# Patient Record
Sex: MALE | Race: BLACK OR AFRICAN AMERICAN | NOT HISPANIC OR LATINO
[De-identification: names, ages, dates, MRNs, and addresses within clinical notes are randomized per-mention and may not be internally consistent; named-entity substitution may affect disease eponyms.]

---

## 2017-01-03 ENCOUNTER — MEDICATION RENEWAL (OUTPATIENT)
Age: 67
End: 2017-01-03

## 2017-01-25 ENCOUNTER — APPOINTMENT (OUTPATIENT)
Dept: INTERNAL MEDICINE | Facility: CLINIC | Age: 67
End: 2017-01-25

## 2017-01-25 VITALS
DIASTOLIC BLOOD PRESSURE: 78 MMHG | WEIGHT: 173 LBS | HEIGHT: 69 IN | HEART RATE: 75 BPM | OXYGEN SATURATION: 98 % | TEMPERATURE: 97.5 F | SYSTOLIC BLOOD PRESSURE: 132 MMHG | BODY MASS INDEX: 25.62 KG/M2

## 2017-01-27 LAB
GLUCOSE BLDC GLUCOMTR-MCNC: 174
HBA1C MFR BLD HPLC: 7.6

## 2017-02-01 ENCOUNTER — MEDICATION RENEWAL (OUTPATIENT)
Age: 67
End: 2017-02-01

## 2017-02-15 ENCOUNTER — RX RENEWAL (OUTPATIENT)
Age: 67
End: 2017-02-15

## 2017-02-19 ENCOUNTER — EMERGENCY (EMERGENCY)
Facility: HOSPITAL | Age: 67
LOS: 1 days | Discharge: LEFT BEFORE DISCHARGE | End: 2017-02-19
Attending: EMERGENCY MEDICINE | Admitting: EMERGENCY MEDICINE

## 2017-02-19 VITALS
DIASTOLIC BLOOD PRESSURE: 64 MMHG | OXYGEN SATURATION: 100 % | HEART RATE: 85 BPM | RESPIRATION RATE: 16 BRPM | HEIGHT: 69 IN | TEMPERATURE: 98 F | WEIGHT: 171.08 LBS | SYSTOLIC BLOOD PRESSURE: 107 MMHG

## 2017-02-19 NOTE — ED ADULT NURSE NOTE - CHIEF COMPLAINT QUOTE
pt is unable to release the button on his prosthetic right leg.  No swelling to leg, button not working

## 2017-02-19 NOTE — ED STATDOCS - OBJECTIVE STATEMENT
67 y/o male with hx of gangrene and right leg amputation presents to ED c/o prosthetic leg malfunction. Pt has a prosthetic right leg and the button on the leg is not working to come off his leg. Pt reports he puts his prosthetic leg on daily. Pt has an orthopedist however he is closed until Tuesday so pt reported to the ED. Prosthetic was put in place by  No further complaints at this time.

## 2017-02-19 NOTE — ED STATDOCS - NS ED MD SCRIBE ATTENDING SCRIBE SECTIONS
VITAL SIGNS( Pullset)/HISTORY OF PRESENT ILLNESS/PAST MEDICAL/SURGICAL/SOCIAL HISTORY/DISPOSITION/HIV/REVIEW OF SYSTEMS/PHYSICAL EXAM/INTAKE ASSESSMENT/SCREENINGS

## 2017-03-06 ENCOUNTER — APPOINTMENT (OUTPATIENT)
Dept: INTERNAL MEDICINE | Facility: CLINIC | Age: 67
End: 2017-03-06

## 2017-03-06 VITALS
OXYGEN SATURATION: 98 % | HEIGHT: 69 IN | TEMPERATURE: 97.6 F | WEIGHT: 172 LBS | SYSTOLIC BLOOD PRESSURE: 120 MMHG | HEART RATE: 81 BPM | DIASTOLIC BLOOD PRESSURE: 74 MMHG | BODY MASS INDEX: 25.48 KG/M2

## 2017-04-11 ENCOUNTER — FORM ENCOUNTER (OUTPATIENT)
Age: 67
End: 2017-04-11

## 2017-04-11 ENCOUNTER — APPOINTMENT (OUTPATIENT)
Dept: INTERNAL MEDICINE | Facility: CLINIC | Age: 67
End: 2017-04-11

## 2017-04-11 VITALS
SYSTOLIC BLOOD PRESSURE: 118 MMHG | WEIGHT: 171 LBS | OXYGEN SATURATION: 98 % | BODY MASS INDEX: 25.33 KG/M2 | DIASTOLIC BLOOD PRESSURE: 80 MMHG | HEART RATE: 86 BPM | TEMPERATURE: 98 F | HEIGHT: 69 IN

## 2017-04-12 ENCOUNTER — OUTPATIENT (OUTPATIENT)
Dept: OUTPATIENT SERVICES | Facility: HOSPITAL | Age: 67
LOS: 1 days | End: 2017-04-12
Payer: MEDICARE

## 2017-04-12 ENCOUNTER — APPOINTMENT (OUTPATIENT)
Dept: RADIOLOGY | Facility: CLINIC | Age: 67
End: 2017-04-12

## 2017-04-12 DIAGNOSIS — E11.621 TYPE 2 DIABETES MELLITUS WITH FOOT ULCER: ICD-10-CM

## 2017-04-12 LAB
ALBUMIN SERPL ELPH-MCNC: 4.2 G/DL
ALP BLD-CCNC: 82 U/L
ALT SERPL-CCNC: 11 U/L
ANION GAP SERPL CALC-SCNC: 15 MMOL/L
AST SERPL-CCNC: 11 U/L
BASOPHILS # BLD AUTO: 0.02 K/UL
BASOPHILS NFR BLD AUTO: 0.2 %
BILIRUB SERPL-MCNC: 0.2 MG/DL
BUN SERPL-MCNC: 15 MG/DL
CALCIUM SERPL-MCNC: 9.8 MG/DL
CHLORIDE SERPL-SCNC: 98 MMOL/L
CHOLEST SERPL-MCNC: 162 MG/DL
CHOLEST/HDLC SERPL: 5.2 RATIO
CO2 SERPL-SCNC: 26 MMOL/L
CREAT SERPL-MCNC: 1.09 MG/DL
EOSINOPHIL # BLD AUTO: 0.12 K/UL
EOSINOPHIL NFR BLD AUTO: 1.1 %
GLUCOSE SERPL-MCNC: 164 MG/DL
HBA1C MFR BLD HPLC: 8 %
HCT VFR BLD CALC: 38.5 %
HDLC SERPL-MCNC: 31 MG/DL
HGB BLD-MCNC: 13 G/DL
IMM GRANULOCYTES NFR BLD AUTO: 0.4 %
LDLC SERPL CALC-MCNC: 102 MG/DL
LYMPHOCYTES # BLD AUTO: 2.86 K/UL
LYMPHOCYTES NFR BLD AUTO: 26 %
MAN DIFF?: NORMAL
MCHC RBC-ENTMCNC: 28.8 PG
MCHC RBC-ENTMCNC: 33.8 GM/DL
MCV RBC AUTO: 85.4 FL
MONOCYTES # BLD AUTO: 0.71 K/UL
MONOCYTES NFR BLD AUTO: 6.4 %
NEUTROPHILS # BLD AUTO: 7.27 K/UL
NEUTROPHILS NFR BLD AUTO: 65.9 %
PLATELET # BLD AUTO: 278 K/UL
POTASSIUM SERPL-SCNC: 4.6 MMOL/L
PROT SERPL-MCNC: 7.3 G/DL
RBC # BLD: 4.51 M/UL
RBC # FLD: 12.8 %
SODIUM SERPL-SCNC: 139 MMOL/L
TRIGL SERPL-MCNC: 147 MG/DL
WBC # FLD AUTO: 11.02 K/UL

## 2017-04-12 PROCEDURE — 73630 X-RAY EXAM OF FOOT: CPT

## 2017-04-17 LAB — BACTERIA WND CULT: ABNORMAL

## 2017-04-25 ENCOUNTER — APPOINTMENT (OUTPATIENT)
Dept: INTERNAL MEDICINE | Facility: CLINIC | Age: 67
End: 2017-04-25

## 2017-04-25 VITALS
BODY MASS INDEX: 25.33 KG/M2 | WEIGHT: 171 LBS | SYSTOLIC BLOOD PRESSURE: 120 MMHG | DIASTOLIC BLOOD PRESSURE: 80 MMHG | HEIGHT: 69 IN | OXYGEN SATURATION: 98 % | TEMPERATURE: 98.1 F | HEART RATE: 83 BPM

## 2017-05-04 ENCOUNTER — RX RENEWAL (OUTPATIENT)
Age: 67
End: 2017-05-04

## 2017-05-10 ENCOUNTER — APPOINTMENT (OUTPATIENT)
Dept: VASCULAR SURGERY | Facility: CLINIC | Age: 67
End: 2017-05-10

## 2017-05-10 ENCOUNTER — INPATIENT (INPATIENT)
Facility: HOSPITAL | Age: 67
LOS: 2 days | Discharge: HOME | DRG: 256 | End: 2017-05-13
Attending: HOSPITALIST | Admitting: HOSPITALIST
Payer: MEDICARE

## 2017-05-10 VITALS
DIASTOLIC BLOOD PRESSURE: 77 MMHG | HEART RATE: 81 BPM | OXYGEN SATURATION: 99 % | SYSTOLIC BLOOD PRESSURE: 129 MMHG | TEMPERATURE: 98 F | RESPIRATION RATE: 16 BRPM | HEIGHT: 69 IN | WEIGHT: 171.08 LBS

## 2017-05-10 VITALS
RESPIRATION RATE: 15 BRPM | WEIGHT: 171 LBS | HEART RATE: 80 BPM | SYSTOLIC BLOOD PRESSURE: 125 MMHG | TEMPERATURE: 97.4 F | BODY MASS INDEX: 25.33 KG/M2 | DIASTOLIC BLOOD PRESSURE: 72 MMHG | HEIGHT: 69 IN | OXYGEN SATURATION: 99 %

## 2017-05-10 DIAGNOSIS — I96 GANGRENE, NOT ELSEWHERE CLASSIFIED: ICD-10-CM

## 2017-05-10 LAB
ALBUMIN SERPL ELPH-MCNC: 4.4 G/DL — SIGNIFICANT CHANGE UP (ref 3.3–5.2)
ALP SERPL-CCNC: 102 U/L — SIGNIFICANT CHANGE UP (ref 40–120)
ALT FLD-CCNC: 9 U/L — SIGNIFICANT CHANGE UP
ANION GAP SERPL CALC-SCNC: 13 MMOL/L — SIGNIFICANT CHANGE UP (ref 5–17)
APTT BLD: 37.2 SEC — SIGNIFICANT CHANGE UP (ref 27.5–37.4)
AST SERPL-CCNC: 12 U/L — SIGNIFICANT CHANGE UP
BASOPHILS # BLD AUTO: 0 K/UL — SIGNIFICANT CHANGE UP (ref 0–0.2)
BASOPHILS NFR BLD AUTO: 0.2 % — SIGNIFICANT CHANGE UP (ref 0–2)
BILIRUB SERPL-MCNC: 0.1 MG/DL — LOW (ref 0.4–2)
BLD GP AB SCN SERPL QL: SIGNIFICANT CHANGE UP
BUN SERPL-MCNC: 19 MG/DL — SIGNIFICANT CHANGE UP (ref 8–20)
CALCIUM SERPL-MCNC: 9.4 MG/DL — SIGNIFICANT CHANGE UP (ref 8.6–10.2)
CHLORIDE SERPL-SCNC: 99 MMOL/L — SIGNIFICANT CHANGE UP (ref 98–107)
CO2 SERPL-SCNC: 26 MMOL/L — SIGNIFICANT CHANGE UP (ref 22–29)
CREAT SERPL-MCNC: 0.91 MG/DL — SIGNIFICANT CHANGE UP (ref 0.5–1.3)
EOSINOPHIL # BLD AUTO: 0.1 K/UL — SIGNIFICANT CHANGE UP (ref 0–0.5)
EOSINOPHIL NFR BLD AUTO: 1.3 % — SIGNIFICANT CHANGE UP (ref 0–5)
GLUCOSE SERPL-MCNC: 130 MG/DL — HIGH (ref 70–115)
HCT VFR BLD CALC: 36.8 % — LOW (ref 42–52)
HGB BLD-MCNC: 12.5 G/DL — LOW (ref 14–18)
INR BLD: 1.01 RATIO — SIGNIFICANT CHANGE UP (ref 0.88–1.16)
LYMPHOCYTES # BLD AUTO: 2.7 K/UL — SIGNIFICANT CHANGE UP (ref 1–4.8)
LYMPHOCYTES # BLD AUTO: 30.5 % — SIGNIFICANT CHANGE UP (ref 20–55)
MCHC RBC-ENTMCNC: 29 PG — SIGNIFICANT CHANGE UP (ref 27–31)
MCHC RBC-ENTMCNC: 34 G/DL — SIGNIFICANT CHANGE UP (ref 32–36)
MCV RBC AUTO: 85.4 FL — SIGNIFICANT CHANGE UP (ref 80–94)
MONOCYTES # BLD AUTO: 0.9 K/UL — HIGH (ref 0–0.8)
MONOCYTES NFR BLD AUTO: 10 % — SIGNIFICANT CHANGE UP (ref 3–10)
NEUTROPHILS # BLD AUTO: 5.2 K/UL — SIGNIFICANT CHANGE UP (ref 1.8–8)
NEUTROPHILS NFR BLD AUTO: 57.8 % — SIGNIFICANT CHANGE UP (ref 37–73)
PLATELET # BLD AUTO: 296 K/UL — SIGNIFICANT CHANGE UP (ref 150–400)
POTASSIUM SERPL-MCNC: 4 MMOL/L — SIGNIFICANT CHANGE UP (ref 3.5–5.3)
POTASSIUM SERPL-SCNC: 4 MMOL/L — SIGNIFICANT CHANGE UP (ref 3.5–5.3)
PROT SERPL-MCNC: 7.8 G/DL — SIGNIFICANT CHANGE UP (ref 6.6–8.7)
PROTHROM AB SERPL-ACNC: 11.1 SEC — SIGNIFICANT CHANGE UP (ref 9.8–12.7)
RBC # BLD: 4.31 M/UL — LOW (ref 4.6–6.2)
RBC # FLD: 12.8 % — SIGNIFICANT CHANGE UP (ref 11–15.6)
SODIUM SERPL-SCNC: 138 MMOL/L — SIGNIFICANT CHANGE UP (ref 135–145)
TYPE + AB SCN PNL BLD: SIGNIFICANT CHANGE UP
WBC # BLD: 8.9 K/UL — SIGNIFICANT CHANGE UP (ref 4.8–10.8)
WBC # FLD AUTO: 8.9 K/UL — SIGNIFICANT CHANGE UP (ref 4.8–10.8)

## 2017-05-10 PROCEDURE — 99285 EMERGENCY DEPT VISIT HI MDM: CPT

## 2017-05-10 PROCEDURE — 99223 1ST HOSP IP/OBS HIGH 75: CPT

## 2017-05-10 PROCEDURE — 73660 X-RAY EXAM OF TOE(S): CPT | Mod: 26,LT

## 2017-05-10 PROCEDURE — 99222 1ST HOSP IP/OBS MODERATE 55: CPT

## 2017-05-10 RX ORDER — PIPERACILLIN AND TAZOBACTAM 4; .5 G/20ML; G/20ML
3.38 INJECTION, POWDER, LYOPHILIZED, FOR SOLUTION INTRAVENOUS ONCE
Qty: 0 | Refills: 0 | Status: COMPLETED | OUTPATIENT
Start: 2017-05-10 | End: 2017-05-10

## 2017-05-10 RX ORDER — PANTOPRAZOLE SODIUM 20 MG/1
40 TABLET, DELAYED RELEASE ORAL
Qty: 0 | Refills: 0 | Status: DISCONTINUED | OUTPATIENT
Start: 2017-05-10 | End: 2017-05-11

## 2017-05-10 RX ORDER — SODIUM CHLORIDE 9 MG/ML
3 INJECTION INTRAMUSCULAR; INTRAVENOUS; SUBCUTANEOUS ONCE
Qty: 0 | Refills: 0 | Status: COMPLETED | OUTPATIENT
Start: 2017-05-10 | End: 2017-05-10

## 2017-05-10 RX ORDER — DEXTROSE 50 % IN WATER 50 %
25 SYRINGE (ML) INTRAVENOUS ONCE
Qty: 0 | Refills: 0 | Status: DISCONTINUED | OUTPATIENT
Start: 2017-05-10 | End: 2017-05-11

## 2017-05-10 RX ORDER — INSULIN LISPRO 100/ML
VIAL (ML) SUBCUTANEOUS
Qty: 0 | Refills: 0 | Status: DISCONTINUED | OUTPATIENT
Start: 2017-05-10 | End: 2017-05-11

## 2017-05-10 RX ORDER — ACETAMINOPHEN 500 MG
650 TABLET ORAL EVERY 6 HOURS
Qty: 0 | Refills: 0 | Status: DISCONTINUED | OUTPATIENT
Start: 2017-05-10 | End: 2017-05-11

## 2017-05-10 RX ORDER — VANCOMYCIN HCL 1 G
1000 VIAL (EA) INTRAVENOUS EVERY 12 HOURS
Qty: 0 | Refills: 0 | Status: DISCONTINUED | OUTPATIENT
Start: 2017-05-10 | End: 2017-05-10

## 2017-05-10 RX ORDER — GLUCAGON INJECTION, SOLUTION 0.5 MG/.1ML
1 INJECTION, SOLUTION SUBCUTANEOUS ONCE
Qty: 0 | Refills: 0 | Status: DISCONTINUED | OUTPATIENT
Start: 2017-05-10 | End: 2017-05-11

## 2017-05-10 RX ORDER — SODIUM CHLORIDE 9 MG/ML
1000 INJECTION, SOLUTION INTRAVENOUS
Qty: 0 | Refills: 0 | Status: DISCONTINUED | OUTPATIENT
Start: 2017-05-10 | End: 2017-05-11

## 2017-05-10 RX ORDER — DEXTROSE 50 % IN WATER 50 %
1 SYRINGE (ML) INTRAVENOUS ONCE
Qty: 0 | Refills: 0 | Status: DISCONTINUED | OUTPATIENT
Start: 2017-05-10 | End: 2017-05-11

## 2017-05-10 RX ORDER — VANCOMYCIN HCL 1 G
1000 VIAL (EA) INTRAVENOUS EVERY 8 HOURS
Qty: 0 | Refills: 0 | Status: DISCONTINUED | OUTPATIENT
Start: 2017-05-10 | End: 2017-05-11

## 2017-05-10 RX ORDER — ONDANSETRON 8 MG/1
4 TABLET, FILM COATED ORAL EVERY 6 HOURS
Qty: 0 | Refills: 0 | Status: DISCONTINUED | OUTPATIENT
Start: 2017-05-10 | End: 2017-05-11

## 2017-05-10 RX ORDER — ENOXAPARIN SODIUM 100 MG/ML
40 INJECTION SUBCUTANEOUS DAILY
Qty: 0 | Refills: 0 | Status: DISCONTINUED | OUTPATIENT
Start: 2017-05-10 | End: 2017-05-11

## 2017-05-10 RX ORDER — PIPERACILLIN AND TAZOBACTAM 4; .5 G/20ML; G/20ML
3.38 INJECTION, POWDER, LYOPHILIZED, FOR SOLUTION INTRAVENOUS EVERY 8 HOURS
Qty: 0 | Refills: 0 | Status: DISCONTINUED | OUTPATIENT
Start: 2017-05-10 | End: 2017-05-11

## 2017-05-10 RX ORDER — DEXTROSE 50 % IN WATER 50 %
12.5 SYRINGE (ML) INTRAVENOUS ONCE
Qty: 0 | Refills: 0 | Status: DISCONTINUED | OUTPATIENT
Start: 2017-05-10 | End: 2017-05-11

## 2017-05-10 RX ADMIN — SODIUM CHLORIDE 3 MILLILITER(S): 9 INJECTION INTRAMUSCULAR; INTRAVENOUS; SUBCUTANEOUS at 18:49

## 2017-05-10 RX ADMIN — Medication 250 MILLIGRAM(S): at 22:47

## 2017-05-10 RX ADMIN — PIPERACILLIN AND TAZOBACTAM 200 GRAM(S): 4; .5 INJECTION, POWDER, LYOPHILIZED, FOR SOLUTION INTRAVENOUS at 19:40

## 2017-05-10 RX ADMIN — Medication 2: at 22:21

## 2017-05-10 NOTE — ED ADULT NURSE NOTE - CHIEF COMPLAINT QUOTE
patient comes to ed from dr roa ([facility] surgeon) office. reports gangrene to left 3rd toe and sent here "to have it cleaned out." patient is diabetic.  denies fevers at home. pt states dr sánchez is supposed to see him here.

## 2017-05-10 NOTE — H&P ADULT - ASSESSMENT
1) left foot gangrene --> podiatry consult appreciated   --> start iv vanco and iv zosyn  --> ordered blood cultures  --> will call ID in am   --> rule out osteo with mri     2) Dm2 --> ssi  --> hemgolobin a1c in am  --> diabetic diet    3) pain --> percocet prn     4) dvt prop --> lovenox sub q

## 2017-05-10 NOTE — H&P ADULT - NSHPLABSRESULTS_GEN_ALL_CORE
12.5   8.9   )-----------( 296      ( 10 May 2017 17:16 )             36.8     10 May 2017 17:16    138    |  99     |  19.0   ----------------------------<  130    4.0     |  26.0   |  0.91     Ca    9.4        10 May 2017 17:16    TPro  7.8    /  Alb  4.4    /  TBili  0.1    /  DBili  x      /  AST  12     /  ALT  9      /  AlkPhos  102    10 May 2017 17:16    LIVER FUNCTIONS - ( 10 May 2017 17:16 )  Alb: 4.4 g/dL / Pro: 7.8 g/dL / ALK PHOS: 102 U/L / ALT: 9 U/L / AST: 12 U/L / GGT: x           PT/INR - ( 10 May 2017 17:16 )   PT: 11.1 sec;   INR: 1.01 ratio         PTT - ( 10 May 2017 17:16 )  PTT:37.2 sec  CAPILLARY BLOOD GLUCOSE

## 2017-05-10 NOTE — ED PROVIDER NOTE - CHPI ED SYMPTOMS NEG
no headache/no vomiting/no chills/no diarrhea/no rash/no decreased eating/drinking/no shortness of breath/no abdominal pain/no cough/no fever

## 2017-05-10 NOTE — ED ADULT NURSE NOTE - OBJECTIVE STATEMENT
patient comes to ed from dr roa ([facility] surgeon) office. reports gangrene to left 3rd toe. pt c/o left foot pain denies fever chills or diarrhea. No distress noted patient comes to ed from dr roa ([facility] surgeon) office. reports gangrene to left 3rd toe. pt c/o left foot pain denies fever chills or diarrhea. No distress noted. pt has surgical boot to left foot

## 2017-05-10 NOTE — CONSULT NOTE ADULT - SUBJECTIVE AND OBJECTIVE BOX
Patient is a 66y old  Male who presents with a chief complaint of left 3rd digit ulceration.  Patient was seen previously in Dr. Calvert's office for the ulceration in April when he first noticed the ulcer.  Patient was given a shoe modification and some abx.  Patient states that the ulcer started to get worse about a week ago.  Patient denies n/v/f/c/sob.  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      PMH:Dry eye  DM (diabetes mellitus)    Allergies: No Known Allergies    Medications: sodium chloride 0.9% lock flush 3milliLiter(s) IV Push once    FH:  PSX: Right BKA  SH: sodium chloride 0.9% lock flush 3milliLiter(s) IV Push once      Vital Signs Last 24 Hrs  T(C): 36.5, Max: 36.5 (05-10 @ 14:40)  T(F): 97.7, Max: 97.7 (05-10 @ 14:40)  HR: 81 (81 - 81)  BP: 129/77 (129/77 - 129/77)  BP(mean): --  RR: 16 (16 - 16)  SpO2: 99% (99% - 99%)    LABS                      ROS  REVIEW OF SYSTEMS:    CONSTITUTIONAL: No fever, weight loss, or fatigue  EYES: No eye pain, visual disturbances, or discharge  ENMT:  No difficulty hearing, tinnitus, vertigo; No sinus or throat pain  NECK: No pain or stiffness  BREASTS: No pain, masses, or nipple discharge  RESPIRATORY: No cough, wheezing, chills or hemoptysis; No shortness of breath  CARDIOVASCULAR: No chest pain, palpitations, dizziness, or leg swelling  GASTROINTESTINAL: No abdominal or epigastric pain. No nausea, vomiting, or hematemesis; No diarrhea or constipation. No melena or hematochezia.  GENITOURINARY: No dysuria, frequency, hematuria, or incontinence  NEUROLOGICAL: No headaches, memory loss, loss of strength, numbness, or tremors  SKIN: No itching, burning, rashes, or lesions   LYMPH NODES: No enlarged glands  ENDOCRINE: No heat or cold intolerance; No hair loss  MUSCULOSKELETAL: No joint pain or swelling; No muscle, back, or extremity pain  PSYCHIATRIC: No depression, anxiety, mood swings, or difficulty sleeping  HEME/LYMPH: No easy bruising, or bleeding gums  ALLERY AND IMMUNOLOGIC: No hives or eczema      PHYSICAL EXAM  GEN: ANDRIY LEBLANC is a pleasant well-nourished, well developed 66y Male in no acute distress, alert awake, and oriented to person, place and time.   LE Focused:  Left foot, Right LE s/p BKA  Vasc: Pedal pulses weakly palpable, CFT 2 sec x 4 w/ no ability to assess 3rd digit, TG slightly increased  Derm: Mild forefoot dorsal non pitting edema with erythema extending to to the level of the dorsal metatarsal shaft.  Distal 3rd digit has a necrotic cap with underlying fluctuance and a + mal odor.  Minor purulent drainage is noted.  Digit is cool to the touch compared to the other digits.  Neuro: protective sensation diminished   MSK: Muscle power 5/5 all groups  Imaging: Significant vascular calcifications.  No soft tissue emphysema, no osteolytic reactions

## 2017-05-10 NOTE — ED STATDOCS - PROGRESS NOTE DETAILS
65 y/o male with h/o DM, with gangrene to left third toe x 1 month, sent to ED by Dr. Mendoza (vascular surgeon), "to have his toe cleaned out". Pt with wet gangrene of left middle toe. Spoke to [facility] surgical PA Juanpablo. Pt to be admitted to medical service for IV abx with further management by podiatry. Outpatient vascular studies done, resulted negative. Pt to be transferred to main ED.

## 2017-05-10 NOTE — ED ADULT TRIAGE NOTE - CHIEF COMPLAINT QUOTE
patient comes to ed from dr roa ([facility] surgeon) office. reports gangrene to left 3rd toe and sent here "to have it cleaned out." patient is diabetic.  denies fevers at home. pt states dr sánchez is supposed to see him here.

## 2017-05-10 NOTE — H&P ADULT - NSHPPHYSICALEXAM_GEN_ALL_CORE
PHYSICAL EXAM:    GENERAL: NAD,   HEAD:  Atraumatic, Normocephalic  EYES: EOMI, PERRLA, conjunctiva and sclera clear  ENMT: No tonsillar erythema, exudates, or enlargement;   NECK: Supple, No JVD, Normal thyroid  NERVOUS SYSTEM:  Alert & Oriented X3, Good concentration;   CHEST/LUNG: Clear to percussion bilaterally; No rales  HEART: Regular rate and rhythm; No murmurs  ABDOMEN: Soft, Nontender, Nondistended;   EXTREMITIES:  right BKA with prosthesis, left foot wrapped  LYMPH: No lymphadenopathy noted

## 2017-05-10 NOTE — ED ADULT NURSE REASSESSMENT NOTE - NS ED NURSE REASSESS COMMENT FT1
Pt received from ED RN. Alert and oriented x 4. Ambulated to bathroom independently. Ortho boots in place. Dressing noted to left foot, dry clean intact. Will continue to monitor.

## 2017-05-10 NOTE — ED PROVIDER NOTE - MEDICAL DECISION MAKING DETAILS
PT WITH GANGRENOUS TOE LEFT FOOT #3. HX BKA RLE YEARS BEFORE.  MD IS MARKELL. DR CHILDRESS  IS PODIATRY

## 2017-05-10 NOTE — ED PROVIDER NOTE - OBJECTIVE STATEMENT
PATIENT STATES HE HAD GANGRENE TO TOE. HE SAW THE PODIATRIST AND THEN  TRIED TO FIND A VASCULAR DOCTOR BUT HE COULD NOT FIND ONE THAT TAKES HIS INSURANCE. HE FINALLY FOUND DR BLAKE WHO DID EVALUATE PT BUT FELT HE NEEDED TO BE ADMITTED FOR FURTHER CARE.  DR CHILDRESS MET PT IN ER AND EVALUATED.. SURGICAL TEAM REQUESTS MEDICAL ADMISSION AS PT IS DIABETIC AND NEEDS SUGARS CONTROLLED AS WELL AS INFECTION.

## 2017-05-10 NOTE — ED STATDOCS - DETAILS:
I, Jennyfer Lopez, personally performed the services described in the documentation, reviewed the documentation recorded by the scribe in my presence and it accurately and completely records my words and action.

## 2017-05-10 NOTE — H&P ADULT - HISTORY OF PRESENT ILLNESS
66 yom with pmh of right BKA with prosthesis and dm2 presents from Vascular surgeons office for 3rd toe ulceration. Patient states he was having normal follow up but due to the appearance and smell of his foot was told to come to the er. Patient states he may have developed a cut on his toe and thought nothing of it and was given po abx for the wound but is unsure of the name. Patient denies fever, chills, nausea, vomiting, diarrhea and was seen in ER by podiatry. Patient seen at bedside and complains of toe pain.

## 2017-05-11 ENCOUNTER — RESULT REVIEW (OUTPATIENT)
Age: 67
End: 2017-05-11

## 2017-05-11 LAB
ANION GAP SERPL CALC-SCNC: 15 MMOL/L — SIGNIFICANT CHANGE UP (ref 5–17)
BUN SERPL-MCNC: 18 MG/DL — SIGNIFICANT CHANGE UP (ref 8–20)
CALCIUM SERPL-MCNC: 9.1 MG/DL — SIGNIFICANT CHANGE UP (ref 8.6–10.2)
CHLORIDE SERPL-SCNC: 98 MMOL/L — SIGNIFICANT CHANGE UP (ref 98–107)
CO2 SERPL-SCNC: 24 MMOL/L — SIGNIFICANT CHANGE UP (ref 22–29)
CREAT SERPL-MCNC: 1.05 MG/DL — SIGNIFICANT CHANGE UP (ref 0.5–1.3)
GLUCOSE SERPL-MCNC: 173 MG/DL — HIGH (ref 70–115)
GRAM STN FLD: SIGNIFICANT CHANGE UP
HBA1C BLD-MCNC: 7.2 % — HIGH (ref 4–5.6)
HCT VFR BLD CALC: 35.9 % — LOW (ref 42–52)
HGB BLD-MCNC: 12 G/DL — LOW (ref 14–18)
MAGNESIUM SERPL-MCNC: 1.8 MG/DL — SIGNIFICANT CHANGE UP (ref 1.8–2.5)
MCHC RBC-ENTMCNC: 28.3 PG — SIGNIFICANT CHANGE UP (ref 27–31)
MCHC RBC-ENTMCNC: 33.4 G/DL — SIGNIFICANT CHANGE UP (ref 32–36)
MCV RBC AUTO: 84.7 FL — SIGNIFICANT CHANGE UP (ref 80–94)
PHOSPHATE SERPL-MCNC: 3.8 MG/DL — SIGNIFICANT CHANGE UP (ref 2.4–4.7)
PLATELET # BLD AUTO: 296 K/UL — SIGNIFICANT CHANGE UP (ref 150–400)
POTASSIUM SERPL-MCNC: 4.1 MMOL/L — SIGNIFICANT CHANGE UP (ref 3.5–5.3)
POTASSIUM SERPL-SCNC: 4.1 MMOL/L — SIGNIFICANT CHANGE UP (ref 3.5–5.3)
RBC # BLD: 4.24 M/UL — LOW (ref 4.6–6.2)
RBC # FLD: 12.4 % — SIGNIFICANT CHANGE UP (ref 11–15.6)
SODIUM SERPL-SCNC: 137 MMOL/L — SIGNIFICANT CHANGE UP (ref 135–145)
SPECIMEN SOURCE: SIGNIFICANT CHANGE UP
WBC # BLD: 6.7 K/UL — SIGNIFICANT CHANGE UP (ref 4.8–10.8)
WBC # FLD AUTO: 6.7 K/UL — SIGNIFICANT CHANGE UP (ref 4.8–10.8)

## 2017-05-11 PROCEDURE — 99233 SBSQ HOSP IP/OBS HIGH 50: CPT

## 2017-05-11 PROCEDURE — 88311 DECALCIFY TISSUE: CPT | Mod: 26

## 2017-05-11 PROCEDURE — 88305 TISSUE EXAM BY PATHOLOGIST: CPT | Mod: 26

## 2017-05-11 PROCEDURE — 73630 X-RAY EXAM OF FOOT: CPT | Mod: 26,LT

## 2017-05-11 RX ORDER — SODIUM CHLORIDE 9 MG/ML
1000 INJECTION, SOLUTION INTRAVENOUS
Qty: 0 | Refills: 0 | Status: DISCONTINUED | OUTPATIENT
Start: 2017-05-11 | End: 2017-05-13

## 2017-05-11 RX ORDER — VANCOMYCIN HCL 1 G
1000 VIAL (EA) INTRAVENOUS EVERY 8 HOURS
Qty: 0 | Refills: 0 | Status: DISCONTINUED | OUTPATIENT
Start: 2017-05-11 | End: 2017-05-12

## 2017-05-11 RX ORDER — FENTANYL CITRATE 50 UG/ML
50 INJECTION INTRAVENOUS
Qty: 0 | Refills: 0 | Status: DISCONTINUED | OUTPATIENT
Start: 2017-05-11 | End: 2017-05-11

## 2017-05-11 RX ORDER — INSULIN LISPRO 100/ML
VIAL (ML) SUBCUTANEOUS
Qty: 0 | Refills: 0 | Status: DISCONTINUED | OUTPATIENT
Start: 2017-05-11 | End: 2017-05-11

## 2017-05-11 RX ORDER — PANTOPRAZOLE SODIUM 20 MG/1
40 TABLET, DELAYED RELEASE ORAL
Qty: 0 | Refills: 0 | Status: DISCONTINUED | OUTPATIENT
Start: 2017-05-11 | End: 2017-05-13

## 2017-05-11 RX ORDER — DEXTROSE 50 % IN WATER 50 %
25 SYRINGE (ML) INTRAVENOUS ONCE
Qty: 0 | Refills: 0 | Status: DISCONTINUED | OUTPATIENT
Start: 2017-05-11 | End: 2017-05-13

## 2017-05-11 RX ORDER — DEXTROSE 50 % IN WATER 50 %
12.5 SYRINGE (ML) INTRAVENOUS ONCE
Qty: 0 | Refills: 0 | Status: DISCONTINUED | OUTPATIENT
Start: 2017-05-11 | End: 2017-05-13

## 2017-05-11 RX ORDER — ONDANSETRON 8 MG/1
4 TABLET, FILM COATED ORAL ONCE
Qty: 0 | Refills: 0 | Status: DISCONTINUED | OUTPATIENT
Start: 2017-05-11 | End: 2017-05-11

## 2017-05-11 RX ORDER — SODIUM CHLORIDE 9 MG/ML
1000 INJECTION INTRAMUSCULAR; INTRAVENOUS; SUBCUTANEOUS
Qty: 0 | Refills: 0 | Status: DISCONTINUED | OUTPATIENT
Start: 2017-05-11 | End: 2017-05-11

## 2017-05-11 RX ORDER — PIPERACILLIN AND TAZOBACTAM 4; .5 G/20ML; G/20ML
3.38 INJECTION, POWDER, LYOPHILIZED, FOR SOLUTION INTRAVENOUS EVERY 8 HOURS
Qty: 0 | Refills: 0 | Status: DISCONTINUED | OUTPATIENT
Start: 2017-05-11 | End: 2017-05-13

## 2017-05-11 RX ORDER — ENOXAPARIN SODIUM 100 MG/ML
40 INJECTION SUBCUTANEOUS DAILY
Qty: 0 | Refills: 0 | Status: DISCONTINUED | OUTPATIENT
Start: 2017-05-11 | End: 2017-05-13

## 2017-05-11 RX ORDER — ONDANSETRON 8 MG/1
4 TABLET, FILM COATED ORAL EVERY 6 HOURS
Qty: 0 | Refills: 0 | Status: DISCONTINUED | OUTPATIENT
Start: 2017-05-11 | End: 2017-05-13

## 2017-05-11 RX ORDER — INSULIN LISPRO 100/ML
VIAL (ML) SUBCUTANEOUS
Qty: 0 | Refills: 0 | Status: DISCONTINUED | OUTPATIENT
Start: 2017-05-11 | End: 2017-05-13

## 2017-05-11 RX ORDER — DEXTROSE 50 % IN WATER 50 %
1 SYRINGE (ML) INTRAVENOUS ONCE
Qty: 0 | Refills: 0 | Status: DISCONTINUED | OUTPATIENT
Start: 2017-05-11 | End: 2017-05-13

## 2017-05-11 RX ORDER — GLUCAGON INJECTION, SOLUTION 0.5 MG/.1ML
1 INJECTION, SOLUTION SUBCUTANEOUS ONCE
Qty: 0 | Refills: 0 | Status: DISCONTINUED | OUTPATIENT
Start: 2017-05-11 | End: 2017-05-13

## 2017-05-11 RX ORDER — ACETAMINOPHEN 500 MG
650 TABLET ORAL EVERY 6 HOURS
Qty: 0 | Refills: 0 | Status: DISCONTINUED | OUTPATIENT
Start: 2017-05-11 | End: 2017-05-13

## 2017-05-11 RX ORDER — INSULIN LISPRO 100/ML
4 VIAL (ML) SUBCUTANEOUS ONCE
Qty: 0 | Refills: 0 | Status: COMPLETED | OUTPATIENT
Start: 2017-05-11 | End: 2017-05-12

## 2017-05-11 RX ADMIN — Medication 250 MILLIGRAM(S): at 22:01

## 2017-05-11 RX ADMIN — Medication 250 MILLIGRAM(S): at 14:18

## 2017-05-11 RX ADMIN — Medication 250 MILLIGRAM(S): at 08:23

## 2017-05-11 RX ADMIN — PANTOPRAZOLE SODIUM 40 MILLIGRAM(S): 20 TABLET, DELAYED RELEASE ORAL at 08:24

## 2017-05-11 RX ADMIN — PIPERACILLIN AND TAZOBACTAM 25 GRAM(S): 4; .5 INJECTION, POWDER, LYOPHILIZED, FOR SOLUTION INTRAVENOUS at 14:18

## 2017-05-11 RX ADMIN — PIPERACILLIN AND TAZOBACTAM 25 GRAM(S): 4; .5 INJECTION, POWDER, LYOPHILIZED, FOR SOLUTION INTRAVENOUS at 23:14

## 2017-05-11 RX ADMIN — PIPERACILLIN AND TAZOBACTAM 25 GRAM(S): 4; .5 INJECTION, POWDER, LYOPHILIZED, FOR SOLUTION INTRAVENOUS at 03:22

## 2017-05-11 NOTE — PROGRESS NOTE ADULT - SUBJECTIVE AND OBJECTIVE BOX
Podiatry Post-Op Note    Surgeon: Dr. Calvert  Assistant : Dr. Pranav Swanson  Pre-op Dx: Left 3rd digit gangrene  Post-op Dx: Same  Procedure : left 3rd digit partial amputation  Pathology : Bone and soft tissue  Anesthesia : MAC  Hemostasis: None  EBL : minimal  Materials : 4-0 vicryl, 4-0 nylon  Injectables : 10 cc 1:1 1% lidocaine plain and 0.25% marcaine plain  Complications : None    Please re-admit patient to medicine    Reinforce dressing if strikethrough noted  Patient to be weightbearing with surgical shoe  Continue medications and abx  as before surgery  Surgical pathology and cultures taken and pending  Podiatry will follow while in house.

## 2017-05-11 NOTE — PROGRESS NOTE ADULT - SUBJECTIVE AND OBJECTIVE BOX
ANDRIY LEBLANC    [id number]    66y      Male    INTERVAL HPI/OVERNIGHT EVENTS:    patient being seen for toe gangrene, dm2 and medical management. Patient seen at bedside and denies any complaints.     last 24 hours patient is afebrile     REVIEW OF SYSTEMS:    CONSTITUTIONAL: No fever, weight loss, or fatigue  RESPIRATORY: No cough, wheezing, hemoptysis; No shortness of breath  CARDIOVASCULAR: No chest pain, palpitations  GASTROINTESTINAL: No abdominal or epigastric pain. No nausea, vomiting  NEUROLOGICAL: No headaches, memory loss, loss of strength.  MISCELLANEOUS:      Vital Signs Last 24 Hrs  T(C): 36.5, Max: 36.8 (05-11 @ 03:04)  T(F): 97.7, Max: 98.2 (05-11 @ 03:04)  HR: 74 (73 - 81)  BP: 134/76 (129/77 - 157/83)  BP(mean): --  RR: 18 (16 - 18)  SpO2: 98% (98% - 99%)    PHYSICAL EXAM:    GENERAL: NAD,  HEENT: PERRL, +EOMI  NECK: soft, Supple, No JVD,   CHEST/LUNG: Clear to percussion bilaterally; No wheezing  HEART: S1S2+, Regular rate and rhythm; No murmurs  ABDOMEN: Soft, Nontender, Nondistended;  EXTREMITIES: right bka, leg toe wrapped  SKIN: No rashes or lesions  NEURO: AAOX3, no focal deficits,   PSYCH: normal mood      LABS:                        12.0   6.7   )-----------( 296      ( 11 May 2017 07:51 )             35.9     05-11    137  |  98  |  18.0  ----------------------------<  173<H>  4.1   |  24.0  |  1.05    Ca    9.1      11 May 2017 07:51  Phos  3.8     05-11  Mg     1.8     05-11    TPro  7.8  /  Alb  4.4  /  TBili  0.1<L>  /  DBili  x   /  AST  12  /  ALT  9   /  AlkPhos  102  05-10    PT/INR - ( 10 May 2017 17:16 )   PT: 11.1 sec;   INR: 1.01 ratio         PTT - ( 10 May 2017 17:16 )  PTT:37.2 sec        MEDICATIONS  (STANDING):  pantoprazole    Tablet 40milliGRAM(s) Oral two times a day before meals  insulin lispro (HumaLOG) corrective regimen sliding scale  SubCutaneous three times a day before meals  dextrose 5%. 1000milliLiter(s) IV Continuous <Continuous>  dextrose 50% Injectable 12.5Gram(s) IV Push once  dextrose 50% Injectable 25Gram(s) IV Push once  dextrose 50% Injectable 25Gram(s) IV Push once  piperacillin/tazobactam IVPB. 3.375Gram(s) IV Intermittent every 8 hours  enoxaparin Injectable 40milliGRAM(s) SubCutaneous daily  vancomycin  IVPB 1000milliGRAM(s) IV Intermittent every 8 hours    MEDICATIONS  (PRN):  dextrose Gel 1Dose(s) Oral once PRN Blood Glucose LESS THAN 70 milliGRAM(s)/deciliter  glucagon  Injectable 1milliGRAM(s) IntraMuscular once PRN Glucose LESS THAN 70 milligrams/deciliter  acetaminophen   Tablet 650milliGRAM(s) Oral every 6 hours PRN For Temp greater than 38 C (100.4 F)  ondansetron Injectable 4milliGRAM(s) IV Push every 6 hours PRN Nausea and/or Vomiting  oxyCODONE  5 mG/acetaminophen 325 mG 2Tablet(s) Oral every 6 hours PRN Moderate Pain (4 - 6)      RADIOLOGY & ADDITIONAL TESTS:

## 2017-05-11 NOTE — PROGRESS NOTE ADULT - SUBJECTIVE AND OBJECTIVE BOX
This patient was seen by me in the [facility] office on 10 May as a referral from podiatry and medicine.    His last MD/DPM visit was more than 2 weeks ago and at that time was referred for vascular evaluation in the setting of a left 3rd tip of toe ulcer. the management plan as I understood it from the patient was to cover the ulcer with a Band-aide, utilize a crest pad to off-load the toe, he was started on oral antibotics (which lapsed and he did not refill or follow-up with prescriber), and given an OR shoe to wear. He was instructed to seek a vascular consult.    His right BKA was don't nearly 7 years ago for gangrene.    In the office yesterday, he presented with wet gangrene of the distal left third toe having failed an oral antibiotic trial. He required hospitalization for IV antibiotics.    ASIA/SDP's and PVR's were done. His left lower extremity vessels are non-compressible. PVR's were consistent with moderate to severe PAD, largely distal depression of waveforms but with biphasic metatarsal waveforms that would typically be consistent with perfusion adequate for healing of a single toe amp if the infection is controlled, the suture line is not made ischemic (too many sutures too tight) and the toe is adequately off-loaded as was the plan per podiatry.    The referring podiatrist was notified of the referral to the ER and the Admin ER MD phone called twice (no answer) to inform the ER of the referal and DPM's involved in the the pateint's care.    Vascular surgery will follow along to discharge and establish continuity of care for PAD surveillance going forward.

## 2017-05-11 NOTE — PROGRESS NOTE ADULT - ASSESSMENT
1) Right toe gangrene --> for OR today   --> medically cleared  --> ID consulted as well  MRI of foot    2) DM2 --> ssi  --> hemoglobin a1c sent    3) dvt prop -->lovenox sub q    4) pain --> percocet prn 1) Left 3rd toe gangrene --> for OR today   --> medically cleared  --> ID consulted as well  MRI of foot    2) DM2 --> ssi  --> hemoglobin a1c sent    3) dvt prop -->lovenox sub q    4) pain --> percocet prn

## 2017-05-11 NOTE — PROGRESS NOTE ADULT - ASSESSMENT
DM, PAD with gangrene of left third toe. No immediate indication for vascular intervention/additional imaging. Plan (per podiatry attending as relayed to me) IV antibiotics and to OR later today for left third toe amputation.

## 2017-05-11 NOTE — PROGRESS NOTE ADULT - SUBJECTIVE AND OBJECTIVE BOX
Chief Complaint : Patient is a 66y old  Male who presents with a chief complaint of left toe gangrene (10 May 2017 19:04)    HPI : HPI:  66 yom with pmh of right BKA with prosthesis and dm2 presents from Vascular surgeons office for 3rd toe ulceration. Patient states he was having normal follow up but due to the appearance and smell of his foot was told to come to the er. Patient states he may have developed a cut on his toe and thought nothing of it and was given po abx for the wound but is unsure of the name. Patient denies fever, chills, nausea, vomiting, diarrhea and was seen in ER by podiatry. Patient seen at bedside and complains of toe pain. (10 May 2017 19:04)      Patient admits to  (-) Fevers, (-) Chills, (-) Nausea, (-) Vomiting, (-) Shortness of Breath      PMH: Dry eye  DM (diabetes mellitus)    PSH:No significant past surgical history      Allergies:No Known Allergies      Labs:                          12.0   6.7   )-----------( 296      ( 11 May 2017 07:51 )             35.9     WBC Trend  6.7 Date (05-11 @ 07:51)  8.9 Date (05-10 @ 17:16)      Chem  05-11    137  |  98  |  18.0  ----------------------------<  173<H>  4.1   |  24.0  |  1.05    Ca    9.1      11 May 2017 07:51  Phos  3.8     05-11  Mg     1.8     05-11    TPro  7.8  /  Alb  4.4  /  TBili  0.1<L>  /  DBili  x   /  AST  12  /  ALT  9   /  AlkPhos  102  05-10          T(F): 97.7, Max: 98.2 (05-11 @ 03:04)  HR: 74 (73 - 81)  BP: 134/76 (129/77 - 157/83)  RR: 18 (16 - 18)  SpO2: 98% (98% - 99%)  Wt(kg): --    O:   PHYSICAL EXAM  GEN: ANDRIY LEBLANC is a pleasant well-nourished, well developed 66y Male in no acute distress, alert awake, and oriented to person, place and time.   LE Focused:  Left foot, Right LE s/p BKA  Vasc: Pedal pulses weakly palpable, CFT 2 sec x 4 w/ no ability to assess 3rd digit, TG slightly increased  Derm: Mild forefoot dorsal non pitting edema with erythema extending to to the level of the dorsal metatarsal shaft.  Distal 3rd digit has a necrotic cap with underlying fluctuance and a + mal odor.  Minor purulent drainage is noted.  Digit is cool to the touch compared to the other digits.  Neuro: protective sensation diminished   MSK: Muscle power 5/5 all groups  Imaging:  Ulceration soft tissue swelling of the distal third phalanx  without underlying fracture or osteolytic lesion..        A: Right 3rd digit gangrene.      P:   Chart reviewed and Patient evaluated  X-ray reviewed  Discussed diagnosis and treatment with patient, right 3rd digit amputation.  Patient understands risk and benefits of surgery, no guarantees given, and answered all questions to patient's satisfaction   Dicusssed with Dr. Buchanan for medical clearance Pending Medical clearence at this time.  Patient has been NPO since 8:30 AM  Patient is scheduled for surgery at 4 PM today.  Patient will be followed by podiatry while in house.

## 2017-05-12 ENCOUNTER — TRANSCRIPTION ENCOUNTER (OUTPATIENT)
Age: 67
End: 2017-05-12

## 2017-05-12 DIAGNOSIS — E11.9 TYPE 2 DIABETES MELLITUS WITHOUT COMPLICATIONS: ICD-10-CM

## 2017-05-12 DIAGNOSIS — I96 GANGRENE, NOT ELSEWHERE CLASSIFIED: ICD-10-CM

## 2017-05-12 LAB
ANION GAP SERPL CALC-SCNC: 13 MMOL/L — SIGNIFICANT CHANGE UP (ref 5–17)
BUN SERPL-MCNC: 15 MG/DL — SIGNIFICANT CHANGE UP (ref 8–20)
CALCIUM SERPL-MCNC: 9.1 MG/DL — SIGNIFICANT CHANGE UP (ref 8.6–10.2)
CHLORIDE SERPL-SCNC: 97 MMOL/L — LOW (ref 98–107)
CO2 SERPL-SCNC: 26 MMOL/L — SIGNIFICANT CHANGE UP (ref 22–29)
CREAT SERPL-MCNC: 0.93 MG/DL — SIGNIFICANT CHANGE UP (ref 0.5–1.3)
CRP SERPL-MCNC: 1.1 MG/DL — HIGH (ref 0–0.4)
ERYTHROCYTE [SEDIMENTATION RATE] IN BLOOD: 41 MM/HR — HIGH (ref 0–20)
GLUCOSE SERPL-MCNC: 123 MG/DL — HIGH (ref 70–115)
HCT VFR BLD CALC: 35.3 % — LOW (ref 42–52)
HGB BLD-MCNC: 12.2 G/DL — LOW (ref 14–18)
MAGNESIUM SERPL-MCNC: 1.8 MG/DL — SIGNIFICANT CHANGE UP (ref 1.6–2.6)
MCHC RBC-ENTMCNC: 29 PG — SIGNIFICANT CHANGE UP (ref 27–31)
MCHC RBC-ENTMCNC: 34.6 G/DL — SIGNIFICANT CHANGE UP (ref 32–36)
MCV RBC AUTO: 83.8 FL — SIGNIFICANT CHANGE UP (ref 80–94)
PLATELET # BLD AUTO: 294 K/UL — SIGNIFICANT CHANGE UP (ref 150–400)
POTASSIUM SERPL-MCNC: 3.9 MMOL/L — SIGNIFICANT CHANGE UP (ref 3.5–5.3)
POTASSIUM SERPL-SCNC: 3.9 MMOL/L — SIGNIFICANT CHANGE UP (ref 3.5–5.3)
RBC # BLD: 4.21 M/UL — LOW (ref 4.6–6.2)
RBC # FLD: 12.3 % — SIGNIFICANT CHANGE UP (ref 11–15.6)
SODIUM SERPL-SCNC: 136 MMOL/L — SIGNIFICANT CHANGE UP (ref 135–145)
VANCOMYCIN TROUGH SERPL-MCNC: 28.5 UG/ML — CRITICAL HIGH (ref 10–20)
WBC # BLD: 10.1 K/UL — SIGNIFICANT CHANGE UP (ref 4.8–10.8)
WBC # FLD AUTO: 10.1 K/UL — SIGNIFICANT CHANGE UP (ref 4.8–10.8)

## 2017-05-12 PROCEDURE — 99233 SBSQ HOSP IP/OBS HIGH 50: CPT

## 2017-05-12 RX ORDER — MORPHINE SULFATE 50 MG/1
4 CAPSULE, EXTENDED RELEASE ORAL ONCE
Qty: 0 | Refills: 0 | Status: DISCONTINUED | OUTPATIENT
Start: 2017-05-12 | End: 2017-05-12

## 2017-05-12 RX ORDER — MORPHINE SULFATE 50 MG/1
4 CAPSULE, EXTENDED RELEASE ORAL EVERY 6 HOURS
Qty: 0 | Refills: 0 | Status: DISCONTINUED | OUTPATIENT
Start: 2017-05-12 | End: 2017-05-13

## 2017-05-12 RX ADMIN — Medication 10 MILLIGRAM(S): at 18:11

## 2017-05-12 RX ADMIN — MORPHINE SULFATE 4 MILLIGRAM(S): 50 CAPSULE, EXTENDED RELEASE ORAL at 18:23

## 2017-05-12 RX ADMIN — PIPERACILLIN AND TAZOBACTAM 25 GRAM(S): 4; .5 INJECTION, POWDER, LYOPHILIZED, FOR SOLUTION INTRAVENOUS at 05:41

## 2017-05-12 RX ADMIN — PIPERACILLIN AND TAZOBACTAM 25 GRAM(S): 4; .5 INJECTION, POWDER, LYOPHILIZED, FOR SOLUTION INTRAVENOUS at 21:58

## 2017-05-12 RX ADMIN — PANTOPRAZOLE SODIUM 40 MILLIGRAM(S): 20 TABLET, DELAYED RELEASE ORAL at 06:01

## 2017-05-12 RX ADMIN — MORPHINE SULFATE 4 MILLIGRAM(S): 50 CAPSULE, EXTENDED RELEASE ORAL at 18:06

## 2017-05-12 RX ADMIN — MORPHINE SULFATE 4 MILLIGRAM(S): 50 CAPSULE, EXTENDED RELEASE ORAL at 11:26

## 2017-05-12 RX ADMIN — MORPHINE SULFATE 4 MILLIGRAM(S): 50 CAPSULE, EXTENDED RELEASE ORAL at 03:23

## 2017-05-12 RX ADMIN — Medication 4: at 13:15

## 2017-05-12 RX ADMIN — Medication 4: at 21:58

## 2017-05-12 RX ADMIN — ENOXAPARIN SODIUM 40 MILLIGRAM(S): 100 INJECTION SUBCUTANEOUS at 13:16

## 2017-05-12 RX ADMIN — PIPERACILLIN AND TAZOBACTAM 25 GRAM(S): 4; .5 INJECTION, POWDER, LYOPHILIZED, FOR SOLUTION INTRAVENOUS at 13:16

## 2017-05-12 RX ADMIN — MORPHINE SULFATE 4 MILLIGRAM(S): 50 CAPSULE, EXTENDED RELEASE ORAL at 12:09

## 2017-05-12 RX ADMIN — PANTOPRAZOLE SODIUM 40 MILLIGRAM(S): 20 TABLET, DELAYED RELEASE ORAL at 18:08

## 2017-05-12 RX ADMIN — Medication 2: at 17:17

## 2017-05-12 RX ADMIN — MORPHINE SULFATE 4 MILLIGRAM(S): 50 CAPSULE, EXTENDED RELEASE ORAL at 02:09

## 2017-05-12 RX ADMIN — Medication 4 UNIT(S): at 01:28

## 2017-05-12 NOTE — PROGRESS NOTE ADULT - SUBJECTIVE AND OBJECTIVE BOX
ANDRIY LEBLANC    [id number]    66y      Male      INTERVAL HPI/OVERNIGHT EVENTS:    patient being seen for toe gangrene, dm2 and medical management. patient seen at bedside and complains of foot pain       REVIEW OF SYSTEMS:    CONSTITUTIONAL: pain   RESPIRATORY: No cough, wheezing, hemoptysis; No shortness of breath  CARDIOVASCULAR: No chest pain, palpitations  GASTROINTESTINAL: No abdominal or epigastric pain. No nausea, vomiting  NEUROLOGICAL: No headaches, memory loss, loss of strength.  MISCELLANEOUS:      Vital Signs Last 24 Hrs  T(C): 36.6, Max: 36.6 (05-12 @ 08:09)  T(F): 97.8, Max: 97.8 (05-12 @ 08:09)  HR: 86 (74 - 86)  BP: 144/81 (130/70 - 159/79)  BP(mean): --  RR: 18 (13 - 18)  SpO2: 95% (95% - 100%)    PHYSICAL EXAM:    GENERAL: NAD,  HEENT: PERRL, +EOMI  NECK: soft, Supple, No JVD,   CHEST/LUNG: Clear to percussion bilaterally; No wheezing  HEART: S1S2+, Regular rate and rhythm; No murmurs  ABDOMEN: Soft, Nontender, Nondistended;  EXTREMITIES: right bka, leg toe wrapped  SKIN: No rashes or lesions  NEURO: AAOX3, no focal deficits,   PSYCH: normal mood      LABS:                        12.2   10.1  )-----------( 294      ( 12 May 2017 06:30 )             35.3     05-12    136  |  97<L>  |  15.0  ----------------------------<  123<H>  3.9   |  26.0  |  0.93    Ca    9.1      12 May 2017 06:30  Phos  3.8     05-11  Mg     1.8     05-12    TPro  7.8  /  Alb  4.4  /  TBili  0.1<L>  /  DBili  x   /  AST  12  /  ALT  9   /  AlkPhos  102  05-10    PT/INR - ( 10 May 2017 17:16 )   PT: 11.1 sec;   INR: 1.01 ratio         PTT - ( 10 May 2017 17:16 )  PTT:37.2 sec        MEDICATIONS  (STANDING):  pantoprazole    Tablet 40milliGRAM(s) Oral two times a day before meals  dextrose 5%. 1000milliLiter(s) IV Continuous <Continuous>  dextrose 50% Injectable 12.5Gram(s) IV Push once  dextrose 50% Injectable 25Gram(s) IV Push once  dextrose 50% Injectable 25Gram(s) IV Push once  piperacillin/tazobactam IVPB. 3.375Gram(s) IV Intermittent every 8 hours  enoxaparin Injectable 40milliGRAM(s) SubCutaneous daily  insulin lispro (HumaLOG) corrective regimen sliding scale  SubCutaneous Before meals and at bedtime  enalapril 10milliGRAM(s) Oral daily    MEDICATIONS  (PRN):  dextrose Gel 1Dose(s) Oral once PRN Blood Glucose LESS THAN 70 milliGRAM(s)/deciliter  glucagon  Injectable 1milliGRAM(s) IntraMuscular once PRN Glucose LESS THAN 70 milligrams/deciliter  acetaminophen   Tablet 650milliGRAM(s) Oral every 6 hours PRN For Temp greater than 38 C (100.4 F)  ondansetron Injectable 4milliGRAM(s) IV Push every 6 hours PRN Nausea and/or Vomiting  oxyCODONE  5 mG/acetaminophen 325 mG 2Tablet(s) Oral every 6 hours PRN Moderate Pain (4 - 6)  morphine  - Injectable 4milliGRAM(s) IV Push every 6 hours PRN Severe Pain (7 - 10)      RADIOLOGY & ADDITIONAL TESTS:    mri foot

## 2017-05-12 NOTE — CONSULT NOTE ADULT - ASSESSMENT
A  DM II  Left 3rd digit ulceration    P  Patient evaluated and chart reviewed  Rec. IV abx- Vanc zosyn  Rec. ID consult  Rec. Vascular consult  Cultures taken  Wound cleansed with normal saline  X-rays reviewed  application of betadine DSD  Rec. MRI   Patient likely to go for 3rd digit amputation on friday/monday  Podiatry will f/u while in house
This 66 y.o. man with DM2, here for left foot partial 3rd toe amputation for ulceration, GNR in topical swab.

## 2017-05-12 NOTE — PROGRESS NOTE ADULT - SUBJECTIVE AND OBJECTIVE BOX
Chief Complaint : Patient is a 66y old  Male who presents with a chief complaint of left toe gangrene (10 May 2017 19:04)    HPI : HPI:  66 yom with pmh of right BKA with prosthesis and dm2 was seen at bedside resting comfortably s/p partial 3rd digit amputaion left foot.        Patient admits to  (-) Fevers, (-) Chills, (-) Nausea, (-) Vomiting, (-) Shortness of Breath      PMH: Dry eye  DM (diabetes mellitus)    PSH:No significant past surgical history      Allergies:No Known Allergies      Labs:                        12.2   10.1  )-----------( 294      ( 12 May 2017 06:30 )             35.3                   O:   PHYSICAL EXAM  GEN: ANDRIY LEBLANC is a pleasant well-nourished, well developed 66y Male in no acute distress, alert awake, and oriented to person, place and time.   LE Focused:  Left foot, Right LE s/p BKA  Sx site: sutures intact, well coapted w/ no dehiscence.  No erythema, no edema, no drainage, no purulence, no mal odor    A: s/p right 3rd digit amputation    P:   Chart reviewed and Patient evaluated  X-ray reviewed- normal expected post op changes  Site cleansed with NS  DSD applied  Patient is podiatrically stable for d/c pending path report  Abx per ID  Patient to f/u with Dr. Calvert upon d/c  podiatry will f/u while in house

## 2017-05-12 NOTE — CONSULT NOTE ADULT - PROBLEM SELECTOR RECOMMENDATION 9
- will continue zosyn at this time ONLY.   discontinue vancomycin  - anticipate a short course of IV antibiotics if all infected tissues were removed  - check ESR and CRP - ordered for today

## 2017-05-12 NOTE — CONSULT NOTE ADULT - SUBJECTIVE AND OBJECTIVE BOX
NPP INFECTIOUS DISEASES AND INTERNAL MEDICINE OF [place]  =======================================================  Terrence Ram MD [facility] Center[facility]LEM Villar MD  Diplomates American Board of Internal Medicine and Infectious Diseases  =======================================================    [facility]-[id number]  ANDRIY LEBLANC is a 66y  Male     =======================================================  Past Medical & Surgical Hx:  =====================  PAST MEDICAL & SURGICAL HISTORY:  Dry eye  DM (diabetes mellitus)  No significant past surgical history      Problem List:  ==========  HEALTH ISSUES - PROBLEM Dx:          Social Hx:  =======    FAMILY HISTORY:  No pertinent family history in first degree relatives      Allergies    No Known Allergies    Intolerances        MEDICATIONS  (STANDING):  pantoprazole    Tablet 40milliGRAM(s) Oral two times a day before meals  dextrose 5%. 1000milliLiter(s) IV Continuous <Continuous>  dextrose 50% Injectable 12.5Gram(s) IV Push once  dextrose 50% Injectable 25Gram(s) IV Push once  dextrose 50% Injectable 25Gram(s) IV Push once  piperacillin/tazobactam IVPB. 3.375Gram(s) IV Intermittent every 8 hours  enoxaparin Injectable 40milliGRAM(s) SubCutaneous daily  vancomycin  IVPB 1000milliGRAM(s) IV Intermittent every 8 hours  insulin lispro (HumaLOG) corrective regimen sliding scale  SubCutaneous Before meals and at bedtime    MEDICATIONS  (PRN):  dextrose Gel 1Dose(s) Oral once PRN Blood Glucose LESS THAN 70 milliGRAM(s)/deciliter  glucagon  Injectable 1milliGRAM(s) IntraMuscular once PRN Glucose LESS THAN 70 milligrams/deciliter  acetaminophen   Tablet 650milliGRAM(s) Oral every 6 hours PRN For Temp greater than 38 C (100.4 F)  ondansetron Injectable 4milliGRAM(s) IV Push every 6 hours PRN Nausea and/or Vomiting  oxyCODONE  5 mG/acetaminophen 325 mG 2Tablet(s) Oral every 6 hours PRN Moderate Pain (4 - 6)        ANTIBIOTICS:      =======================================================  REVIEW OF SYSTEMS:  CONSTITUTIONAL:  as per HPI  HEENT:  Eyes:  No diplopia or blurred vision. ENT:  No earache, sore throat or runny nose.  CARDIOVASCULAR:  No pressure, squeezing, strangling, tightness, heaviness or aching about the chest, neck, axilla or epigastrium.  RESPIRATORY:  No cough, shortness of breath, PND or orthopnea.  GASTROINTESTINAL:  No nausea, vomiting or diarrhea.  GENITOURINARY:  No dysuria, frequency or urgency. No Blood in urine  MUSCULOSKELETAL:  no joint aches, no muscle pain  SKIN:  No change in skin, hair or nails.  NEUROLOGIC:  No paresthesias, fasciculations, seizures or weakness.  PSYCHIATRIC:  No disorder of thought or mood.  ENDOCRINE:  No heat or cold intolerance, polyuria or polydipsia.  HEMATOLOGICAL:  No easy bruising or bleeding.     =======================================================  I&O's Detail    I & Os for current day (as of 12 May 2017 08:07)  =============================================  IN:    Total IN: 0 ml  ---------------------------------------------  OUT:    Voided: 750 ml    Total OUT: 750 ml  ---------------------------------------------  Total NET: -750 ml      Physical Exam:  ============  Vital Signs Last 24 Hrs  T(C): 36.5, Max: 36.5 (05-11 @ 12:01)  T(F): 97.7, Max: 97.7 (05-11 @ 12:01)  HR: 77 (74 - 79)  BP: 159/79 (130/70 - 159/79)  BP(mean): --  RR: 18 (13 - 18)  SpO2: 98% (98% - 100%)    GEN: NAD, pleasant  HEENT: normocephalic and atraumatic. EOMI. ARNOL.    NECK: Supple. No carotid bruits.  No lymphadenopathy or thyromegaly.  LUNGS: Clear to auscultation.  HEART: Regular rate and rhythm without murmur.  ABDOMEN: Soft, nontender, and nondistended.  Positive bowel sounds.    EXTREMITIES: Without any cyanosis, clubbing, rash, lesions or edema.  NEUROLOGIC: Cranial nerves II through XII are grossly intact.  PSYCHIATRIC: Appropriate affect .  SKIN: No ulceration or induration present.    =======================================================  Labs:  ====   05-12    136  |  97<L>  |  15.0  ----------------------------<  123<H>  3.9   |  26.0  |  0.93    Ca    9.1      12 May 2017 06:30  Phos  3.8     05-11  Mg     1.8     05-12    TPro  7.8  /  Alb  4.4  /  TBili  0.1<L>  /  DBili  x   /  AST  12  /  ALT  9   /  AlkPhos  102  05-10                          12.2   10.1  )-----------( 294      ( 12 May 2017 06:30 )             35.3       PT/INR - ( 10 May 2017 17:16 )   PT: 11.1 sec;   INR: 1.01 ratio         PTT - ( 10 May 2017 17:16 )  PTT:37.2 sec    LIVER FUNCTIONS - ( 10 May 2017 17:16 )  Alb: 4.4 g/dL / Pro: 7.8 g/dL / ALK PHOS: 102 U/L / ALT: 9 U/L / AST: 12 U/L / GGT: x               CAPILLARY BLOOD GLUCOSE  222 (11 May 2017 22:06)  147 (11 May 2017 18:10)        RECENT CULTURES:  05-11 .Surgical Swab left 3rd digit toe (swabs) XXXX   No White blood cells  No organisms seen XXXX    05-10 Skin foot XXXX XXXX   Moderate Gram Negative Rods Identification and susceptibility to follow. NPP INFECTIOUS DISEASES AND INTERNAL MEDICINE OF [place]  =======================================================  Terrence Ram MD [facility]   Adama Villar MD  Diplomates American Board of Internal Medicine and Infectious Diseases  =======================================================    [facility]-[id number]  ANDRIY LEBLANC is a 66y  Male with long standing diabetes fro 24 years, prior RIGHT sided BKA with prosthesis who developed an ulcer of the LEFT third toe at distal tip for about 6 weeks.  patient had a delay getting to see podiatry and then to see vascular due to insurance non-acceptance. He was eventually seen by Dr. Александр Calvert and Dr. Neelima Mendoza, repsectively.  he was sent to the ER  for further management of this toe ulcer.   Xrays of the foot showed ulceration at the distal tip on the LEFT third toe. patient had been on vanco and zosyn per admitting team.  Superficial swabs showed gram negative rods, pending identification.   ESR and CRP were not sent.  patient underwent LEFT partial 3rd toe amputation on 5/11/17.  he is seen post operatively.      patent has complaints of pain only.     =======================================================  Past Medical & Surgical Hx:  =====================  PAST MEDICAL & SURGICAL HISTORY:  Dry eye  DM (diabetes mellitus)  No significant past surgical history      Problem List:  ==========  HEALTH ISSUES - PROBLEM Dx:    Social Hx:  =======  no current toxic habits.    FAMILY HISTORY:  No pertinent family history in first degree relatives      Allergies    No Known Allergies    Intolerances        MEDICATIONS  (STANDING):  pantoprazole    Tablet 40milliGRAM(s) Oral two times a day before meals  dextrose 5%. 1000milliLiter(s) IV Continuous <Continuous>  dextrose 50% Injectable 12.5Gram(s) IV Push once  dextrose 50% Injectable 25Gram(s) IV Push once  dextrose 50% Injectable 25Gram(s) IV Push once  piperacillin/tazobactam IVPB. 3.375Gram(s) IV Intermittent every 8 hours  enoxaparin Injectable 40milliGRAM(s) SubCutaneous daily  vancomycin  IVPB 1000milliGRAM(s) IV Intermittent every 8 hours  insulin lispro (HumaLOG) corrective regimen sliding scale  SubCutaneous Before meals and at bedtime    MEDICATIONS  (PRN):  dextrose Gel 1Dose(s) Oral once PRN Blood Glucose LESS THAN 70 milliGRAM(s)/deciliter  glucagon  Injectable 1milliGRAM(s) IntraMuscular once PRN Glucose LESS THAN 70 milligrams/deciliter  acetaminophen   Tablet 650milliGRAM(s) Oral every 6 hours PRN For Temp greater than 38 C (100.4 F)  ondansetron Injectable 4milliGRAM(s) IV Push every 6 hours PRN Nausea and/or Vomiting  oxyCODONE  5 mG/acetaminophen 325 mG 2Tablet(s) Oral every 6 hours PRN Moderate Pain (4 - 6)        ANTIBIOTICS:      =======================================================  REVIEW OF SYSTEMS:  CONSTITUTIONAL:  as per HPI  HEENT:  Eyes:  No diplopia or blurred vision. ENT:  No earache, sore throat or runny nose.  CARDIOVASCULAR:  No pressure, squeezing, strangling, tightness, heaviness or aching about the chest, neck, axilla or epigastrium.  RESPIRATORY:  No cough, shortness of breath, PND or orthopnea.  GASTROINTESTINAL:  No nausea, vomiting or diarrhea.  GENITOURINARY:  No dysuria, frequency or urgency. No Blood in urine  MUSCULOSKELETAL:  pain in left foot  SKIN:  No change in skin, hair or nails.  NEUROLOGIC:  No paresthesias, fasciculations, seizures or weakness.  PSYCHIATRIC:  No disorder of thought or mood.  ENDOCRINE:  No heat or cold intolerance, polyuria or polydipsia.  HEMATOLOGICAL:  No easy bruising or bleeding.     =======================================================  I&O's Detail    I & Os for current day (as of 12 May 2017 08:07)  =============================================  IN:    Total IN: 0 ml  ---------------------------------------------  OUT:    Voided: 750 ml    Total OUT: 750 ml  ---------------------------------------------  Total NET: -750 ml      Physical Exam:  ============  Vital Signs Last 24 Hrs  T(C): 36.5, Max: 36.5 (05-11 @ 12:01)  T(F): 97.7, Max: 97.7 (05-11 @ 12:01)  HR: 77 (74 - 79)  BP: 159/79 (130/70 - 159/79)  BP(mean): --  RR: 18 (13 - 18)  SpO2: 98% (98% - 100%)    GEN: NAD, pleasant  HEENT: normocephalic and atraumatic. EOMI. ARNOL.    NECK: Supple. No carotid bruits.  No lymphadenopathy or thyromegaly.  LUNGS: Clear to auscultation.  HEART: Regular rate and rhythm without murmur.  ABDOMEN: Soft, nontender, and nondistended.  Positive bowel sounds.    EXTREMITIES: Without any cyanosis, clubbing, rash, lesions or edema.  RIGHT BKA with prosthesis in place  LEFT foot with dressing in place; good pulse on DP.   NEUROLOGIC: Cranial nerves II through XII are grossly intact.  PSYCHIATRIC: Appropriate affect .  SKIN: No ulceration or induration present.    =======================================================  Labs:  ====   05-12    136  |  97<L>  |  15.0  ----------------------------<  123<H>  3.9   |  26.0  |  0.93    Ca    9.1      12 May 2017 06:30  Phos  3.8     05-11  Mg     1.8     05-12    TPro  7.8  /  Alb  4.4  /  TBili  0.1<L>  /  DBili  x   /  AST  12  /  ALT  9   /  AlkPhos  102  05-10                          12.2   10.1  )-----------( 294      ( 12 May 2017 06:30 )             35.3       PT/INR - ( 10 May 2017 17:16 )   PT: 11.1 sec;   INR: 1.01 ratio         PTT - ( 10 May 2017 17:16 )  PTT:37.2 sec    LIVER FUNCTIONS - ( 10 May 2017 17:16 )  Alb: 4.4 g/dL / Pro: 7.8 g/dL / ALK PHOS: 102 U/L / ALT: 9 U/L / AST: 12 U/L / GGT: x               CAPILLARY BLOOD GLUCOSE  222 (11 May 2017 22:06)  147 (11 May 2017 18:10)        RECENT CULTURES:  05-11 .Surgical Swab left 3rd digit toe (swabs) XXXX   No White blood cells  No organisms seen XXXX    05-10 Skin foot XXXX XXXX   Moderate Gram Negative Rods Identification and susceptibility to follow.

## 2017-05-12 NOTE — PROGRESS NOTE ADULT - ASSESSMENT
1) Left 3rd toe gangrene --> s.p amputation post op day #1  --> ID following   --> MRI of foot    2) DM2 --> ssi  --> hemoglobin a1c elevated  --> SSI     3) dvt prop -->lovenox sub q    4) pain --> percocet prn     5) htn --> enalapril home dose  --> add iv morphine prn

## 2017-05-13 ENCOUNTER — TRANSCRIPTION ENCOUNTER (OUTPATIENT)
Age: 67
End: 2017-05-13

## 2017-05-13 VITALS — OXYGEN SATURATION: 93 % | TEMPERATURE: 96 F | HEART RATE: 74 BPM

## 2017-05-13 DIAGNOSIS — A49.9 BACTERIAL INFECTION, UNSPECIFIED: ICD-10-CM

## 2017-05-13 LAB
ANION GAP SERPL CALC-SCNC: 12 MMOL/L — SIGNIFICANT CHANGE UP (ref 5–17)
BUN SERPL-MCNC: 18 MG/DL — SIGNIFICANT CHANGE UP (ref 8–20)
CALCIUM SERPL-MCNC: 9.1 MG/DL — SIGNIFICANT CHANGE UP (ref 8.6–10.2)
CHLORIDE SERPL-SCNC: 96 MMOL/L — LOW (ref 98–107)
CO2 SERPL-SCNC: 27 MMOL/L — SIGNIFICANT CHANGE UP (ref 22–29)
CREAT SERPL-MCNC: 1.01 MG/DL — SIGNIFICANT CHANGE UP (ref 0.5–1.3)
GLUCOSE SERPL-MCNC: 188 MG/DL — HIGH (ref 70–115)
HCT VFR BLD CALC: 34.3 % — LOW (ref 42–52)
HGB BLD-MCNC: 11.6 G/DL — LOW (ref 14–18)
MAGNESIUM SERPL-MCNC: 2 MG/DL — SIGNIFICANT CHANGE UP (ref 1.6–2.6)
MCHC RBC-ENTMCNC: 28.6 PG — SIGNIFICANT CHANGE UP (ref 27–31)
MCHC RBC-ENTMCNC: 33.8 G/DL — SIGNIFICANT CHANGE UP (ref 32–36)
MCV RBC AUTO: 84.5 FL — SIGNIFICANT CHANGE UP (ref 80–94)
PHOSPHATE SERPL-MCNC: 4 MG/DL — SIGNIFICANT CHANGE UP (ref 2.4–4.7)
PLATELET # BLD AUTO: 293 K/UL — SIGNIFICANT CHANGE UP (ref 150–400)
POTASSIUM SERPL-MCNC: 4 MMOL/L — SIGNIFICANT CHANGE UP (ref 3.5–5.3)
POTASSIUM SERPL-SCNC: 4 MMOL/L — SIGNIFICANT CHANGE UP (ref 3.5–5.3)
RBC # BLD: 4.06 M/UL — LOW (ref 4.6–6.2)
RBC # FLD: 12.3 % — SIGNIFICANT CHANGE UP (ref 11–15.6)
SODIUM SERPL-SCNC: 135 MMOL/L — SIGNIFICANT CHANGE UP (ref 135–145)
WBC # BLD: 9 K/UL — SIGNIFICANT CHANGE UP (ref 4.8–10.8)
WBC # FLD AUTO: 9 K/UL — SIGNIFICANT CHANGE UP (ref 4.8–10.8)

## 2017-05-13 PROCEDURE — 73720 MRI LWR EXTREMITY W/O&W/DYE: CPT

## 2017-05-13 PROCEDURE — 85610 PROTHROMBIN TIME: CPT

## 2017-05-13 PROCEDURE — 87040 BLOOD CULTURE FOR BACTERIA: CPT

## 2017-05-13 PROCEDURE — 88305 TISSUE EXAM BY PATHOLOGIST: CPT

## 2017-05-13 PROCEDURE — 83735 ASSAY OF MAGNESIUM: CPT

## 2017-05-13 PROCEDURE — 87075 CULTR BACTERIA EXCEPT BLOOD: CPT

## 2017-05-13 PROCEDURE — 85730 THROMBOPLASTIN TIME PARTIAL: CPT

## 2017-05-13 PROCEDURE — 73720 MRI LWR EXTREMITY W/O&W/DYE: CPT | Mod: 26,LT

## 2017-05-13 PROCEDURE — 86901 BLOOD TYPING SEROLOGIC RH(D): CPT

## 2017-05-13 PROCEDURE — 87070 CULTURE OTHR SPECIMN AEROBIC: CPT

## 2017-05-13 PROCEDURE — A9579: CPT

## 2017-05-13 PROCEDURE — 84100 ASSAY OF PHOSPHORUS: CPT

## 2017-05-13 PROCEDURE — 96374 THER/PROPH/DIAG INJ IV PUSH: CPT

## 2017-05-13 PROCEDURE — 99285 EMERGENCY DEPT VISIT HI MDM: CPT | Mod: 25

## 2017-05-13 PROCEDURE — 80048 BASIC METABOLIC PNL TOTAL CA: CPT

## 2017-05-13 PROCEDURE — 85652 RBC SED RATE AUTOMATED: CPT

## 2017-05-13 PROCEDURE — 83036 HEMOGLOBIN GLYCOSYLATED A1C: CPT

## 2017-05-13 PROCEDURE — 86900 BLOOD TYPING SEROLOGIC ABO: CPT

## 2017-05-13 PROCEDURE — 88311 DECALCIFY TISSUE: CPT

## 2017-05-13 PROCEDURE — 80053 COMPREHEN METABOLIC PANEL: CPT

## 2017-05-13 PROCEDURE — 86850 RBC ANTIBODY SCREEN: CPT

## 2017-05-13 PROCEDURE — 99239 HOSP IP/OBS DSCHRG MGMT >30: CPT

## 2017-05-13 PROCEDURE — 73630 X-RAY EXAM OF FOOT: CPT

## 2017-05-13 PROCEDURE — 36415 COLL VENOUS BLD VENIPUNCTURE: CPT

## 2017-05-13 PROCEDURE — 85027 COMPLETE CBC AUTOMATED: CPT

## 2017-05-13 PROCEDURE — 87186 SC STD MICRODIL/AGAR DIL: CPT

## 2017-05-13 PROCEDURE — 86140 C-REACTIVE PROTEIN: CPT

## 2017-05-13 PROCEDURE — 80202 ASSAY OF VANCOMYCIN: CPT

## 2017-05-13 PROCEDURE — 73660 X-RAY EXAM OF TOE(S): CPT

## 2017-05-13 RX ORDER — MORPHINE SULFATE 50 MG/1
4 CAPSULE, EXTENDED RELEASE ORAL EVERY 4 HOURS
Qty: 0 | Refills: 0 | Status: DISCONTINUED | OUTPATIENT
Start: 2017-05-13 | End: 2017-05-13

## 2017-05-13 RX ORDER — DULAGLUTIDE 4.5 MG/.5ML
0 INJECTION, SOLUTION SUBCUTANEOUS
Qty: 0 | Refills: 0 | COMMUNITY

## 2017-05-13 RX ORDER — DULAGLUTIDE 4.5 MG/.5ML
1 INJECTION, SOLUTION SUBCUTANEOUS
Qty: 30 | Refills: 0 | OUTPATIENT
Start: 2017-05-13

## 2017-05-13 RX ORDER — CIPROFLOXACIN LACTATE 400MG/40ML
1 VIAL (ML) INTRAVENOUS
Qty: 7 | Refills: 0 | OUTPATIENT
Start: 2017-05-13 | End: 2017-05-20

## 2017-05-13 RX ADMIN — MORPHINE SULFATE 4 MILLIGRAM(S): 50 CAPSULE, EXTENDED RELEASE ORAL at 00:09

## 2017-05-13 RX ADMIN — MORPHINE SULFATE 4 MILLIGRAM(S): 50 CAPSULE, EXTENDED RELEASE ORAL at 12:58

## 2017-05-13 RX ADMIN — MORPHINE SULFATE 4 MILLIGRAM(S): 50 CAPSULE, EXTENDED RELEASE ORAL at 00:24

## 2017-05-13 RX ADMIN — PANTOPRAZOLE SODIUM 40 MILLIGRAM(S): 20 TABLET, DELAYED RELEASE ORAL at 06:06

## 2017-05-13 RX ADMIN — Medication 4: at 12:49

## 2017-05-13 RX ADMIN — PIPERACILLIN AND TAZOBACTAM 25 GRAM(S): 4; .5 INJECTION, POWDER, LYOPHILIZED, FOR SOLUTION INTRAVENOUS at 05:58

## 2017-05-13 RX ADMIN — MORPHINE SULFATE 4 MILLIGRAM(S): 50 CAPSULE, EXTENDED RELEASE ORAL at 08:51

## 2017-05-13 RX ADMIN — Medication 4: at 16:46

## 2017-05-13 RX ADMIN — PANTOPRAZOLE SODIUM 40 MILLIGRAM(S): 20 TABLET, DELAYED RELEASE ORAL at 16:46

## 2017-05-13 RX ADMIN — PIPERACILLIN AND TAZOBACTAM 25 GRAM(S): 4; .5 INJECTION, POWDER, LYOPHILIZED, FOR SOLUTION INTRAVENOUS at 14:52

## 2017-05-13 RX ADMIN — Medication 10 MILLIGRAM(S): at 06:06

## 2017-05-13 RX ADMIN — Medication 2: at 08:06

## 2017-05-13 RX ADMIN — ENOXAPARIN SODIUM 40 MILLIGRAM(S): 100 INJECTION SUBCUTANEOUS at 12:58

## 2017-05-13 NOTE — DISCHARGE NOTE ADULT - MEDICATION SUMMARY - MEDICATIONS TO TAKE
I will START or STAY ON the medications listed below when I get home from the hospital:    Percocet 5/325 325 mg-5 mg oral tablet  -- 2 tab(s) by mouth every 6 hours MDD:max 8 tabs  -- Caution federal law prohibits the transfer of this drug to any person other  than the person for whom it was prescribed.  May cause drowsiness.  Alcohol may intensify this effect.  Use care when operating dangerous machinery.  This prescription cannot be refilled.  This product contains acetaminophen.  Do not use  with any other product containing acetaminophen to prevent possible liver damage.  Using more of this medication than prescribed may cause serious breathing problems.    -- Indication: For pain    enalapril 10 mg oral tablet  -- 1 tab(s) by mouth once a day  -- Indication: For htn    metFORMIN 500 mg oral tablet  -- 1 tab(s) by mouth 2 times a day  -- Indication: For DM (diabetes mellitus)    glipiZIDE 10 mg oral tablet  -- 1 tab(s) by mouth 2 times a day  -- Indication: For DM (diabetes mellitus)    Lantus 100 units/mL subcutaneous solution  -- 15 unit(s) subcutaneous once a day (at bedtime)  -- Indication: For DM (diabetes mellitus)    Trulicity Pen 1.5 mg/0.5 mL subcutaneous solution  -- 1 dose(s) subcutaneous every 7 days  -- Indication: For DM (diabetes mellitus)    levoFLOXacin 750 mg oral tablet  -- 1 tab(s) by mouth every 24 hours  -- Indication: For foot infection

## 2017-05-13 NOTE — DISCHARGE NOTE ADULT - HOSPITAL COURSE
66 yom with pmh of right BKA with prosthesis and dm2 presents from Vascular surgeons (leandra) office for 3rd toe ulceration. Patient states he was having normal follow up but due to the appearance and smell of his foot was told to come to the er. Patient states he may have developed a cut on his toe and thought nothing of it and was given po abx for the wound but is unsure of the name.    Patient admitted to medicine and started on IV abx and seen by podiatry, vascular and ID. Patient had an amputation of 3rd left toe on 5/11 and tolerated it well. Patient is still ordered for MRI foot to rule out osteo and ID narrowed his abx to iv zosyn. Patient continues to have pain and is on IV morphine prn. 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    Findings consistent with osteochondral injury along the heads of the   second and third metatarsals with associated periarticular edema about   the second and third metatarsal phalangeal joints.    time spent on dc 35 minutes

## 2017-05-13 NOTE — PROGRESS NOTE ADULT - ASSESSMENT
1) Left 3rd toe gangrene --> s.p amputation post op day #2  --> ID, podiatry and vascular following  --> c.w iv zosyn   --> MRI of foot still pending to rule out osteo     2) DM2 -->  --> hemoglobin a1c elevated  --> SSI   --> dc on home oral meds when ready for dc    3) dvt prop -->lovenox sub q    4) pain --> iv morphine prn     5) htn --> stable    dispo --> await MRI of foot  pt consult

## 2017-05-13 NOTE — PROGRESS NOTE ADULT - SUBJECTIVE AND OBJECTIVE BOX
Chief Complaint : Patient is a 66y old  Male who presents with a chief complaint of left toe gangrene (10 May 2017 19:04)    HPI : HPI:  66 yom with pmh of right BKA with prosthesis and dm2 was seen at bedside resting comfortably s/p partial 3rd digit amputaion left foot.        Patient admits to  (-) Fevers, (-) Chills, (-) Nausea, (-) Vomiting, (-) Shortness of Breath      PMH: Dry eye  DM (diabetes mellitus)    PSH:No significant past surgical history      Allergies:No Known Allergies      O:   PHYSICAL EXAM  GEN: ANDRIY LEBLANC is a pleasant well-nourished, well developed 66y Male in no acute distress, alert awake, and oriented to person, place and time.   LE Focused:  Left foot, Right LE s/p BKA  Sx site: sutures intact, well coapted w/ no dehiscence.  No erythema, no edema, no drainage, no purulence, no mal odor, dusky appearance to the surgical site around the sutures and proximal , no tenderness on palpation     A: s/p right 3rd digit amputation    P:   Chart reviewed and Patient evaluated  X-ray reviewed- normal expected post op changes  Site cleansed with NS  DSD applied  Discussed with patient about the importance of elevating his foot at all times, minimal walking to the bathroom, otherwise no walking a t all.   Patient to follow up with Dr. Calvert upon discharge  Change his dressing daily with dry sterile dressing, do not get the foot wet at all.

## 2017-05-13 NOTE — PROGRESS NOTE ADULT - SUBJECTIVE AND OBJECTIVE BOX
ANDRIY LEBLANC    [id number]    66y      Male    INTERVAL HPI/OVERNIGHT EVENTS:    REVIEW OF SYSTEMS:    CONSTITUTIONAL: No fever, weight loss, or fatigue  RESPIRATORY: No cough, wheezing, hemoptysis; No shortness of breath  CARDIOVASCULAR: No chest pain, palpitations  GASTROINTESTINAL: No abdominal or epigastric pain. No nausea, vomiting  NEUROLOGICAL: No headaches, memory loss, loss of strength.  MISCELLANEOUS:      Vital Signs Last 24 Hrs  T(C): 36.2, Max: 36.2 (05-12 @ 17:22)  T(F): 97.2, Max: 97.2 (05-12 @ 17:22)  HR: 82 (82 - 84)  BP: 135/72 (134/82 - 135/72)  BP(mean): --  RR: 18 (18 - 18)  SpO2: 97% (96% - 97%)    PHYSICAL EXAM:    GENERAL: NAD, well-groomed  HEENT: PERRL, +EOMI  NECK: soft, Supple, No JVD,   CHEST/LUNG: Clear to percussion bilaterally; No wheezing  HEART: S1S2+, Regular rate and rhythm; No murmurs, rubs, or gallops  ABDOMEN: Soft, Nontender, Nondistended; Bowel sounds present  EXTREMITIES:  2+ Peripheral Pulses, No clubbing, cyanosis, or edema  SKIN: No rashes or lesions  NEURO: AAOX3, no focal deficits, no motor r sensory loss  PSYCH: normal mood      LABS:                        11.6   9.0   )-----------( 293      ( 13 May 2017 08:14 )             34.3     05-13    135  |  96<L>  |  18.0  ----------------------------<  188<H>  4.0   |  27.0  |  1.01    Ca    9.1      13 May 2017 08:13  Phos  4.0     05-13  Mg     2.0     05-13        MEDICATIONS  (STANDING):  pantoprazole    Tablet 40milliGRAM(s) Oral two times a day before meals  dextrose 5%. 1000milliLiter(s) IV Continuous <Continuous>  dextrose 50% Injectable 12.5Gram(s) IV Push once  dextrose 50% Injectable 25Gram(s) IV Push once  dextrose 50% Injectable 25Gram(s) IV Push once  piperacillin/tazobactam IVPB. 3.375Gram(s) IV Intermittent every 8 hours  enoxaparin Injectable 40milliGRAM(s) SubCutaneous daily  insulin lispro (HumaLOG) corrective regimen sliding scale  SubCutaneous Before meals and at bedtime  enalapril 10milliGRAM(s) Oral daily    MEDICATIONS  (PRN):  dextrose Gel 1Dose(s) Oral once PRN Blood Glucose LESS THAN 70 milliGRAM(s)/deciliter  glucagon  Injectable 1milliGRAM(s) IntraMuscular once PRN Glucose LESS THAN 70 milligrams/deciliter  acetaminophen   Tablet 650milliGRAM(s) Oral every 6 hours PRN For Temp greater than 38 C (100.4 F)  ondansetron Injectable 4milliGRAM(s) IV Push every 6 hours PRN Nausea and/or Vomiting  morphine  - Injectable 4milliGRAM(s) IV Push every 4 hours PRN Moderate Pain (4 - 6)      RADIOLOGY & ADDITIONAL TESTS: ANDRIY LEBLANC    [id number]    66y      Male    INTERVAL HPI/OVERNIGHT EVENTS:    off service note:      REVIEW OF SYSTEMS:    CONSTITUTIONAL: No fever, weight loss, or fatigue  RESPIRATORY: No cough, wheezing, hemoptysis; No shortness of breath  CARDIOVASCULAR: No chest pain, palpitations  GASTROINTESTINAL: No abdominal or epigastric pain. No nausea, vomiting  NEUROLOGICAL: No headaches, memory loss, loss of strength.  MISCELLANEOUS:      Vital Signs Last 24 Hrs  T(C): 36.2, Max: 36.2 (05-12 @ 17:22)  T(F): 97.2, Max: 97.2 (05-12 @ 17:22)  HR: 82 (82 - 84)  BP: 135/72 (134/82 - 135/72)  BP(mean): --  RR: 18 (18 - 18)  SpO2: 97% (96% - 97%)    PHYSICAL EXAM:    GENERAL: NAD, well-groomed  HEENT: PERRL, +EOMI  NECK: soft, Supple, No JVD,   CHEST/LUNG: Clear to percussion bilaterally; No wheezing  HEART: S1S2+, Regular rate and rhythm; No murmurs, rubs, or gallops  ABDOMEN: Soft, Nontender, Nondistended; Bowel sounds present  EXTREMITIES:  2+ Peripheral Pulses, No clubbing, cyanosis, or edema  SKIN: No rashes or lesions  NEURO: AAOX3, no focal deficits, no motor r sensory loss  PSYCH: normal mood      LABS:                        11.6   9.0   )-----------( 293      ( 13 May 2017 08:14 )             34.3     05-13    135  |  96<L>  |  18.0  ----------------------------<  188<H>  4.0   |  27.0  |  1.01    Ca    9.1      13 May 2017 08:13  Phos  4.0     05-13  Mg     2.0     05-13        MEDICATIONS  (STANDING):  pantoprazole    Tablet 40milliGRAM(s) Oral two times a day before meals  dextrose 5%. 1000milliLiter(s) IV Continuous <Continuous>  dextrose 50% Injectable 12.5Gram(s) IV Push once  dextrose 50% Injectable 25Gram(s) IV Push once  dextrose 50% Injectable 25Gram(s) IV Push once  piperacillin/tazobactam IVPB. 3.375Gram(s) IV Intermittent every 8 hours  enoxaparin Injectable 40milliGRAM(s) SubCutaneous daily  insulin lispro (HumaLOG) corrective regimen sliding scale  SubCutaneous Before meals and at bedtime  enalapril 10milliGRAM(s) Oral daily    MEDICATIONS  (PRN):  dextrose Gel 1Dose(s) Oral once PRN Blood Glucose LESS THAN 70 milliGRAM(s)/deciliter  glucagon  Injectable 1milliGRAM(s) IntraMuscular once PRN Glucose LESS THAN 70 milligrams/deciliter  acetaminophen   Tablet 650milliGRAM(s) Oral every 6 hours PRN For Temp greater than 38 C (100.4 F)  ondansetron Injectable 4milliGRAM(s) IV Push every 6 hours PRN Nausea and/or Vomiting  morphine  - Injectable 4milliGRAM(s) IV Push every 4 hours PRN Moderate Pain (4 - 6)      RADIOLOGY & ADDITIONAL TESTS: ANDRIY LEBLANC    [id number]    66y      Male    INTERVAL HPI/OVERNIGHT EVENTS:    off service note:  66 yom with pmh of right BKA with prosthesis and dm2 presents from Vascular surgeons (leandra) office for 3rd toe ulceration. Patient states he was having normal follow up but due to the appearance and smell of his foot was told to come to the er. Patient states he may have developed a cut on his toe and thought nothing of it and was given po abx for the wound but is unsure of the name.    Patient admitted to medicine and started on IV abx and seen by podiatry, vascular and ID. Patient had an amputation of 3rd left toe on 5/11 and tolerated it well. Patient is still ordered for MRI foot to rule out osteo and ID narrowed his abx to iv zosyn. Patient continues to have pain and is on IV morphine prn. Patients hemoglobin a1c is 7.2 Patient has remained afebrile and stable    last 24 hours patient is afebrile.      REVIEW OF SYSTEMS:    CONSTITUTIONAL: pain   RESPIRATORY: No cough, wheezing, hemoptysis; No shortness of breath  CARDIOVASCULAR: No chest pain, palpitations  GASTROINTESTINAL: No abdominal or epigastric pain. No nausea, vomiting  NEUROLOGICAL: No headaches, memory loss, loss of strength.  MISCELLANEOUS:      Vital Signs Last 24 Hrs  T(C): 36.2, Max: 36.2 (05-12 @ 17:22)  T(F): 97.2, Max: 97.2 (05-12 @ 17:22)  HR: 82 (82 - 84)  BP: 135/72 (134/82 - 135/72)  BP(mean): --  RR: 18 (18 - 18)  SpO2: 97% (96% - 97%)    PHYSICAL EXAM:    GENERAL: NAD,  HEENT: PERRL, +EOMI  NECK: soft, Supple, No JVD,   CHEST/LUNG: Clear to percussion bilaterally; No wheezing  HEART: S1S2+, Regular rate and rhythm; No murmurs  ABDOMEN: Soft, Nontender, Nondistended;  EXTREMITIES: right bka, leg toe wrapped  SKIN: No rashes or lesions  NEURO: AAOX3, no focal deficits,   PSYCH: normal mood      LABS:                        11.6   9.0   )-----------( 293      ( 13 May 2017 08:14 )             34.3     05-13    135  |  96<L>  |  18.0  ----------------------------<  188<H>  4.0   |  27.0  |  1.01    Ca    9.1      13 May 2017 08:13  Phos  4.0     05-13  Mg     2.0     05-13        MEDICATIONS  (STANDING):  pantoprazole    Tablet 40milliGRAM(s) Oral two times a day before meals  dextrose 5%. 1000milliLiter(s) IV Continuous <Continuous>  dextrose 50% Injectable 12.5Gram(s) IV Push once  dextrose 50% Injectable 25Gram(s) IV Push once  dextrose 50% Injectable 25Gram(s) IV Push once  piperacillin/tazobactam IVPB. 3.375Gram(s) IV Intermittent every 8 hours  enoxaparin Injectable 40milliGRAM(s) SubCutaneous daily  insulin lispro (HumaLOG) corrective regimen sliding scale  SubCutaneous Before meals and at bedtime  enalapril 10milliGRAM(s) Oral daily    MEDICATIONS  (PRN):  dextrose Gel 1Dose(s) Oral once PRN Blood Glucose LESS THAN 70 milliGRAM(s)/deciliter  glucagon  Injectable 1milliGRAM(s) IntraMuscular once PRN Glucose LESS THAN 70 milligrams/deciliter  acetaminophen   Tablet 650milliGRAM(s) Oral every 6 hours PRN For Temp greater than 38 C (100.4 F)  ondansetron Injectable 4milliGRAM(s) IV Push every 6 hours PRN Nausea and/or Vomiting  morphine  - Injectable 4milliGRAM(s) IV Push every 4 hours PRN Moderate Pain (4 - 6)      RADIOLOGY & ADDITIONAL TESTS:    mri foot --> ordered

## 2017-05-13 NOTE — PROGRESS NOTE ADULT - ASSESSMENT
This 66 y.o. man with DM2, here for left foot partial 3rd toe amputation for ulceration, GNR in superficial swab.

## 2017-05-13 NOTE — DISCHARGE NOTE ADULT - PATIENT PORTAL LINK FT
“You can access the Follow[facility] Patient Portal, offered by [facility], by registering with the following website: [email or web address]”

## 2017-05-13 NOTE — PROGRESS NOTE ADULT - PROVIDER SPECIALTY LIST ADULT
Anesthesia
Infectious Disease
Internal Medicine
Podiatry
Vascular Surgery
Podiatry

## 2017-05-13 NOTE — DISCHARGE NOTE ADULT - CARE PROVIDERS DIRECT ADDRESSES
,[email or web address]

## 2017-05-13 NOTE — PROGRESS NOTE ADULT - SUBJECTIVE AND OBJECTIVE BOX
[facility] [place]  =======================================================  Terrence Ram MD [facility]   Adama Villar MD  Diplomates American Board of Internal Medicine and Infectious Diseases  =======================================================    [id number]  ANDRIY LEBLANC    patient seen in follow up for Left partial 3rd toe amputation.   per podiatry  infected tissues removed.     =======================================================     Allergies  No Known Allergies    MEDICATIONS  (STANDING):  pantoprazole    Tablet 40milliGRAM(s) Oral two times a day before meals  dextrose 5%. 1000milliLiter(s) IV Continuous <Continuous>  dextrose 50% Injectable 12.5Gram(s) IV Push once  dextrose 50% Injectable 25Gram(s) IV Push once  dextrose 50% Injectable 25Gram(s) IV Push once  piperacillin/tazobactam IVPB. 3.375Gram(s) IV Intermittent every 8 hours  enoxaparin Injectable 40milliGRAM(s) SubCutaneous daily  insulin lispro (HumaLOG) corrective regimen sliding scale  SubCutaneous Before meals and at bedtime  enalapril 10milliGRAM(s) Oral daily       =======================================================  REVIEW OF SYSTEMS:  CONSTITUTIONAL:  as per HPI  HEENT:  Eyes:  No diplopia or blurred vision. ENT:  No earache, sore throat or runny nose.  CARDIOVASCULAR:  No pressure, squeezing, strangling, tightness, heaviness or aching about the chest, neck, axilla or epigastrium.  RESPIRATORY:  No cough, shortness of breath, PND or orthopnea.  GASTROINTESTINAL:  No nausea, vomiting or diarrhea.  GENITOURINARY:  No dysuria, frequency or urgency. No Blood in urine  MUSCULOSKELETAL:  pain in left foot  SKIN:  No change in skin, hair or nails.  NEUROLOGIC:  No paresthesias, fasciculations, seizures or weakness.  PSYCHIATRIC:  No disorder of thought or mood.  ENDOCRINE:  No heat or cold intolerance, polyuria or polydipsia.  HEMATOLOGICAL:  No easy bruising or bleeding.     =======================================================  Physical Exam:  ============  Vital Signs Last 24 Hrs  ICU Vital Signs Last 24 Hrs  T(C): 36.2, Max: 36.2 (05-12 @ 17:22)  T(F): 97.2, Max: 97.2 (05-12 @ 17:22)  HR: 76 (76 - 84)  BP: 108/62 (108/62 - 135/72)  BP(mean): --  ABP: --  ABP(mean): --  RR: 18 (18 - 18)  SpO2: 97% (96% - 97%)    GEN: NAD, pleasant  HEENT: normocephalic and atraumatic. EOMI. ARNOL.    NECK: Supple. No carotid bruits.  No lymphadenopathy or thyromegaly.  LUNGS: Clear to auscultation.  HEART: Regular rate and rhythm without murmur.  ABDOMEN: Soft, nontender, and nondistended.  Positive bowel sounds.    EXTREMITIES: Without any cyanosis, clubbing, rash, lesions or edema.  RIGHT BKA with prosthesis in place  LEFT foot with dressing in place; good pulse on DP.   NEUROLOGIC: Cranial nerves II through XII are grossly intact.  PSYCHIATRIC: Appropriate affect .  SKIN: No ulceration or induration present.    =======================================================  Labs:  ====  05-13    135  |  96<L>  |  18.0  ----------------------------<  188<H>  4.0   |  27.0  |  1.01    Ca    9.1      13 May 2017 08:13  Phos  4.0     05-13  Mg     2.0     05-13                            11.6   9.0   )-----------( 293      ( 13 May 2017 08:14 )             34.3                CAPILLARY BLOOD GLUCOSE  192 (13 May 2017 07:59)  209 (12 May 2017 22:00)  189 (12 May 2017 17:31)        RECENT CULTURES:  05-11 .Surgical Swab left 3rd digit toe (swabs) XXXX   No White blood cells  No organisms seen   Culture in progress    05-11 .Blood Blood XXXX XXXX   No growth at 48 hours    05-10 Skin foot Providencia rettgeri XXXX   Moderate Providencia rettgeri  Culture in progress        Culture - Other (05.10.17 @ 19:07)    -  Ampicillin/Sulbactam: I 16/8    -  Cefepime: S <=4    -  Ceftazidime: S <=1    -  Ertapenem: S <=1    -  Gentamicin: S <=4    -  Tobramycin: S <=4    -  Cefazolin: R >16    -  Ciprofloxacin: S <=1    -  Meropenem: S <=1    -  Ampicillin: R >16    -  Aztreonam: S <=4    -  Cefoxitin: S <=8    -  Amikacin: S <=16    -  Ceftriaxone: S <=1    -  Imipenem: S <=1    -  Levofloxacin: S <=2    -  Piperacillin/Tazobactam: S <=16    -  Trimethoprim/Sulfamethoxazole: S <=2/38    Specimen Source: Skin foot    Culture Results:   Moderate Providencia rettgeri  Culture in progress    Organism Identification: Providencia rettgeri    Organism: Providencia retvanessaeri    Method Type: SHAZIA

## 2017-05-13 NOTE — DISCHARGE NOTE ADULT - CARE PROVIDER_API CALL
Jabari Renee), [facility]  [address]  [place] [address]  Phone: [phone]  Fax: [phone]    Jay Mccurdy ([email or web address]), Podiatric Medicine and Surgery  [address]  [place] [address]  Phone: [phone]  Fax: [phone]    Neelima Mendoza (MD; PhD), Vascular Surgery  [address] [place] [address]  Phone: [phone]  Fax: [phone]

## 2017-05-13 NOTE — PROGRESS NOTE ADULT - SUBJECTIVE AND OBJECTIVE BOX
Anesthesia post op  65 yo S/P toe amputation  No anesthetic issues evident, VSS, pain well controlled. No N/V reported.

## 2017-05-13 NOTE — PROGRESS NOTE ADULT - PROBLEM SELECTOR PLAN 2
no pseudomonas on swab cx  moderate providenciae rettgeri  SS Cipro and Levaquin  - d/c zosyn  - Po Levaquin for 7 days

## 2017-05-13 NOTE — DISCHARGE NOTE ADULT - CARE PLAN
Principal Discharge DX:	Gangrene of toe  Goal:	sp amputation  Instructions for follow-up, activity and diet:	follow up with podiatry  vascular  Secondary Diagnosis:	DM (diabetes mellitus)  Goal:	home meds  Secondary Diagnosis:	HTN (hypertension)  Goal:	home meds  Secondary Diagnosis:	Pain  Goal:	peroccet prn

## 2017-05-15 LAB
-  AMIKACIN: SIGNIFICANT CHANGE UP
-  AMIKACIN: SIGNIFICANT CHANGE UP
-  AMPICILLIN/SULBACTAM: SIGNIFICANT CHANGE UP
-  AMPICILLIN/SULBACTAM: SIGNIFICANT CHANGE UP
-  AMPICILLIN: SIGNIFICANT CHANGE UP
-  AMPICILLIN: SIGNIFICANT CHANGE UP
-  AZTREONAM: SIGNIFICANT CHANGE UP
-  AZTREONAM: SIGNIFICANT CHANGE UP
-  CEFAZOLIN: SIGNIFICANT CHANGE UP
-  CEFAZOLIN: SIGNIFICANT CHANGE UP
-  CEFEPIME: SIGNIFICANT CHANGE UP
-  CEFEPIME: SIGNIFICANT CHANGE UP
-  CEFOXITIN: SIGNIFICANT CHANGE UP
-  CEFOXITIN: SIGNIFICANT CHANGE UP
-  CEFTAZIDIME: SIGNIFICANT CHANGE UP
-  CEFTAZIDIME: SIGNIFICANT CHANGE UP
-  CEFTRIAXONE: SIGNIFICANT CHANGE UP
-  CEFTRIAXONE: SIGNIFICANT CHANGE UP
-  CIPROFLOXACIN: SIGNIFICANT CHANGE UP
-  CIPROFLOXACIN: SIGNIFICANT CHANGE UP
-  ERTAPENEM: SIGNIFICANT CHANGE UP
-  ERTAPENEM: SIGNIFICANT CHANGE UP
-  GENTAMICIN: SIGNIFICANT CHANGE UP
-  GENTAMICIN: SIGNIFICANT CHANGE UP
-  IMIPENEM: SIGNIFICANT CHANGE UP
-  IMIPENEM: SIGNIFICANT CHANGE UP
-  LEVOFLOXACIN: SIGNIFICANT CHANGE UP
-  LEVOFLOXACIN: SIGNIFICANT CHANGE UP
-  MEROPENEM: SIGNIFICANT CHANGE UP
-  MEROPENEM: SIGNIFICANT CHANGE UP
-  PIPERACILLIN/TAZOBACTAM: SIGNIFICANT CHANGE UP
-  PIPERACILLIN/TAZOBACTAM: SIGNIFICANT CHANGE UP
-  TOBRAMYCIN: SIGNIFICANT CHANGE UP
-  TOBRAMYCIN: SIGNIFICANT CHANGE UP
-  TRIMETHOPRIM/SULFAMETHOXAZOLE: SIGNIFICANT CHANGE UP
-  TRIMETHOPRIM/SULFAMETHOXAZOLE: SIGNIFICANT CHANGE UP
METHOD TYPE: SIGNIFICANT CHANGE UP
METHOD TYPE: SIGNIFICANT CHANGE UP

## 2017-05-16 LAB
CULTURE RESULTS: SIGNIFICANT CHANGE UP
METHOD TYPE: SIGNIFICANT CHANGE UP
ORGANISM # SPEC MICROSCOPIC CNT: SIGNIFICANT CHANGE UP
SPECIMEN SOURCE: SIGNIFICANT CHANGE UP

## 2017-05-17 LAB
CULTURE RESULTS: SIGNIFICANT CHANGE UP
CULTURE RESULTS: SIGNIFICANT CHANGE UP
SPECIMEN SOURCE: SIGNIFICANT CHANGE UP
SPECIMEN SOURCE: SIGNIFICANT CHANGE UP

## 2017-05-18 LAB — SURGICAL PATHOLOGY FINAL REPORT - CH: SIGNIFICANT CHANGE UP

## 2017-05-19 ENCOUNTER — MEDICATION RENEWAL (OUTPATIENT)
Age: 67
End: 2017-05-19

## 2017-05-26 ENCOUNTER — APPOINTMENT (OUTPATIENT)
Dept: INTERNAL MEDICINE | Facility: CLINIC | Age: 67
End: 2017-05-26

## 2017-07-06 ENCOUNTER — RX RENEWAL (OUTPATIENT)
Age: 67
End: 2017-07-06

## 2017-07-21 ENCOUNTER — RX RENEWAL (OUTPATIENT)
Age: 67
End: 2017-07-21

## 2017-08-02 ENCOUNTER — MEDICATION RENEWAL (OUTPATIENT)
Age: 67
End: 2017-08-02

## 2017-08-02 PROBLEM — E11.9 TYPE 2 DIABETES MELLITUS WITHOUT COMPLICATIONS: Chronic | Status: ACTIVE | Noted: 2017-05-10

## 2017-08-02 PROBLEM — H04.129 DRY EYE SYNDROME OF UNSPECIFIED LACRIMAL GLAND: Chronic | Status: ACTIVE | Noted: 2017-05-10

## 2017-08-02 RX ORDER — SULFAMETHOXAZOLE AND TRIMETHOPRIM 800; 160 MG/1; MG/1
800-160 TABLET ORAL
Qty: 10 | Refills: 0 | Status: DISCONTINUED | COMMUNITY
Start: 2017-04-11 | End: 2017-08-02

## 2017-08-03 ENCOUNTER — CHART COPY (OUTPATIENT)
Age: 67
End: 2017-08-03

## 2017-08-15 ENCOUNTER — APPOINTMENT (OUTPATIENT)
Dept: INTERNAL MEDICINE | Facility: CLINIC | Age: 67
End: 2017-08-15
Payer: MEDICARE

## 2017-08-15 ENCOUNTER — CHART COPY (OUTPATIENT)
Age: 67
End: 2017-08-15

## 2017-08-15 VITALS
BODY MASS INDEX: 25.77 KG/M2 | HEART RATE: 75 BPM | DIASTOLIC BLOOD PRESSURE: 70 MMHG | HEIGHT: 69 IN | TEMPERATURE: 97.3 F | OXYGEN SATURATION: 98 % | SYSTOLIC BLOOD PRESSURE: 110 MMHG | WEIGHT: 174 LBS

## 2017-08-15 PROCEDURE — 83036 HEMOGLOBIN GLYCOSYLATED A1C: CPT | Mod: QW

## 2017-08-15 PROCEDURE — 82962 GLUCOSE BLOOD TEST: CPT

## 2017-08-15 PROCEDURE — 99213 OFFICE O/P EST LOW 20 MIN: CPT | Mod: 25

## 2017-08-15 RX ORDER — ENALAPRIL MALEATE 5 MG/1
TABLET ORAL
Refills: 0 | Status: COMPLETED | COMMUNITY

## 2017-08-15 RX ORDER — LEVOFLOXACIN 750 MG/1
750 TABLET, FILM COATED ORAL
Qty: 7 | Refills: 0 | Status: COMPLETED | COMMUNITY
Start: 2017-05-13

## 2017-08-16 LAB
GLUCOSE BLDC GLUCOMTR-MCNC: 96
HBA1C MFR BLD HPLC: 7.5

## 2017-09-19 ENCOUNTER — RX RENEWAL (OUTPATIENT)
Age: 67
End: 2017-09-19

## 2017-09-26 ENCOUNTER — OTHER (OUTPATIENT)
Age: 67
End: 2017-09-26

## 2017-09-26 ENCOUNTER — APPOINTMENT (OUTPATIENT)
Dept: INTERNAL MEDICINE | Facility: CLINIC | Age: 67
End: 2017-09-26
Payer: MEDICARE

## 2017-09-26 VITALS
DIASTOLIC BLOOD PRESSURE: 60 MMHG | OXYGEN SATURATION: 97 % | HEIGHT: 69 IN | HEART RATE: 82 BPM | WEIGHT: 174 LBS | BODY MASS INDEX: 25.77 KG/M2 | TEMPERATURE: 97.5 F | SYSTOLIC BLOOD PRESSURE: 120 MMHG

## 2017-09-26 DIAGNOSIS — Q80.0 ICHTHYOSIS VULGARIS: ICD-10-CM

## 2017-09-26 PROCEDURE — 99213 OFFICE O/P EST LOW 20 MIN: CPT | Mod: 25

## 2017-09-26 PROCEDURE — G0008: CPT

## 2017-09-26 PROCEDURE — 90688 IIV4 VACCINE SPLT 0.5 ML IM: CPT

## 2017-09-27 ENCOUNTER — RX RENEWAL (OUTPATIENT)
Age: 67
End: 2017-09-27

## 2017-10-31 ENCOUNTER — APPOINTMENT (OUTPATIENT)
Dept: INTERNAL MEDICINE | Facility: CLINIC | Age: 67
End: 2017-10-31
Payer: MEDICARE

## 2017-10-31 VITALS
TEMPERATURE: 97.4 F | HEART RATE: 76 BPM | BODY MASS INDEX: 26.22 KG/M2 | DIASTOLIC BLOOD PRESSURE: 70 MMHG | OXYGEN SATURATION: 97 % | SYSTOLIC BLOOD PRESSURE: 124 MMHG | WEIGHT: 177 LBS | HEIGHT: 69 IN

## 2017-10-31 PROCEDURE — 99213 OFFICE O/P EST LOW 20 MIN: CPT

## 2017-10-31 RX ORDER — AMOXICILLIN AND CLAVULANATE POTASSIUM 875; 125 MG/1; MG/1
875-125 TABLET, COATED ORAL
Qty: 20 | Refills: 0 | Status: DISCONTINUED | COMMUNITY
Start: 2017-04-20 | End: 2017-10-31

## 2017-12-14 NOTE — ED PROVIDER NOTE - RESPIRATORY, MLM
[facility] [place]   [facility]       Patient: Rock ALEXANDRA Rausch Date: 2017     : 1956 Attending: Russell Kam MD   61 year old male      Chief Complaint: Epistaxis    History of Present Illness: Rock ALEXANDRA Rausch is a 61 year old male with a past medical history significant for Ischemic Cardiomyopathy s/p Heartmate II LVAD as 1-B, Chronic Systolic HF, s/p x3 PCI to LAD & x1 PCI to RCA (), DMII, HLD, HTN, AFib, bipolar disorder, and epistaxis with cauterization to left nare (10/2017) who was admitted from Heart Failure Clinic on 17 after reporting bloody nose approximately 1 week ago that lasted ~1 day.  Patient notes that the bleeding was from from the right nare, opposite of the site that was cauterized 8 weeks ago.  He states that this bleeding was much less severe and lasted much shorter in duration as compared to is previous bleed which is why he did not seek medical attention.  He has not had any further bleeding since then.  He denies hematochezia and melena. He does note dizziness and lightheadedness when standing and with ambulation initially - he reports that these symptoms improve when he walks around a bit.  He also notes decreased PIs on LVAD as well as intermittent decreased speed to 8800 which is the lowest he has ever seen.  He also reports some BLE swelling that he started recently after Norvasc was started.  He denies CP, SOB at rest, PND, orthopnea, palpitations as well as fever/chills, cough and sinus congestion. Patient was seen in Heart Failure Clinic on day of admission and his hemoglobin was noted to be 8.6 (has been 12) therefore, he was admitted for further evaluation.  Admission INR therapeutic at 2.6 (goal 2-3).  LDH on admission 170. He reports compliance with all medications and states that his driveline dressing is changed daily, he denies tenderness or bleeding/drainage at driveline site.    Interval  Events:  12/13: Resting in bed. Feels tired. Hgb decreased 7.8. No BM since admit. No s/s of bleeding.  12/14: Sitting up at side of bed. No bleeding (nose, no BM since admit) but Hgb continues to drop. RHC postponed until today due to scheduling conflict.     Review of Systems: Pertinent items are listed in HPI (history of present illness):  A comprehensive 10+ point review of systems is otherwise negative.    Nutrition: NPO for RHC (PO 2 gram Na, 2000 cc Fluid Restriction)    Medications/Infusions:  Scheduled:   • levothyroxine  50 mcg Oral QAM AC   • iron sucrose (VENOFER) injection/IVPB  200 mg Intravenous Daily   • sodium chloride (PF)  2 mL Injection 2 times per day   • carvedilol  6.25 mg Oral BID WC   • potassium chloride  20 mEq Oral Daily   • famotidine  20 mg Oral BID   • lamoTRIgine  75 mg Oral Daily   • ferrous sulfate  325 mg Oral Daily with breakfast   • cholecalciferol  2,000 Units Oral Daily   • torsemide  10 mg Oral Daily   • amiodarone  200 mg Oral Daily   • allopurinol  100 mg Oral BID   • atorvastatin  40 mg Oral Nightly       Continuous Infusions:        Rhythm: Normal sinus    Arrythmias: None    UNOS Status:  IB      Vital Last Value 24 Hour Range   Temperature 98.4 °F (36.9 °C) Temp  Min: 98.3 °F (36.8 °C)  Max: 98.4 °F (36.9 °C)   Pulse 64 Pulse  Min: 64  Max: 76   Respiratory (!) 0 Resp  Min: 0  Max: 20   Blood Pressure (!) 87/68 BP  Min: 87/68  Max: 109/64   Pulse Oximetry 98 % SpO2  Min: 94 %  Max: 100 %     Vital Today Admitted   Weight 105.6 kg Weight: 105.7 kg   Height N/A Height: 5' 8\" (172.7 cm)   BMI (body mass index) N/A BMI (Calculated): 35.51       Hemodynamics:         Weight over the past 48 Hours:  Patient Vitals for the past 48 hrs:   Weight   12/13/17 0253 105.1 kg   12/14/17 0655 105.6 kg          Intake/Output:  I/O this shift:  In: 270 [P.O.:270]  Out: 1225 [Urine:1225]  I/O last 3 completed shifts:  In: 817 [P.O.:817]  Out: 1875 [Urine:1875]    Intake/Output Summary  (Last 24 hours) at 12/14/17 1401  Last data filed at 12/14/17 1100   Gross per 24 hour   Intake              727 ml   Output             1325 ml   Net             -598 ml         Physical Assessment:    General:    No Acute distress and Obese   Incision:    n/a   EENT:    Normal PERRL   Oral Mucosa:    Moist   Neck:   Trachea midline   Lungs:     diminished breath sounds  anterior - bilateral   Cardiovascular:   VAD sounds, no JVD/HJR   Abdomen:     Soft, nontender   Pulses:   Nonpulsitile   Skin:   Driveline dressing: CDI, no tenderness or oozing   Neurologic:   Alert and oriented to person, place and time       Laboratory Results:  Lab Results   Component Value Date    WBC 5.1 12/14/2017    HGB 7.3 (L) 12/14/2017    HCT 23.3 (L) 12/14/2017     12/14/2017    BUN 28 (H) 12/14/2017    CREATININE 1.28 (H) 12/14/2017    SODIUM 143 12/14/2017    POTASSIUM 3.8 12/14/2017    CO2 28 12/14/2017    MG 2.1 04/11/2017    BILIRUBIN 0.8 04/10/2017    INR 2.7 12/14/2017    PTT 42 (H) 04/10/2017    LACTA 1.1 11/14/2016    ALKPT 81 04/10/2017    AST 22 04/10/2017    GPT 42 04/10/2017    ALBUMIN 3.6 04/10/2017    GLUCOSE 109 (H) 12/14/2017    TSH 17.760 (H) 12/12/2017     ECHO 12/13/17  S/p Heartmate II LVAD (11/16/2016) @ 9200 RPMS  Inflow velocity of 0.8 m/s; Outflow velocity of 1.0 m/s  Septum in midline.  Aortic valve does not opens with each cardiac cycle. Mild aortic valve regurgitation.  Mildly increased right ventricular size. Decreased longitudianl and normal radial systolic function.  Compared to prior study dated 09/12/2017, aortic valve does not open.    ECHO 9/12/17  Heartmate II LVAD (11/16/2016).  Severely decreased left ventricular systolic function.  Inflow and outflow cannulae visualized; 0.8 m/s and 1.1 m/s respectively.  Septum is somewhat neutral-to-rightward positioned.  Aortic valve opens with each cardiac cycle. Mild aortic valve regurgitation.  Normal right ventricular systolic function.  No  significant change since the prior study dated 2017.    RHC 17  RA 11/11/5 mmhg.  RV 36/0/8 mmhg.  PA 34/15/24 mmhg.   PCWP 15/18/15 mmhg.  Systemic BP  mmhg  HR 66 bpm  Cardiac output/cardiac index (thermodilution) 5.30 L/min/2.48 L/min/m2.  Cardiac output/cardiac index ( Assumed Eleanor) = 5.31  L/min//2.48 L/min/m2 .  Calculated PVR 1.69 lenz  Calculated SVR 19.77 lenz.  RVSWI 10 gm-m / m2  Sats.:   Ao 96%   PA 61.6%      Diagnosis/Plan:   S/P Heartmate II LVAD as BTT (RHTET Status 1-B) due to Ischemic Cardiomyopathy. Chronic Systolic HF. ACC/AHA Stage D, NYHA Function Class III.   Volume Status: Suspect Elevated Perfusion Status: Adequate with LVAD   -Continue current VAD speed 9200 RPM   -Recent Alarms: None per patient   -ECHO as aforementioned    -Continue Coreg 6.25 mg BID   -Continue Torsemide 10 mg daily   -Hold ASA Dosin mg daily    -HOLD coumadin.  Warfarin Dosing per AHF team; goal INR 2.0-3.0     --[facility] ([phone])    -RHC today at 1000.  Pt to be NPO 4 hours prior (starting at 0600).  No additional orders needed. Written consent previously obtained and placed in blue folder   -Driveline dressing changes per protocol, daily   -Daily standing weights and strict I&O   -Low sodium & fluid restricted diet    Mild Pulmonary Hypertension   -RHC 17: mPA 24 with wedge 15   -Stop Sildenafil 20 mg TID - consider keeping off pending RHC hemodynamics      Anemia / Epistaxis   -Epistaxis seems to be resolved   -Check CBC daily   -Venofer 200 mg x5 days   -Check occult stool   -Continue ASA / AC as aforementioned   -If further bleeding, will consider ENT consult   -Consider transfusion only if HGB <7 and symptomatic   -Consult ENT to determine if nasal bleed    Hypertension, controlled   -Goal MAP 60-85   -Stop Norvasc as BLE edema noted shortly after started   -Continue BB as aforementioned   -PRN Hydralazine for MAP >90 mmHg    Atrial  Fibrillation   -Currently in SR   -Amio 200 mg daily    --Check thyroid panel   -AC as aforementioned    CKD II   GFR Estimate, Non  (no units)   Date Value   12/14/2017 61    -Monitor   -Avoid nephrotoxic agents    Hypothyroidism   -Start synthroid 50 daily    Bipolar   -Continue Lamictal    H/O Nicotine Abuse   -Nicotine level pending, last negative on 6/27/17    DVT Prophylaxis: Holding for anemia / SCDs      BRYNN Castrejon  Advanced Heart Failure Therapies & Pulmonary Hypertension  [phone]  Please call on-call MD between the hours of [phone]    I have interviewed and examined the patient, reviewed the pertinent diagnostic studies and medical data, and have participated in the development of the treatment plan with BRYNN Castrejon  My recommendation for 12/14/2017    CV exam: RAP= 8, + Periferal edema, pallor    LVAD: HM2 as BTT listed staus 1B   MAP 60-90   INR 2-3   ASA 81   Drive line appears clean and dry    ECHO noted  RHC noted (off revatio)    Thyroid function: start synthroid 50ug  TSH (mcUnits/mL)   Date Value   12/12/2017 17.760 (H)    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    CKD: Stage 2 (baseline)  GFR Estimate, Non  (no units)   Date Value   12/14/2017 61      Edema likely related to norvasc and thyroid state      Russell Kam MD                 Breath sounds clear and equal bilaterally.

## 2017-12-18 ENCOUNTER — LABORATORY RESULT (OUTPATIENT)
Age: 67
End: 2017-12-18

## 2017-12-19 ENCOUNTER — NON-APPOINTMENT (OUTPATIENT)
Age: 67
End: 2017-12-19

## 2017-12-19 ENCOUNTER — APPOINTMENT (OUTPATIENT)
Dept: INTERNAL MEDICINE | Facility: CLINIC | Age: 67
End: 2017-12-19
Payer: MEDICARE

## 2017-12-19 VITALS
DIASTOLIC BLOOD PRESSURE: 76 MMHG | HEART RATE: 94 BPM | SYSTOLIC BLOOD PRESSURE: 128 MMHG | OXYGEN SATURATION: 96 % | WEIGHT: 178 LBS | HEIGHT: 69 IN | BODY MASS INDEX: 26.36 KG/M2 | TEMPERATURE: 97.5 F

## 2017-12-19 PROCEDURE — G0439: CPT | Mod: 25

## 2017-12-19 PROCEDURE — 92552 PURE TONE AUDIOMETRY AIR: CPT

## 2017-12-19 PROCEDURE — 36415 COLL VENOUS BLD VENIPUNCTURE: CPT

## 2017-12-19 PROCEDURE — G0444 DEPRESSION SCREEN ANNUAL: CPT

## 2017-12-19 PROCEDURE — 94010 BREATHING CAPACITY TEST: CPT

## 2017-12-19 PROCEDURE — 82043 UR ALBUMIN QUANTITATIVE: CPT | Mod: QW

## 2017-12-19 PROCEDURE — 83036 HEMOGLOBIN GLYCOSYLATED A1C: CPT | Mod: QW

## 2017-12-19 PROCEDURE — 93000 ELECTROCARDIOGRAM COMPLETE: CPT

## 2017-12-19 RX ORDER — INSULIN ASPART 100 [IU]/ML
100 INJECTION, SOLUTION INTRAVENOUS; SUBCUTANEOUS
Refills: 0 | Status: DISCONTINUED | COMMUNITY
End: 2017-12-19

## 2017-12-21 ENCOUNTER — CLINICAL ADVICE (OUTPATIENT)
Age: 67
End: 2017-12-21

## 2017-12-21 DIAGNOSIS — R31.29 OTHER MICROSCOPIC HEMATURIA: ICD-10-CM

## 2017-12-21 LAB
25(OH)D3 SERPL-MCNC: 23.9 NG/ML
ALBUMIN SERPL ELPH-MCNC: 4.2 G/DL
ALP BLD-CCNC: 100 U/L
ALT SERPL-CCNC: 11 U/L
ANION GAP SERPL CALC-SCNC: 14 MMOL/L
APPEARANCE: ABNORMAL
AST SERPL-CCNC: 13 U/L
BASOPHILS # BLD AUTO: 0.02 K/UL
BASOPHILS NFR BLD AUTO: 0.2 %
BILIRUB SERPL-MCNC: <0.2 MG/DL
BILIRUBIN URINE: NEGATIVE
BLOOD URINE: ABNORMAL
BUN SERPL-MCNC: 13 MG/DL
CALCIUM SERPL-MCNC: 10 MG/DL
CHLORIDE SERPL-SCNC: 97 MMOL/L
CHOLEST SERPL-MCNC: 178 MG/DL
CHOLEST/HDLC SERPL: 6.6 RATIO
CK SERPL-CCNC: 172 U/L
CO2 SERPL-SCNC: 26 MMOL/L
COLOR: YELLOW
CREAT SERPL-MCNC: 1.05 MG/DL
EOSINOPHIL # BLD AUTO: 0.1 K/UL
EOSINOPHIL NFR BLD AUTO: 1.1 %
GLUCOSE QUALITATIVE U: 1000 MG/DL
GLUCOSE SERPL-MCNC: 212 MG/DL
HCT VFR BLD CALC: 39.7 %
HDLC SERPL-MCNC: 27 MG/DL
HGB BLD-MCNC: 13.1 G/DL
IMM GRANULOCYTES NFR BLD AUTO: 0.2 %
KETONES URINE: ABNORMAL
LDLC SERPL CALC-MCNC: 89 MG/DL
LEUKOCYTE ESTERASE URINE: ABNORMAL
LYMPHOCYTES # BLD AUTO: 2.51 K/UL
LYMPHOCYTES NFR BLD AUTO: 28 %
MAN DIFF?: NORMAL
MCHC RBC-ENTMCNC: 28.5 PG
MCHC RBC-ENTMCNC: 33 GM/DL
MCV RBC AUTO: 86.3 FL
MONOCYTES # BLD AUTO: 0.77 K/UL
MONOCYTES NFR BLD AUTO: 8.6 %
NEUTROPHILS # BLD AUTO: 5.53 K/UL
NEUTROPHILS NFR BLD AUTO: 61.9 %
NITRITE URINE: NEGATIVE
PH URINE: 5
PLATELET # BLD AUTO: 333 K/UL
POTASSIUM SERPL-SCNC: 4.7 MMOL/L
PROT SERPL-MCNC: 7.8 G/DL
PROTEIN URINE: NEGATIVE MG/DL
RBC # BLD: 4.6 M/UL
RBC # FLD: 13.2 %
SODIUM SERPL-SCNC: 137 MMOL/L
SPECIFIC GRAVITY URINE: 1.03
TRIGL SERPL-MCNC: 308 MG/DL
TSH SERPL-ACNC: 1.87 UIU/ML
URATE SERPL-MCNC: 4.2 MG/DL
UROBILINOGEN URINE: NEGATIVE MG/DL
WBC # FLD AUTO: 8.95 K/UL

## 2017-12-22 ENCOUNTER — CHART COPY (OUTPATIENT)
Age: 67
End: 2017-12-22

## 2017-12-28 ENCOUNTER — FORM ENCOUNTER (OUTPATIENT)
Age: 67
End: 2017-12-28

## 2017-12-29 ENCOUNTER — APPOINTMENT (OUTPATIENT)
Dept: ULTRASOUND IMAGING | Facility: CLINIC | Age: 67
End: 2017-12-29
Payer: MEDICARE

## 2017-12-29 ENCOUNTER — OUTPATIENT (OUTPATIENT)
Dept: OUTPATIENT SERVICES | Facility: HOSPITAL | Age: 67
LOS: 1 days | End: 2017-12-29
Payer: MEDICARE

## 2017-12-29 DIAGNOSIS — R31.29 OTHER MICROSCOPIC HEMATURIA: ICD-10-CM

## 2017-12-29 PROCEDURE — 76770 US EXAM ABDO BACK WALL COMP: CPT | Mod: 26

## 2017-12-29 PROCEDURE — 76770 US EXAM ABDO BACK WALL COMP: CPT

## 2018-01-29 ENCOUNTER — APPOINTMENT (OUTPATIENT)
Dept: INTERNAL MEDICINE | Facility: CLINIC | Age: 68
End: 2018-01-29
Payer: MEDICARE

## 2018-01-29 VITALS
HEART RATE: 78 BPM | HEIGHT: 69 IN | OXYGEN SATURATION: 97 % | BODY MASS INDEX: 26.66 KG/M2 | DIASTOLIC BLOOD PRESSURE: 70 MMHG | TEMPERATURE: 97.5 F | WEIGHT: 180 LBS | SYSTOLIC BLOOD PRESSURE: 130 MMHG

## 2018-01-29 PROCEDURE — 99214 OFFICE O/P EST MOD 30 MIN: CPT

## 2018-02-05 ENCOUNTER — LABORATORY RESULT (OUTPATIENT)
Age: 68
End: 2018-02-05

## 2018-02-06 ENCOUNTER — APPOINTMENT (OUTPATIENT)
Dept: UROLOGY | Facility: CLINIC | Age: 68
End: 2018-02-06
Payer: MEDICARE

## 2018-02-06 VITALS
OXYGEN SATURATION: 97 % | HEART RATE: 79 BPM | DIASTOLIC BLOOD PRESSURE: 70 MMHG | TEMPERATURE: 98.1 F | SYSTOLIC BLOOD PRESSURE: 157 MMHG

## 2018-02-06 PROCEDURE — 81003 URINALYSIS AUTO W/O SCOPE: CPT | Mod: QW

## 2018-02-06 PROCEDURE — 99203 OFFICE O/P NEW LOW 30 MIN: CPT

## 2018-02-07 LAB
BILIRUB UR QL STRIP: NEGATIVE
CLARITY UR: NORMAL
COLLECTION METHOD: NORMAL
GLUCOSE UR-MCNC: 500
HCG UR QL: 0.2 EU/DL
HGB UR QL STRIP.AUTO: NORMAL
KETONES UR-MCNC: 15
LEUKOCYTE ESTERASE UR QL STRIP: NORMAL
NITRITE UR QL STRIP: NEGATIVE
PH UR STRIP: 5
PROT UR STRIP-MCNC: NORMAL
SP GR UR STRIP: 1.03

## 2018-02-08 LAB — BACTERIA UR CULT: NORMAL

## 2018-03-12 ENCOUNTER — APPOINTMENT (OUTPATIENT)
Dept: INTERNAL MEDICINE | Facility: CLINIC | Age: 68
End: 2018-03-12
Payer: MEDICARE

## 2018-03-12 PROCEDURE — 83036 HEMOGLOBIN GLYCOSYLATED A1C: CPT | Mod: QW

## 2018-03-12 PROCEDURE — 99214 OFFICE O/P EST MOD 30 MIN: CPT | Mod: 25

## 2018-03-12 PROCEDURE — 82962 GLUCOSE BLOOD TEST: CPT

## 2018-03-13 ENCOUNTER — APPOINTMENT (OUTPATIENT)
Dept: UROLOGY | Facility: CLINIC | Age: 68
End: 2018-03-13
Payer: MEDICARE

## 2018-03-13 VITALS
BODY MASS INDEX: 26.66 KG/M2 | TEMPERATURE: 98 F | HEIGHT: 69 IN | HEART RATE: 81 BPM | WEIGHT: 180 LBS | SYSTOLIC BLOOD PRESSURE: 143 MMHG | DIASTOLIC BLOOD PRESSURE: 71 MMHG

## 2018-03-13 DIAGNOSIS — N47.1 PHIMOSIS: ICD-10-CM

## 2018-03-13 LAB
BILIRUB UR QL STRIP: NORMAL
GLUCOSE UR-MCNC: 500
HCG UR QL: 0.2 EU/DL
HGB UR QL STRIP.AUTO: NORMAL
KETONES UR-MCNC: NORMAL
LEUKOCYTE ESTERASE UR QL STRIP: NORMAL
NITRITE UR QL STRIP: NORMAL
PH UR STRIP: 5
PROT UR STRIP-MCNC: NORMAL
SP GR UR STRIP: >=1.03

## 2018-03-13 PROCEDURE — 81003 URINALYSIS AUTO W/O SCOPE: CPT | Mod: QW

## 2018-03-13 PROCEDURE — 99213 OFFICE O/P EST LOW 20 MIN: CPT

## 2018-03-14 LAB — GLUCOSE BLDC GLUCOMTR-MCNC: 371

## 2018-03-15 NOTE — ED STATDOCS - HISTORY ATTESTATION, MLM
Subjective:      Patient ID: Rudy Saldaña is a 81 y.o. male.    Patient Active Problem List   Diagnosis    Combined hyperlipidemia associated with type 2 diabetes mellitus    Hypertension associated with diabetes    DM (diabetes mellitus) type II controlled with renal manifestation    History of stroke    BPH (benign prostatic hypertrophy)    History of colon polyps    Iron deficiency anemia    Polyneuropathy due to secondary diabetes    DEVIKA on CPAP    Family history of colonic polyps    Vitamin D deficiency    Hypothyroidism    Anemia    Gross hematuria    Obstruction of urinary tract    CKD stage 3 secondary to diabetes    Facet arthropathy, lumbar    Right hip pain    Primary osteoarthritis of right hip    Osteoarthritis of spine with radiculopathy, lumbar region     Problem list has been reviewed.    Chief Complaint: DEVIKA ON CPAP    HPI: He is  here for follow up for DEVIKA and CPAP complaince assessment.  Patient denies snoring, headaches, excessive daytime sleepiness. He definitely thinks PAP is beneficial to his health and he wants to continue with PAP therapy.       Compliance Summary  2/13/2018 - 3/14/2018 (30 days)  Days with Device Usage 28 days  Days without Device Usage 2 days  Percent Days with Device Usage 93.3%  Cumulative Usage 5 days 17 hrs. 50 mins. 28 secs.  Maximum Usage (1 Day) 7 hrs. 4 mins. 22 secs.  Average Usage (All Days) 4 hrs. 35 mins. 40 secs.  Average Usage (Days Used) 4 hrs. 55 mins. 22 secs.  Minimum Usage (1 Day) 2 hrs. 22 mins. 47 secs.  Percent of Days with Usage >= 4 Hours 76.7%  Percent of Days with Usage < 4 Hours 23.3%  Date Range  Total Blower Time 5 days 19 hrs. 57 mins. 7 secs.  CPAP Summary ([facility])  Average Time in Large Leak Per Day 1 mins. 13 secs.  Average AHI 2.7  CPAP 8.0 cmH2O    [place] Sleepiness Scale   EPWORTH SLEEPINESS SCALE 3/15/2018 12/7/2015 10/5/2015 8/10/2015   Sitting and reading 1 2 2 3   Watching TV 1 2 2 3   Sitting,  inactive in a public place (e.g. a theatre or a meeting) 0 2 1 3   As a passenger in a car for an hour without a break 1 2 1 3   Lying down to rest in the afternoon when circumstances permit 1 1 2 3   Sitting and talking to someone 0 0 0 1   Sitting quietly after a lunch without alcohol 0 2 1 3   In a car, while stopped for a few minutes in traffic 0 0 1 0   Total score 4 11 10 19     Previous Report Reviewed: office notes     The following portions of the patient's history were reviewed and updated as appropriate: He  has a past medical history of Anemia; BPH (benign prostatic hypertrophy); Carotid bruit; CVA (cerebral vascular accident); Hyperlipidemia; Hypertension; Hypothyroidism (2/29/2016); Type II or unspecified type diabetes mellitus without mention of complication, not stated as uncontrolled; and Vitamin D deficiency (2/29/2016).  He  has a past surgical history that includes Colonoscopy (2008); Transurethral resection of prostate; Pilonidal cyst drainage; and Colonoscopy (5/31/2005).  His family history includes Brain cancer in his sister; Heart attack in his maternal uncle; Hypertension in his father and mother.  He  reports that he has never smoked. He has never used smokeless tobacco. He reports that he does not drink alcohol or use drugs.  He has a current medication list which includes the following prescription(s): acarbose, aspirin, atorvastatin, benazepril-hydrochlorthiazide, blood sugar diagnostic, calcium carbonate, calcium-vitamin d3, clopidogrel, diclofenac sodium, ergocalciferol, fenofibrate, ferrous sulfate, fluticasone, galantamine, glipizide, lancets, levothyroxine, loratadine, niacin, nisoldipine, pioglitazone, sitagliptin, and onetouch verio iq meter.  He is allergic to no known drug allergies..    Review of Systems   Constitutional: Negative for fever, weight gain and weakness.   HENT: Negative for nosebleeds, postnasal drip and hearing loss.    Eyes: Negative for itching.   Respiratory:  Negative for hemoptysis, asthma nighttime symptoms, dyspnea on extertion and somnolence.    Cardiovascular: Negative for chest pain and leg swelling.   Endocrine: Negative for polydipsia and heat intolerance.    Musculoskeletal: Negative for back pain and gait problem.   Skin: Negative for rash.   Gastrointestinal: Negative for nausea, vomiting and acid reflux.   Neurological: Negative for dizziness, light-headedness and headaches.   Hematological: Does not bruise/bleed easily.   Psychiatric/Behavioral: The patient is not nervous/anxious.    All other systems reviewed and are negative.     Objective:     Vitals:    03/15/18 1305   BP: 120/83   Pulse: 62   Resp: 18   SpO2: 96%   Weight: 104.6 kg (230 lb 9.6 oz)   Height: 6' (1.829 m)     Body mass index is 31.28 kg/m².    Physical Exam   Constitutional: He is oriented to person, place, and time. He appears well-developed and well-nourished.   HENT:   Head: Normocephalic and atraumatic.   Right Ear: External ear normal.   Left Ear: External ear normal.   Nose: Nose normal.   Mouth/Throat: Oropharynx is clear and moist. No oropharyngeal exudate.   Eyes: Conjunctivae and EOM are normal. Pupils are equal, round, and reactive to light. Right eye exhibits no discharge. Left eye exhibits no discharge. No scleral icterus.   Neck: Normal range of motion. Neck supple. No JVD present. No thyromegaly present.   Cardiovascular: Normal rate, regular rhythm, normal heart sounds and intact distal pulses.  Exam reveals no gallop and no friction rub.    No murmur heard.  Pulmonary/Chest: Effort normal and breath sounds normal. No respiratory distress. He has no wheezes. He has no rales. He exhibits no tenderness.   Abdominal: Soft. Bowel sounds are normal. He exhibits no distension and no mass. There is no tenderness. There is no rebound and no guarding.   Musculoskeletal: Normal range of motion. He exhibits no edema or tenderness.   Lymphadenopathy:     He has no cervical adenopathy.    Neurological: He is alert and oriented to person, place, and time. No cranial nerve deficit. Coordination normal.   Skin: Skin is warm and dry. He is not diaphoretic.   Psychiatric: He has a normal mood and affect.       Personal Diagnostic Review  CPAP complaince download.     Assessment / plan :       Discussed diagnosis, its evaluation, treatment and usual course. All questions answered.      Problem List Items Addressed This Visit        Other    DEVIKA on CPAP - Primary (Chronic)    Current Assessment & Plan     Continue CPAP of 8 CMWP. ([facility] [place] [facility]). Discussed therapeutic goals for positive airway pressure therapy(CPAP or BiPAP): Ideal is usage 100% of nights for 6 - 8 hours per night. Minimum usage is 70% of night for at least 4 hours per night used. Pateint expressed understanding. All Questions answered. CPAP supplies renewed.         CPAP supplies renewed.          Relevant Orders    CPAP/BIPAP SUPPLIES          TIME SPENT WITH PATIENT: Time spent: 25 minutes in face to face  discussion concerning diagnosis, prognosis, review of lab and test results, benefits of treatment as well as management of disease, counseling of patient and coordination of care between various health  care providers . Greater than half the time spent was used for coordination of care and counseling of patient.       Follow-up in about 1 year (around 3/15/2019) for DEVIKA.   I have reviewed and confirmed nurses' notes...

## 2018-03-16 ENCOUNTER — CHART COPY (OUTPATIENT)
Age: 68
End: 2018-03-16

## 2018-03-29 ENCOUNTER — RX RENEWAL (OUTPATIENT)
Age: 68
End: 2018-03-29

## 2018-04-13 ENCOUNTER — APPOINTMENT (OUTPATIENT)
Dept: UROLOGY | Facility: CLINIC | Age: 68
End: 2018-04-13

## 2018-04-18 ENCOUNTER — MEDICATION RENEWAL (OUTPATIENT)
Age: 68
End: 2018-04-18

## 2018-04-25 ENCOUNTER — INPATIENT (INPATIENT)
Facility: HOSPITAL | Age: 68
LOS: 6 days | Discharge: HOME | DRG: 256 | End: 2018-05-02
Attending: HOSPITALIST | Admitting: GENERAL ACUTE CARE HOSPITAL
Payer: MEDICARE

## 2018-04-25 VITALS
WEIGHT: 173.06 LBS | TEMPERATURE: 99 F | DIASTOLIC BLOOD PRESSURE: 78 MMHG | OXYGEN SATURATION: 98 % | RESPIRATION RATE: 17 BRPM | HEART RATE: 91 BPM | HEIGHT: 69 IN | SYSTOLIC BLOOD PRESSURE: 145 MMHG

## 2018-04-25 DIAGNOSIS — Z89.511 ACQUIRED ABSENCE OF RIGHT LEG BELOW KNEE: Chronic | ICD-10-CM

## 2018-04-25 DIAGNOSIS — I96 GANGRENE, NOT ELSEWHERE CLASSIFIED: ICD-10-CM

## 2018-04-25 LAB
ALBUMIN SERPL ELPH-MCNC: 4.2 G/DL — SIGNIFICANT CHANGE UP (ref 3.3–5.2)
ALP SERPL-CCNC: 95 U/L — SIGNIFICANT CHANGE UP (ref 40–120)
ALT FLD-CCNC: 10 U/L — SIGNIFICANT CHANGE UP
ANION GAP SERPL CALC-SCNC: 13 MMOL/L — SIGNIFICANT CHANGE UP (ref 5–17)
AST SERPL-CCNC: 14 U/L — SIGNIFICANT CHANGE UP
BASOPHILS # BLD AUTO: 0 K/UL — SIGNIFICANT CHANGE UP (ref 0–0.2)
BASOPHILS NFR BLD AUTO: 0.1 % — SIGNIFICANT CHANGE UP (ref 0–2)
BILIRUB SERPL-MCNC: <0.2 MG/DL — LOW (ref 0.4–2)
BUN SERPL-MCNC: 19 MG/DL — SIGNIFICANT CHANGE UP (ref 8–20)
CALCIUM SERPL-MCNC: 9.4 MG/DL — SIGNIFICANT CHANGE UP (ref 8.6–10.2)
CHLORIDE SERPL-SCNC: 97 MMOL/L — LOW (ref 98–107)
CO2 SERPL-SCNC: 25 MMOL/L — SIGNIFICANT CHANGE UP (ref 22–29)
CREAT SERPL-MCNC: 1.04 MG/DL — SIGNIFICANT CHANGE UP (ref 0.5–1.3)
EOSINOPHIL # BLD AUTO: 0.1 K/UL — SIGNIFICANT CHANGE UP (ref 0–0.5)
EOSINOPHIL NFR BLD AUTO: 0.7 % — SIGNIFICANT CHANGE UP (ref 0–5)
GLUCOSE BLDC GLUCOMTR-MCNC: 179 MG/DL — HIGH (ref 70–99)
GLUCOSE SERPL-MCNC: 193 MG/DL — HIGH (ref 70–115)
HCT VFR BLD CALC: 36.3 % — LOW (ref 42–52)
HGB BLD-MCNC: 12.2 G/DL — LOW (ref 14–18)
LACTATE BLDV-MCNC: 0.9 MMOL/L — SIGNIFICANT CHANGE UP (ref 0.5–2)
LYMPHOCYTES # BLD AUTO: 2.2 K/UL — SIGNIFICANT CHANGE UP (ref 1–4.8)
LYMPHOCYTES # BLD AUTO: 20.5 % — SIGNIFICANT CHANGE UP (ref 20–55)
MCHC RBC-ENTMCNC: 28.3 PG — SIGNIFICANT CHANGE UP (ref 27–31)
MCHC RBC-ENTMCNC: 33.6 G/DL — SIGNIFICANT CHANGE UP (ref 32–36)
MCV RBC AUTO: 84.2 FL — SIGNIFICANT CHANGE UP (ref 80–94)
MONOCYTES # BLD AUTO: 0.8 K/UL — SIGNIFICANT CHANGE UP (ref 0–0.8)
MONOCYTES NFR BLD AUTO: 7.8 % — SIGNIFICANT CHANGE UP (ref 3–10)
NEUTROPHILS # BLD AUTO: 7.7 K/UL — SIGNIFICANT CHANGE UP (ref 1.8–8)
NEUTROPHILS NFR BLD AUTO: 70.5 % — SIGNIFICANT CHANGE UP (ref 37–73)
PLATELET # BLD AUTO: 334 K/UL — SIGNIFICANT CHANGE UP (ref 150–400)
POTASSIUM SERPL-MCNC: 4.4 MMOL/L — SIGNIFICANT CHANGE UP (ref 3.5–5.3)
POTASSIUM SERPL-SCNC: 4.4 MMOL/L — SIGNIFICANT CHANGE UP (ref 3.5–5.3)
PROT SERPL-MCNC: 7.9 G/DL — SIGNIFICANT CHANGE UP (ref 6.6–8.7)
RBC # BLD: 4.31 M/UL — LOW (ref 4.6–6.2)
RBC # FLD: 13.1 % — SIGNIFICANT CHANGE UP (ref 11–15.6)
SODIUM SERPL-SCNC: 135 MMOL/L — SIGNIFICANT CHANGE UP (ref 135–145)
WBC # BLD: 11 K/UL — HIGH (ref 4.8–10.8)
WBC # FLD AUTO: 11 K/UL — HIGH (ref 4.8–10.8)

## 2018-04-25 PROCEDURE — 99223 1ST HOSP IP/OBS HIGH 75: CPT

## 2018-04-25 PROCEDURE — 73630 X-RAY EXAM OF FOOT: CPT | Mod: 26,LT

## 2018-04-25 PROCEDURE — 99285 EMERGENCY DEPT VISIT HI MDM: CPT

## 2018-04-25 RX ORDER — GABAPENTIN 400 MG/1
100 CAPSULE ORAL THREE TIMES A DAY
Qty: 0 | Refills: 0 | Status: DISCONTINUED | OUTPATIENT
Start: 2018-04-25 | End: 2018-05-02

## 2018-04-25 RX ORDER — SODIUM CHLORIDE 9 MG/ML
1000 INJECTION INTRAMUSCULAR; INTRAVENOUS; SUBCUTANEOUS
Qty: 0 | Refills: 0 | Status: COMPLETED | OUTPATIENT
Start: 2018-04-25 | End: 2018-04-26

## 2018-04-25 RX ORDER — INSULIN DETEMIR 100/ML (3)
15 INSULIN PEN (ML) SUBCUTANEOUS AT BEDTIME
Qty: 0 | Refills: 0 | Status: DISCONTINUED | OUTPATIENT
Start: 2018-04-25 | End: 2018-04-27

## 2018-04-25 RX ORDER — ACETAMINOPHEN 500 MG
650 TABLET ORAL EVERY 6 HOURS
Qty: 0 | Refills: 0 | Status: DISCONTINUED | OUTPATIENT
Start: 2018-04-25 | End: 2018-05-02

## 2018-04-25 RX ORDER — VANCOMYCIN HCL 1 G
1000 VIAL (EA) INTRAVENOUS EVERY 8 HOURS
Qty: 0 | Refills: 0 | Status: DISCONTINUED | OUTPATIENT
Start: 2018-04-25 | End: 2018-04-30

## 2018-04-25 RX ORDER — SODIUM CHLORIDE 9 MG/ML
1000 INJECTION, SOLUTION INTRAVENOUS
Qty: 0 | Refills: 0 | Status: DISCONTINUED | OUTPATIENT
Start: 2018-04-25 | End: 2018-05-02

## 2018-04-25 RX ORDER — ENOXAPARIN SODIUM 100 MG/ML
40 INJECTION SUBCUTANEOUS DAILY
Qty: 0 | Refills: 0 | Status: DISCONTINUED | OUTPATIENT
Start: 2018-04-25 | End: 2018-04-27

## 2018-04-25 RX ORDER — OXYCODONE AND ACETAMINOPHEN 5; 325 MG/1; MG/1
2 TABLET ORAL EVERY 4 HOURS
Qty: 0 | Refills: 0 | Status: DISCONTINUED | OUTPATIENT
Start: 2018-04-25 | End: 2018-05-01

## 2018-04-25 RX ORDER — PIPERACILLIN AND TAZOBACTAM 4; .5 G/20ML; G/20ML
3.38 INJECTION, POWDER, LYOPHILIZED, FOR SOLUTION INTRAVENOUS ONCE
Qty: 0 | Refills: 0 | Status: COMPLETED | OUTPATIENT
Start: 2018-04-25 | End: 2018-04-25

## 2018-04-25 RX ORDER — PIPERACILLIN AND TAZOBACTAM 4; .5 G/20ML; G/20ML
3.38 INJECTION, POWDER, LYOPHILIZED, FOR SOLUTION INTRAVENOUS EVERY 8 HOURS
Qty: 0 | Refills: 0 | Status: DISCONTINUED | OUTPATIENT
Start: 2018-04-25 | End: 2018-04-26

## 2018-04-25 RX ORDER — GLUCAGON INJECTION, SOLUTION 0.5 MG/.1ML
1 INJECTION, SOLUTION SUBCUTANEOUS ONCE
Qty: 0 | Refills: 0 | Status: DISCONTINUED | OUTPATIENT
Start: 2018-04-25 | End: 2018-05-02

## 2018-04-25 RX ORDER — DEXTROSE 50 % IN WATER 50 %
25 SYRINGE (ML) INTRAVENOUS ONCE
Qty: 0 | Refills: 0 | Status: DISCONTINUED | OUTPATIENT
Start: 2018-04-25 | End: 2018-05-02

## 2018-04-25 RX ORDER — DEXTROSE 50 % IN WATER 50 %
1 SYRINGE (ML) INTRAVENOUS ONCE
Qty: 0 | Refills: 0 | Status: DISCONTINUED | OUTPATIENT
Start: 2018-04-25 | End: 2018-05-02

## 2018-04-25 RX ORDER — INSULIN LISPRO 100/ML
VIAL (ML) SUBCUTANEOUS
Qty: 0 | Refills: 0 | Status: DISCONTINUED | OUTPATIENT
Start: 2018-04-25 | End: 2018-05-02

## 2018-04-25 RX ORDER — ATENOLOL 25 MG/1
50 TABLET ORAL DAILY
Qty: 0 | Refills: 0 | Status: DISCONTINUED | OUTPATIENT
Start: 2018-04-25 | End: 2018-05-02

## 2018-04-25 RX ORDER — VANCOMYCIN HCL 1 G
1000 VIAL (EA) INTRAVENOUS ONCE
Qty: 0 | Refills: 0 | Status: DISCONTINUED | OUTPATIENT
Start: 2018-04-25 | End: 2018-04-25

## 2018-04-25 RX ORDER — PANTOPRAZOLE SODIUM 20 MG/1
40 TABLET, DELAYED RELEASE ORAL
Qty: 0 | Refills: 0 | Status: DISCONTINUED | OUTPATIENT
Start: 2018-04-25 | End: 2018-05-02

## 2018-04-25 RX ORDER — DEXTROSE 50 % IN WATER 50 %
12.5 SYRINGE (ML) INTRAVENOUS ONCE
Qty: 0 | Refills: 0 | Status: DISCONTINUED | OUTPATIENT
Start: 2018-04-25 | End: 2018-05-02

## 2018-04-25 RX ORDER — VANCOMYCIN HCL 1 G
1000 VIAL (EA) INTRAVENOUS ONCE
Qty: 0 | Refills: 0 | Status: COMPLETED | OUTPATIENT
Start: 2018-04-25 | End: 2018-04-25

## 2018-04-25 RX ORDER — OXYCODONE AND ACETAMINOPHEN 5; 325 MG/1; MG/1
1 TABLET ORAL EVERY 6 HOURS
Qty: 0 | Refills: 0 | Status: DISCONTINUED | OUTPATIENT
Start: 2018-04-25 | End: 2018-04-30

## 2018-04-25 RX ADMIN — OXYCODONE AND ACETAMINOPHEN 2 TABLET(S): 5; 325 TABLET ORAL at 23:27

## 2018-04-25 RX ADMIN — OXYCODONE AND ACETAMINOPHEN 2 TABLET(S): 5; 325 TABLET ORAL at 23:59

## 2018-04-25 RX ADMIN — Medication 250 MILLIGRAM(S): at 19:14

## 2018-04-25 RX ADMIN — Medication 15 UNIT(S): at 23:28

## 2018-04-25 RX ADMIN — SODIUM CHLORIDE 75 MILLILITER(S): 9 INJECTION INTRAMUSCULAR; INTRAVENOUS; SUBCUTANEOUS at 20:28

## 2018-04-25 RX ADMIN — PIPERACILLIN AND TAZOBACTAM 200 GRAM(S): 4; .5 INJECTION, POWDER, LYOPHILIZED, FOR SOLUTION INTRAVENOUS at 17:53

## 2018-04-25 RX ADMIN — GABAPENTIN 100 MILLIGRAM(S): 400 CAPSULE ORAL at 23:27

## 2018-04-25 NOTE — ED PROVIDER NOTE - MEDICAL DECISION MAKING DETAILS
The patient presents with toe infection with gangrene.  Will admit for IV antibiotics and Vascular consult. Podiatry already seen patient.

## 2018-04-25 NOTE — CONSULT NOTE ADULT - SUBJECTIVE AND OBJECTIVE BOX
Vascular Attending:  kim sexton       HPI:      67y year old Male with PMHX of DM, HTN, Past tobacco use,  history of Right BKA ~ 9 years ago, and left 3rd toe amputation last year, presents with complaint of  Left 2nd digit ulceration.  Patient reports to having the ulceration for 1.5 week. pt states that he went [facility] with shoe on 4/15 and had mild pain in the L foot. Patient saw his  Podiatrist today who recommended to be admitted to [facility] for vascular work up. for L 2nd digit with infection.  Patient was seen last may  in office by Dr. Mendoza for vascular workup prior to his 3rd toe amputation.  Based on non invasive studies at that time, no vascular surgical intervention was performed prior to amputation.  Patient healed the amputation site.   Currently reports no pain, no chills, no fevers, no sob or chest pain.             PAST MEDICAL & SURGICAL HISTORY:  HTN (hypertension)  Dry eye  DM (diabetes mellitus)  No significant past surgical history      REVIEW OF SYSTEMS  see hpi             MEDICATIONS  (STANDING):  vancomycin  IVPB 1000 milliGRAM(s) IV Intermittent once    MEDICATIONS  (PRN):      Allergies    No Known Allergies    Intolerances        SOCIAL HISTORY:  Past tobacco use       Vital Signs Last 24 Hrs  T(C): 37.2 (25 Apr 2018 15:45), Max: 37.2 (25 Apr 2018 15:45)  T(F): 99 (25 Apr 2018 15:45), Max: 99 (25 Apr 2018 15:45)  HR: 91 (25 Apr 2018 15:45) (91 - 91)  BP: 145/78 (25 Apr 2018 15:45) (145/78 - 145/78)  BP(mean): --  RR: 17 (25 Apr 2018 15:45) (17 - 17)  SpO2: 98% (25 Apr 2018 15:45) (98% - 98%)    PHYSICAL EXAM:      Constitutional:  no distress     Eyes:  left ptosis, no jaundice , left forehead scar       Neck:  supple , no audible bruit       Respiratory:  clear     Cardiovascular:  s1/s2     Gastrointestinal:  soft no pulsatile mass       Extremities:  warm, left foot with arch deformity.  Right BKA with prosthetic device in place.  Left second toe with gangrenous changes, odorous no pain elicited to palpation     Vascular: Femorals palpable , equal.  left popliteal 1+, pedals non palpable     Neurological:  gross distal sensory deficits, able to wiggle toes             LABS:                        12.2   11.0  )-----------( 334      ( 25 Apr 2018 16:50 )             36.3     04-25    135  |  97<L>  |  19.0  ----------------------------<  193<H>  4.4   |  25.0  |  1.04    Ca    9.4      25 Apr 2018 16:50    TPro  7.9  /  Alb  4.2  /  TBili  <0.2<L>  /  DBili  x   /  AST  14  /  ALT  10  /  AlkPhos  95  04-25          RADIOLOGY & ADDITIONAL STUDIES    past non invasive studies no within the pacs system, but within [employer]     Impression and Plan:    Diabetic 2 left toe infection, amputation likely eminent  Charcot foot deformity   patient with history of moderate to severe left tibial disease, but was able to heal prior surgical wound post amputation    - will follow up ordered non invasive studies and compare them with the past office findings  - patient seen with Dr. alvarez, will continue to follow while in house
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
## 2018-04-25 NOTE — ED PROVIDER NOTE - MUSCULOSKELETAL MINIMAL EXAM
2nd toe: Ulcer seen with erythema and discharge seen L 2nd toe: Ulcer seen with erythema and discharge seen

## 2018-04-25 NOTE — ED ADULT NURSE REASSESSMENT NOTE - NS ED NURSE REASSESS COMMENT FT1
Third call placed to [facility] to give report, RN on phone, unable to take report at this time.

## 2018-04-25 NOTE — ED PROVIDER NOTE - CARE PLAN
Principal Discharge DX:	Toe gangrene  Secondary Diagnosis:	DM (diabetes mellitus)  Secondary Diagnosis:	HTN (hypertension)

## 2018-04-25 NOTE — ED PROVIDER NOTE - CHPI ED SYMPTOMS NEG
no rash/no shortness of breath/no abdominal pain/no diarrhea/no chills/no headache/no vomiting/no decreased eating/drinking/no cough/no fever
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
## 2018-04-25 NOTE — ED PROVIDER NOTE - OBJECTIVE STATEMENT
The patient is a 67 year old male presents with foot pain and foul smell discharge from 2nd toe The patient is a 67 year old male presents with foot pain and foul smell discharge from 2nd toe.  No fever, No chill, No CP, No SOB.  The patient went to see his PMD Podiatrist Dr Calvert and sent in for admission.

## 2018-04-25 NOTE — ED ADULT NURSE NOTE - OBJECTIVE STATEMENT
pt AOX4 c.o going to [employer] on  last Sunday, was out all the day with the same shoe and when he went home he noticed something wrong with his big toe and the second toe, pt went to the MD, where it was wrapped and pt was educated on proper foot wear. pt has hx of DM and poor circulation, pt was told by MD to come to ED bc he was not able to get in touch with a vascular MD.

## 2018-04-25 NOTE — CONSULT NOTE ADULT - SUBJECTIVE AND OBJECTIVE BOX
S : 67y year old Male seen at bedside for Right BKA Left 2nd digit ulceration.  Patient relates to having the ulceration for 1.5 week. pt states that he went [employer] with shoe on 4/15 and had mild pain in the L foot.  Patient has had previous treatment with dsd  seen by Dr. Calvert.  - Patient states the last saw Dr. Calvert, a Podiatrist today who recommenced to be admitted to [facility] for vascular work up. for L 2nd digit with infection.      Patient admits to  (-) Fevers, (-) Chills, (-) Nausea, (-) Vomiting, (-) Shortness of Breath, admits to pain in the L foot       PMH: Dry eye  DM (diabetes mellitus)    PSH:No significant past surgical history      Allergies:No Known Allergies      Labs: pending       T(F): 99 (04-25-18 @ 15:45), Max: 99 (04-25-18 @ 15:45)  HR: 91 (04-25-18 @ 15:45) (91 - 91)  BP: 145/78 (04-25-18 @ 15:45) (145/78 - 145/78)  RR: 17 (04-25-18 @ 15:45) (17 - 17)  SpO2: 98% (04-25-18 @ 15:45) (98% - 98%)  Wt(kg): --    O:   General: Pleasant  male NAD & AOX3.       Ulceration Right BKA, Left 2nd digit ulcer, - hyperkeratotic border ,  + edema on the dorsal aspect of the foot, +michael-wound erythema, - purulence, - fluctuance, - tracking/tunneling, + malodor  - probe to bone. L 3rd digit amp site healed   Vascular: Dorsalis Pedis and Posterior Tibial pulses 0/4.     Neuro: Protective sensation diminished to the level of the digits bilateral.  Deformity:  A: Left 2nd digit gangrene  ulceration      P:   Chart reviewed and Patient evaluated  Discussed diagnosis and treatment with patient  Obtained wound culture to be sent to Pathology  Applied with dry sterile dressing  X-rays pending   Continue with IV antibiotics As Per ID  ID consult Dr. Pedro  Ordered ASIA   Vascular consult recommenced   Podiatry will follow while in house.  Discussed with Attending Dr. Calvert

## 2018-04-26 DIAGNOSIS — E11.40 TYPE 2 DIABETES MELLITUS WITH DIABETIC NEUROPATHY, UNSPECIFIED: ICD-10-CM

## 2018-04-26 DIAGNOSIS — E11.628 TYPE 2 DIABETES MELLITUS WITH OTHER SKIN COMPLICATIONS: ICD-10-CM

## 2018-04-26 DIAGNOSIS — I10 ESSENTIAL (PRIMARY) HYPERTENSION: ICD-10-CM

## 2018-04-26 DIAGNOSIS — I96 GANGRENE, NOT ELSEWHERE CLASSIFIED: ICD-10-CM

## 2018-04-26 LAB
ANION GAP SERPL CALC-SCNC: 10 MMOL/L — SIGNIFICANT CHANGE UP (ref 5–17)
APPEARANCE UR: CLEAR — SIGNIFICANT CHANGE UP
BACTERIA # UR AUTO: ABNORMAL
BASOPHILS # BLD AUTO: 0 K/UL — SIGNIFICANT CHANGE UP (ref 0–0.2)
BASOPHILS NFR BLD AUTO: 0.1 % — SIGNIFICANT CHANGE UP (ref 0–2)
BILIRUB UR-MCNC: NEGATIVE — SIGNIFICANT CHANGE UP
BUN SERPL-MCNC: 18 MG/DL — SIGNIFICANT CHANGE UP (ref 8–20)
CALCIUM SERPL-MCNC: 8.7 MG/DL — SIGNIFICANT CHANGE UP (ref 8.6–10.2)
CHLORIDE SERPL-SCNC: 102 MMOL/L — SIGNIFICANT CHANGE UP (ref 98–107)
CO2 SERPL-SCNC: 28 MMOL/L — SIGNIFICANT CHANGE UP (ref 22–29)
COLOR SPEC: YELLOW — SIGNIFICANT CHANGE UP
CREAT SERPL-MCNC: 0.98 MG/DL — SIGNIFICANT CHANGE UP (ref 0.5–1.3)
CRP SERPL-MCNC: 3.4 MG/DL — HIGH (ref 0–0.4)
DIFF PNL FLD: NEGATIVE — SIGNIFICANT CHANGE UP
EOSINOPHIL # BLD AUTO: 0.1 K/UL — SIGNIFICANT CHANGE UP (ref 0–0.5)
EOSINOPHIL NFR BLD AUTO: 0.6 % — SIGNIFICANT CHANGE UP (ref 0–5)
EPI CELLS # UR: SIGNIFICANT CHANGE UP
ERYTHROCYTE [SEDIMENTATION RATE] IN BLOOD: 45 MM/HR — HIGH (ref 0–20)
GLUCOSE BLDC GLUCOMTR-MCNC: 112 MG/DL — HIGH (ref 70–99)
GLUCOSE BLDC GLUCOMTR-MCNC: 166 MG/DL — HIGH (ref 70–99)
GLUCOSE BLDC GLUCOMTR-MCNC: 182 MG/DL — HIGH (ref 70–99)
GLUCOSE BLDC GLUCOMTR-MCNC: 90 MG/DL — SIGNIFICANT CHANGE UP (ref 70–99)
GLUCOSE SERPL-MCNC: 111 MG/DL — SIGNIFICANT CHANGE UP (ref 70–115)
GLUCOSE UR QL: 50 MG/DL
HBA1C BLD-MCNC: 8.9 % — HIGH (ref 4–5.6)
HCT VFR BLD CALC: 33.8 % — LOW (ref 42–52)
HGB BLD-MCNC: 11.1 G/DL — LOW (ref 14–18)
KETONES UR-MCNC: NEGATIVE — SIGNIFICANT CHANGE UP
LEUKOCYTE ESTERASE UR-ACNC: ABNORMAL
LYMPHOCYTES # BLD AUTO: 2 K/UL — SIGNIFICANT CHANGE UP (ref 1–4.8)
LYMPHOCYTES # BLD AUTO: 21.2 % — SIGNIFICANT CHANGE UP (ref 20–55)
MAGNESIUM SERPL-MCNC: 1.7 MG/DL — SIGNIFICANT CHANGE UP (ref 1.6–2.6)
MCHC RBC-ENTMCNC: 28.1 PG — SIGNIFICANT CHANGE UP (ref 27–31)
MCHC RBC-ENTMCNC: 32.8 G/DL — SIGNIFICANT CHANGE UP (ref 32–36)
MCV RBC AUTO: 85.6 FL — SIGNIFICANT CHANGE UP (ref 80–94)
MONOCYTES # BLD AUTO: 0.7 K/UL — SIGNIFICANT CHANGE UP (ref 0–0.8)
MONOCYTES NFR BLD AUTO: 7.7 % — SIGNIFICANT CHANGE UP (ref 3–10)
NEUTROPHILS # BLD AUTO: 6.5 K/UL — SIGNIFICANT CHANGE UP (ref 1.8–8)
NEUTROPHILS NFR BLD AUTO: 70.2 % — SIGNIFICANT CHANGE UP (ref 37–73)
NITRITE UR-MCNC: NEGATIVE — SIGNIFICANT CHANGE UP
PH UR: 5 — SIGNIFICANT CHANGE UP (ref 5–8)
PHOSPHATE SERPL-MCNC: 3.3 MG/DL — SIGNIFICANT CHANGE UP (ref 2.4–4.7)
PLATELET # BLD AUTO: 327 K/UL — SIGNIFICANT CHANGE UP (ref 150–400)
POTASSIUM SERPL-MCNC: 4.5 MMOL/L — SIGNIFICANT CHANGE UP (ref 3.5–5.3)
POTASSIUM SERPL-SCNC: 4.5 MMOL/L — SIGNIFICANT CHANGE UP (ref 3.5–5.3)
PROT UR-MCNC: 15 MG/DL
RBC # BLD: 3.95 M/UL — LOW (ref 4.6–6.2)
RBC # FLD: 13.2 % — SIGNIFICANT CHANGE UP (ref 11–15.6)
RBC CASTS # UR COMP ASSIST: SIGNIFICANT CHANGE UP /HPF (ref 0–4)
SODIUM SERPL-SCNC: 140 MMOL/L — SIGNIFICANT CHANGE UP (ref 135–145)
SP GR SPEC: 1.02 — SIGNIFICANT CHANGE UP (ref 1.01–1.02)
UROBILINOGEN FLD QL: NEGATIVE MG/DL — SIGNIFICANT CHANGE UP
WBC # BLD: 9.3 K/UL — SIGNIFICANT CHANGE UP (ref 4.8–10.8)
WBC # FLD AUTO: 9.3 K/UL — SIGNIFICANT CHANGE UP (ref 4.8–10.8)
WBC UR QL: ABNORMAL

## 2018-04-26 PROCEDURE — 99233 SBSQ HOSP IP/OBS HIGH 50: CPT

## 2018-04-26 PROCEDURE — 99222 1ST HOSP IP/OBS MODERATE 55: CPT

## 2018-04-26 PROCEDURE — 93923 UPR/LXTR ART STDY 3+ LVLS: CPT | Mod: 26

## 2018-04-26 RX ORDER — PIPERACILLIN AND TAZOBACTAM 4; .5 G/20ML; G/20ML
3.38 INJECTION, POWDER, LYOPHILIZED, FOR SOLUTION INTRAVENOUS EVERY 8 HOURS
Qty: 0 | Refills: 0 | Status: DISCONTINUED | OUTPATIENT
Start: 2018-04-26 | End: 2018-05-02

## 2018-04-26 RX ADMIN — Medication 250 MILLIGRAM(S): at 02:44

## 2018-04-26 RX ADMIN — GABAPENTIN 100 MILLIGRAM(S): 400 CAPSULE ORAL at 05:40

## 2018-04-26 RX ADMIN — OXYCODONE AND ACETAMINOPHEN 2 TABLET(S): 5; 325 TABLET ORAL at 22:09

## 2018-04-26 RX ADMIN — PIPERACILLIN AND TAZOBACTAM 25 GRAM(S): 4; .5 INJECTION, POWDER, LYOPHILIZED, FOR SOLUTION INTRAVENOUS at 22:09

## 2018-04-26 RX ADMIN — OXYCODONE AND ACETAMINOPHEN 2 TABLET(S): 5; 325 TABLET ORAL at 23:00

## 2018-04-26 RX ADMIN — PANTOPRAZOLE SODIUM 40 MILLIGRAM(S): 20 TABLET, DELAYED RELEASE ORAL at 08:45

## 2018-04-26 RX ADMIN — Medication 1: at 12:38

## 2018-04-26 RX ADMIN — Medication 15 UNIT(S): at 22:09

## 2018-04-26 RX ADMIN — GABAPENTIN 100 MILLIGRAM(S): 400 CAPSULE ORAL at 13:40

## 2018-04-26 RX ADMIN — PIPERACILLIN AND TAZOBACTAM 25 GRAM(S): 4; .5 INJECTION, POWDER, LYOPHILIZED, FOR SOLUTION INTRAVENOUS at 05:41

## 2018-04-26 RX ADMIN — ATENOLOL 50 MILLIGRAM(S): 25 TABLET ORAL at 05:40

## 2018-04-26 RX ADMIN — ENOXAPARIN SODIUM 40 MILLIGRAM(S): 100 INJECTION SUBCUTANEOUS at 13:40

## 2018-04-26 RX ADMIN — PIPERACILLIN AND TAZOBACTAM 25 GRAM(S): 4; .5 INJECTION, POWDER, LYOPHILIZED, FOR SOLUTION INTRAVENOUS at 15:00

## 2018-04-26 RX ADMIN — Medication 1: at 17:21

## 2018-04-26 RX ADMIN — Medication 250 MILLIGRAM(S): at 13:40

## 2018-04-26 RX ADMIN — OXYCODONE AND ACETAMINOPHEN 2 TABLET(S): 5; 325 TABLET ORAL at 10:27

## 2018-04-26 RX ADMIN — OXYCODONE AND ACETAMINOPHEN 2 TABLET(S): 5; 325 TABLET ORAL at 11:00

## 2018-04-26 RX ADMIN — GABAPENTIN 100 MILLIGRAM(S): 400 CAPSULE ORAL at 22:09

## 2018-04-26 NOTE — CHART NOTE - NSCHARTNOTEFT_GEN_A_CORE
ASIA/PVR's reviewed with Dr Sanford  No sig change from prior studies performed in our office in 5/2017  Toe pressures >30  Previous amputation site healed  No vascular surgical intervention at this time  May proceed with podiatric intervention and will follow post op wound healing
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
## 2018-04-26 NOTE — CONSULT NOTE ADULT - PROBLEM SELECTOR RECOMMENDATION 9
- continue current antibiotics - Zosyn and Vancomycin  - follow upon cultures  - needs vascular consult if not already done  - podiatry to follow  - trend WBC infection

## 2018-04-26 NOTE — CONSULT NOTE ADULT - SUBJECTIVE AND OBJECTIVE BOX
[facility]  INFECTIOUS DISEASES AND INTERNAL MEDICINE at [place]  =======================================================  Terrence Hardy MD  Diplomates American Board of Internal Medicine and Infectious Diseases  =======================================================    [facility]-[id number]  ANDRIY LEBLANC   This is a 67 y.o. man with  HTN, DM with Neuropathy, PVD s/p Right BKA, Left tibial disease s/p left third toe amputation, who had been following with podiatry Dr. Александр Calvert, was sent to the ED for LEFT #2 TOE ulceration and cellulitis.   ABOUT 10 DAYS AGO, A wound and discoloration of affected toe. He was given a course of PO antibiotics. ______________  There was no improvement.    Pt c/o left foot pain  Cultures from blood were obtained.  Wound culture was collected by the ER physician.   Workup showed leukocytosis of 11k.     Prior cultures from 2017 reviewed and showed Serratia, Providencia, and Bacteroides from the LEFT #3 toe prior to its amputation.   patient was started on Vancomycin and Zosyn.     =======================================================  Past Medical & Surgical Hx:  =====================  PAST MEDICAL & SURGICAL HISTORY:  Neuropathy  Cataract  HTN (hypertension)  Dry eye  DM (diabetes mellitus)  S/P BKA (below knee amputation) unilateral, right      Problem List:  ==========  HEALTH ISSUES - PROBLEM Dx:    Social Hx:  =======  no toxic habits currently    Family History:  no significant family history of immunosuppressive disorders.  Family history of diabetes mellitus (Father, Mother)        Allergies  No Known Allergies  Intolerances        MEDICATIONS  (STANDING):  ATENolol  Tablet 50 milliGRAM(s) Oral daily  dextrose 5%. 1000 milliLiter(s) (50 mL/Hr) IV Continuous <Continuous>  dextrose 50% Injectable 12.5 Gram(s) IV Push once  dextrose 50% Injectable 25 Gram(s) IV Push once  dextrose 50% Injectable 25 Gram(s) IV Push once  enoxaparin Injectable 40 milliGRAM(s) SubCutaneous daily  gabapentin 100 milliGRAM(s) Oral three times a day  insulin detemir injectable (LEVEMIR) 15 Unit(s) SubCutaneous at bedtime  insulin lispro (HumaLOG) corrective regimen sliding scale   SubCutaneous three times a day before meals  pantoprazole    Tablet 40 milliGRAM(s) Oral before breakfast  piperacillin/tazobactam IVPB. 3.375 Gram(s) IV Intermittent every 8 hours  vancomycin  IVPB 1000 milliGRAM(s) IV Intermittent every 8 hours    =======================================================    REVIEW OF SYSTEMS:  as above  all other ROS negative    ======================================================  Physical Exam:  ============  Vital Signs Last 24 Hrs  T(C): 36.6 (2018 05:18), Max: 37.2 (2018 15:45)  T(F): 97.9 (2018 05:18), Max: 99 (2018 15:45)  HR: 82 (2018 05:18) (82 - 91)  BP: 132/65 (2018 05:18) (132/65 - 145/78)  RR: 18 (2018 05:18) (16 - 18)  SpO2: 97% (2018 05:18) (97% - 99%)  Height (cm): 175.26 ( @ 15:45)  Weight (kg): 78.5 ( @ 15:45)  BMI (kg/m2): 25.6 ( @ 15:45)  BSA (m2): 1.94 ( @ 15:45)    General: Alert and oriented, No acute distress.  Eye: Pupils are equal, round and reactive to light, Extraocular movements are intact, Normal conjunctiva.  HENT: Normocephalic, Oral mucosa is moist, No pharyngeal erythema, No sinus tenderness.  Neck: Supple, No lymphadenopathy.  Respiratory: Lungs are clear to auscultation, Respirations are non-labored.  Cardiovascular: Normal rate, Regular rhythm, No murmur, Good pulses equal in all extremities, No edema.  Gastrointestinal: Soft, Non-tender, Non-distended, Normal bowel sounds.  Genitourinary: No costovertebral angle tenderness.  Lymphatics: No lymphadenopathy neck, axilla, groin.  Musculoskeletal:   Integumentary:    Neurologic: Alert, Oriented, No focal deficits, Cranial Nerves II-XII are grossly intact.  Psychiatric: Appropriate mood & affect.      =======================================================  Labs:  ====      140  |  102  |  18.0  ----------------------------<  111  4.5   |  28.0  |  0.98    Ca    8.7      2018 06:23  Phos  3.3       Mg     1.7         TPro  7.9  /  Alb  4.2  /  TBili  <0.2<L>  /  DBili  x   /  AST  14  /  ALT  10  /  AlkPhos  95                            12.2   11.0  )-----------( 334      ( 2018 16:50 )             36.3         Urinalysis Basic - ( 2018 05:07 )    Color: Yellow / Appearance: Clear / S.020 / pH: x  Gluc: x / Ketone: Negative  / Bili: Negative / Urobili: Negative mg/dL   Blood: x / Protein: 15 mg/dL / Nitrite: Negative   Leuk Esterase: Moderate / RBC: 0-2 /HPF / WBC 26-50   Sq Epi: x / Non Sq Epi: Occasional / Bacteria: Occasional      LIVER FUNCTIONS - ( 2018 16:50 )  Alb: 4.2 g/dL / Pro: 7.9 g/dL / ALK PHOS: 95 U/L / ALT: 10 U/L / AST: 14 U/L / GGT: x [facility]  INFECTIOUS DISEASES AND INTERNAL MEDICINE at [place]  =======================================================  Terrence Hardy MD  Diplomates American Board of Internal Medicine and Infectious Diseases  =======================================================    [facility]-[id number]  ANDRIY LEBLANC   This is a 67 y.o. man with  HTN, DM with Neuropathy, PVD s/p Right BKA, Left tibial disease s/p left third toe amputation, who had been following with podiatry Dr. Александр Calvert, was sent to the ED for LEFT #2 TOE ulceration and cellulitis.   ON 18 evening, while going to bed, he noted that his toe was discolored/ white in appearance.  He was concerned, told his wife, who then suggested he go to the ER.  He opted to see podiatry on 4/15/18.  He wad debrided at the office and started on a course of PO antibiotics.  (Flagyl 500 mg TID x 14 days; and also given Bactrim DS BID for about 1 week).  In follow up, there was no improvement.   He had pain, and had been taking Tylenol around the clock; no fevers reported at home.     Pt c/o left foot pain.  Cultures from blood were obtained.  Wound culture was collected by the ER physician.  Workup showed leukocytosis of 11k.     Prior cultures from 2017 reviewed and showed Serratia, Providencia, and Bacteroides from the LEFT #3 toe prior to its amputation.   patient was started on Vancomycin and Zosyn.     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  Family history of diabetes mellitus (Father, Mother)    Allergies  No Known Allergies  Intolerances    MEDICATIONS  (STANDING):  ATENolol  Tablet 50 milliGRAM(s) Oral daily  dextrose 5%. 1000 milliLiter(s) (50 mL/Hr) IV Continuous <Continuous>  dextrose 50% Injectable 12.5 Gram(s) IV Push once  dextrose 50% Injectable 25 Gram(s) IV Push once  dextrose 50% Injectable 25 Gram(s) IV Push once  enoxaparin Injectable 40 milliGRAM(s) SubCutaneous daily  gabapentin 100 milliGRAM(s) Oral three times a day  insulin detemir injectable (LEVEMIR) 15 Unit(s) SubCutaneous at bedtime  insulin lispro (HumaLOG) corrective regimen sliding scale   SubCutaneous three times a day before meals  pantoprazole    Tablet 40 milliGRAM(s) Oral before breakfast  piperacillin/tazobactam IVPB. 3.375 Gram(s) IV Intermittent every 8 hours  vancomycin  IVPB 1000 milliGRAM(s) IV Intermittent every 8 hours    =======================================================    REVIEW OF SYSTEMS:  as above  all other ROS negative    ======================================================  Physical Exam:  ============  Vital Signs Last 24 Hrs  T(C): 36.6 (2018 05:18), Max: 37.2 (2018 15:45)  T(F): 97.9 (2018 05:18), Max: 99 (2018 15:45)  HR: 82 (2018 05:18) (82 - 91)  BP: 132/65 (2018 05:18) (132/65 - 145/78)  RR: 18 (2018 05:18) (16 - 18)  SpO2: 97% (2018 05:18) (97% - 99%)  Height (cm): 175.26 ( @ 15:45)  Weight (kg): 78.5 ( @ 15:45)  BMI (kg/m2): 25.6 ( @ 15:45)  BSA (m2): 1.94 ( @ 15:45)    General: Alert and oriented, No acute distress.  Eye: Pupils are equal, round and reactive to light, Extraocular movements are intact, Normal conjunctiva.  HENT: Normocephalic, Oral mucosa is moist, No pharyngeal erythema, No sinus tenderness.  Neck: Supple, No lymphadenopathy.  Respiratory: Lungs are clear to auscultation, PROLONG EXP PAHS  Cardiovascular: Normal rate, Regular rhythm, No murmur. DIMINISHED PULSES IN LLE, NO EDEMA  Gastrointestinal: Soft, Non-tender, Non-distended, Normal bowel sounds.  Genitourinary: No costovertebral angle tenderness.  Lymphatics: No lymphadenopathy neck, axilla, groin.  Musculoskeletal: RIGHT LOWER EXT BKA WITH PROSTHESIS; LEFT #3 TOE STUMP C/D/I  Integumentary:  NECROTIC SKIN ON THE LEFT MEDIAL #2 TOE; PIP JOINT IS VISIBLE.  ESCHAR ON THE MEDIAL PLANTAR TIP OF TIE.   Neurologic: Alert, Oriented, No focal deficits, Cranial Nerves II-XII are grossly intact.  Psychiatric: Appropriate mood & affect.      =======================================================  Labs:  ====      140  |  102  |  18.0  ----------------------------<  111  4.5   |  28.0  |  0.98    Ca    8.7      2018 06:23  Phos  3.3     -  Mg     1.7     -    TPro  7.9  /  Alb  4.2  /  TBili  <0.2<L>  /  DBili  x   /  AST  14  /  ALT  10  /  AlkPhos  95  -                          12.2   11.0  )-----------( 334      ( 2018 16:50 )             36.3          Color: Yellow / Appearance: Clear / S.020 / pH: x  Gluc: x / Ketone: Negative  / Bili: Negative / Urobili: Negative mg/dL   Blood: x / Protein: 15 mg/dL / Nitrite: Negative   Leuk Esterase: Moderate / RBC: 0-2 /HPF / WBC 26-50   Sq Epi: x / Non Sq Epi: Occasional / Bacteria: Occasional      LIVER FUNCTIONS - ( 2018 16:50 )  Alb: 4.2 g/dL / Pro: 7.9 g/dL / ALK PHOS: 95 U/L / ALT: 10 U/L / AST: 14 U/L / GGT: x

## 2018-04-26 NOTE — CONSULT NOTE ADULT - ASSESSMENT
this 67 y.o. man with long standing diabetes, neuropathy, vascular disease, prior right BKA and left #3 toe OM and amputation, here for left #2 toe infection.

## 2018-04-27 LAB
-  AMIKACIN: SIGNIFICANT CHANGE UP
-  AZTREONAM: SIGNIFICANT CHANGE UP
-  CEFEPIME: SIGNIFICANT CHANGE UP
-  CIPROFLOXACIN: SIGNIFICANT CHANGE UP
-  GENTAMICIN: SIGNIFICANT CHANGE UP
-  LEVOFLOXACIN: SIGNIFICANT CHANGE UP
-  MEROPENEM: SIGNIFICANT CHANGE UP
-  PIPERACILLIN/TAZOBACTAM: SIGNIFICANT CHANGE UP
-  TOBRAMYCIN: SIGNIFICANT CHANGE UP
ANION GAP SERPL CALC-SCNC: 13 MMOL/L — SIGNIFICANT CHANGE UP (ref 5–17)
BASOPHILS # BLD AUTO: 0 K/UL — SIGNIFICANT CHANGE UP (ref 0–0.2)
BASOPHILS NFR BLD AUTO: 0.1 % — SIGNIFICANT CHANGE UP (ref 0–2)
BUN SERPL-MCNC: 13 MG/DL — SIGNIFICANT CHANGE UP (ref 8–20)
CALCIUM SERPL-MCNC: 8.7 MG/DL — SIGNIFICANT CHANGE UP (ref 8.6–10.2)
CHLORIDE SERPL-SCNC: 100 MMOL/L — SIGNIFICANT CHANGE UP (ref 98–107)
CO2 SERPL-SCNC: 25 MMOL/L — SIGNIFICANT CHANGE UP (ref 22–29)
CREAT SERPL-MCNC: 1.08 MG/DL — SIGNIFICANT CHANGE UP (ref 0.5–1.3)
CULTURE RESULTS: SIGNIFICANT CHANGE UP
EOSINOPHIL # BLD AUTO: 0.1 K/UL — SIGNIFICANT CHANGE UP (ref 0–0.5)
EOSINOPHIL NFR BLD AUTO: 1.2 % — SIGNIFICANT CHANGE UP (ref 0–5)
GLUCOSE BLDC GLUCOMTR-MCNC: 126 MG/DL — HIGH (ref 70–99)
GLUCOSE BLDC GLUCOMTR-MCNC: 169 MG/DL — HIGH (ref 70–99)
GLUCOSE BLDC GLUCOMTR-MCNC: 221 MG/DL — HIGH (ref 70–99)
GLUCOSE BLDC GLUCOMTR-MCNC: 236 MG/DL — HIGH (ref 70–99)
GLUCOSE SERPL-MCNC: 120 MG/DL — HIGH (ref 70–115)
HCT VFR BLD CALC: 33.4 % — LOW (ref 42–52)
HGB BLD-MCNC: 10.9 G/DL — LOW (ref 14–18)
LYMPHOCYTES # BLD AUTO: 1.5 K/UL — SIGNIFICANT CHANGE UP (ref 1–4.8)
LYMPHOCYTES # BLD AUTO: 20.5 % — SIGNIFICANT CHANGE UP (ref 20–55)
MAGNESIUM SERPL-MCNC: 1.9 MG/DL — SIGNIFICANT CHANGE UP (ref 1.6–2.6)
MCHC RBC-ENTMCNC: 27.6 PG — SIGNIFICANT CHANGE UP (ref 27–31)
MCHC RBC-ENTMCNC: 32.6 G/DL — SIGNIFICANT CHANGE UP (ref 32–36)
MCV RBC AUTO: 84.6 FL — SIGNIFICANT CHANGE UP (ref 80–94)
METHOD TYPE: SIGNIFICANT CHANGE UP
MONOCYTES # BLD AUTO: 0.8 K/UL — SIGNIFICANT CHANGE UP (ref 0–0.8)
MONOCYTES NFR BLD AUTO: 10.5 % — HIGH (ref 3–10)
NEUTROPHILS # BLD AUTO: 4.9 K/UL — SIGNIFICANT CHANGE UP (ref 1.8–8)
NEUTROPHILS NFR BLD AUTO: 67.7 % — SIGNIFICANT CHANGE UP (ref 37–73)
ORGANISM # SPEC MICROSCOPIC CNT: SIGNIFICANT CHANGE UP
ORGANISM # SPEC MICROSCOPIC CNT: SIGNIFICANT CHANGE UP
PHOSPHATE SERPL-MCNC: 3.6 MG/DL — SIGNIFICANT CHANGE UP (ref 2.4–4.7)
PLATELET # BLD AUTO: 307 K/UL — SIGNIFICANT CHANGE UP (ref 150–400)
POTASSIUM SERPL-MCNC: 4 MMOL/L — SIGNIFICANT CHANGE UP (ref 3.5–5.3)
POTASSIUM SERPL-SCNC: 4 MMOL/L — SIGNIFICANT CHANGE UP (ref 3.5–5.3)
RBC # BLD: 3.95 M/UL — LOW (ref 4.6–6.2)
RBC # FLD: 13 % — SIGNIFICANT CHANGE UP (ref 11–15.6)
SODIUM SERPL-SCNC: 138 MMOL/L — SIGNIFICANT CHANGE UP (ref 135–145)
SPECIMEN SOURCE: SIGNIFICANT CHANGE UP
VANCOMYCIN TROUGH SERPL-MCNC: 15.7 UG/ML — SIGNIFICANT CHANGE UP (ref 10–20)
WBC # BLD: 7.3 K/UL — SIGNIFICANT CHANGE UP (ref 4.8–10.8)
WBC # FLD AUTO: 7.3 K/UL — SIGNIFICANT CHANGE UP (ref 4.8–10.8)

## 2018-04-27 PROCEDURE — 99233 SBSQ HOSP IP/OBS HIGH 50: CPT

## 2018-04-27 RX ORDER — INSULIN DETEMIR 100/ML (3)
8 INSULIN PEN (ML) SUBCUTANEOUS AT BEDTIME
Qty: 0 | Refills: 0 | Status: COMPLETED | OUTPATIENT
Start: 2018-04-27 | End: 2018-04-27

## 2018-04-27 RX ADMIN — ENOXAPARIN SODIUM 40 MILLIGRAM(S): 100 INJECTION SUBCUTANEOUS at 11:47

## 2018-04-27 RX ADMIN — PIPERACILLIN AND TAZOBACTAM 25 GRAM(S): 4; .5 INJECTION, POWDER, LYOPHILIZED, FOR SOLUTION INTRAVENOUS at 22:13

## 2018-04-27 RX ADMIN — PANTOPRAZOLE SODIUM 40 MILLIGRAM(S): 20 TABLET, DELAYED RELEASE ORAL at 05:42

## 2018-04-27 RX ADMIN — OXYCODONE AND ACETAMINOPHEN 2 TABLET(S): 5; 325 TABLET ORAL at 22:44

## 2018-04-27 RX ADMIN — ATENOLOL 50 MILLIGRAM(S): 25 TABLET ORAL at 05:41

## 2018-04-27 RX ADMIN — PIPERACILLIN AND TAZOBACTAM 25 GRAM(S): 4; .5 INJECTION, POWDER, LYOPHILIZED, FOR SOLUTION INTRAVENOUS at 15:13

## 2018-04-27 RX ADMIN — OXYCODONE AND ACETAMINOPHEN 2 TABLET(S): 5; 325 TABLET ORAL at 22:14

## 2018-04-27 RX ADMIN — Medication 250 MILLIGRAM(S): at 02:48

## 2018-04-27 RX ADMIN — Medication 1: at 17:21

## 2018-04-27 RX ADMIN — Medication 2: at 11:48

## 2018-04-27 RX ADMIN — PIPERACILLIN AND TAZOBACTAM 25 GRAM(S): 4; .5 INJECTION, POWDER, LYOPHILIZED, FOR SOLUTION INTRAVENOUS at 05:42

## 2018-04-27 RX ADMIN — GABAPENTIN 100 MILLIGRAM(S): 400 CAPSULE ORAL at 15:13

## 2018-04-27 RX ADMIN — Medication 250 MILLIGRAM(S): at 11:48

## 2018-04-27 RX ADMIN — Medication 8 UNIT(S): at 22:43

## 2018-04-27 RX ADMIN — OXYCODONE AND ACETAMINOPHEN 2 TABLET(S): 5; 325 TABLET ORAL at 06:18

## 2018-04-27 RX ADMIN — GABAPENTIN 100 MILLIGRAM(S): 400 CAPSULE ORAL at 05:41

## 2018-04-27 RX ADMIN — Medication 250 MILLIGRAM(S): at 20:36

## 2018-04-27 RX ADMIN — OXYCODONE AND ACETAMINOPHEN 2 TABLET(S): 5; 325 TABLET ORAL at 05:42

## 2018-04-27 RX ADMIN — GABAPENTIN 100 MILLIGRAM(S): 400 CAPSULE ORAL at 22:13
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
## 2018-04-28 ENCOUNTER — RESULT REVIEW (OUTPATIENT)
Age: 68
End: 2018-04-28

## 2018-04-28 LAB
FERRITIN SERPL-MCNC: 95 NG/ML — SIGNIFICANT CHANGE UP (ref 30–400)
FOLATE SERPL-MCNC: 13.1 NG/ML — SIGNIFICANT CHANGE UP
GLUCOSE BLDC GLUCOMTR-MCNC: 164 MG/DL — HIGH (ref 70–99)
GLUCOSE BLDC GLUCOMTR-MCNC: 219 MG/DL — HIGH (ref 70–99)
GLUCOSE BLDC GLUCOMTR-MCNC: 307 MG/DL — HIGH (ref 70–99)
GRAM STN FLD: SIGNIFICANT CHANGE UP
HCT VFR BLD CALC: 33.1 % — LOW (ref 42–52)
HGB BLD-MCNC: 11.2 G/DL — LOW (ref 14–18)
IRON SATN MFR SERPL: 13 % — LOW (ref 16–55)
IRON SATN MFR SERPL: 40 UG/DL — LOW (ref 59–158)
MCHC RBC-ENTMCNC: 28.6 PG — SIGNIFICANT CHANGE UP (ref 27–31)
MCHC RBC-ENTMCNC: 33.8 G/DL — SIGNIFICANT CHANGE UP (ref 32–36)
MCV RBC AUTO: 84.4 FL — SIGNIFICANT CHANGE UP (ref 80–94)
PLATELET # BLD AUTO: 344 K/UL — SIGNIFICANT CHANGE UP (ref 150–400)
RBC # BLD: 3.92 M/UL — LOW (ref 4.6–6.2)
RBC # FLD: 12.6 % — SIGNIFICANT CHANGE UP (ref 11–15.6)
SPECIMEN SOURCE: SIGNIFICANT CHANGE UP
TIBC SERPL-MCNC: 319 UG/DL — SIGNIFICANT CHANGE UP (ref 220–430)
TRANSFERRIN SERPL-MCNC: 223 MG/DL — SIGNIFICANT CHANGE UP (ref 180–329)
VANCOMYCIN TROUGH SERPL-MCNC: 15.3 UG/ML — SIGNIFICANT CHANGE UP (ref 10–20)
VIT B12 SERPL-MCNC: 207 PG/ML — LOW (ref 232–1245)
WBC # BLD: 8.1 K/UL — SIGNIFICANT CHANGE UP (ref 4.8–10.8)
WBC # FLD AUTO: 8.1 K/UL — SIGNIFICANT CHANGE UP (ref 4.8–10.8)

## 2018-04-28 PROCEDURE — 88305 TISSUE EXAM BY PATHOLOGIST: CPT | Mod: 26

## 2018-04-28 PROCEDURE — 88311 DECALCIFY TISSUE: CPT | Mod: 26

## 2018-04-28 PROCEDURE — 99233 SBSQ HOSP IP/OBS HIGH 50: CPT

## 2018-04-28 PROCEDURE — 73630 X-RAY EXAM OF FOOT: CPT | Mod: 26,LT

## 2018-04-28 RX ORDER — LACTOBACILLUS ACIDOPHILUS 100MM CELL
1 CAPSULE ORAL
Qty: 0 | Refills: 0 | Status: DISCONTINUED | OUTPATIENT
Start: 2018-04-28 | End: 2018-05-02

## 2018-04-28 RX ORDER — INSULIN DETEMIR 100/ML (3)
15 INSULIN PEN (ML) SUBCUTANEOUS AT BEDTIME
Qty: 0 | Refills: 0 | Status: DISCONTINUED | OUTPATIENT
Start: 2018-04-28 | End: 2018-04-30

## 2018-04-28 RX ORDER — ENOXAPARIN SODIUM 100 MG/ML
40 INJECTION SUBCUTANEOUS DAILY
Qty: 0 | Refills: 0 | Status: DISCONTINUED | OUTPATIENT
Start: 2018-04-29 | End: 2018-05-02

## 2018-04-28 RX ORDER — ATENOLOL 25 MG/1
50 TABLET ORAL ONCE
Qty: 0 | Refills: 0 | Status: COMPLETED | OUTPATIENT
Start: 2018-04-28 | End: 2018-04-28

## 2018-04-28 RX ORDER — SODIUM CHLORIDE 9 MG/ML
1000 INJECTION, SOLUTION INTRAVENOUS
Qty: 0 | Refills: 0 | Status: DISCONTINUED | OUTPATIENT
Start: 2018-04-28 | End: 2018-04-28

## 2018-04-28 RX ORDER — FENTANYL CITRATE 50 UG/ML
25 INJECTION INTRAVENOUS
Qty: 0 | Refills: 0 | Status: DISCONTINUED | OUTPATIENT
Start: 2018-04-28 | End: 2018-04-28

## 2018-04-28 RX ORDER — PREGABALIN 225 MG/1
1000 CAPSULE ORAL DAILY
Qty: 0 | Refills: 0 | Status: DISCONTINUED | OUTPATIENT
Start: 2018-04-28 | End: 2018-05-02

## 2018-04-28 RX ORDER — INSULIN LISPRO 100/ML
3 VIAL (ML) SUBCUTANEOUS ONCE
Qty: 0 | Refills: 0 | Status: COMPLETED | OUTPATIENT
Start: 2018-04-28 | End: 2018-04-28

## 2018-04-28 RX ORDER — ONDANSETRON 8 MG/1
4 TABLET, FILM COATED ORAL ONCE
Qty: 0 | Refills: 0 | Status: DISCONTINUED | OUTPATIENT
Start: 2018-04-28 | End: 2018-04-28

## 2018-04-28 RX ORDER — FERROUS SULFATE 325(65) MG
325 TABLET ORAL
Qty: 0 | Refills: 0 | Status: DISCONTINUED | OUTPATIENT
Start: 2018-04-28 | End: 2018-05-02

## 2018-04-28 RX ADMIN — OXYCODONE AND ACETAMINOPHEN 2 TABLET(S): 5; 325 TABLET ORAL at 22:16

## 2018-04-28 RX ADMIN — Medication 325 MILLIGRAM(S): at 16:57

## 2018-04-28 RX ADMIN — OXYCODONE AND ACETAMINOPHEN 1 TABLET(S): 5; 325 TABLET ORAL at 14:08

## 2018-04-28 RX ADMIN — OXYCODONE AND ACETAMINOPHEN 2 TABLET(S): 5; 325 TABLET ORAL at 23:16

## 2018-04-28 RX ADMIN — Medication 1 TABLET(S): at 16:53

## 2018-04-28 RX ADMIN — PIPERACILLIN AND TAZOBACTAM 25 GRAM(S): 4; .5 INJECTION, POWDER, LYOPHILIZED, FOR SOLUTION INTRAVENOUS at 05:03

## 2018-04-28 RX ADMIN — GABAPENTIN 100 MILLIGRAM(S): 400 CAPSULE ORAL at 21:52

## 2018-04-28 RX ADMIN — Medication 250 MILLIGRAM(S): at 16:57

## 2018-04-28 RX ADMIN — GABAPENTIN 100 MILLIGRAM(S): 400 CAPSULE ORAL at 05:03

## 2018-04-28 RX ADMIN — PIPERACILLIN AND TAZOBACTAM 25 GRAM(S): 4; .5 INJECTION, POWDER, LYOPHILIZED, FOR SOLUTION INTRAVENOUS at 14:06

## 2018-04-28 RX ADMIN — Medication 250 MILLIGRAM(S): at 05:02

## 2018-04-28 RX ADMIN — ATENOLOL 50 MILLIGRAM(S): 25 TABLET ORAL at 11:43

## 2018-04-28 RX ADMIN — Medication 1: at 11:30

## 2018-04-28 RX ADMIN — GABAPENTIN 100 MILLIGRAM(S): 400 CAPSULE ORAL at 14:06

## 2018-04-28 RX ADMIN — PREGABALIN 1000 MICROGRAM(S): 225 CAPSULE ORAL at 16:57

## 2018-04-28 RX ADMIN — PIPERACILLIN AND TAZOBACTAM 25 GRAM(S): 4; .5 INJECTION, POWDER, LYOPHILIZED, FOR SOLUTION INTRAVENOUS at 21:46

## 2018-04-28 RX ADMIN — OXYCODONE AND ACETAMINOPHEN 1 TABLET(S): 5; 325 TABLET ORAL at 15:36

## 2018-04-28 RX ADMIN — Medication 2: at 16:54

## 2018-04-28 RX ADMIN — Medication 15 UNIT(S): at 22:13

## 2018-04-28 RX ADMIN — Medication 3 UNIT(S): at 22:12
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
## 2018-04-28 NOTE — BRIEF OPERATIVE NOTE - PROCEDURE
<<-----Click on this checkbox to enter Procedure Amputation of left second toe  04/28/2018    Active  LTOMLINSON

## 2018-04-29 LAB
GLUCOSE BLDC GLUCOMTR-MCNC: 170 MG/DL — HIGH (ref 70–99)
GLUCOSE BLDC GLUCOMTR-MCNC: 195 MG/DL — HIGH (ref 70–99)
GLUCOSE BLDC GLUCOMTR-MCNC: 241 MG/DL — HIGH (ref 70–99)
GLUCOSE BLDC GLUCOMTR-MCNC: 261 MG/DL — HIGH (ref 70–99)

## 2018-04-29 PROCEDURE — 99233 SBSQ HOSP IP/OBS HIGH 50: CPT

## 2018-04-29 RX ADMIN — Medication 325 MILLIGRAM(S): at 05:30

## 2018-04-29 RX ADMIN — GABAPENTIN 100 MILLIGRAM(S): 400 CAPSULE ORAL at 05:30

## 2018-04-29 RX ADMIN — GABAPENTIN 100 MILLIGRAM(S): 400 CAPSULE ORAL at 22:35

## 2018-04-29 RX ADMIN — Medication 15 UNIT(S): at 22:37

## 2018-04-29 RX ADMIN — PIPERACILLIN AND TAZOBACTAM 25 GRAM(S): 4; .5 INJECTION, POWDER, LYOPHILIZED, FOR SOLUTION INTRAVENOUS at 22:35

## 2018-04-29 RX ADMIN — Medication 2: at 17:21

## 2018-04-29 RX ADMIN — Medication 1 TABLET(S): at 17:21

## 2018-04-29 RX ADMIN — OXYCODONE AND ACETAMINOPHEN 2 TABLET(S): 5; 325 TABLET ORAL at 10:11

## 2018-04-29 RX ADMIN — PIPERACILLIN AND TAZOBACTAM 25 GRAM(S): 4; .5 INJECTION, POWDER, LYOPHILIZED, FOR SOLUTION INTRAVENOUS at 15:55

## 2018-04-29 RX ADMIN — OXYCODONE AND ACETAMINOPHEN 2 TABLET(S): 5; 325 TABLET ORAL at 22:44

## 2018-04-29 RX ADMIN — Medication 250 MILLIGRAM(S): at 22:35

## 2018-04-29 RX ADMIN — ATENOLOL 50 MILLIGRAM(S): 25 TABLET ORAL at 05:30

## 2018-04-29 RX ADMIN — Medication 1 TABLET(S): at 08:11

## 2018-04-29 RX ADMIN — Medication 250 MILLIGRAM(S): at 00:10

## 2018-04-29 RX ADMIN — PIPERACILLIN AND TAZOBACTAM 25 GRAM(S): 4; .5 INJECTION, POWDER, LYOPHILIZED, FOR SOLUTION INTRAVENOUS at 05:31

## 2018-04-29 RX ADMIN — Medication 250 MILLIGRAM(S): at 17:21

## 2018-04-29 RX ADMIN — OXYCODONE AND ACETAMINOPHEN 2 TABLET(S): 5; 325 TABLET ORAL at 11:30

## 2018-04-29 RX ADMIN — Medication 325 MILLIGRAM(S): at 17:21

## 2018-04-29 RX ADMIN — PANTOPRAZOLE SODIUM 40 MILLIGRAM(S): 20 TABLET, DELAYED RELEASE ORAL at 05:30

## 2018-04-29 RX ADMIN — ENOXAPARIN SODIUM 40 MILLIGRAM(S): 100 INJECTION SUBCUTANEOUS at 12:26

## 2018-04-29 RX ADMIN — Medication 1: at 08:10

## 2018-04-29 RX ADMIN — OXYCODONE AND ACETAMINOPHEN 2 TABLET(S): 5; 325 TABLET ORAL at 23:45

## 2018-04-29 RX ADMIN — PREGABALIN 1000 MICROGRAM(S): 225 CAPSULE ORAL at 12:26

## 2018-04-29 RX ADMIN — Medication 1: at 12:26

## 2018-04-29 RX ADMIN — Medication 250 MILLIGRAM(S): at 09:39

## 2018-04-29 RX ADMIN — GABAPENTIN 100 MILLIGRAM(S): 400 CAPSULE ORAL at 15:55

## 2018-04-29 NOTE — PROVIDER CONTACT NOTE (OTHER) - BACKGROUND
d/c. If so, please amend order to state pt is WBAT for transfers, ambulation and stairs. P.T. to hold completing order pending clarification of above.

## 2018-04-29 NOTE — PROVIDER CONTACT NOTE (OTHER) - SITUATION
Pt chart reviewed, contents noted, P.T order received and states WBAT for transfers using surgical shoe, please confirm if pt is permitted to ambulate and if needed negotiate stairs in preparation for
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
## 2018-04-30 DIAGNOSIS — E11.628 TYPE 2 DIABETES MELLITUS WITH OTHER SKIN COMPLICATIONS: ICD-10-CM

## 2018-04-30 LAB
ANION GAP SERPL CALC-SCNC: 10 MMOL/L — SIGNIFICANT CHANGE UP (ref 5–17)
BUN SERPL-MCNC: 13 MG/DL — SIGNIFICANT CHANGE UP (ref 8–20)
CALCIUM SERPL-MCNC: 8.7 MG/DL — SIGNIFICANT CHANGE UP (ref 8.6–10.2)
CHLORIDE SERPL-SCNC: 98 MMOL/L — SIGNIFICANT CHANGE UP (ref 98–107)
CO2 SERPL-SCNC: 27 MMOL/L — SIGNIFICANT CHANGE UP (ref 22–29)
CREAT SERPL-MCNC: 1.01 MG/DL — SIGNIFICANT CHANGE UP (ref 0.5–1.3)
CULTURE RESULTS: SIGNIFICANT CHANGE UP
CULTURE RESULTS: SIGNIFICANT CHANGE UP
GLUCOSE BLDC GLUCOMTR-MCNC: 202 MG/DL — HIGH (ref 70–99)
GLUCOSE BLDC GLUCOMTR-MCNC: 202 MG/DL — HIGH (ref 70–99)
GLUCOSE BLDC GLUCOMTR-MCNC: 218 MG/DL — HIGH (ref 70–99)
GLUCOSE BLDC GLUCOMTR-MCNC: 252 MG/DL — HIGH (ref 70–99)
GLUCOSE BLDC GLUCOMTR-MCNC: 282 MG/DL — HIGH (ref 70–99)
GLUCOSE SERPL-MCNC: 186 MG/DL — HIGH (ref 70–115)
HCT VFR BLD CALC: 33.1 % — LOW (ref 42–52)
HGB BLD-MCNC: 11 G/DL — LOW (ref 14–18)
MCHC RBC-ENTMCNC: 28 PG — SIGNIFICANT CHANGE UP (ref 27–31)
MCHC RBC-ENTMCNC: 33.2 G/DL — SIGNIFICANT CHANGE UP (ref 32–36)
MCV RBC AUTO: 84.2 FL — SIGNIFICANT CHANGE UP (ref 80–94)
PLATELET # BLD AUTO: 324 K/UL — SIGNIFICANT CHANGE UP (ref 150–400)
POTASSIUM SERPL-MCNC: 3.8 MMOL/L — SIGNIFICANT CHANGE UP (ref 3.5–5.3)
POTASSIUM SERPL-SCNC: 3.8 MMOL/L — SIGNIFICANT CHANGE UP (ref 3.5–5.3)
RBC # BLD: 3.93 M/UL — LOW (ref 4.6–6.2)
RBC # FLD: 12.7 % — SIGNIFICANT CHANGE UP (ref 11–15.6)
SODIUM SERPL-SCNC: 135 MMOL/L — SIGNIFICANT CHANGE UP (ref 135–145)
SPECIMEN SOURCE: SIGNIFICANT CHANGE UP
SPECIMEN SOURCE: SIGNIFICANT CHANGE UP
WBC # BLD: 7.8 K/UL — SIGNIFICANT CHANGE UP (ref 4.8–10.8)
WBC # FLD AUTO: 7.8 K/UL — SIGNIFICANT CHANGE UP (ref 4.8–10.8)

## 2018-04-30 PROCEDURE — 99233 SBSQ HOSP IP/OBS HIGH 50: CPT

## 2018-04-30 PROCEDURE — 99232 SBSQ HOSP IP/OBS MODERATE 35: CPT

## 2018-04-30 RX ORDER — INSULIN DETEMIR 100/ML (3)
20 INSULIN PEN (ML) SUBCUTANEOUS AT BEDTIME
Qty: 0 | Refills: 0 | Status: DISCONTINUED | OUTPATIENT
Start: 2018-04-30 | End: 2018-05-02

## 2018-04-30 RX ADMIN — Medication 1 TABLET(S): at 17:02

## 2018-04-30 RX ADMIN — OXYCODONE AND ACETAMINOPHEN 1 TABLET(S): 5; 325 TABLET ORAL at 06:17

## 2018-04-30 RX ADMIN — Medication 2: at 17:02

## 2018-04-30 RX ADMIN — GABAPENTIN 100 MILLIGRAM(S): 400 CAPSULE ORAL at 13:35

## 2018-04-30 RX ADMIN — OXYCODONE AND ACETAMINOPHEN 1 TABLET(S): 5; 325 TABLET ORAL at 21:45

## 2018-04-30 RX ADMIN — PANTOPRAZOLE SODIUM 40 MILLIGRAM(S): 20 TABLET, DELAYED RELEASE ORAL at 06:05

## 2018-04-30 RX ADMIN — OXYCODONE AND ACETAMINOPHEN 1 TABLET(S): 5; 325 TABLET ORAL at 21:23

## 2018-04-30 RX ADMIN — PIPERACILLIN AND TAZOBACTAM 25 GRAM(S): 4; .5 INJECTION, POWDER, LYOPHILIZED, FOR SOLUTION INTRAVENOUS at 21:30

## 2018-04-30 RX ADMIN — GABAPENTIN 100 MILLIGRAM(S): 400 CAPSULE ORAL at 06:05

## 2018-04-30 RX ADMIN — Medication 325 MILLIGRAM(S): at 06:05

## 2018-04-30 RX ADMIN — GABAPENTIN 100 MILLIGRAM(S): 400 CAPSULE ORAL at 21:23

## 2018-04-30 RX ADMIN — Medication 20 UNIT(S): at 21:27

## 2018-04-30 RX ADMIN — Medication 325 MILLIGRAM(S): at 17:02

## 2018-04-30 RX ADMIN — ENOXAPARIN SODIUM 40 MILLIGRAM(S): 100 INJECTION SUBCUTANEOUS at 12:37

## 2018-04-30 RX ADMIN — PIPERACILLIN AND TAZOBACTAM 25 GRAM(S): 4; .5 INJECTION, POWDER, LYOPHILIZED, FOR SOLUTION INTRAVENOUS at 13:35

## 2018-04-30 RX ADMIN — Medication 3: at 12:38

## 2018-04-30 RX ADMIN — PIPERACILLIN AND TAZOBACTAM 25 GRAM(S): 4; .5 INJECTION, POWDER, LYOPHILIZED, FOR SOLUTION INTRAVENOUS at 06:05

## 2018-04-30 RX ADMIN — Medication 250 MILLIGRAM(S): at 08:28

## 2018-04-30 RX ADMIN — Medication 1 TABLET(S): at 08:28

## 2018-04-30 RX ADMIN — Medication 2: at 08:28

## 2018-04-30 RX ADMIN — PREGABALIN 1000 MICROGRAM(S): 225 CAPSULE ORAL at 12:41

## 2018-04-30 RX ADMIN — OXYCODONE AND ACETAMINOPHEN 1 TABLET(S): 5; 325 TABLET ORAL at 07:01

## 2018-04-30 RX ADMIN — ATENOLOL 50 MILLIGRAM(S): 25 TABLET ORAL at 06:05

## 2018-04-30 NOTE — PROGRESS NOTE ADULT - PROBLEM SELECTOR PLAN 1
s/p Left second toe amputation 4/28/18  Culture from OR pending  Continue Zosyn  Will D/C Vancomycin since no sign of MRSA on new or prior cultures  Prior cultures with Pseudomonas, so will cover pseudomonas   Follow up Podiatry
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
## 2018-04-30 NOTE — PROGRESS NOTE ADULT - ASSESSMENT
67 y.o. man with long standing diabetes, neuropathy, vascular disease, prior right BKA and left #3 toe OM and amputation, here for left #2 toe infection.   s/p Left second toe amputation 4/28/18

## 2018-05-01 ENCOUNTER — APPOINTMENT (OUTPATIENT)
Dept: INTERNAL MEDICINE | Facility: CLINIC | Age: 68
End: 2018-05-01

## 2018-05-01 LAB
GLUCOSE BLDC GLUCOMTR-MCNC: 199 MG/DL — HIGH (ref 70–99)
GLUCOSE BLDC GLUCOMTR-MCNC: 199 MG/DL — HIGH (ref 70–99)
GLUCOSE BLDC GLUCOMTR-MCNC: 211 MG/DL — HIGH (ref 70–99)
GLUCOSE BLDC GLUCOMTR-MCNC: 272 MG/DL — HIGH (ref 70–99)

## 2018-05-01 PROCEDURE — 99232 SBSQ HOSP IP/OBS MODERATE 35: CPT

## 2018-05-01 RX ADMIN — OXYCODONE AND ACETAMINOPHEN 2 TABLET(S): 5; 325 TABLET ORAL at 23:45

## 2018-05-01 RX ADMIN — PIPERACILLIN AND TAZOBACTAM 25 GRAM(S): 4; .5 INJECTION, POWDER, LYOPHILIZED, FOR SOLUTION INTRAVENOUS at 21:28

## 2018-05-01 RX ADMIN — ENOXAPARIN SODIUM 40 MILLIGRAM(S): 100 INJECTION SUBCUTANEOUS at 11:05

## 2018-05-01 RX ADMIN — Medication 3: at 11:34

## 2018-05-01 RX ADMIN — PREGABALIN 1000 MICROGRAM(S): 225 CAPSULE ORAL at 11:34

## 2018-05-01 RX ADMIN — GABAPENTIN 100 MILLIGRAM(S): 400 CAPSULE ORAL at 13:34

## 2018-05-01 RX ADMIN — Medication 20 UNIT(S): at 21:28

## 2018-05-01 RX ADMIN — Medication 1: at 07:54

## 2018-05-01 RX ADMIN — PIPERACILLIN AND TAZOBACTAM 25 GRAM(S): 4; .5 INJECTION, POWDER, LYOPHILIZED, FOR SOLUTION INTRAVENOUS at 06:24

## 2018-05-01 RX ADMIN — OXYCODONE AND ACETAMINOPHEN 2 TABLET(S): 5; 325 TABLET ORAL at 11:13

## 2018-05-01 RX ADMIN — GABAPENTIN 100 MILLIGRAM(S): 400 CAPSULE ORAL at 21:27

## 2018-05-01 RX ADMIN — PIPERACILLIN AND TAZOBACTAM 25 GRAM(S): 4; .5 INJECTION, POWDER, LYOPHILIZED, FOR SOLUTION INTRAVENOUS at 13:34

## 2018-05-01 RX ADMIN — Medication 325 MILLIGRAM(S): at 17:08

## 2018-05-01 RX ADMIN — Medication 1 TABLET(S): at 17:08

## 2018-05-01 RX ADMIN — ATENOLOL 50 MILLIGRAM(S): 25 TABLET ORAL at 06:23

## 2018-05-01 RX ADMIN — Medication 325 MILLIGRAM(S): at 06:23

## 2018-05-01 RX ADMIN — Medication 1: at 17:08

## 2018-05-01 RX ADMIN — OXYCODONE AND ACETAMINOPHEN 2 TABLET(S): 5; 325 TABLET ORAL at 10:13

## 2018-05-01 RX ADMIN — PANTOPRAZOLE SODIUM 40 MILLIGRAM(S): 20 TABLET, DELAYED RELEASE ORAL at 06:23

## 2018-05-01 RX ADMIN — GABAPENTIN 100 MILLIGRAM(S): 400 CAPSULE ORAL at 06:23

## 2018-05-01 RX ADMIN — Medication 1 TABLET(S): at 07:54
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
## 2018-05-01 NOTE — DIETITIAN INITIAL EVALUATION ADULT. - PERTINENT LABORATORY DATA
04-30 Na135 mmol/L Glu 186 mg/dL<H> K+ 3.8 mmol/L Cr  1.01 mg/dL BUN 13.0 mg/dL Phos n/a   Alb n/a   PAB n/a

## 2018-05-01 NOTE — DIETITIAN INITIAL EVALUATION ADULT. - NS AS NUTRI INTERV ED CONTENT
Nutrition relationship to health/disease/Purpose of the nutrition education/Recommended modifications/meal planning with plate model

## 2018-05-02 ENCOUNTER — TRANSCRIPTION ENCOUNTER (OUTPATIENT)
Age: 68
End: 2018-05-02

## 2018-05-02 VITALS
RESPIRATION RATE: 20 BRPM | DIASTOLIC BLOOD PRESSURE: 75 MMHG | TEMPERATURE: 98 F | HEART RATE: 69 BPM | SYSTOLIC BLOOD PRESSURE: 131 MMHG

## 2018-05-02 PROBLEM — I10 ESSENTIAL (PRIMARY) HYPERTENSION: Chronic | Status: ACTIVE | Noted: 2018-04-25

## 2018-05-02 LAB
GLUCOSE BLDC GLUCOMTR-MCNC: 219 MG/DL — HIGH (ref 70–99)
GLUCOSE BLDC GLUCOMTR-MCNC: 222 MG/DL — HIGH (ref 70–99)

## 2018-05-02 PROCEDURE — 83036 HEMOGLOBIN GLYCOSYLATED A1C: CPT

## 2018-05-02 PROCEDURE — 93923 UPR/LXTR ART STDY 3+ LVLS: CPT

## 2018-05-02 PROCEDURE — 86140 C-REACTIVE PROTEIN: CPT

## 2018-05-02 PROCEDURE — 99285 EMERGENCY DEPT VISIT HI MDM: CPT | Mod: 25

## 2018-05-02 PROCEDURE — 80048 BASIC METABOLIC PNL TOTAL CA: CPT

## 2018-05-02 PROCEDURE — 87070 CULTURE OTHR SPECIMN AEROBIC: CPT

## 2018-05-02 PROCEDURE — 88305 TISSUE EXAM BY PATHOLOGIST: CPT

## 2018-05-02 PROCEDURE — 82607 VITAMIN B-12: CPT

## 2018-05-02 PROCEDURE — 83605 ASSAY OF LACTIC ACID: CPT

## 2018-05-02 PROCEDURE — 73630 X-RAY EXAM OF FOOT: CPT

## 2018-05-02 PROCEDURE — 99239 HOSP IP/OBS DSCHRG MGMT >30: CPT

## 2018-05-02 PROCEDURE — 87075 CULTR BACTERIA EXCEPT BLOOD: CPT

## 2018-05-02 PROCEDURE — 99232 SBSQ HOSP IP/OBS MODERATE 35: CPT

## 2018-05-02 PROCEDURE — 82728 ASSAY OF FERRITIN: CPT

## 2018-05-02 PROCEDURE — 81001 URINALYSIS AUTO W/SCOPE: CPT

## 2018-05-02 PROCEDURE — 96374 THER/PROPH/DIAG INJ IV PUSH: CPT

## 2018-05-02 PROCEDURE — 82962 GLUCOSE BLOOD TEST: CPT

## 2018-05-02 PROCEDURE — 82746 ASSAY OF FOLIC ACID SERUM: CPT

## 2018-05-02 PROCEDURE — 83735 ASSAY OF MAGNESIUM: CPT

## 2018-05-02 PROCEDURE — 97163 PT EVAL HIGH COMPLEX 45 MIN: CPT

## 2018-05-02 PROCEDURE — 87186 SC STD MICRODIL/AGAR DIL: CPT

## 2018-05-02 PROCEDURE — 88311 DECALCIFY TISSUE: CPT

## 2018-05-02 PROCEDURE — 85652 RBC SED RATE AUTOMATED: CPT

## 2018-05-02 PROCEDURE — 87040 BLOOD CULTURE FOR BACTERIA: CPT

## 2018-05-02 PROCEDURE — 84100 ASSAY OF PHOSPHORUS: CPT

## 2018-05-02 PROCEDURE — 84466 ASSAY OF TRANSFERRIN: CPT

## 2018-05-02 PROCEDURE — 85027 COMPLETE CBC AUTOMATED: CPT

## 2018-05-02 PROCEDURE — 80053 COMPREHEN METABOLIC PANEL: CPT

## 2018-05-02 PROCEDURE — 36415 COLL VENOUS BLD VENIPUNCTURE: CPT

## 2018-05-02 PROCEDURE — 83550 IRON BINDING TEST: CPT

## 2018-05-02 PROCEDURE — 80202 ASSAY OF VANCOMYCIN: CPT

## 2018-05-02 RX ORDER — PREGABALIN 225 MG/1
1 CAPSULE ORAL
Qty: 30 | Refills: 0 | OUTPATIENT
Start: 2018-05-02 | End: 2018-05-31

## 2018-05-02 RX ORDER — FERROUS SULFATE 325(65) MG
1 TABLET ORAL
Qty: 90 | Refills: 0 | OUTPATIENT
Start: 2018-05-02 | End: 2018-05-31

## 2018-05-02 RX ORDER — GABAPENTIN 400 MG/1
1 CAPSULE ORAL
Qty: 0 | Refills: 0 | COMMUNITY
Start: 2018-05-02 | End: 2018-05-31

## 2018-05-02 RX ORDER — GABAPENTIN 400 MG/1
1 CAPSULE ORAL
Qty: 90 | Refills: 0 | OUTPATIENT
Start: 2018-05-02 | End: 2018-05-31

## 2018-05-02 RX ORDER — ACETAMINOPHEN 500 MG
2 TABLET ORAL
Qty: 0 | Refills: 0 | COMMUNITY
Start: 2018-05-02

## 2018-05-02 RX ADMIN — GABAPENTIN 100 MILLIGRAM(S): 400 CAPSULE ORAL at 12:29

## 2018-05-02 RX ADMIN — Medication 1 TABLET(S): at 07:58

## 2018-05-02 RX ADMIN — PANTOPRAZOLE SODIUM 40 MILLIGRAM(S): 20 TABLET, DELAYED RELEASE ORAL at 06:09

## 2018-05-02 RX ADMIN — PREGABALIN 1000 MICROGRAM(S): 225 CAPSULE ORAL at 12:29

## 2018-05-02 RX ADMIN — Medication 2: at 12:29

## 2018-05-02 RX ADMIN — GABAPENTIN 100 MILLIGRAM(S): 400 CAPSULE ORAL at 06:08

## 2018-05-02 RX ADMIN — ATENOLOL 50 MILLIGRAM(S): 25 TABLET ORAL at 06:08

## 2018-05-02 RX ADMIN — OXYCODONE AND ACETAMINOPHEN 2 TABLET(S): 5; 325 TABLET ORAL at 00:05

## 2018-05-02 RX ADMIN — PIPERACILLIN AND TAZOBACTAM 25 GRAM(S): 4; .5 INJECTION, POWDER, LYOPHILIZED, FOR SOLUTION INTRAVENOUS at 06:08

## 2018-05-02 RX ADMIN — Medication 325 MILLIGRAM(S): at 06:08

## 2018-05-02 RX ADMIN — Medication 2: at 07:58
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
## 2018-05-02 NOTE — DISCHARGE NOTE ADULT - PLAN OF CARE
C/w your home medicine as reconciled and follow up with your PCP in 1 week. C/w your home medicine as reconciled and follow up with your PCP / endocrine in 1 week. Trial fo gabapentin as reconciled. C/w iron /b12 supplements. Follow up with your PMD. To prevent progression of infection. Status post amputation. Follow up with podiatry in 1 week. Status post amputation. Follow up with podiatry in 1 week. Take antibiotics as reconciled. Follow with ID in 1 week.

## 2018-05-02 NOTE — DISCHARGE NOTE ADULT - MEDICATION SUMMARY - MEDICATIONS TO TAKE
I will START or STAY ON the medications listed below when I get home from the hospital:    gabapentin 100 mg oral capsule  -- 1 cap(s) by mouth 3 times a day  -- Indication: For Neuropathy    metFORMIN 500 mg oral tablet  -- 1 tab(s) by mouth 2 times a day  -- Indication: For DM (diabetes mellitus)    Lantus 100 units/mL subcutaneous solution  -- 15 unit(s) subcutaneous once a day (at bedtime)  -- Indication: For DM (diabetes mellitus)    Trulicity Pen 1.5 mg/0.5 mL subcutaneous solution  -- 1 dose(s) subcutaneous every 7 days  -- Indication: For DM (diabetes mellitus)    glipiZIDE 10 mg oral tablet  -- 2 tab(s) by mouth 2 times a day  -- Indication: For DM (diabetes mellitus)    atenolol 50 mg oral tablet  -- 1 tab(s) by mouth once a day  -- Indication: For HTN (hypertension)    FeroSul 325 mg (65 mg elemental iron) oral tablet  -- 1 tab(s) by mouth 3 times a day  -- Indication: For anemia    omeprazole 40 mg oral delayed release capsule  -- 1 cap(s) by mouth once a day  -- Indication: For GERD    cyanocobalamin 1000 mcg oral tablet  -- 1 tab(s) by mouth once a day  -- Indication: For Supplements. I will START or STAY ON the medications listed below when I get home from the hospital:    acetaminophen 325 mg oral tablet  -- 2 tab(s) by mouth every 6 hours, As Needed for pain.   -- Indication: For Pain    gabapentin 100 mg oral capsule  -- 1 cap(s) by mouth 3 times a day  -- Indication: For Neuropathy    metFORMIN 500 mg oral tablet  -- 1 tab(s) by mouth 2 times a day  -- Indication: For DM (diabetes mellitus)    Lantus 100 units/mL subcutaneous solution  -- 15 unit(s) subcutaneous once a day (at bedtime)  -- Indication: For DM (diabetes mellitus)    Trulicity Pen 1.5 mg/0.5 mL subcutaneous solution  -- 1 dose(s) subcutaneous every 7 days  -- Indication: For DM (diabetes mellitus)    glipiZIDE 10 mg oral tablet  -- 2 tab(s) by mouth 2 times a day  -- Indication: For DM (diabetes mellitus)    atenolol 50 mg oral tablet  -- 1 tab(s) by mouth once a day  -- Indication: For HTN (hypertension)    FeroSul 325 mg (65 mg elemental iron) oral tablet  -- 1 tab(s) by mouth 3 times a day  -- Indication: For anemia    omeprazole 40 mg oral delayed release capsule  -- 1 cap(s) by mouth once a day  -- Indication: For GERD    Levaquin 500 mg oral tablet  -- 1 tab(s) by mouth once a day   -- Avoid prolonged or excessive exposure to direct and/or artificial sunlight while taking this medication.  Do not take dairy products, antacids, or iron preparations within one hour of this medication.  Finish all this medication unless otherwise directed by prescriber.  May cause drowsiness or dizziness.  Medication should be taken with plenty of water.    -- Indication: For Infection    cyanocobalamin 1000 mcg oral tablet  -- 1 tab(s) by mouth once a day  -- Indication: For Supplements.

## 2018-05-02 NOTE — DISCHARGE NOTE ADULT - PROVIDER TOKENS
TOKEN:'[id number]',FREE:[LAST:[PCP],PHONE:[(   )    -],FAX:[(   )    -]] TOKEN:'[id number]',FREE:[LAST:[PCP],PHONE:[(   )    -],FAX:[(   )    -]],TOKEN:'[id number]',FREE:[LAST:[Endocrinology.],PHONE:[(   )    -],FAX:[(   )    -]]

## 2018-05-02 NOTE — PROGRESS NOTE ADULT - SUBJECTIVE AND OBJECTIVE BOX
67y year old Male seen at bedside 1 day s/p left 2nd digit partial amputation. Patient states he feels well this morning. Denies paint to the area. Denies any overnight events. Denies n/v/f/c/sob at this time.     Allergies:No Known Allergies      Labs:                                   11.0   7.8   )-----------( 324      ( 30 Apr 2018 06:18 )             33.1     O:   General: Pleasant  male NAD & AOX3.       Derm: left foot surgical site with sutures intact; no signs of dehiscence, skin edges well coapted; mild edema to left LE; no erythema, no purulence   Vascular: Dorsalis Pedis and Posterior Tibial pulses 0/4.     Neuro: Protective sensation diminished to the level of the digits bilateral.  Deformity:    A: Left 2nd digit partial amputation on 4/28/18      P:   Chart reviewed and Patient evaluated  Dressing of xeroform, DSD applied  No growth on Cx day 3  Patient may be discharged and follow up with Dr. Calvert as outpatient  Patient may ambulate with surgical shoe  Podiatry to follow
67y year old Male seen at bedside 1 day s/p left 2nd digit partial amputation. Patient states he feels well this morning. Denies paint to the area. Denies any overnight events. Denies n/v/f/c/sob at this time.     Allergies:No Known Allergies      Labs:                        11.2   8.1   )-----------( 344      ( 28 Apr 2018 07:32 )             33.1     Vital Signs Last 24 Hrs  T(C): 36.6 (29 Apr 2018 10:17), Max: 36.9 (29 Apr 2018 04:42)  T(F): 97.8 (29 Apr 2018 10:17), Max: 98.4 (29 Apr 2018 04:42)  HR: 77 (29 Apr 2018 10:17) (77 - 91)  BP: 120/72 (29 Apr 2018 10:17) (120/72 - 152/92)  BP(mean): --  RR: 14 (29 Apr 2018 10:17) (14 - 18)  SpO2: 98% (29 Apr 2018 10:17) (93% - 98%)    O:   General: Pleasant  male NAD & AOX3.       Derm: left foot surgical site with sutures intact; no signs of dehiscence, skin edges well coapted; mild edema to left LE; no erythema, no purulence   Vascular: Dorsalis Pedis and Posterior Tibial pulses 0/4.     Neuro: Protective sensation diminished to the level of the digits bilateral.  Deformity:    A: Left 2nd digit partial amputation on 4/28/18      P:   Chart reviewed and Patient evaluated  Dressing of xeroform, DSD applied  F/U OR culture and pathology  Continue IV abx  Podiatry to follow
67y year old Male seen at bedside 1 day s/p left 2nd digit partial amputation. Patient states he feels well this morning. Denies paint to the area. Denies any overnight events. Denies n/v/f/c/sob at this time.     Allergies:No Known Allergies    O:   General: Pleasant  male NAD & AOX3.       Derm: left foot surgical site with sutures intact; no signs of dehiscence, skin edges well coapted; mild edema to left LE; no erythema, no purulence   Vascular: Dorsalis Pedis and Posterior Tibial pulses 0/4.     Neuro: Protective sensation diminished to the level of the digits bilateral.  Deformity:    A: Left 2nd digit partial amputation on 4/28/18      P:   Chart reviewed and Patient evaluated  Dressing of xeroform, DSD applied  No growth on Cx day 4  Patient may be discharged and follow up with Dr. Calvert as outpatient  Patient may ambulate with surgical shoe  New PT consult placed  Bandage change every other day  Podiatry to follow    Wound care instructions:  1) remove bandage and cleanse with saline  2) place xeroform on incision site  3) wrap with gauze and kerlix  4) wrap light ACE
ANDRIY LEBLANC Male 67y [id number]    Patient is a 67y old  Male who presents with a chief complaint of Toe gangrene (25 Apr 2018 20:04)      Subjective/objective:  Pt seen and examined at bedside, no over night event reported by night staff. Pt doing well, has no other complaints, states left foot swelling reduced, has ace wrapped left foot. Pending OR culture result and on IV abx.     Review of system:  No fever, chills, nausea, vomiting, headache, dizziness, chest pain, SOB or palpitation.      PHYSICAL EXAM:    Vital Signs Last 24 Hrs  T(C): 36.8 (30 Apr 2018 06:03), Max: 36.8 (29 Apr 2018 22:28)  T(F): 98.3 (30 Apr 2018 06:03), Max: 98.3 (29 Apr 2018 22:28)  HR: 85 (30 Apr 2018 06:03) (60 - 85)  BP: 134/68 (30 Apr 2018 06:03) (118/62 - 150/85)  BP(mean): --  RR: 17 (30 Apr 2018 06:03) (14 - 17)  SpO2: 96% (30 Apr 2018 06:03) (95% - 98%)    GENERAL: Pt lying comfortably, NAD.  CHEST/LUNG: Clear to auscultation bilaterally; No wheezing.  HEART: S1S2+, Regular rate and rhythm; No murmurs.  ABDOMEN: Soft, Nontender, Nondistended; Bowel sounds present.  Extremities:  Left foot with ace wrap dressing by podiatry.   NEURO: AAOX3, no focal deficits, no motor r sensory loss.  PSYCH: normal mood.            MEDICATIONS  (STANDING):  ATENolol  Tablet 50 milliGRAM(s) Oral daily  cyanocobalamin 1000 MICROGram(s) Oral daily  dextrose 5%. 1000 milliLiter(s) (50 mL/Hr) IV Continuous <Continuous>  dextrose 50% Injectable 12.5 Gram(s) IV Push once  dextrose 50% Injectable 25 Gram(s) IV Push once  dextrose 50% Injectable 25 Gram(s) IV Push once  enoxaparin Injectable 40 milliGRAM(s) SubCutaneous daily  ferrous    sulfate 325 milliGRAM(s) Oral two times a day  gabapentin 100 milliGRAM(s) Oral three times a day  insulin detemir injectable (LEVEMIR) 15 Unit(s) SubCutaneous at bedtime  insulin lispro (HumaLOG) corrective regimen sliding scale   SubCutaneous three times a day before meals  lactobacillus acidophilus 1 Tablet(s) Oral two times a day with meals  pantoprazole    Tablet 40 milliGRAM(s) Oral before breakfast  piperacillin/tazobactam IVPB. 3.375 Gram(s) IV Intermittent every 8 hours  vancomycin  IVPB 1000 milliGRAM(s) IV Intermittent every 8 hours    MEDICATIONS  (PRN):  acetaminophen   Tablet. 650 milliGRAM(s) Oral every 6 hours PRN Mild Pain (1 - 3)  dextrose Gel 1 Dose(s) Oral once PRN Blood Glucose LESS THAN 70 milliGRAM(s)/deciliter  glucagon  Injectable 1 milliGRAM(s) IntraMuscular once PRN Glucose LESS THAN 70 milligrams/deciliter  oxyCODONE    5 mG/acetaminophen 325 mG 1 Tablet(s) Oral every 6 hours PRN Moderate Pain (4 - 6)  oxyCODONE    5 mG/acetaminophen 325 mG 2 Tablet(s) Oral every 4 hours PRN Severe Pain (7 - 10)        Labs:  LABS:                        11.0   7.8   )-----------( 324      ( 30 Apr 2018 06:18 )             33.1     04-30    135  |  98  |  13.0  ----------------------------<  186<H>  3.8   |  27.0  |  1.01    Ca    8.7      30 Apr 2018 06:18
ANDRIY LEBLANC Male 67y [id number]    Patient is a 67y old  Male who presents with a chief complaint of Toe gangrene (25 Apr 2018 20:04)      Subjective/objective:  Pt seen and examined at bedside, no over night event reported by night staff. Pt reports pain well controlled, has no other active complaints. Surgical Swab cx in process.      Review of system:  No fever, chills, nausea, vomiting, headache, dizziness, chest pain, SOB or palpitation.        PHYSICAL EXAM:    Vital Signs Last 24 Hrs  T(C): 36.6 (29 Apr 2018 10:17), Max: 36.9 (29 Apr 2018 04:42)  T(F): 97.8 (29 Apr 2018 10:17), Max: 98.4 (29 Apr 2018 04:42)  HR: 77 (29 Apr 2018 10:17) (77 - 91)  BP: 120/72 (29 Apr 2018 10:17) (120/72 - 152/92)  BP(mean): --  RR: 14 (29 Apr 2018 10:17) (14 - 18)  SpO2: 98% (29 Apr 2018 10:17) (93% - 98%)    GENERAL: Pt lying comfortably, NAD.  CHEST/LUNG: Clear to auscultation bilaterally; No wheezing.  HEART: S1S2+, Regular rate and rhythm; No murmurs.  ABDOMEN: Soft, Nontender, Nondistended; Bowel sounds present.  Extremities:  Left foot with ace wrap dressing by podiatry.   NEURO: AAOX3, no focal deficits, no motor r sensory loss.  PSYCH: normal mood.        MEDICATIONS  (STANDING):  ATENolol  Tablet 50 milliGRAM(s) Oral daily  cyanocobalamin 1000 MICROGram(s) Oral daily  dextrose 5%. 1000 milliLiter(s) (50 mL/Hr) IV Continuous <Continuous>  dextrose 50% Injectable 12.5 Gram(s) IV Push once  dextrose 50% Injectable 25 Gram(s) IV Push once  dextrose 50% Injectable 25 Gram(s) IV Push once  enoxaparin Injectable 40 milliGRAM(s) SubCutaneous daily  ferrous    sulfate 325 milliGRAM(s) Oral two times a day  gabapentin 100 milliGRAM(s) Oral three times a day  insulin detemir injectable (LEVEMIR) 15 Unit(s) SubCutaneous at bedtime  insulin lispro (HumaLOG) corrective regimen sliding scale   SubCutaneous three times a day before meals  lactobacillus acidophilus 1 Tablet(s) Oral two times a day with meals  pantoprazole    Tablet 40 milliGRAM(s) Oral before breakfast  piperacillin/tazobactam IVPB. 3.375 Gram(s) IV Intermittent every 8 hours  vancomycin  IVPB 1000 milliGRAM(s) IV Intermittent every 8 hours    MEDICATIONS  (PRN):  acetaminophen   Tablet. 650 milliGRAM(s) Oral every 6 hours PRN Mild Pain (1 - 3)  dextrose Gel 1 Dose(s) Oral once PRN Blood Glucose LESS THAN 70 milliGRAM(s)/deciliter  glucagon  Injectable 1 milliGRAM(s) IntraMuscular once PRN Glucose LESS THAN 70 milligrams/deciliter  oxyCODONE    5 mG/acetaminophen 325 mG 1 Tablet(s) Oral every 6 hours PRN Moderate Pain (4 - 6)  oxyCODONE    5 mG/acetaminophen 325 mG 2 Tablet(s) Oral every 4 hours PRN Severe Pain (7 - 10)        Labs:  LABS:                        11.2   8.1   )-----------( 344      ( 28 Apr 2018 07:32 )             33.1
ANDRIY LEBLANC Male 67y [id number]    Patient is a 67y old  Male who presents with a chief complaint of Toe gangrene (25 Apr 2018 20:04)      Subjective/objective:  Pt seen and examined at bedside, no over night event reported by night staff. Pt has no active complaints. On IV abx pending OR culture result to decide on length of abx per ID.     Review of system:  No fever, chills, nausea, vomiting, headache, dizziness, chest pain, SOB or palpitation.      PHYSICAL EXAM:    Vital Signs Last 24 Hrs  T(C): 36.9 (01 May 2018 08:19), Max: 36.9 (01 May 2018 04:43)  T(F): 98.5 (01 May 2018 08:19), Max: 98.5 (01 May 2018 04:43)  HR: 78 (01 May 2018 08:19) (71 - 78)  BP: 119/69 (01 May 2018 08:19) (114/70 - 169/79)  BP(mean): --  RR: 19 (01 May 2018 08:19) (18 - 20)  SpO2: --    GENERAL: Pt lying comfortably, NAD.  CHEST/LUNG: Clear to auscultation bilaterally; No wheezing.  HEART: S1S2+, Regular rate and rhythm; No murmurs.  ABDOMEN: Soft, Nontender, Nondistended; Bowel sounds present.  Extremities:  Left foot with ace wrap dressing by podiatry.   NEURO: AAOX3, no focal deficits, no motor r sensory loss.  PSYCH: normal mood.        MEDICATIONS  (STANDING):  ATENolol  Tablet 50 milliGRAM(s) Oral daily  cyanocobalamin 1000 MICROGram(s) Oral daily  dextrose 5%. 1000 milliLiter(s) (50 mL/Hr) IV Continuous <Continuous>  dextrose 50% Injectable 12.5 Gram(s) IV Push once  dextrose 50% Injectable 25 Gram(s) IV Push once  dextrose 50% Injectable 25 Gram(s) IV Push once  enoxaparin Injectable 40 milliGRAM(s) SubCutaneous daily  ferrous    sulfate 325 milliGRAM(s) Oral two times a day  gabapentin 100 milliGRAM(s) Oral three times a day  insulin detemir injectable (LEVEMIR) 20 Unit(s) SubCutaneous at bedtime  insulin lispro (HumaLOG) corrective regimen sliding scale   SubCutaneous three times a day before meals  lactobacillus acidophilus 1 Tablet(s) Oral two times a day with meals  pantoprazole    Tablet 40 milliGRAM(s) Oral before breakfast  piperacillin/tazobactam IVPB. 3.375 Gram(s) IV Intermittent every 8 hours    MEDICATIONS  (PRN):  acetaminophen   Tablet. 650 milliGRAM(s) Oral every 6 hours PRN Mild Pain (1 - 3)  dextrose Gel 1 Dose(s) Oral once PRN Blood Glucose LESS THAN 70 milliGRAM(s)/deciliter  glucagon  Injectable 1 milliGRAM(s) IntraMuscular once PRN Glucose LESS THAN 70 milligrams/deciliter  oxyCODONE    5 mG/acetaminophen 325 mG 1 Tablet(s) Oral every 6 hours PRN Moderate Pain (4 - 6)  oxyCODONE    5 mG/acetaminophen 325 mG 2 Tablet(s) Oral every 4 hours PRN Severe Pain (7 - 10)        Labs:  LABS:                        11.0   7.8   )-----------( 324      ( 30 Apr 2018 06:18 )             33.1     04-30    135  |  98  |  13.0  ----------------------------<  186<H>  3.8   |  27.0  |  1.01    Ca    8.7      30 Apr 2018 06:18
CHIEF COMPLAINT/INTERVAL HISTORY:    Patient is a 67y old  Male who presents with a chief complaint of Toe gangrene (2018 20:04)    SUBJECTIVE & OBJECTIVE: Pt seen and examined at bedside. No overnight events. Denies any new complaints. Pain controlled on current regimen. ASIA/PVR today. Continue IV antibiotics.    ROS: No chest pain, palpitations, SOB, light headedness, dizziness, headache, nausea/vomiting, fevers/chills, abdominal pain, dysuria or increased urinary frequency.    ICU Vital Signs Last 24 Hrs  T(C): 37.2 (2018 11:20), Max: 37.2 (2018 15:45)  T(F): 98.9 (2018 11:20), Max: 99 (2018 15:45)  HR: 74 (2018 11:20) (74 - 91)  BP: 122/77 (2018 11:20) (122/77 - 145/78)  RR: 17 (2018 11:20) (16 - 18)  SpO2: 98% (2018 11:20) (97% - 99%)    MEDICATIONS  (STANDING):  ATENolol  Tablet 50 milliGRAM(s) Oral daily  dextrose 5%. 1000 milliLiter(s) (50 mL/Hr) IV Continuous <Continuous>  dextrose 50% Injectable 12.5 Gram(s) IV Push once  dextrose 50% Injectable 25 Gram(s) IV Push once  dextrose 50% Injectable 25 Gram(s) IV Push once  enoxaparin Injectable 40 milliGRAM(s) SubCutaneous daily  gabapentin 100 milliGRAM(s) Oral three times a day  insulin detemir injectable (LEVEMIR) 15 Unit(s) SubCutaneous at bedtime  insulin lispro (HumaLOG) corrective regimen sliding scale   SubCutaneous three times a day before meals  pantoprazole    Tablet 40 milliGRAM(s) Oral before breakfast  piperacillin/tazobactam IVPB. 3.375 Gram(s) IV Intermittent every 8 hours  vancomycin  IVPB 1000 milliGRAM(s) IV Intermittent every 8 hours    MEDICATIONS  (PRN):  acetaminophen   Tablet. 650 milliGRAM(s) Oral every 6 hours PRN Mild Pain (1 - 3)  dextrose Gel 1 Dose(s) Oral once PRN Blood Glucose LESS THAN 70 milliGRAM(s)/deciliter  glucagon  Injectable 1 milliGRAM(s) IntraMuscular once PRN Glucose LESS THAN 70 milligrams/deciliter  oxyCODONE    5 mG/acetaminophen 325 mG 1 Tablet(s) Oral every 6 hours PRN Moderate Pain (4 - 6)  oxyCODONE    5 mG/acetaminophen 325 mG 2 Tablet(s) Oral every 4 hours PRN Severe Pain (7 - 10)      LABS:                        11.1   9.3   )-----------( 327      ( 2018 06:23 )             33.8         140  |  102  |  18.0  ----------------------------<  111  4.5   |  28.0  |  0.98    Ca    8.7      2018 06:23  Phos  3.3       Mg     1.7         TPro  7.9  /  Alb  4.2  /  TBili  <0.2<L>  /  DBili  x   /  AST  14  /  ALT  10  /  AlkPhos  95        Urinalysis Basic - ( 2018 05:07 )    Color: Yellow / Appearance: Clear / S.020 / pH: x  Gluc: x / Ketone: Negative  / Bili: Negative / Urobili: Negative mg/dL   Blood: x / Protein: 15 mg/dL / Nitrite: Negative   Leuk Esterase: Moderate / RBC: 0-2 /HPF / WBC 26-50   Sq Epi: x / Non Sq Epi: Occasional / Bacteria: Occasional        CAPILLARY BLOOD GLUCOSE      POCT Blood Glucose.: 90 mg/dL (2018 08:06)  POCT Blood Glucose.: 179 mg/dL (2018 22:17)    Physical Exam:  Gen: laying in bed, not in acute distress  HEENT: NC/AT, MMM  CVS: RRR, + S1/S2  Resp: Bilateral air entry  GI: soft, nontender, nondistended  Ext: right BKA, left toe ulceration/gangrene
[facility]  INFECTIOUS DISEASES AND INTERNAL MEDICINE at [place]  =======================================================  Terrence Hardy MD  Diplomates American Board of Internal Medicine and Infectious Diseases  =======================================================    ANDRIY LEBLANC [id number]    Follow up: Left second toe infection   s/p Left second toe amputation 4/28/18    No fevers or chills  No complaints  cultures from bone negative.        Allergies:  No Known Allergies    MEDICATIONS  (STANDING):  ATENolol  Tablet 50 milliGRAM(s) Oral daily  cyanocobalamin 1000 MICROGram(s) Oral daily  dextrose 5%. 1000 milliLiter(s) (50 mL/Hr) IV Continuous <Continuous>  dextrose 50% Injectable 12.5 Gram(s) IV Push once  dextrose 50% Injectable 25 Gram(s) IV Push once  dextrose 50% Injectable 25 Gram(s) IV Push once  enoxaparin Injectable 40 milliGRAM(s) SubCutaneous daily  ferrous    sulfate 325 milliGRAM(s) Oral two times a day  gabapentin 100 milliGRAM(s) Oral three times a day  insulin detemir injectable (LEVEMIR) 20 Unit(s) SubCutaneous at bedtime  insulin lispro (HumaLOG) corrective regimen sliding scale   SubCutaneous three times a day before meals  lactobacillus acidophilus 1 Tablet(s) Oral two times a day with meals  levoFLOXacin  Tablet 750 milliGRAM(s) Oral every 24 hours  pantoprazole    Tablet 40 milliGRAM(s) Oral before breakfast     REVIEW OF SYSTEMS:  CONSTITUTIONAL:  No Fever or chills  HEENT:   No diplopia or blurred vision.  No earache, sore throat or runny nose.  CARDIOVASCULAR:  No pressure, squeezing, strangling, tightness, heaviness or aching about the chest, neck, axilla or epigastrium.  RESPIRATORY:  No cough, shortness of breath  GASTROINTESTINAL:  No nausea, vomiting or diarrhea.  GENITOURINARY:  No dysuria, frequency or urgency  MUSCULOSKELETAL:  no joint aches, no muscle pain  SKIN:  No rash  NEUROLOGIC:  No paresthesias, fasciculations  PSYCHIATRIC:  No disorder of thought or mood.  ENDOCRINE:  No heat or cold intolerance  HEMATOLOGICAL:  No easy bruising or bleeding.       Physical Exam:  Vital Signs Last 24 Hrs  T(C): 36.4 (02 May 2018 11:37), Max: 36.9 (01 May 2018 21:00)  T(F): 97.6 (02 May 2018 11:37), Max: 98.4 (01 May 2018 21:00)  HR: 69 (02 May 2018 11:37) (56 - 77)  BP: 131/75 (02 May 2018 11:37) (123/72 - 150/85)  RR: 20 (02 May 2018 11:37) (18 - 20)  SpO2: 97% (02 May 2018 04:18) (97% - 97%)    GEN: NAD, pleasant  HEENT: normocephalic and atraumatic. EOMI. PERRL.    NECK: Supple.  LUNGS: Clear to auscultation.  HEART: Regular rate and rhythm  ABDOMEN: Soft, nontender, and nondistended.  Positive bowel sounds.    : No CVA tenderness  EXTREMITIES: Left foot in dressing   MSK: no joint swelling  NEUROLOGIC: No focal deficits   PSYCHIATRIC: Appropriate affect .  SKIN: No rash          Labs:                      CAPILLARY BLOOD GLUCOSE      POCT Blood Glucose.: 219 mg/dL (02 May 2018 07:57)  POCT Blood Glucose.: 211 mg/dL (01 May 2018 21:26)  POCT Blood Glucose.: 199 mg/dL (01 May 2018 16:29)        RECENT CULTURES:  04-28 @ 14:25 .Surgical Swab     No growth at 4 days.  Culture in progress    No White blood cells  No organisms seen      Culture - Other (04.25.18 @ 17:35)    -  Amikacin: S <=16    -  Aztreonam: S <=4    -  Cefepime: S <=4    -  Tobramycin: S <=4    -  Meropenem: S <=1    -  Piperacillin/Tazobactam: S <=16    -  Ciprofloxacin: S <=1    -  Gentamicin: S <=4    -  Levofloxacin: S <=2    Specimen Source: .Other Left foot    Culture Results:   Numerous Pseudomonas aeruginosa    Organism Identification: Pseudomonas aeruginosa    Organism: Pseudomonas aeruginosa    Method Type: SHAZIA          04-25 @ 16:51 .Blood Blood     No growth at 5 days.        04-25 @ 16:50 .Blood Blood     No growth at 5 days.
[facility]  INFECTIOUS DISEASES AND INTERNAL MEDICINE at [place]  =======================================================  Terrence Hardy MD  Diplomates American Board of Internal Medicine and Infectious Diseases  =======================================================    ANDRIY LEBLANC [id number]    Follow up: Left second toe infection   s/p Left second toe amputation 4/28/18    No fevers or chills  No complaints      Allergies:  No Known Allergies      Antibiotics:  piperacillin/tazobactam IVPB. 3.375 Gram(s) IV Intermittent every 8 hours  vancomycin  IVPB 1000 milliGRAM(s) IV Intermittent every 8 hours       REVIEW OF SYSTEMS:  CONSTITUTIONAL:  No Fever or chills  HEENT:   No diplopia or blurred vision.  No earache, sore throat or runny nose.  CARDIOVASCULAR:  No pressure, squeezing, strangling, tightness, heaviness or aching about the chest, neck, axilla or epigastrium.  RESPIRATORY:  No cough, shortness of breath  GASTROINTESTINAL:  No nausea, vomiting or diarrhea.  GENITOURINARY:  No dysuria, frequency or urgency  MUSCULOSKELETAL:  no joint aches, no muscle pain  SKIN:  No rash  NEUROLOGIC:  No paresthesias, fasciculations  PSYCHIATRIC:  No disorder of thought or mood.  ENDOCRINE:  No heat or cold intolerance  HEMATOLOGICAL:  No easy bruising or bleeding.       Physical Exam:  Vital Signs Last 24 Hrs  T(C): 36.5 (30 Apr 2018 12:44), Max: 36.8 (29 Apr 2018 22:28)  T(F): 97.7 (30 Apr 2018 12:44), Max: 98.3 (29 Apr 2018 22:28)  HR: 71 (30 Apr 2018 12:44) (60 - 85)  BP: 124/68 (30 Apr 2018 12:44) (118/62 - 150/85)  RR: 20 (30 Apr 2018 12:44) (16 - 20)  SpO2: 96% (30 Apr 2018 06:03) (95% - 98%)      GEN: NAD, pleasant  HEENT: normocephalic and atraumatic. EOMI. PERRL.    NECK: Supple.  LUNGS: Clear to auscultation.  HEART: Regular rate and rhythm  ABDOMEN: Soft, nontender, and nondistended.  Positive bowel sounds.    : No CVA tenderness  EXTREMITIES: Left foot in dressing  MSK: no joint swelling  NEUROLOGIC: No focal deficits   PSYCHIATRIC: Appropriate affect .  SKIN: No rash      Labs:  04-30    135  |  98  |  13.0  ----------------------------<  186<H>  3.8   |  27.0  |  1.01    Ca    8.7      30 Apr 2018 06:18               11.0   7.8   )-----------( 324      ( 30 Apr 2018 06:18 )             33.1       RECENT CULTURES:  04-28 @ 14:25 .Surgical Swab     No growth at 2 days.  Culture in progress  No White blood cells  No organisms seen      04-25 @ 17:35 .Other Left foot Pseudomonas aeruginosa    Numerous Pseudomonas aeruginosa      04-25 @ 16:51 .Blood Blood     No growth at 48 hours      04-25 @ 16:50 .Blood Blood     No growth at 48 hours
Pt seen, chart reviewed.  S/p partial amputation left 2nd toe, POD#3.  VSS.  Adequate pain control.  Resting comfortably.   Tolerating PO intake.  No N/V.    No anesthesia problems noted.
ANDRIY LEBLANC Male 67y [id number]    Patient is a 67y old  Male who presents with a chief complaint of Toe gangrene (2018 20:04)      On service note:  Pt seen /examined at bedside and charts reviewed, no over night event reported by night staff. Pt reports pain well controlled, he has no other complaints. Followed by podiatry today- plan for OR tomorrow.     Review of system:  No fever, chills, nausea, vomiting, headache, dizziness, chest pain, SOB or palpitation.      PHYSICAL EXAM:    Vital Signs Last 24 Hrs  T(C): 36.6 (2018 05:40), Max: 37.2 (2018 16:47)  T(F): 97.8 (2018 05:40), Max: 98.9 (2018 16:47)  HR: 79 (2018 05:40) (76 - 79)  BP: 124/62 (2018 05:40) (121/71 - 130/76)  BP(mean): --  RR: 16 (2018 05:40) (16 - 18)  SpO2: 94% (2018 05:40) (94% - 98%)    GENERAL: Pt lying comfortably, NAD.  CHEST/LUNG: Clear to auscultation bilaterally; No wheezing.  HEART: S1S2+, Regular rate and rhythm; No murmurs.  ABDOMEN: Soft, Nontender, Nondistended; Bowel sounds present.  Extremities:  Left 2nd digit gangrene  ulceration, Right BKA  NEURO: AAOX3, no focal deficits, no motor r sensory loss.  PSYCH: normal mood.          MEDICATIONS  (STANDING):  ATENolol  Tablet 50 milliGRAM(s) Oral daily  dextrose 5%. 1000 milliLiter(s) (50 mL/Hr) IV Continuous <Continuous>  dextrose 50% Injectable 12.5 Gram(s) IV Push once  dextrose 50% Injectable 25 Gram(s) IV Push once  dextrose 50% Injectable 25 Gram(s) IV Push once  enoxaparin Injectable 40 milliGRAM(s) SubCutaneous daily  gabapentin 100 milliGRAM(s) Oral three times a day  insulin detemir injectable (LEVEMIR) 15 Unit(s) SubCutaneous at bedtime  insulin lispro (HumaLOG) corrective regimen sliding scale   SubCutaneous three times a day before meals  pantoprazole    Tablet 40 milliGRAM(s) Oral before breakfast  piperacillin/tazobactam IVPB. 3.375 Gram(s) IV Intermittent every 8 hours  vancomycin  IVPB 1000 milliGRAM(s) IV Intermittent every 8 hours    MEDICATIONS  (PRN):  acetaminophen   Tablet. 650 milliGRAM(s) Oral every 6 hours PRN Mild Pain (1 - 3)  dextrose Gel 1 Dose(s) Oral once PRN Blood Glucose LESS THAN 70 milliGRAM(s)/deciliter  glucagon  Injectable 1 milliGRAM(s) IntraMuscular once PRN Glucose LESS THAN 70 milligrams/deciliter  oxyCODONE    5 mG/acetaminophen 325 mG 1 Tablet(s) Oral every 6 hours PRN Moderate Pain (4 - 6)  oxyCODONE    5 mG/acetaminophen 325 mG 2 Tablet(s) Oral every 4 hours PRN Severe Pain (7 - 10)        Labs:  LABS:                        10.9   7.3   )-----------( 307      ( 2018 06:12 )             33.4     -    138  |  100  |  13.0  ----------------------------<  120<H>  4.0   |  25.0  |  1.08    Ca    8.7      2018 06:12  Phos  3.6       Mg     1.9         TPro  7.9  /  Alb  4.2  /  TBili  <0.2<L>  /  DBili  x   /  AST  14  /  ALT  10  /  AlkPhos  95  04-25        LIVER FUNCTIONS - ( 2018 16:50 )  Alb: 4.2 g/dL / Pro: 7.9 g/dL / ALK PHOS: 95 U/L / ALT: 10 U/L / AST: 14 U/L / GGT: x           Urinalysis Basic - ( 2018 05:07 )    Color: Yellow / Appearance: Clear / S.020 / pH: x  Gluc: x / Ketone: Negative  / Bili: Negative / Urobili: Negative mg/dL   Blood: x / Protein: 15 mg/dL / Nitrite: Negative   Leuk Esterase: Moderate / RBC: 0-2 /HPF / WBC 26-50   Sq Epi: x / Non Sq Epi: Occasional / Bacteria: Occasional
ANDRIY LEBLANC Male 67y [id number]    Patient is a 67y old  Male who presents with a chief complaint of Toe gangrene (25 Apr 2018 20:04)      Subjective/objective:  Pt seen and examined at bedside, no over night event reported by night staff. Pt undergoing toe amputation today.     Review of system:  No fever, chills, nausea, vomiting, headache, dizziness, chest pain, SOB or palpitation.      PHYSICAL EXAM:    Vital Signs Last 24 Hrs  T(C): 36.2 (28 Apr 2018 09:33), Max: 36.8 (27 Apr 2018 17:12)  T(F): 97.2 (28 Apr 2018 09:33), Max: 98.2 (27 Apr 2018 17:12)  HR: 75 (28 Apr 2018 09:33) (73 - 78)  BP: 129/61 (28 Apr 2018 09:33) (102/56 - 158/79)  BP(mean): --  RR: 13 (28 Apr 2018 09:33) (13 - 18)  SpO2: 98% (28 Apr 2018 09:33) (95% - 99%)    GENERAL: Pt lying comfortably, NAD.  CHEST/LUNG: Clear to auscultation bilaterally; No wheezing.  HEART: S1S2+, Regular rate and rhythm; No murmurs.  ABDOMEN: Soft, Nontender, Nondistended; Bowel sounds present.  Extremities:  Left 2nd digit gangrene  ulceration, Right BKA  NEURO: AAOX3, no focal deficits, no motor r sensory loss.  PSYCH: normal mood.      MEDICATIONS  (STANDING):  ATENolol  Tablet 50 milliGRAM(s) Oral daily  dextrose 5%. 1000 milliLiter(s) (50 mL/Hr) IV Continuous <Continuous>  dextrose 50% Injectable 12.5 Gram(s) IV Push once  dextrose 50% Injectable 25 Gram(s) IV Push once  dextrose 50% Injectable 25 Gram(s) IV Push once  gabapentin 100 milliGRAM(s) Oral three times a day  insulin lispro (HumaLOG) corrective regimen sliding scale   SubCutaneous three times a day before meals  lactated ringers. 1000 milliLiter(s) (100 mL/Hr) IV Continuous <Continuous>  pantoprazole    Tablet 40 milliGRAM(s) Oral before breakfast  piperacillin/tazobactam IVPB. 3.375 Gram(s) IV Intermittent every 8 hours  vancomycin  IVPB 1000 milliGRAM(s) IV Intermittent every 8 hours    MEDICATIONS  (PRN):  acetaminophen   Tablet. 650 milliGRAM(s) Oral every 6 hours PRN Mild Pain (1 - 3)  dextrose Gel 1 Dose(s) Oral once PRN Blood Glucose LESS THAN 70 milliGRAM(s)/deciliter  fentaNYL    Injectable 25 MICROGram(s) IV Push every 5 minutes PRN Moderate Pain  glucagon  Injectable 1 milliGRAM(s) IntraMuscular once PRN Glucose LESS THAN 70 milligrams/deciliter  ondansetron Injectable 4 milliGRAM(s) IV Push once PRN Nausea and/or Vomiting  oxyCODONE    5 mG/acetaminophen 325 mG 1 Tablet(s) Oral every 6 hours PRN Moderate Pain (4 - 6)  oxyCODONE    5 mG/acetaminophen 325 mG 2 Tablet(s) Oral every 4 hours PRN Severe Pain (7 - 10)        Labs:  LABS:                        11.2   8.1   )-----------( 344      ( 28 Apr 2018 07:32 )             33.1     04-27    138  |  100  |  13.0  ----------------------------<  120<H>  4.0   |  25.0  |  1.08    Ca    8.7      27 Apr 2018 06:12  Phos  3.6     04-27  Mg     1.9     04-27

## 2018-05-02 NOTE — PROGRESS NOTE ADULT - PROBLEM SELECTOR PLAN 1
s/p Left second toe amputation 4/28/18  Culture from OR negative  Prior cultures with Pseudomonas  - completed course of zosyn.  Levaquin x 5 more days

## 2018-05-02 NOTE — DISCHARGE NOTE ADULT - CARE PLAN
Principal Discharge DX:	Toe gangrene  Goal:	To prevent progression of infection.  Assessment and plan of treatment:	Status post amputation. Follow up with podiatry in 1 week.  Secondary Diagnosis:	Essential hypertension  Assessment and plan of treatment:	C/w your home medicine as reconciled and follow up with your PCP in 1 week.  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  Secondary Diagnosis:	Neuropathy  Assessment and plan of treatment:	Trial fo gabapentin as reconciled.  Secondary Diagnosis:	Iron deficiency anemia, unspecified iron deficiency anemia type  Assessment and plan of treatment:	C/w iron /b12 supplements. Follow up with your PMD. Principal Discharge DX:	Toe gangrene  Goal:	To prevent progression of infection.  Assessment and plan of treatment:	Status post amputation. Follow up with podiatry in 1 week. Take antibiotics as reconciled. Follow with ID in 1 week.  Secondary Diagnosis:	Essential hypertension  Assessment and plan of treatment:	C/w your home medicine as reconciled and follow up with your PCP in 1 week.  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  Secondary Diagnosis:	Neuropathy  Assessment and plan of treatment:	Trial fo gabapentin as reconciled.  Secondary Diagnosis:	Iron deficiency anemia, unspecified iron deficiency anemia type  Assessment and plan of treatment:	C/w iron /b12 supplements. Follow up with your PMD.

## 2018-05-02 NOTE — DISCHARGE NOTE ADULT - SECONDARY DIAGNOSIS.
Type 2 diabetes mellitus with other skin ulcer, with long-term current use of insulin Neuropathy Iron deficiency anemia, unspecified iron deficiency anemia type Essential hypertension

## 2018-05-02 NOTE — PROGRESS NOTE ADULT - PROVIDER SPECIALTY LIST ADULT
Anesthesia
Hospitalist
Infectious Disease
Infectious Disease
Podiatry
Hospitalist
Hospitalist

## 2018-05-02 NOTE — DISCHARGE NOTE ADULT - CARE PROVIDER_API CALL
Александр Calvert (DPM), Podiatric Medicine and Surgery  [address] [place] [address]  Phone: [phone]  Fax: [phone]    PCP,   Phone: (   )    -  Fax: (   )    - Александр Calvert (DPM), Podiatric Medicine and Surgery  [address] [place] [address]  Phone: [phone]  Fax: [phone]    PCP,   Phone: (   )    -  Fax: (   )    -    Saúl Hull), Infectious Disease; Internal Medicine  [address] [place] [address]  Phone: [phone]  Fax: [phone]    Endocrinology.,   Phone: (   )    -  Fax: (   )    -

## 2018-05-02 NOTE — PROGRESS NOTE ADULT - ASSESSMENT
67 y.o. man with long standing diabetes, neuropathy, vascular disease, prior right BKA and left #3 toe OM and amputation, here for left #2 toe infection with pseudomonas. s/p Left second toe amputation 4/28/18. bone cultures negative

## 2018-05-02 NOTE — DISCHARGE NOTE ADULT - MEDICATION SUMMARY - MEDICATIONS TO STOP TAKING
I will STOP taking the medications listed below when I get home from the hospital:    levoFLOXacin 750 mg oral tablet  -- 1 tab(s) by mouth every 24 hours    enalapril  -- 50 milligram(s) orally I will STOP taking the medications listed below when I get home from the hospital:    enalapril  -- 50 milligram(s) orally

## 2018-05-02 NOTE — DISCHARGE NOTE ADULT - PATIENT PORTAL LINK FT
You can access the [facility], offered by [facility], by registering with the following website: [email or web address]

## 2018-05-03 ENCOUNTER — CHART COPY (OUTPATIENT)
Age: 68
End: 2018-05-03

## 2018-05-03 LAB
CULTURE RESULTS: SIGNIFICANT CHANGE UP
SPECIMEN SOURCE: SIGNIFICANT CHANGE UP

## 2018-05-03 RX ORDER — CIPROFLOXACIN LACTATE 400MG/40ML
1 VIAL (ML) INTRAVENOUS
Qty: 5 | Refills: 0 | OUTPATIENT
Start: 2018-05-03 | End: 2018-05-07

## 2018-05-04 ENCOUNTER — CHART COPY (OUTPATIENT)
Age: 68
End: 2018-05-04

## 2018-05-10 ENCOUNTER — RX RENEWAL (OUTPATIENT)
Age: 68
End: 2018-05-10

## 2018-05-10 ENCOUNTER — MEDICATION RENEWAL (OUTPATIENT)
Age: 68
End: 2018-05-10

## 2018-05-10 LAB — SURGICAL PATHOLOGY FINAL REPORT - CH: SIGNIFICANT CHANGE UP

## 2018-05-15 ENCOUNTER — OTHER (OUTPATIENT)
Age: 68
End: 2018-05-15

## 2018-05-15 ENCOUNTER — APPOINTMENT (OUTPATIENT)
Dept: INTERNAL MEDICINE | Facility: CLINIC | Age: 68
End: 2018-05-15
Payer: MEDICARE

## 2018-05-15 VITALS
WEIGHT: 170 LBS | TEMPERATURE: 98.2 F | HEART RATE: 85 BPM | HEIGHT: 69 IN | SYSTOLIC BLOOD PRESSURE: 132 MMHG | OXYGEN SATURATION: 97 % | BODY MASS INDEX: 25.18 KG/M2 | DIASTOLIC BLOOD PRESSURE: 80 MMHG

## 2018-05-15 PROCEDURE — 99495 TRANSJ CARE MGMT MOD F2F 14D: CPT | Mod: 25

## 2018-05-15 PROCEDURE — 83036 HEMOGLOBIN GLYCOSYLATED A1C: CPT | Mod: QW

## 2018-05-15 PROCEDURE — 82962 GLUCOSE BLOOD TEST: CPT

## 2018-05-15 NOTE — HISTORY OF PRESENT ILLNESS
[Post-hospitalization from ___ Hospital] : Post-hospitalization from [unfilled] Hospital [Admitted on: ___] : The patient was admitted on [unfilled] [Discharged on ___] : discharged on [unfilled] [Discharge Summary] : discharge summary [Discharge Med List] : discharge medication list [Med Reconciliation] : medication reconciliation has been completed [Patient Contacted By: ____] : and contacted by [unfilled] [FreeTextEntry2] : Pt had gangrenous toe and required amputation. He has been on antibiotics.

## 2018-05-15 NOTE — PHYSICAL EXAM
[No Acute Distress] : no acute distress [Normal Sclera/Conjunctiva] : normal sclera/conjunctiva [Normal Outer Ear/Nose] : the outer ears and nose were normal in appearance [No JVD] : no jugular venous distention [No Respiratory Distress] : no respiratory distress  [Alert and Oriented x3] : oriented to person, place, and time

## 2018-05-16 ENCOUNTER — APPOINTMENT (OUTPATIENT)
Dept: INTERNAL MEDICINE | Facility: CLINIC | Age: 68
End: 2018-05-16

## 2018-05-16 LAB
GLUCOSE BLDC GLUCOMTR-MCNC: 216
HBA1C MFR BLD HPLC: 8.3

## 2018-05-18 ENCOUNTER — CHART COPY (OUTPATIENT)
Age: 68
End: 2018-05-18

## 2018-05-21 ENCOUNTER — CHART COPY (OUTPATIENT)
Age: 68
End: 2018-05-21

## 2018-06-01 ENCOUNTER — RX RENEWAL (OUTPATIENT)
Age: 68
End: 2018-06-01

## 2018-06-13 ENCOUNTER — MEDICATION RENEWAL (OUTPATIENT)
Age: 68
End: 2018-06-13

## 2018-06-15 ENCOUNTER — APPOINTMENT (OUTPATIENT)
Dept: INTERNAL MEDICINE | Facility: CLINIC | Age: 68
End: 2018-06-15
Payer: MEDICARE

## 2018-06-15 VITALS
TEMPERATURE: 98.8 F | HEART RATE: 82 BPM | WEIGHT: 177 LBS | DIASTOLIC BLOOD PRESSURE: 78 MMHG | RESPIRATION RATE: 16 BRPM | BODY MASS INDEX: 26.14 KG/M2 | OXYGEN SATURATION: 98 % | SYSTOLIC BLOOD PRESSURE: 136 MMHG

## 2018-06-15 PROCEDURE — 99214 OFFICE O/P EST MOD 30 MIN: CPT

## 2018-06-15 RX ORDER — COLLAGENASE SANTYL 250 [ARB'U]/G
250 OINTMENT TOPICAL
Qty: 30 | Refills: 0 | Status: COMPLETED | COMMUNITY
Start: 2018-05-31

## 2018-06-15 NOTE — PHYSICAL EXAM
[No Acute Distress] : no acute distress [Normal Sclera/Conjunctiva] : normal sclera/conjunctiva [Normal Outer Ear/Nose] : the outer ears and nose were normal in appearance [No JVD] : no jugular venous distention [No Respiratory Distress] : no respiratory distress  [Clear to Auscultation] : lungs were clear to auscultation bilaterally [Normal Rate] : normal rate

## 2018-06-15 NOTE — HISTORY OF PRESENT ILLNESS
[FreeTextEntry1] : f/u to DM, GERD and HTN [de-identified] : pt states that he is not able to get Trulicity any more as they will not pay for it . he states that the GERD is controlled and his HTN is controlled.

## 2018-07-23 ENCOUNTER — APPOINTMENT (OUTPATIENT)
Dept: INTERNAL MEDICINE | Facility: CLINIC | Age: 68
End: 2018-07-23
Payer: MEDICARE

## 2018-07-23 VITALS
TEMPERATURE: 98.2 F | DIASTOLIC BLOOD PRESSURE: 80 MMHG | BODY MASS INDEX: 25.33 KG/M2 | HEIGHT: 69 IN | WEIGHT: 171 LBS | HEART RATE: 63 BPM | OXYGEN SATURATION: 98 % | SYSTOLIC BLOOD PRESSURE: 134 MMHG

## 2018-07-23 DIAGNOSIS — Z86.79 PERSONAL HISTORY OF OTHER DISEASES OF THE CIRCULATORY SYSTEM: ICD-10-CM

## 2018-07-23 PROBLEM — H26.9 UNSPECIFIED CATARACT: Chronic | Status: ACTIVE | Noted: 2018-04-25

## 2018-07-23 PROBLEM — G62.9 POLYNEUROPATHY, UNSPECIFIED: Chronic | Status: ACTIVE | Noted: 2018-04-25

## 2018-07-23 PROCEDURE — 99214 OFFICE O/P EST MOD 30 MIN: CPT

## 2018-07-23 NOTE — HISTORY OF PRESENT ILLNESS
[FreeTextEntry1] : f/u to DM, GERD, and HTN [de-identified] : pt states that the medication is helping the reflux. His HTN is controlled and the DM to be checked today. He could not afford the Bydureon and will be tried on Ozempic.

## 2018-08-28 ENCOUNTER — APPOINTMENT (OUTPATIENT)
Dept: INTERNAL MEDICINE | Facility: CLINIC | Age: 68
End: 2018-08-28
Payer: MEDICARE

## 2018-08-28 VITALS
OXYGEN SATURATION: 98 % | SYSTOLIC BLOOD PRESSURE: 132 MMHG | BODY MASS INDEX: 25.18 KG/M2 | WEIGHT: 170 LBS | HEART RATE: 71 BPM | DIASTOLIC BLOOD PRESSURE: 80 MMHG | HEIGHT: 69 IN

## 2018-08-28 DIAGNOSIS — Z23 ENCOUNTER FOR IMMUNIZATION: ICD-10-CM

## 2018-08-28 PROCEDURE — G0009: CPT

## 2018-08-28 PROCEDURE — 99213 OFFICE O/P EST LOW 20 MIN: CPT | Mod: 25

## 2018-08-28 PROCEDURE — 90670 PCV13 VACCINE IM: CPT

## 2018-08-28 PROCEDURE — 82962 GLUCOSE BLOOD TEST: CPT

## 2018-08-28 PROCEDURE — 83036 HEMOGLOBIN GLYCOSYLATED A1C: CPT | Mod: QW

## 2018-08-29 LAB
GLUCOSE BLDC GLUCOMTR-MCNC: 95
HBA1C MFR BLD HPLC: 7.5

## 2018-08-31 ENCOUNTER — CHART COPY (OUTPATIENT)
Age: 68
End: 2018-08-31

## 2018-09-05 ENCOUNTER — CHART COPY (OUTPATIENT)
Age: 68
End: 2018-09-05

## 2018-09-06 ENCOUNTER — RX RENEWAL (OUTPATIENT)
Age: 68
End: 2018-09-06

## 2018-09-07 ENCOUNTER — CHART COPY (OUTPATIENT)
Age: 68
End: 2018-09-07

## 2018-09-20 ENCOUNTER — MEDICATION RENEWAL (OUTPATIENT)
Age: 68
End: 2018-09-20

## 2018-09-21 ENCOUNTER — MEDICATION RENEWAL (OUTPATIENT)
Age: 68
End: 2018-09-21

## 2018-10-09 ENCOUNTER — OTHER (OUTPATIENT)
Age: 68
End: 2018-10-09

## 2018-10-09 ENCOUNTER — APPOINTMENT (OUTPATIENT)
Dept: INTERNAL MEDICINE | Facility: CLINIC | Age: 68
End: 2018-10-09
Payer: MEDICARE

## 2018-10-09 VITALS
RESPIRATION RATE: 16 BRPM | HEART RATE: 72 BPM | TEMPERATURE: 98.6 F | BODY MASS INDEX: 25.55 KG/M2 | SYSTOLIC BLOOD PRESSURE: 130 MMHG | OXYGEN SATURATION: 98 % | WEIGHT: 173 LBS | DIASTOLIC BLOOD PRESSURE: 70 MMHG

## 2018-10-09 PROCEDURE — 99214 OFFICE O/P EST MOD 30 MIN: CPT | Mod: 25

## 2018-10-09 PROCEDURE — 82962 GLUCOSE BLOOD TEST: CPT

## 2018-10-25 ENCOUNTER — MEDICATION RENEWAL (OUTPATIENT)
Age: 68
End: 2018-10-25

## 2018-11-16 ENCOUNTER — RX RENEWAL (OUTPATIENT)
Age: 68
End: 2018-11-16

## 2018-12-03 ENCOUNTER — OTHER (OUTPATIENT)
Age: 68
End: 2018-12-03

## 2018-12-03 ENCOUNTER — APPOINTMENT (OUTPATIENT)
Dept: INTERNAL MEDICINE | Facility: CLINIC | Age: 68
End: 2018-12-03
Payer: MEDICARE

## 2018-12-03 VITALS
HEART RATE: 72 BPM | OXYGEN SATURATION: 97 % | WEIGHT: 171 LBS | BODY MASS INDEX: 25.33 KG/M2 | HEIGHT: 69 IN | SYSTOLIC BLOOD PRESSURE: 120 MMHG | DIASTOLIC BLOOD PRESSURE: 60 MMHG

## 2018-12-03 PROCEDURE — 99214 OFFICE O/P EST MOD 30 MIN: CPT | Mod: 25

## 2018-12-03 PROCEDURE — 82962 GLUCOSE BLOOD TEST: CPT

## 2018-12-03 PROCEDURE — 83036 HEMOGLOBIN GLYCOSYLATED A1C: CPT | Mod: QW

## 2018-12-03 NOTE — HISTORY OF PRESENT ILLNESS
[FreeTextEntry1] : f/u to DM, GERD and the HTN [de-identified] : Pt DM to be checked today, GERD is under controlled, and the HTN is controlled.

## 2018-12-04 LAB
GLUCOSE BLDC GLUCOMTR-MCNC: 179
HBA1C MFR BLD HPLC: 8.2

## 2018-12-05 NOTE — PATIENT PROFILE ADULT. - TOBACCO USE
Pt C/O vaginal bleeding. LMP ended on 12/01. Positive urine pregnancy test at home on 12/02.
Former smoker

## 2019-01-02 ENCOUNTER — APPOINTMENT (OUTPATIENT)
Dept: INTERNAL MEDICINE | Facility: CLINIC | Age: 69
End: 2019-01-02
Payer: MEDICARE

## 2019-01-02 VITALS
HEART RATE: 78 BPM | OXYGEN SATURATION: 97 % | BODY MASS INDEX: 25.48 KG/M2 | WEIGHT: 172 LBS | HEIGHT: 69 IN | SYSTOLIC BLOOD PRESSURE: 120 MMHG | DIASTOLIC BLOOD PRESSURE: 60 MMHG

## 2019-01-02 PROCEDURE — 99213 OFFICE O/P EST LOW 20 MIN: CPT

## 2019-01-14 ENCOUNTER — APPOINTMENT (OUTPATIENT)
Dept: INTERNAL MEDICINE | Facility: CLINIC | Age: 69
End: 2019-01-14
Payer: MEDICARE

## 2019-01-14 VITALS
OXYGEN SATURATION: 98 % | HEART RATE: 71 BPM | HEIGHT: 69 IN | DIASTOLIC BLOOD PRESSURE: 70 MMHG | WEIGHT: 172 LBS | SYSTOLIC BLOOD PRESSURE: 110 MMHG | BODY MASS INDEX: 25.48 KG/M2

## 2019-01-14 PROCEDURE — 82962 GLUCOSE BLOOD TEST: CPT

## 2019-01-14 PROCEDURE — 99214 OFFICE O/P EST MOD 30 MIN: CPT | Mod: 25

## 2019-01-14 NOTE — HISTORY OF PRESENT ILLNESS
[FreeTextEntry1] : f/u to DM, HTN and GERD [de-identified] : DM to be tested today. The HTN is controlled and the GERD is controlled with the medication. He reports that the sore on the stump is improved and he got a new sleeve for the prosthesis.

## 2019-01-15 LAB — GLUCOSE BLDC GLUCOMTR-MCNC: 254

## 2019-01-22 NOTE — ED PROVIDER NOTE - CARDIOVASCULAR NEGATIVE STATEMENT, MLM
[General Appearance - Alert] : alert [General Appearance - In No Acute Distress] : in no acute distress [Oriented To Time, Place, And Person] : oriented to person, place, and time [Impaired Insight] : insight and judgment were intact [Cranial Nerves Optic (II)] : visual acuity intact bilaterally,  pupils equal round and reactive to light [Cranial Nerves Oculomotor (III)] : extraocular motion intact [Cranial Nerves Trigeminal (V)] : facial sensation intact symmetrically [Cranial Nerves Facial (VII)] : face symmetrical [Cranial Nerves Glossopharyngeal (IX)] : tongue and palate midline [Cranial Nerves Accessory (XI - Cranial And Spinal)] : head turning and shoulder shrug symmetric [Cranial Nerves Hypoglossal (XII)] : there was no tongue deviation with protrusion [Motor Tone] : muscle tone was normal in all four extremities [Motor Strength] : muscle strength was normal in all four extremities [Sensation Tactile Decrease] : light touch was intact [2+] : Patella left 2+ [No Visual Abnormalities] : no visible abnormailities no chest pain and no edema. [Straight-Leg Raise Test - Left] : straight leg raise of the left leg was positive [Straight-Leg Raise Test - Right] : straight leg raise of the right leg was positive [Antalgic] : antalgic [Able to toe walk] : the patient was able to toe walk [Able to heel walk] : the patient was able to heel walk [Intact] : all sensory within normal limits bilaterally [Sclera] : the sclera and conjunctiva were normal [] : no respiratory distress [Heart Rate And Rhythm] : heart rate was normal and rhythm regular [Abdomen Soft] : soft [Skin Color & Pigmentation] : normal skin color and pigmentation [FreeTextEntry1] : Ambulates with Limp and with a cane

## 2019-01-30 ENCOUNTER — APPOINTMENT (OUTPATIENT)
Dept: VASCULAR SURGERY | Facility: CLINIC | Age: 69
End: 2019-01-30
Payer: MEDICARE

## 2019-01-30 ENCOUNTER — INPATIENT (INPATIENT)
Facility: HOSPITAL | Age: 69
LOS: 1 days | Discharge: HOME | DRG: 253 | End: 2019-02-01
Attending: SURGERY | Admitting: SURGERY
Payer: MEDICARE

## 2019-01-30 VITALS
DIASTOLIC BLOOD PRESSURE: 66 MMHG | HEIGHT: 69 IN | RESPIRATION RATE: 14 BRPM | BODY MASS INDEX: 25.48 KG/M2 | SYSTOLIC BLOOD PRESSURE: 114 MMHG | WEIGHT: 172 LBS | HEART RATE: 65 BPM

## 2019-01-30 VITALS
SYSTOLIC BLOOD PRESSURE: 151 MMHG | RESPIRATION RATE: 18 BRPM | OXYGEN SATURATION: 99 % | HEART RATE: 65 BPM | DIASTOLIC BLOOD PRESSURE: 76 MMHG | TEMPERATURE: 98 F

## 2019-01-30 DIAGNOSIS — Z89.422 ACQUIRED ABSENCE OF OTHER LEFT TOE(S): Chronic | ICD-10-CM

## 2019-01-30 DIAGNOSIS — Z89.511 ACQUIRED ABSENCE OF RIGHT LEG BELOW KNEE: Chronic | ICD-10-CM

## 2019-01-30 DIAGNOSIS — I99.8 OTHER DISORDER OF CIRCULATORY SYSTEM: ICD-10-CM

## 2019-01-30 LAB
ALBUMIN SERPL ELPH-MCNC: 3.8 G/DL — SIGNIFICANT CHANGE UP (ref 3.3–5.2)
ALP SERPL-CCNC: 98 U/L — SIGNIFICANT CHANGE UP (ref 40–120)
ALT FLD-CCNC: 11 U/L — SIGNIFICANT CHANGE UP
ANION GAP SERPL CALC-SCNC: 12 MMOL/L — SIGNIFICANT CHANGE UP (ref 5–17)
APTT BLD: 36 SEC — SIGNIFICANT CHANGE UP (ref 27.5–36.3)
AST SERPL-CCNC: 12 U/L — SIGNIFICANT CHANGE UP
BASOPHILS # BLD AUTO: 0 K/UL — SIGNIFICANT CHANGE UP (ref 0–0.2)
BASOPHILS NFR BLD AUTO: 0.1 % — SIGNIFICANT CHANGE UP (ref 0–2)
BILIRUB SERPL-MCNC: 0.3 MG/DL — LOW (ref 0.4–2)
BUN SERPL-MCNC: 19 MG/DL — SIGNIFICANT CHANGE UP (ref 8–20)
CALCIUM SERPL-MCNC: 9.1 MG/DL — SIGNIFICANT CHANGE UP (ref 8.6–10.2)
CHLORIDE SERPL-SCNC: 96 MMOL/L — LOW (ref 98–107)
CO2 SERPL-SCNC: 26 MMOL/L — SIGNIFICANT CHANGE UP (ref 22–29)
CREAT SERPL-MCNC: 1.32 MG/DL — HIGH (ref 0.5–1.3)
EOSINOPHIL # BLD AUTO: 0.1 K/UL — SIGNIFICANT CHANGE UP (ref 0–0.5)
EOSINOPHIL NFR BLD AUTO: 0.8 % — SIGNIFICANT CHANGE UP (ref 0–5)
GLUCOSE SERPL-MCNC: 133 MG/DL — HIGH (ref 70–115)
HCT VFR BLD CALC: 33 % — LOW (ref 42–52)
HGB BLD-MCNC: 11 G/DL — LOW (ref 14–18)
INR BLD: 1.17 RATIO — HIGH (ref 0.88–1.16)
LACTATE BLDV-MCNC: 0.8 MMOL/L — SIGNIFICANT CHANGE UP (ref 0.5–2)
LYMPHOCYTES # BLD AUTO: 2.6 K/UL — SIGNIFICANT CHANGE UP (ref 1–4.8)
LYMPHOCYTES # BLD AUTO: 21.8 % — SIGNIFICANT CHANGE UP (ref 20–55)
MCHC RBC-ENTMCNC: 27.8 PG — SIGNIFICANT CHANGE UP (ref 27–31)
MCHC RBC-ENTMCNC: 33.3 G/DL — SIGNIFICANT CHANGE UP (ref 32–36)
MCV RBC AUTO: 83.5 FL — SIGNIFICANT CHANGE UP (ref 80–94)
MONOCYTES # BLD AUTO: 1 K/UL — HIGH (ref 0–0.8)
MONOCYTES NFR BLD AUTO: 8.7 % — SIGNIFICANT CHANGE UP (ref 3–10)
NEUTROPHILS # BLD AUTO: 8.2 K/UL — HIGH (ref 1.8–8)
NEUTROPHILS NFR BLD AUTO: 68.3 % — SIGNIFICANT CHANGE UP (ref 37–73)
PLATELET # BLD AUTO: 397 K/UL — SIGNIFICANT CHANGE UP (ref 150–400)
POTASSIUM SERPL-MCNC: 4.5 MMOL/L — SIGNIFICANT CHANGE UP (ref 3.5–5.3)
POTASSIUM SERPL-SCNC: 4.5 MMOL/L — SIGNIFICANT CHANGE UP (ref 3.5–5.3)
PROT SERPL-MCNC: 8.1 G/DL — SIGNIFICANT CHANGE UP (ref 6.6–8.7)
PROTHROM AB SERPL-ACNC: 13.5 SEC — HIGH (ref 10–12.9)
RBC # BLD: 3.95 M/UL — LOW (ref 4.6–6.2)
RBC # FLD: 12.8 % — SIGNIFICANT CHANGE UP (ref 11–15.6)
SODIUM SERPL-SCNC: 134 MMOL/L — LOW (ref 135–145)
WBC # BLD: 12 K/UL — HIGH (ref 4.8–10.8)
WBC # FLD AUTO: 12 K/UL — HIGH (ref 4.8–10.8)

## 2019-01-30 PROCEDURE — 93010 ELECTROCARDIOGRAM REPORT: CPT

## 2019-01-30 PROCEDURE — 99285 EMERGENCY DEPT VISIT HI MDM: CPT

## 2019-01-30 PROCEDURE — 99215 OFFICE O/P EST HI 40 MIN: CPT

## 2019-01-30 PROCEDURE — 93923 UPR/LXTR ART STDY 3+ LVLS: CPT

## 2019-01-30 PROCEDURE — 93926 LOWER EXTREMITY STUDY: CPT

## 2019-01-30 PROCEDURE — 71045 X-RAY EXAM CHEST 1 VIEW: CPT | Mod: 26

## 2019-01-30 RX ORDER — GLUCAGON INJECTION, SOLUTION 0.5 MG/.1ML
1 INJECTION, SOLUTION SUBCUTANEOUS ONCE
Qty: 0 | Refills: 0 | Status: DISCONTINUED | OUTPATIENT
Start: 2019-01-30 | End: 2019-02-01

## 2019-01-30 RX ORDER — VANCOMYCIN HCL 1 G
VIAL (EA) INTRAVENOUS
Qty: 0 | Refills: 0 | Status: DISCONTINUED | OUTPATIENT
Start: 2019-01-31 | End: 2019-02-01

## 2019-01-30 RX ORDER — DOCUSATE SODIUM 100 MG
100 CAPSULE ORAL THREE TIMES A DAY
Qty: 0 | Refills: 0 | Status: DISCONTINUED | OUTPATIENT
Start: 2019-01-30 | End: 2019-02-01

## 2019-01-30 RX ORDER — PIPERACILLIN AND TAZOBACTAM 4; .5 G/20ML; G/20ML
3.38 INJECTION, POWDER, LYOPHILIZED, FOR SOLUTION INTRAVENOUS ONCE
Qty: 0 | Refills: 0 | Status: COMPLETED | OUTPATIENT
Start: 2019-01-30 | End: 2019-01-31

## 2019-01-30 RX ORDER — INSULIN LISPRO 100/ML
VIAL (ML) SUBCUTANEOUS
Qty: 0 | Refills: 0 | Status: DISCONTINUED | OUTPATIENT
Start: 2019-01-30 | End: 2019-02-01

## 2019-01-30 RX ORDER — ATENOLOL 25 MG/1
50 TABLET ORAL DAILY
Qty: 0 | Refills: 0 | Status: DISCONTINUED | OUTPATIENT
Start: 2019-01-30 | End: 2019-02-01

## 2019-01-30 RX ORDER — DEXTROSE 50 % IN WATER 50 %
25 SYRINGE (ML) INTRAVENOUS ONCE
Qty: 0 | Refills: 0 | Status: DISCONTINUED | OUTPATIENT
Start: 2019-01-30 | End: 2019-02-01

## 2019-01-30 RX ORDER — DEXTROSE 50 % IN WATER 50 %
15 SYRINGE (ML) INTRAVENOUS ONCE
Qty: 0 | Refills: 0 | Status: DISCONTINUED | OUTPATIENT
Start: 2019-01-30 | End: 2019-02-01

## 2019-01-30 RX ORDER — DEXTROSE 50 % IN WATER 50 %
12.5 SYRINGE (ML) INTRAVENOUS ONCE
Qty: 0 | Refills: 0 | Status: DISCONTINUED | OUTPATIENT
Start: 2019-01-30 | End: 2019-02-01

## 2019-01-30 RX ORDER — SODIUM CHLORIDE 9 MG/ML
1000 INJECTION, SOLUTION INTRAVENOUS
Qty: 0 | Refills: 0 | Status: DISCONTINUED | OUTPATIENT
Start: 2019-01-30 | End: 2019-02-01

## 2019-01-30 RX ORDER — HYDROMORPHONE HYDROCHLORIDE 2 MG/ML
0.5 INJECTION INTRAMUSCULAR; INTRAVENOUS; SUBCUTANEOUS EVERY 4 HOURS
Qty: 0 | Refills: 0 | Status: DISCONTINUED | OUTPATIENT
Start: 2019-01-30 | End: 2019-02-01

## 2019-01-30 RX ORDER — INSULIN GLARGINE 100 [IU]/ML
15 INJECTION, SOLUTION SUBCUTANEOUS AT BEDTIME
Qty: 0 | Refills: 0 | Status: DISCONTINUED | OUTPATIENT
Start: 2019-01-30 | End: 2019-01-31

## 2019-01-30 RX ORDER — SENNA PLUS 8.6 MG/1
2 TABLET ORAL AT BEDTIME
Qty: 0 | Refills: 0 | Status: DISCONTINUED | OUTPATIENT
Start: 2019-01-30 | End: 2019-02-01

## 2019-01-30 RX ORDER — HEPARIN SODIUM 5000 [USP'U]/ML
800 INJECTION INTRAVENOUS; SUBCUTANEOUS
Qty: 25000 | Refills: 0 | Status: DISCONTINUED | OUTPATIENT
Start: 2019-01-30 | End: 2019-02-01

## 2019-01-30 RX ORDER — PIPERACILLIN AND TAZOBACTAM 4; .5 G/20ML; G/20ML
3.38 INJECTION, POWDER, LYOPHILIZED, FOR SOLUTION INTRAVENOUS EVERY 8 HOURS
Qty: 0 | Refills: 0 | Status: DISCONTINUED | OUTPATIENT
Start: 2019-01-30 | End: 2019-02-01

## 2019-01-30 RX ORDER — GABAPENTIN 400 MG/1
100 CAPSULE ORAL THREE TIMES A DAY
Qty: 0 | Refills: 0 | Status: DISCONTINUED | OUTPATIENT
Start: 2019-01-30 | End: 2019-02-01

## 2019-01-30 RX ORDER — VANCOMYCIN HCL 1 G
1000 VIAL (EA) INTRAVENOUS EVERY 12 HOURS
Qty: 0 | Refills: 0 | Status: DISCONTINUED | OUTPATIENT
Start: 2019-01-31 | End: 2019-02-01

## 2019-01-30 RX ORDER — VANCOMYCIN HCL 1 G
1000 VIAL (EA) INTRAVENOUS ONCE
Qty: 0 | Refills: 0 | Status: COMPLETED | OUTPATIENT
Start: 2019-01-30 | End: 2019-01-31

## 2019-01-30 RX ORDER — ACETAMINOPHEN 500 MG
650 TABLET ORAL EVERY 6 HOURS
Qty: 0 | Refills: 0 | Status: DISCONTINUED | OUTPATIENT
Start: 2019-01-30 | End: 2019-02-01

## 2019-01-30 RX ADMIN — HYDROMORPHONE HYDROCHLORIDE 0.5 MILLIGRAM(S): 2 INJECTION INTRAMUSCULAR; INTRAVENOUS; SUBCUTANEOUS at 23:22

## 2019-01-30 NOTE — ED ADULT TRIAGE NOTE - CHIEF COMPLAINT QUOTE
Patient states that he was told to come to the ED tonight for surgery tomorrow. Patient is going to have stents placed in his leg for gangrene.

## 2019-01-30 NOTE — ASSESSMENT
[FreeTextEntry1] : 68 year old diabetic with known PAD s/p right BKA now presenting with left great toe gangrene and ischemic rest pain. Concern for acute left great hallux osteomyelitis\par Non invasive testing shows multisegmental occlusive disease with high grade right SFA stenosis\par -Recommend Admission to [facility] for IV antibiotics and OM work up\par -Will plan for left leg angiogram this week\par -Plan discussed with patient and daughter. All questions answered

## 2019-01-30 NOTE — PHYSICAL EXAM
[Normal Breath Sounds] : Normal breath sounds [Normal Heart Sounds] : normal heart sounds [2+] : left 2+ [0] : left 0 [Alert] : alert [Oriented to Person] : oriented to person [Oriented to Place] : oriented to place [Oriented to Time] : oriented to time [Carotid Bruits] : no carotid bruits [Ankle Swelling (On Exam)] : not present [Varicose Veins Of Lower Extremities] : not present [] : not present [Abdomen Masses] : No abdominal masses [de-identified] : Well appearing, NAD [de-identified] : NCAT [de-identified] : Supple [FreeTextEntry1] : Right BKA \par Left great toe dry gangrene. S/P 2nd and 3rd toe amps; well healed. \par Absent pedal pulses

## 2019-01-30 NOTE — H&P ADULT - NSHPSOCIALHISTORY_GEN_ALL_CORE
Denies current tobacco use - 30 pack year hx, Denies EtOH, Remote history of illicit substance use - including heroin use

## 2019-01-30 NOTE — H&P ADULT - NSHPPHYSICALEXAM_GEN_ALL_CORE
GENERAL: Alert, well developed, in no acute distress.  MENTAL STATUS: AAOx3. Appropriate affect.  HEENT: PERRLA. EOMI. MMM.  Trachea midline. No lymph node swelling or tenderness.  RESPIRATORY: CTAB. No wheezing, rales or rhonchi.  CARDIOVASCULAR: RRR. No audible murmurs, rubs or gallops.   GASTROINTESTINAL: Abdomen soft, NT, ND, -R/-G.  No pulsatile mass, no flank tenderness or suprapubic tenderness. No hepatosplenomegaly.  NEUROLOGIC: Cranial nerves II-XII grossly intact. No focal neurological deficits. Moves all extremities spontaneously. Sensation intact bilaterally.  INTEGUMENTARY: No overt rashes or lesions, petechia or purpura. Good turgor. No edema.  MUSCULOSKELETAL: RLE BKE well healed with prosthesis in place. LLE with well healed 2nd and 3rd toe amputations. 1st digit with necrosis. No areas of expressible purulence, but TTP. No surrounding erythema.  No cyanosis or clubbing. No gross deformities.   LYMPHATIC: Palpation of neck reveals no swelling or tenderness of neck nodes. Palpation of groin reveals no swelling or tenderness of groin nodes.

## 2019-01-30 NOTE — ED PROVIDER NOTE - CARE PLAN
Principal Discharge DX:	Vascular insufficiency of extremity  Secondary Diagnosis:	Foot ulcer due to secondary DM

## 2019-01-30 NOTE — ED ADULT NURSE NOTE - OBJECTIVE STATEMENT
pt c/o left foot pain d/t dec circulation per pt he was sent in by his cardiologist Anthony for vascular surgery Tomorrow per pt he is supposed to have a stent placed tomorrow.  per pt he has history of BKA d/t gangrene and vascular issues in his right leg.  pt currently has +3 pitting edema of left foot with multiple toes amputated

## 2019-01-30 NOTE — H&P ADULT - ASSESSMENT
69 y/o M w/ PMH of HTN, DM and PSH of Right BKA, and toe amputation x2 presenting with left great toe wound with evidence of dry gangrene with possible superimposed infection. Cannot rule out we gangrene  -Admit to vascular surgery, Dr. Munoz.  -Plan for LLE angiogram 2/1/18  -Vancomycin/Zosyn  -Heparin gtt @ 800u/hr  -Ok for diet, NPO at midnight 1/31  -Home meds  -SSI  Dispo: admission, LLE angio 2/1

## 2019-01-30 NOTE — H&P ADULT - HISTORY OF PRESENT ILLNESS
67 y/o M w/ PMH of HTN, DM and PSH of Right BKA, and toe amputation x2 presenting with left great toe wound. Patient states that the great toe wound first started months ago, and has progressively worsened and become more painful. He noted associated foot swelling. Denies purulent discharge from the toe, but has not intermittent sanguinous discharge. Denies recent F/C/N/V/CP/SOB.    He has been compliant with his medications and outpatient follow ups. Ambulated with RLE prosthesis and cane.

## 2019-01-30 NOTE — ED PROVIDER NOTE - MUSCULOSKELETAL, MLM
Spine appears normal, right below the knee amputation.  LLE non tender.  Left lower leg wrapped, unable to visualize the ulcer

## 2019-01-30 NOTE — ED PROVIDER NOTE - OBJECTIVE STATEMENT
69 y/o M, with hx of DM, HTN, right BKA, and neuropathy presents to the ED c/o pain, to his left foot.  Pt states that he has had an ulcer to his foot since 1/23/19.  Pt was told to come to the ED for surgery to remove the ulcer and have vascular stents placed in his LLE.  Pt states that he follows with a cardiovascular surgeon and was last seen today.  Pt was referred to the ED for admission and surgery tomorrow.  Denies fever, chills, chest pain, SOB, N/V/D, abd pain, back pain, or HA.  Cardiovascular surgeon: Dr. Munoz

## 2019-01-30 NOTE — HISTORY OF PRESENT ILLNESS
[FreeTextEntry1] : 68 year old diabetic, previous smoker with PAD s/p right BKA 10 years ago presents with left great toe gangrene. He reports that he developed severe constant pain in his great toe 2 weeks ago and subsequently noted that his toe began to turn black.\par Patient has a right leg prosthesis and ambulates without assistance. He denies claudication in either leg. No previous MI or stroke. \par He lives at home and is retired.

## 2019-01-31 LAB
ANION GAP SERPL CALC-SCNC: 12 MMOL/L — SIGNIFICANT CHANGE UP (ref 5–17)
APPEARANCE UR: ABNORMAL
APTT BLD: 36.5 SEC — HIGH (ref 27.5–36.3)
BACTERIA # UR AUTO: ABNORMAL
BASOPHILS # BLD AUTO: 0 K/UL — SIGNIFICANT CHANGE UP (ref 0–0.2)
BASOPHILS NFR BLD AUTO: 0.1 % — SIGNIFICANT CHANGE UP (ref 0–2)
BILIRUB UR-MCNC: ABNORMAL
BUN SERPL-MCNC: 20 MG/DL — SIGNIFICANT CHANGE UP (ref 8–20)
CALCIUM SERPL-MCNC: 8.5 MG/DL — LOW (ref 8.6–10.2)
CHLORIDE SERPL-SCNC: 95 MMOL/L — LOW (ref 98–107)
CO2 SERPL-SCNC: 25 MMOL/L — SIGNIFICANT CHANGE UP (ref 22–29)
COLOR SPEC: YELLOW — SIGNIFICANT CHANGE UP
COMMENT - URINE: SIGNIFICANT CHANGE UP
CREAT SERPL-MCNC: 1.14 MG/DL — SIGNIFICANT CHANGE UP (ref 0.5–1.3)
DIFF PNL FLD: ABNORMAL
EOSINOPHIL # BLD AUTO: 0.1 K/UL — SIGNIFICANT CHANGE UP (ref 0–0.5)
EOSINOPHIL NFR BLD AUTO: 0.6 % — SIGNIFICANT CHANGE UP (ref 0–5)
EPI CELLS # UR: ABNORMAL
GLUCOSE BLDC GLUCOMTR-MCNC: 164 MG/DL — HIGH (ref 70–99)
GLUCOSE BLDC GLUCOMTR-MCNC: 196 MG/DL — HIGH (ref 70–99)
GLUCOSE BLDC GLUCOMTR-MCNC: 197 MG/DL — HIGH (ref 70–99)
GLUCOSE BLDC GLUCOMTR-MCNC: 199 MG/DL — HIGH (ref 70–99)
GLUCOSE SERPL-MCNC: 210 MG/DL — HIGH (ref 70–115)
GLUCOSE UR QL: NEGATIVE MG/DL — SIGNIFICANT CHANGE UP
HBA1C BLD-MCNC: 8.9 % — HIGH (ref 4–5.6)
HCT VFR BLD CALC: 30.5 % — LOW (ref 42–52)
HGB BLD-MCNC: 10 G/DL — LOW (ref 14–18)
KETONES UR-MCNC: ABNORMAL
LEUKOCYTE ESTERASE UR-ACNC: ABNORMAL
LYMPHOCYTES # BLD AUTO: 1.1 K/UL — SIGNIFICANT CHANGE UP (ref 1–4.8)
LYMPHOCYTES # BLD AUTO: 12 % — LOW (ref 20–55)
MAGNESIUM SERPL-MCNC: 1.8 MG/DL — SIGNIFICANT CHANGE UP (ref 1.6–2.6)
MCHC RBC-ENTMCNC: 27.4 PG — SIGNIFICANT CHANGE UP (ref 27–31)
MCHC RBC-ENTMCNC: 32.8 G/DL — SIGNIFICANT CHANGE UP (ref 32–36)
MCV RBC AUTO: 83.6 FL — SIGNIFICANT CHANGE UP (ref 80–94)
MONOCYTES # BLD AUTO: 0.6 K/UL — SIGNIFICANT CHANGE UP (ref 0–0.8)
MONOCYTES NFR BLD AUTO: 6.8 % — SIGNIFICANT CHANGE UP (ref 3–10)
NEUTROPHILS # BLD AUTO: 7.5 K/UL — SIGNIFICANT CHANGE UP (ref 1.8–8)
NEUTROPHILS NFR BLD AUTO: 80.1 % — HIGH (ref 37–73)
NITRITE UR-MCNC: NEGATIVE — SIGNIFICANT CHANGE UP
PH UR: 5 — SIGNIFICANT CHANGE UP (ref 5–8)
PHOSPHATE SERPL-MCNC: 3.7 MG/DL — SIGNIFICANT CHANGE UP (ref 2.4–4.7)
PLATELET # BLD AUTO: 363 K/UL — SIGNIFICANT CHANGE UP (ref 150–400)
POTASSIUM SERPL-MCNC: 4.7 MMOL/L — SIGNIFICANT CHANGE UP (ref 3.5–5.3)
POTASSIUM SERPL-SCNC: 4.7 MMOL/L — SIGNIFICANT CHANGE UP (ref 3.5–5.3)
PROT UR-MCNC: 15 MG/DL
RBC # BLD: 3.65 M/UL — LOW (ref 4.6–6.2)
RBC # FLD: 12.7 % — SIGNIFICANT CHANGE UP (ref 11–15.6)
RBC CASTS # UR COMP ASSIST: SIGNIFICANT CHANGE UP /HPF (ref 0–4)
SODIUM SERPL-SCNC: 132 MMOL/L — LOW (ref 135–145)
SP GR SPEC: 1.02 — SIGNIFICANT CHANGE UP (ref 1.01–1.02)
TYPE + AB SCN PNL BLD: SIGNIFICANT CHANGE UP
URATE CRY FLD QL MICRO: ABNORMAL
UROBILINOGEN FLD QL: 1 MG/DL
WBC # BLD: 9.3 K/UL — SIGNIFICANT CHANGE UP (ref 4.8–10.8)
WBC # FLD AUTO: 9.3 K/UL — SIGNIFICANT CHANGE UP (ref 4.8–10.8)
WBC UR QL: ABNORMAL

## 2019-01-31 RX ORDER — INSULIN GLARGINE 100 [IU]/ML
7 INJECTION, SOLUTION SUBCUTANEOUS AT BEDTIME
Qty: 0 | Refills: 0 | Status: DISCONTINUED | OUTPATIENT
Start: 2019-01-31 | End: 2019-02-01

## 2019-01-31 RX ORDER — MAGNESIUM OXIDE 400 MG ORAL TABLET 241.3 MG
400 TABLET ORAL ONCE
Qty: 0 | Refills: 0 | Status: COMPLETED | OUTPATIENT
Start: 2019-01-31 | End: 2019-01-31

## 2019-01-31 RX ADMIN — Medication 10 MILLIGRAM(S): at 17:53

## 2019-01-31 RX ADMIN — GABAPENTIN 100 MILLIGRAM(S): 400 CAPSULE ORAL at 22:07

## 2019-01-31 RX ADMIN — HYDROMORPHONE HYDROCHLORIDE 0.5 MILLIGRAM(S): 2 INJECTION INTRAMUSCULAR; INTRAVENOUS; SUBCUTANEOUS at 22:40

## 2019-01-31 RX ADMIN — Medication 2: at 12:19

## 2019-01-31 RX ADMIN — Medication 650 MILLIGRAM(S): at 12:18

## 2019-01-31 RX ADMIN — INSULIN GLARGINE 15 UNIT(S): 100 INJECTION, SOLUTION SUBCUTANEOUS at 01:14

## 2019-01-31 RX ADMIN — INSULIN GLARGINE 7 UNIT(S): 100 INJECTION, SOLUTION SUBCUTANEOUS at 22:07

## 2019-01-31 RX ADMIN — GABAPENTIN 100 MILLIGRAM(S): 400 CAPSULE ORAL at 01:17

## 2019-01-31 RX ADMIN — GABAPENTIN 100 MILLIGRAM(S): 400 CAPSULE ORAL at 15:18

## 2019-01-31 RX ADMIN — Medication 10 MILLIGRAM(S): at 05:28

## 2019-01-31 RX ADMIN — Medication 650 MILLIGRAM(S): at 05:17

## 2019-01-31 RX ADMIN — ATENOLOL 50 MILLIGRAM(S): 25 TABLET ORAL at 05:17

## 2019-01-31 RX ADMIN — PIPERACILLIN AND TAZOBACTAM 25 GRAM(S): 4; .5 INJECTION, POWDER, LYOPHILIZED, FOR SOLUTION INTRAVENOUS at 16:52

## 2019-01-31 RX ADMIN — HYDROMORPHONE HYDROCHLORIDE 0.5 MILLIGRAM(S): 2 INJECTION INTRAMUSCULAR; INTRAVENOUS; SUBCUTANEOUS at 04:15

## 2019-01-31 RX ADMIN — Medication 650 MILLIGRAM(S): at 05:57

## 2019-01-31 RX ADMIN — Medication 250 MILLIGRAM(S): at 01:52

## 2019-01-31 RX ADMIN — HYDROMORPHONE HYDROCHLORIDE 0.5 MILLIGRAM(S): 2 INJECTION INTRAMUSCULAR; INTRAVENOUS; SUBCUTANEOUS at 22:07

## 2019-01-31 RX ADMIN — HEPARIN SODIUM 8 UNIT(S)/HR: 5000 INJECTION INTRAVENOUS; SUBCUTANEOUS at 11:31

## 2019-01-31 RX ADMIN — MAGNESIUM OXIDE 400 MG ORAL TABLET 400 MILLIGRAM(S): 241.3 TABLET ORAL at 11:01

## 2019-01-31 RX ADMIN — Medication 650 MILLIGRAM(S): at 17:52

## 2019-01-31 RX ADMIN — Medication 250 MILLIGRAM(S): at 22:09

## 2019-01-31 RX ADMIN — Medication 100 MILLIGRAM(S): at 05:17

## 2019-01-31 RX ADMIN — Medication 2: at 16:52

## 2019-01-31 RX ADMIN — HYDROMORPHONE HYDROCHLORIDE 0.5 MILLIGRAM(S): 2 INJECTION INTRAMUSCULAR; INTRAVENOUS; SUBCUTANEOUS at 01:07

## 2019-01-31 RX ADMIN — PIPERACILLIN AND TAZOBACTAM 200 GRAM(S): 4; .5 INJECTION, POWDER, LYOPHILIZED, FOR SOLUTION INTRAVENOUS at 01:19

## 2019-01-31 RX ADMIN — PIPERACILLIN AND TAZOBACTAM 25 GRAM(S): 4; .5 INJECTION, POWDER, LYOPHILIZED, FOR SOLUTION INTRAVENOUS at 07:57

## 2019-01-31 RX ADMIN — HEPARIN SODIUM 8 UNIT(S)/HR: 5000 INJECTION INTRAVENOUS; SUBCUTANEOUS at 01:13

## 2019-01-31 RX ADMIN — GABAPENTIN 100 MILLIGRAM(S): 400 CAPSULE ORAL at 05:17

## 2019-01-31 RX ADMIN — Medication 250 MILLIGRAM(S): at 15:17

## 2019-01-31 RX ADMIN — Medication 2: at 09:00

## 2019-01-31 NOTE — PROGRESS NOTE ADULT - SUBJECTIVE AND OBJECTIVE BOX
INTERVAL HPI/OVERNIGHT EVENTS/SUBJECTIVE:  67 y/o with left great toe wound. ON heparin gtt. Planned for LLE angio . On IV abx.   Denies recent F/C/N/V/CP/SOB.      ICU Vital Signs Last 24 Hrs  T(C): 36.4 (2019 11:19), Max: 36.8 (2019 18:28)  T(F): 97.5 (2019 11:19), Max: 98.2 (2019 18:28)  HR: 59 (2019 11:19) (59 - 71)  BP: 122/68 (2019 11:19) (122/68 - 151/76)  BP(mean): 89 (2019 08:06) (84 - 89)  ABP: --  ABP(mean): --  RR: 18 (2019 11:19) (16 - 18)  SpO2: 96% (2019 11:19) (93% - 99%)    MEDICATIONS  (STANDING):  acetaminophen   Tablet .. 650 milliGRAM(s) Oral every 6 hours  ATENolol  Tablet 50 milliGRAM(s) Oral daily  dextrose 5%. 1000 milliLiter(s) (50 mL/Hr) IV Continuous <Continuous>  dextrose 50% Injectable 12.5 Gram(s) IV Push once  dextrose 50% Injectable 25 Gram(s) IV Push once  dextrose 50% Injectable 25 Gram(s) IV Push once  docusate sodium 100 milliGRAM(s) Oral three times a day  gabapentin 100 milliGRAM(s) Oral three times a day  glipiZIDE. 10 milliGRAM(s) Oral two times a day  heparin  Infusion 800 Unit(s)/Hr (8 mL/Hr) IV Continuous <Continuous>  insulin glargine Injectable (LANTUS) 15 Unit(s) SubCutaneous at bedtime  insulin lispro (HumaLOG) corrective regimen sliding scale   SubCutaneous three times a day before meals  piperacillin/tazobactam IVPB. 3.375 Gram(s) IV Intermittent every 8 hours  senna 2 Tablet(s) Oral at bedtime  vancomycin  IVPB 1000 milliGRAM(s) IV Intermittent every 12 hours  vancomycin  IVPB        MEDICATIONS  (PRN):  dextrose 40% Gel 15 Gram(s) Oral once PRN Blood Glucose LESS THAN 70 milliGRAM(s)/deciliter  glucagon  Injectable 1 milliGRAM(s) IntraMuscular once PRN Glucose LESS THAN 70 milligrams/deciliter  HYDROmorphone  Injectable 0.5 milliGRAM(s) IV Push every 4 hours PRN Moderate to sever pain      MISC:     PHYSICAL EXAM:  GENERAL: Alert, well developed, in no acute distress.  	MENTAL STATUS: AAOx3. Appropriate affect.  	RESPIRATORY: non-labored  	CARDIOVASCULAR: RRR  	NEUROLOGIC: aaox3, sensation intact bilaterally.  Extremities: LLE with well healed 2nd and 3rd toe amputations. 1st digit with necrosis. No areas of expressible purulence, but TTP. No surrounding erythema.  No cyanosis or clubbing. No gross deformities.     LABS:  CBC Full  -  ( 2019 07:22 )  WBC Count : 9.3 K/uL  Hemoglobin : 10.0 g/dL  Hematocrit : 30.5 %  Platelet Count - Automated : 363 K/uL  Mean Cell Volume : 83.6 fl  Mean Cell Hemoglobin : 27.4 pg  Mean Cell Hemoglobin Concentration : 32.8 g/dL  Auto Neutrophil # : 7.5 K/uL  Auto Lymphocyte # : 1.1 K/uL  Auto Monocyte # : 0.6 K/uL  Auto Eosinophil # : 0.1 K/uL  Auto Basophil # : 0.0 K/uL  Auto Neutrophil % : 80.1 %  Auto Lymphocyte % : 12.0 %  Auto Monocyte % : 6.8 %  Auto Eosinophil % : 0.6 %  Auto Basophil % : 0.1 %        132<L>  |  95<L>  |  20.0  ----------------------------<  210<H>  4.7   |  25.0  |  1.14    Ca    8.5<L>      2019 07:22  Phos  3.7       Mg     1.8         TPro  8.1  /  Alb  3.8  /  TBili  0.3<L>  /  DBili  x   /  AST  12  /  ALT  11  /  AlkPhos  98      PT/INR - ( 2019 22:38 )   PT: 13.5 sec;   INR: 1.17 ratio         PTT - ( 2019 08:18 )  PTT:36.5 sec  Urinalysis Basic - ( 2019 07:08 )    Color: Yellow / Appearance: Slightly Turbid / S.025 / pH: x  Gluc: x / Ketone: Trace  / Bili: Small / Urobili: 1 mg/dL   Blood: x / Protein: 15 mg/dL / Nitrite: Negative   Leuk Esterase: Moderate / RBC: 0-2 /HPF / WBC 11-25   Sq Epi: x / Non Sq Epi: Moderate / Bacteria: Few      LIVER FUNCTIONS - ( 2019 22:38 )  Alb: 3.8 g/dL / Pro: 8.1 g/dL / ALK PHOS: 98 U/L / ALT: 11 U/L / AST: 12 U/L / GGT: x           ASSESSMENT/PLAN:  68yMale w/ PMH of HTN, DM and PSH of Right BKA, and toe amputation x2 presenting with left great toe wound with evidence of dry gangrene with possible superimposed infection.   -Plan for LLE angiogram 18, will pre-op today  -Vancomycin/Zosyn  -Heparin gtt @ 800u/hr, f/u PTT  -Ok for diet, NPO at midnight   -Home meds  -ISS  Dispo: admission, LLE angio

## 2019-01-31 NOTE — CONSULT NOTE ADULT - SUBJECTIVE AND OBJECTIVE BOX
S : 68y year old Male seen at bedside for Left big toe gangrene.  Patient states it started about 2 months ago with the lifting up of his big toe nail. Patient states it has progressively worsened and become more painful. He noted associated foot swelling. Denies any drainage from the toe.  Patient states he sees a Dr. Calvert for his foot care.  Patient admits to  (-) Fevers, (-) Chills, (-) Nausea, (-) Vomiting, (-) Shortness of Breath      PMH: Neuropathy  Cataract  HTN (hypertension)  Dry eye  DM (diabetes mellitus)    PSH:Toe amputation status, left  S/P BKA (below knee amputation) unilateral, right  No significant past surgical history      Allergies:No Known Allergies      Labs:                          10.0   9.3   )-----------( 363      ( 31 Jan 2019 07:22 )             30.5     WBC Trend  9.3 Date (01-31 @ 07:22)  12.0<H> Date (01-30 @ 22:38)      Chem  01-31    132<L>  |  95<L>  |  20.0  ----------------------------<  210<H>  4.7   |  25.0  |  1.14    Ca    8.5<L>      31 Jan 2019 07:22  Phos  3.7     01-31  Mg     1.8     01-31    TPro  8.1  /  Alb  3.8  /  TBili  0.3<L>  /  DBili  x   /  AST  12  /  ALT  11  /  AlkPhos  98  01-30          T(F): 97.5 (01-31-19 @ 08:06), Max: 98.2 (01-30-19 @ 18:28)  HR: 71 (01-31-19 @ 08:06) (61 - 71)  BP: 134/66 (01-31-19 @ 08:06) (123/65 - 151/76)  RR: 18 (01-31-19 @ 08:06) (16 - 18)  SpO2: 95% (01-31-19 @ 08:06) (93% - 99%)  Wt(kg): --    O:   RRE bka  LLE focused exam:  Integument:  gangrenous changes seen on the left hallux, (2.5cm x 2.3cm), + edema, - michael-wound erythema, - purulence, - fluctuance, - tracking/tunneling, - probe to bone.   Vascular: Dorsalis Pedis and Posterior Tibial pulses non-palpable.  Capillary re-fill time less then 3 seconds digits 1-5.    Neuro: Protective sensation diminished to the level of the digits.  MSK: Muscle strength 5/5 all major muscle groups bilateral.   Deformity: amputated 2nd 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s.    A: left hallux gangrene      P:   Chart reviewed and Patient evaluated  Discussed diagnosis and treatment with patient  Applied betadine and dry sterile dressing to the left hallux  X-rays ordered  Continue with IV antibiotics   Ordered ASIA  Weight bearing as tolerated to the left heel  Offloading to bilateral Heels in bed  Discussed importance of daily foot examinations and proper shoe gear and to importance of lower Fasting Blood Glucose levels.   Planned for angiogram by vascular 2/1  Will await vascular recommendations before any surgical intervention.  Podiatry will follow while in house.  Discussed with Dr. Calvert

## 2019-02-01 ENCOUNTER — TRANSCRIPTION ENCOUNTER (OUTPATIENT)
Age: 69
End: 2019-02-01

## 2019-02-01 VITALS
HEART RATE: 74 BPM | DIASTOLIC BLOOD PRESSURE: 60 MMHG | TEMPERATURE: 98 F | RESPIRATION RATE: 18 BRPM | OXYGEN SATURATION: 100 % | SYSTOLIC BLOOD PRESSURE: 138 MMHG

## 2019-02-01 LAB
ANION GAP SERPL CALC-SCNC: 12 MMOL/L — SIGNIFICANT CHANGE UP (ref 5–17)
APTT BLD: 35.4 SEC — SIGNIFICANT CHANGE UP (ref 27.5–36.3)
BASOPHILS # BLD AUTO: 0 K/UL — SIGNIFICANT CHANGE UP (ref 0–0.2)
BASOPHILS NFR BLD AUTO: 0.1 % — SIGNIFICANT CHANGE UP (ref 0–2)
BUN SERPL-MCNC: 19 MG/DL — SIGNIFICANT CHANGE UP (ref 8–20)
CALCIUM SERPL-MCNC: 9 MG/DL — SIGNIFICANT CHANGE UP (ref 8.6–10.2)
CHLORIDE SERPL-SCNC: 97 MMOL/L — LOW (ref 98–107)
CO2 SERPL-SCNC: 25 MMOL/L — SIGNIFICANT CHANGE UP (ref 22–29)
CREAT SERPL-MCNC: 0.96 MG/DL — SIGNIFICANT CHANGE UP (ref 0.5–1.3)
EOSINOPHIL # BLD AUTO: 0.1 K/UL — SIGNIFICANT CHANGE UP (ref 0–0.5)
EOSINOPHIL NFR BLD AUTO: 1.5 % — SIGNIFICANT CHANGE UP (ref 0–5)
GLUCOSE BLDC GLUCOMTR-MCNC: 216 MG/DL — HIGH (ref 70–99)
GLUCOSE BLDC GLUCOMTR-MCNC: 233 MG/DL — HIGH (ref 70–99)
GLUCOSE SERPL-MCNC: 202 MG/DL — HIGH (ref 70–115)
HCT VFR BLD CALC: 33.3 % — LOW (ref 42–52)
HCV AB S/CO SERPL IA: 13.31 S/CO — SIGNIFICANT CHANGE UP
HCV AB SERPL-IMP: REACTIVE
HGB BLD-MCNC: 10.9 G/DL — LOW (ref 14–18)
INR BLD: 1.17 RATIO — HIGH (ref 0.88–1.16)
LYMPHOCYTES # BLD AUTO: 1.7 K/UL — SIGNIFICANT CHANGE UP (ref 1–4.8)
LYMPHOCYTES # BLD AUTO: 20.8 % — SIGNIFICANT CHANGE UP (ref 20–55)
MAGNESIUM SERPL-MCNC: 2 MG/DL — SIGNIFICANT CHANGE UP (ref 1.6–2.6)
MCHC RBC-ENTMCNC: 27.4 PG — SIGNIFICANT CHANGE UP (ref 27–31)
MCHC RBC-ENTMCNC: 32.7 G/DL — SIGNIFICANT CHANGE UP (ref 32–36)
MCV RBC AUTO: 83.7 FL — SIGNIFICANT CHANGE UP (ref 80–94)
MONOCYTES # BLD AUTO: 1 K/UL — HIGH (ref 0–0.8)
MONOCYTES NFR BLD AUTO: 12 % — HIGH (ref 3–10)
NEUTROPHILS # BLD AUTO: 5.4 K/UL — SIGNIFICANT CHANGE UP (ref 1.8–8)
NEUTROPHILS NFR BLD AUTO: 65.4 % — SIGNIFICANT CHANGE UP (ref 37–73)
PHOSPHATE SERPL-MCNC: 3.8 MG/DL — SIGNIFICANT CHANGE UP (ref 2.4–4.7)
PLATELET # BLD AUTO: 418 K/UL — HIGH (ref 150–400)
POTASSIUM SERPL-MCNC: 4.5 MMOL/L — SIGNIFICANT CHANGE UP (ref 3.5–5.3)
POTASSIUM SERPL-SCNC: 4.5 MMOL/L — SIGNIFICANT CHANGE UP (ref 3.5–5.3)
PROTHROM AB SERPL-ACNC: 13.5 SEC — HIGH (ref 10–12.9)
RBC # BLD: 3.98 M/UL — LOW (ref 4.6–6.2)
RBC # FLD: 12.5 % — SIGNIFICANT CHANGE UP (ref 11–15.6)
SODIUM SERPL-SCNC: 134 MMOL/L — LOW (ref 135–145)
WBC # BLD: 8.2 K/UL — SIGNIFICANT CHANGE UP (ref 4.8–10.8)
WBC # FLD AUTO: 8.2 K/UL — SIGNIFICANT CHANGE UP (ref 4.8–10.8)

## 2019-02-01 PROCEDURE — C1769: CPT

## 2019-02-01 PROCEDURE — 81001 URINALYSIS AUTO W/SCOPE: CPT

## 2019-02-01 PROCEDURE — 99153 MOD SED SAME PHYS/QHP EA: CPT

## 2019-02-01 PROCEDURE — 71045 X-RAY EXAM CHEST 1 VIEW: CPT

## 2019-02-01 PROCEDURE — 86803 HEPATITIS C AB TEST: CPT

## 2019-02-01 PROCEDURE — 86900 BLOOD TYPING SEROLOGIC ABO: CPT

## 2019-02-01 PROCEDURE — 82962 GLUCOSE BLOOD TEST: CPT

## 2019-02-01 PROCEDURE — 85730 THROMBOPLASTIN TIME PARTIAL: CPT

## 2019-02-01 PROCEDURE — 80053 COMPREHEN METABOLIC PANEL: CPT

## 2019-02-01 PROCEDURE — C1760: CPT

## 2019-02-01 PROCEDURE — 37228: CPT | Mod: LT

## 2019-02-01 PROCEDURE — 84100 ASSAY OF PHOSPHORUS: CPT

## 2019-02-01 PROCEDURE — 80048 BASIC METABOLIC PNL TOTAL CA: CPT

## 2019-02-01 PROCEDURE — 83036 HEMOGLOBIN GLYCOSYLATED A1C: CPT

## 2019-02-01 PROCEDURE — 86901 BLOOD TYPING SEROLOGIC RH(D): CPT

## 2019-02-01 PROCEDURE — 37224: CPT | Mod: 59

## 2019-02-01 PROCEDURE — C1874: CPT

## 2019-02-01 PROCEDURE — 36415 COLL VENOUS BLD VENIPUNCTURE: CPT

## 2019-02-01 PROCEDURE — 99285 EMERGENCY DEPT VISIT HI MDM: CPT | Mod: 25

## 2019-02-01 PROCEDURE — 86850 RBC ANTIBODY SCREEN: CPT

## 2019-02-01 PROCEDURE — 87521 HEPATITIS C PROBE&RVRS TRNSC: CPT

## 2019-02-01 PROCEDURE — 37226: CPT | Mod: LT

## 2019-02-01 PROCEDURE — 83605 ASSAY OF LACTIC ACID: CPT

## 2019-02-01 PROCEDURE — 85610 PROTHROMBIN TIME: CPT

## 2019-02-01 PROCEDURE — 75710 ARTERY X-RAYS ARM/LEG: CPT | Mod: XU

## 2019-02-01 PROCEDURE — 75710 ARTERY X-RAYS ARM/LEG: CPT | Mod: 26,59

## 2019-02-01 PROCEDURE — C1894: CPT

## 2019-02-01 PROCEDURE — C1725: CPT

## 2019-02-01 PROCEDURE — 37228: CPT

## 2019-02-01 PROCEDURE — 83735 ASSAY OF MAGNESIUM: CPT

## 2019-02-01 PROCEDURE — 37226: CPT

## 2019-02-01 PROCEDURE — 85027 COMPLETE CBC AUTOMATED: CPT

## 2019-02-01 PROCEDURE — 93005 ELECTROCARDIOGRAM TRACING: CPT

## 2019-02-01 PROCEDURE — C1887: CPT

## 2019-02-01 PROCEDURE — 75630 X-RAY AORTA LEG ARTERIES: CPT | Mod: 26,59

## 2019-02-01 PROCEDURE — 99152 MOD SED SAME PHYS/QHP 5/>YRS: CPT

## 2019-02-01 RX ORDER — ACETAMINOPHEN 500 MG
2 TABLET ORAL
Qty: 0 | Refills: 0 | COMMUNITY
Start: 2019-02-01

## 2019-02-01 RX ORDER — CLOPIDOGREL BISULFATE 75 MG/1
75 TABLET, FILM COATED ORAL DAILY
Qty: 0 | Refills: 0 | Status: DISCONTINUED | OUTPATIENT
Start: 2019-02-02 | End: 2019-02-01

## 2019-02-01 RX ORDER — ASPIRIN/CALCIUM CARB/MAGNESIUM 324 MG
1 TABLET ORAL
Qty: 0 | Refills: 0 | COMMUNITY
Start: 2019-02-01

## 2019-02-01 RX ORDER — CLOPIDOGREL BISULFATE 75 MG/1
1 TABLET, FILM COATED ORAL
Qty: 30 | Refills: 0 | OUTPATIENT
Start: 2019-02-01

## 2019-02-01 RX ORDER — ASPIRIN/CALCIUM CARB/MAGNESIUM 324 MG
81 TABLET ORAL DAILY
Qty: 0 | Refills: 0 | Status: DISCONTINUED | OUTPATIENT
Start: 2019-02-02 | End: 2019-02-01

## 2019-02-01 RX ORDER — METFORMIN HYDROCHLORIDE 850 MG/1
1 TABLET ORAL
Qty: 0 | Refills: 0 | COMMUNITY

## 2019-02-01 RX ADMIN — Medication 100 MILLIGRAM(S): at 16:02

## 2019-02-01 RX ADMIN — GABAPENTIN 100 MILLIGRAM(S): 400 CAPSULE ORAL at 05:31

## 2019-02-01 RX ADMIN — GABAPENTIN 100 MILLIGRAM(S): 400 CAPSULE ORAL at 16:02

## 2019-02-01 RX ADMIN — Medication 650 MILLIGRAM(S): at 05:31

## 2019-02-01 RX ADMIN — Medication 650 MILLIGRAM(S): at 15:24

## 2019-02-01 RX ADMIN — Medication 250 MILLIGRAM(S): at 05:31

## 2019-02-01 RX ADMIN — Medication 650 MILLIGRAM(S): at 06:05

## 2019-02-01 RX ADMIN — Medication 650 MILLIGRAM(S): at 00:50

## 2019-02-01 RX ADMIN — Medication 10 MILLIGRAM(S): at 05:31

## 2019-02-01 RX ADMIN — HYDROMORPHONE HYDROCHLORIDE 0.5 MILLIGRAM(S): 2 INJECTION INTRAMUSCULAR; INTRAVENOUS; SUBCUTANEOUS at 12:36

## 2019-02-01 RX ADMIN — HYDROMORPHONE HYDROCHLORIDE 0.5 MILLIGRAM(S): 2 INJECTION INTRAMUSCULAR; INTRAVENOUS; SUBCUTANEOUS at 12:35

## 2019-02-01 RX ADMIN — Medication 650 MILLIGRAM(S): at 00:12

## 2019-02-01 RX ADMIN — PIPERACILLIN AND TAZOBACTAM 25 GRAM(S): 4; .5 INJECTION, POWDER, LYOPHILIZED, FOR SOLUTION INTRAVENOUS at 00:30

## 2019-02-01 NOTE — PROGRESS NOTE ADULT - SUBJECTIVE AND OBJECTIVE BOX
S/P successful PTA and stent to left SFA  successful PTA of left posterior tibia  Good doppler pulses noted post procedure on left foot  Right groin site mynxed and no bleeding or hematoma noted  Post procedure teaching done   Patient verbalizes understanding  Bedrest for 3 hours  Will follow by vascular surgery  Aspirin and plavix protocol as ordered  Plavix load 600mg given today  Start Plavix 75 mg daily in the AM

## 2019-02-01 NOTE — DISCHARGE NOTE ADULT - MEDICATION SUMMARY - MEDICATIONS TO TAKE
I will START or STAY ON the medications listed below when I get home from the hospital:    acetaminophen 325 mg oral tablet  -- 2 tab(s) by mouth every 6 hours, As Needed  -- Indication: For PAIN    aspirin 81 mg oral tablet, chewable  -- 1 tab(s) by mouth once a day  -- Indication: For PAD    gabapentin 100 mg oral capsule  -- 1 cap(s) by mouth 3 times a day  -- Indication: For NEUROPATHY    metFORMIN 500 mg oral tablet  -- 1 tab(s) by mouth 2 times a day to resume 2/4  -- Indication: For DM    Lantus 100 units/mL subcutaneous solution  -- 15 unit(s) subcutaneous once a day (at bedtime)  -- Indication: For DM    Trulicity Pen 1.5 mg/0.5 mL subcutaneous solution  -- 1 dose(s) subcutaneous every 7 days  -- Indication: For DM    glipiZIDE 10 mg oral tablet  -- 2 tab(s) by mouth 2 times a day  -- Indication: For DM    clopidogrel 75 mg oral tablet  -- 1 tab(s) by mouth once a day  -- Indication: For PAD    atenolol 50 mg oral tablet  -- 1 tab(s) by mouth once a day  -- Indication: For HTN    FeroSul 325 mg (65 mg elemental iron) oral tablet  -- 1 tab(s) by mouth 3 times a day  -- Indication: For ANEMIA    omeprazole 40 mg oral delayed release capsule  -- 1 cap(s) by mouth once a day  -- Indication: For GERD    cyanocobalamin 1000 mcg oral tablet  -- 1 tab(s) by mouth once a day  -- Indication: For SUPPLEMENT

## 2019-02-01 NOTE — DISCHARGE NOTE ADULT - CARE PLAN
Principal Discharge DX:	Vascular insufficiency of extremity  Goal:	improved circulation  Assessment and plan of treatment:	Monitor R groin for any swelling or bleeding. Some bruising is expected. Notify office immediately if any swelling, bleeding, numbness or increased pain. Follow up with Dr Munoz in office in 2 weeks. Office will call with appointment. Office number [phone]  Secondary Diagnosis:	Gangrene  Assessment and plan of treatment:	Please follow up with Dr Calvert next week

## 2019-02-01 NOTE — DISCHARGE NOTE ADULT - CARE PROVIDER_API CALL
Anthony Munoz)  Surgery  [address] [place] [address]  Phone: [phone]  Fax: [phone]    Александр Calvert (LEOBARDO)  Podiatric Medicine and Surgery  [address] [place] [address]  Phone: [phone]  Fax: [phone]

## 2019-02-01 NOTE — BRIEF OPERATIVE NOTE - PROCEDURE
<<-----Click on this checkbox to enter Procedure Angioplasty of artery of left lower extremity with fluoroscopic guidance  02/01/2019  Left SFA angioplasty and stent. Left PT Angioplasty.  Active  [email or web address]  Angiogram, extremity, left  02/01/2019    Active  [email or web address]

## 2019-02-01 NOTE — DISCHARGE NOTE ADULT - NS AS ACTIVITY OBS
Walking-Indoors allowed/Do not drive or operate machinery/Showering allowed/No Heavy lifting/straining/Walking-Outdoors allowed/Stairs allowed

## 2019-02-01 NOTE — DISCHARGE NOTE ADULT - PATIENT PORTAL LINK FT
You can access the [facility] Patient Portal, offered by [facility], by registering with the following website: [email or web address]

## 2019-02-01 NOTE — PROGRESS NOTE ADULT - SUBJECTIVE AND OBJECTIVE BOX
S : 68y year old Male seen at bedside for Left big toe gangrene.  Patient states he is feeling well.  Patient admits to  (-) Fevers, (-) Chills, (-) Nausea, (-) Vomiting, (-) Shortness of Breath      PMH: Neuropathy  Cataract  HTN (hypertension)  Dry eye  DM (diabetes mellitus)    PSH:Toe amputation status, left  S/P BKA (below knee amputation) unilateral, right  No significant past surgical history      Allergies:No Known Allergies      Labs:  ICU Vital Signs Last 24 Hrs  T(C): 37.1 (01 Feb 2019 16:05), Max: 37.1 (01 Feb 2019 16:05)  T(F): 98.8 (01 Feb 2019 16:05), Max: 98.8 (01 Feb 2019 16:05)  HR: 80 (01 Feb 2019 16:05) (57 - 80)  BP: 144/76 (01 Feb 2019 16:05) (115/69 - 180/87)  BP(mean): --  ABP: --  ABP(mean): --  RR: 18 (01 Feb 2019 16:05) (13 - 18)  SpO2: 95% (01 Feb 2019 13:00) (95% - 100%)                          10.9   8.2   )-----------( 418      ( 01 Feb 2019 05:33 )             33.3     WBC Count: 8.2 K/uL (02-01 @ 05:33)  WBC Count: 9.3 K/uL (01-31 @ 07:22)  WBC Count: 12.0 K/uL (01-30 @ 22:38)      O:   RRE bka  LLE focused exam:  Integument:  gangrenous changes seen on the left hallux, (2.5cm x 2.3cm), + edema, - michael-wound erythema, - purulence, - fluctuance, - tracking/tunneling, - probe to bone.   Vascular: Dorsalis Pedis and Posterior Tibial pulses non-palpable.  Capillary re-fill time less then 3 seconds digits 1-5.    Neuro: Protective sensation diminished to the level of the digits.  MSK: Muscle strength 5/5 all major muscle groups bilateral.   Deformity: amputated 2nd 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s.  ----------------------------------------------------    A: left hallux gangrene      P:   Chart reviewed and Patient evaluated  Discussed diagnosis and treatment with patient  Applied betadine and dry sterile dressing to the left hallux  Recommend discharge with antibiotics   Patient can follow up with Dr. Tellez at his office for podiatric care.  Weight bearing as tolerated to the left heel  Offloading to bilateral Heels in bed  Podiatry will follow while in house.  Discussed with Dr. Tellez S : 68y year old Male seen at bedside for Left big toe gangrene.  Patient states he is feeling well.  Patient admits to  (-) Fevers, (-) Chills, (-) Nausea, (-) Vomiting, (-) Shortness of Breath      PMH: Neuropathy  Cataract  HTN (hypertension)  Dry eye  DM (diabetes mellitus)    PSH:Toe amputation status, left  S/P BKA (below knee amputation) unilateral, right  No significant past surgical history      Allergies:No Known Allergies      Labs:  ICU Vital Signs Last 24 Hrs  T(C): 37.1 (01 Feb 2019 16:05), Max: 37.1 (01 Feb 2019 16:05)  T(F): 98.8 (01 Feb 2019 16:05), Max: 98.8 (01 Feb 2019 16:05)  HR: 80 (01 Feb 2019 16:05) (57 - 80)  BP: 144/76 (01 Feb 2019 16:05) (115/69 - 180/87)  BP(mean): --  ABP: --  ABP(mean): --  RR: 18 (01 Feb 2019 16:05) (13 - 18)  SpO2: 95% (01 Feb 2019 13:00) (95% - 100%)                          10.9   8.2   )-----------( 418      ( 01 Feb 2019 05:33 )             33.3     WBC Count: 8.2 K/uL (02-01 @ 05:33)  WBC Count: 9.3 K/uL (01-31 @ 07:22)  WBC Count: 12.0 K/uL (01-30 @ 22:38)      O:   RRE bka  LLE focused exam:  Integument:  gangrenous changes seen on the left hallux, (2.5cm x 2.3cm), + edema, - michael-wound erythema, - purulence, - fluctuance, - tracking/tunneling, - probe to bone.   Vascular: Dorsalis Pedis and Posterior Tibial pulses non-palpable.  Capillary re-fill time less then 3 seconds digits 1-5.    Neuro: Protective sensation diminished to the level of the digits.  MSK: Muscle strength 5/5 all major muscle groups bilateral.   Deformity: amputated 2nd 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s.  ----------------------------------------------------    A: left hallux gangrene      P:   Chart reviewed and Patient evaluated  Discussed diagnosis and treatment with patient  Applied betadine and dry sterile dressing to the left hallux  Recommend discharge with antibiotics   Patient is podiatrically stable and can follow up with Dr. Tellez at his office for podiatric care.  Weight bearing as tolerated to the left heel  Offloading to bilateral Heels in bed  Podiatry will follow while in house.  Discussed with Dr. Tellez S : 68y year old Male seen at bedside for Left big toe gangrene.  Patient states he is feeling well.  Patient admits to  (-) Fevers, (-) Chills, (-) Nausea, (-) Vomiting, (-) Shortness of Breath      PMH: Neuropathy  Cataract  HTN (hypertension)  Dry eye  DM (diabetes mellitus)    PSH:Toe amputation status, left  S/P BKA (below knee amputation) unilateral, right  No significant past surgical history      Allergies:No Known Allergies      Labs:  ICU Vital Signs Last 24 Hrs  T(C): 37.1 (01 Feb 2019 16:05), Max: 37.1 (01 Feb 2019 16:05)  T(F): 98.8 (01 Feb 2019 16:05), Max: 98.8 (01 Feb 2019 16:05)  HR: 80 (01 Feb 2019 16:05) (57 - 80)  BP: 144/76 (01 Feb 2019 16:05) (115/69 - 180/87)  BP(mean): --  ABP: --  ABP(mean): --  RR: 18 (01 Feb 2019 16:05) (13 - 18)  SpO2: 95% (01 Feb 2019 13:00) (95% - 100%)                          10.9   8.2   )-----------( 418      ( 01 Feb 2019 05:33 )             33.3     WBC Count: 8.2 K/uL (02-01 @ 05:33)  WBC Count: 9.3 K/uL (01-31 @ 07:22)  WBC Count: 12.0 K/uL (01-30 @ 22:38)      O:   RRE bka  LLE focused exam:  Integument:  gangrenous changes seen on the left hallux, (2.5cm x 2.3cm), + edema, - michael-wound erythema, - purulence, - fluctuance, - tracking/tunneling, - probe to bone.   Vascular: Dorsalis Pedis and Posterior Tibial pulses non-palpable.  Capillary re-fill time less then 3 seconds digits 1-5.    Neuro: Protective sensation diminished to the level of the digits.  MSK: Muscle strength 5/5 all major muscle groups left LE  Deformity: amputated 2nd 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s.  ----------------------------------------------------    A: left hallux gangrene      P:   Chart reviewed and Patient evaluated  Discussed diagnosis and treatment with patient  Applied betadine and dry sterile dressing to the left hallux  Recommend discharge with antibiotics   Patient is podiatrically stable and can follow up with Dr. Tellez at his office for podiatric care.  Weight bearing as tolerated to the left heel  Offloading to left Heel in bed  Podiatry will follow while in house.  Discussed with Dr. Tellez

## 2019-02-01 NOTE — DISCHARGE NOTE ADULT - PLAN OF CARE
improved circulation Monitor R groin for any swelling or bleeding. Some bruising is expected. Notify office immediately if any swelling, bleeding, numbness or increased pain. Follow up with Dr Munoz in office in 2 weeks. Office will call with appointment. Office number [phone] Please follow up with Dr Calvert next week

## 2019-02-01 NOTE — DISCHARGE NOTE ADULT - HOSPITAL COURSE
67 y/o M w/ PMH of HTN, DM and PSH of Right BKA, and toe amputation x2 presenting with left great toe wound. Patient states that the great toe wound first started months ago, and has progressively worsened and become more painful. He noted associated foot swelling. Denies purulent discharge from the toe, but has not intermittent sanguinous discharge. Denies recent F/C/N/V/CP/SOB.    He has been compliant with his medications and outpatient follow ups. Ambulated with RLE prosthesis and cane. Pt was seen and evaluated in office by Dr Munoz and instructed to present to the ER for further vascular intervention. He was admitted to the vascular surgery service and started on AC as well as IV abx. He was taken to cath lab on 2/1/19 by Dr Munoz and is S/P LLE angio with L SFA angioplasty and stent as well as PT angioplasty. He was loaded with Plavix in the cath lab. He tolerated the procedure well. He was maintained flat for 4 hours and then was OOB without incidence He is stable for DC home.

## 2019-02-01 NOTE — PHARMACOTHERAPY INTERVENTION NOTE - COMMENTS
Vancomycin trough ordered for 17:00, 1 hour prior to scheduled 4th dose, will adjust dose to maintain a trough of 15-20

## 2019-02-04 ENCOUNTER — CHART COPY (OUTPATIENT)
Age: 69
End: 2019-02-04

## 2019-02-06 ENCOUNTER — APPOINTMENT (OUTPATIENT)
Dept: INTERNAL MEDICINE | Facility: CLINIC | Age: 69
End: 2019-02-06
Payer: MEDICARE

## 2019-02-06 VITALS
HEIGHT: 69 IN | OXYGEN SATURATION: 96 % | TEMPERATURE: 97.7 F | WEIGHT: 168 LBS | BODY MASS INDEX: 24.88 KG/M2 | SYSTOLIC BLOOD PRESSURE: 122 MMHG | DIASTOLIC BLOOD PRESSURE: 80 MMHG | HEART RATE: 68 BPM

## 2019-02-06 PROCEDURE — 99215 OFFICE O/P EST HI 40 MIN: CPT

## 2019-02-06 RX ORDER — CLOTRIMAZOLE AND BETAMETHASONE DIPROPIONATE 10; .5 MG/G; MG/G
1-0.05 CREAM TOPICAL
Qty: 45 | Refills: 0 | Status: DISCONTINUED | COMMUNITY
Start: 2018-02-06 | End: 2019-02-06

## 2019-02-06 RX ORDER — EXENATIDE 2 MG/.85ML
2 INJECTION, SUSPENSION, EXTENDED RELEASE SUBCUTANEOUS
Qty: 1 | Refills: 0 | Status: DISCONTINUED | COMMUNITY
Start: 2018-06-15 | End: 2019-02-06

## 2019-02-06 RX ORDER — SEMAGLUTIDE 1.34 MG/ML
2 INJECTION, SOLUTION SUBCUTANEOUS WEEKLY
Qty: 1 | Refills: 1 | Status: DISCONTINUED | COMMUNITY
Start: 2018-07-23 | End: 2019-02-06

## 2019-02-07 RX ORDER — OMEPRAZOLE 10 MG/1
1 CAPSULE, DELAYED RELEASE ORAL
Qty: 0 | Refills: 0 | COMMUNITY

## 2019-02-07 RX ORDER — INSULIN GLARGINE 100 [IU]/ML
15 INJECTION, SOLUTION SUBCUTANEOUS
Qty: 0 | Refills: 0 | COMMUNITY

## 2019-02-07 RX ORDER — CEFAZOLIN SODIUM 1 G
2000 VIAL (EA) INJECTION ONCE
Qty: 0 | Refills: 0 | Status: DISCONTINUED | OUTPATIENT
Start: 2019-02-11 | End: 2019-02-26

## 2019-02-07 NOTE — ASU PATIENT PROFILE, ADULT - LEARNING ASSESSMENT (PATIENT) ADDITIONAL COMMENTS
Telephone interview with pt, Instructed pt on pre-op instructions, tips for safer surgery, pain management scale, continue Clopidogrel (Plavix) and take clopidogrel the morning of surgery with a sip of water, do not take diabetes medication (Metformin, Glipizide) the morning of surgery, call surgeon re: ASA, stop Fish Oil 2/7-2/11, diabetes club schedule to be given day of surgery, understanding of all instructions verbalized.

## 2019-02-11 ENCOUNTER — OUTPATIENT (OUTPATIENT)
Dept: INPATIENT UNIT | Facility: HOSPITAL | Age: 69
LOS: 1 days | End: 2019-02-11
Payer: MEDICARE

## 2019-02-11 ENCOUNTER — RESULT REVIEW (OUTPATIENT)
Age: 69
End: 2019-02-11

## 2019-02-11 VITALS
OXYGEN SATURATION: 100 % | TEMPERATURE: 98 F | HEART RATE: 65 BPM | WEIGHT: 171.96 LBS | RESPIRATION RATE: 16 BRPM | SYSTOLIC BLOOD PRESSURE: 124 MMHG | DIASTOLIC BLOOD PRESSURE: 59 MMHG | HEIGHT: 69 IN

## 2019-02-11 VITALS
DIASTOLIC BLOOD PRESSURE: 60 MMHG | RESPIRATION RATE: 13 BRPM | HEART RATE: 65 BPM | SYSTOLIC BLOOD PRESSURE: 150 MMHG | OXYGEN SATURATION: 99 %

## 2019-02-11 DIAGNOSIS — I96 GANGRENE, NOT ELSEWHERE CLASSIFIED: ICD-10-CM

## 2019-02-11 DIAGNOSIS — Z89.422 ACQUIRED ABSENCE OF OTHER LEFT TOE(S): Chronic | ICD-10-CM

## 2019-02-11 DIAGNOSIS — Z89.511 ACQUIRED ABSENCE OF RIGHT LEG BELOW KNEE: Chronic | ICD-10-CM

## 2019-02-11 LAB
APTT BLD: 32.2 SEC — SIGNIFICANT CHANGE UP (ref 27.5–36.3)
GLUCOSE BLDC GLUCOMTR-MCNC: 103 MG/DL — HIGH (ref 70–99)
INR BLD: 1.13 RATIO — SIGNIFICANT CHANGE UP (ref 0.88–1.16)
PROTHROM AB SERPL-ACNC: 13.1 SEC — HIGH (ref 10–12.9)

## 2019-02-11 PROCEDURE — 28825 PARTIAL AMPUTATION OF TOE: CPT | Mod: LT

## 2019-02-11 PROCEDURE — 88311 DECALCIFY TISSUE: CPT

## 2019-02-11 PROCEDURE — 88311 DECALCIFY TISSUE: CPT | Mod: 26

## 2019-02-11 PROCEDURE — 85610 PROTHROMBIN TIME: CPT

## 2019-02-11 PROCEDURE — 88305 TISSUE EXAM BY PATHOLOGIST: CPT | Mod: 26

## 2019-02-11 PROCEDURE — 73630 X-RAY EXAM OF FOOT: CPT

## 2019-02-11 PROCEDURE — 82962 GLUCOSE BLOOD TEST: CPT

## 2019-02-11 PROCEDURE — 73630 X-RAY EXAM OF FOOT: CPT | Mod: 26,LT

## 2019-02-11 PROCEDURE — 36415 COLL VENOUS BLD VENIPUNCTURE: CPT

## 2019-02-11 PROCEDURE — 88305 TISSUE EXAM BY PATHOLOGIST: CPT

## 2019-02-11 PROCEDURE — 85730 THROMBOPLASTIN TIME PARTIAL: CPT

## 2019-02-11 RX ORDER — ONDANSETRON 8 MG/1
4 TABLET, FILM COATED ORAL ONCE
Qty: 0 | Refills: 0 | Status: DISCONTINUED | OUTPATIENT
Start: 2019-02-11 | End: 2019-02-11

## 2019-02-11 RX ORDER — METFORMIN HYDROCHLORIDE 850 MG/1
1 TABLET ORAL
Qty: 0 | Refills: 0 | COMMUNITY

## 2019-02-11 RX ORDER — SODIUM CHLORIDE 9 MG/ML
1000 INJECTION INTRAMUSCULAR; INTRAVENOUS; SUBCUTANEOUS
Qty: 0 | Refills: 0 | Status: DISCONTINUED | OUTPATIENT
Start: 2019-02-11 | End: 2019-02-11

## 2019-02-11 RX ORDER — SODIUM CHLORIDE 9 MG/ML
3 INJECTION INTRAMUSCULAR; INTRAVENOUS; SUBCUTANEOUS ONCE
Qty: 0 | Refills: 0 | Status: DISCONTINUED | OUTPATIENT
Start: 2019-02-11 | End: 2019-02-11

## 2019-02-11 RX ORDER — ATENOLOL 25 MG/1
1 TABLET ORAL
Qty: 0 | Refills: 0 | COMMUNITY

## 2019-02-11 RX ORDER — HYDROMORPHONE HYDROCHLORIDE 2 MG/ML
0.5 INJECTION INTRAMUSCULAR; INTRAVENOUS; SUBCUTANEOUS
Qty: 0 | Refills: 0 | Status: DISCONTINUED | OUTPATIENT
Start: 2019-02-11 | End: 2019-02-11

## 2019-02-11 RX ORDER — OMEGA-3 ACID ETHYL ESTERS 1 G
1 CAPSULE ORAL
Qty: 0 | Refills: 0 | COMMUNITY

## 2019-02-11 RX ORDER — INSULIN ASPART 100 [IU]/ML
15 INJECTION, SOLUTION SUBCUTANEOUS
Qty: 0 | Refills: 0 | COMMUNITY

## 2019-02-11 RX ORDER — FENTANYL CITRATE 50 UG/ML
25 INJECTION INTRAVENOUS
Qty: 0 | Refills: 0 | Status: DISCONTINUED | OUTPATIENT
Start: 2019-02-11 | End: 2019-02-11

## 2019-02-11 NOTE — BRIEF OPERATIVE NOTE - PROCEDURE
<<-----Click on this checkbox to enter Procedure Amputation  02/11/2019  Amputation of the left hallux  Active  [id number]

## 2019-02-12 ENCOUNTER — APPOINTMENT (OUTPATIENT)
Dept: INTERNAL MEDICINE | Facility: CLINIC | Age: 69
End: 2019-02-12

## 2019-02-14 LAB — SURGICAL PATHOLOGY STUDY: SIGNIFICANT CHANGE UP

## 2019-02-19 ENCOUNTER — APPOINTMENT (OUTPATIENT)
Dept: INTERNAL MEDICINE | Facility: CLINIC | Age: 69
End: 2019-02-19

## 2019-02-25 ENCOUNTER — RX RENEWAL (OUTPATIENT)
Age: 69
End: 2019-02-25

## 2019-02-28 ENCOUNTER — APPOINTMENT (OUTPATIENT)
Dept: VASCULAR SURGERY | Facility: CLINIC | Age: 69
End: 2019-02-28
Payer: MEDICARE

## 2019-02-28 VITALS
SYSTOLIC BLOOD PRESSURE: 156 MMHG | WEIGHT: 169 LBS | BODY MASS INDEX: 25.03 KG/M2 | TEMPERATURE: 97.5 F | OXYGEN SATURATION: 99 % | HEART RATE: 72 BPM | DIASTOLIC BLOOD PRESSURE: 74 MMHG | HEIGHT: 69 IN

## 2019-02-28 PROCEDURE — 99214 OFFICE O/P EST MOD 30 MIN: CPT

## 2019-02-28 NOTE — PROCEDURE
[FreeTextEntry1] : 68 year old male with left great toe dry gangrene s/p left SFA stenting and PT angioplasty.\par His great toe amputation wound is healing well.\par He remains on Plavix.\par Will return in 3 months for surveillance duplex US.

## 2019-02-28 NOTE — PHYSICAL EXAM
[Carotid Bruits] : no carotid bruits [Normal Breath Sounds] : Normal breath sounds [Normal Heart Sounds] : normal heart sounds [2+] : left 2+ [0] : left 0 [Ankle Swelling (On Exam)] : not present [Varicose Veins Of Lower Extremities] : not present [] : not present [Abdomen Masses] : No abdominal masses [Alert] : alert [Oriented to Person] : oriented to person [Oriented to Place] : oriented to place [Oriented to Time] : oriented to time [de-identified] : Well appearing, NAD [de-identified] : NCAT [de-identified] : Supple [FreeTextEntry1] : Right BKA \par Well healing left 1st toe amputation wound\par PT signal ++\par Absent pedal pulses

## 2019-02-28 NOTE — HISTORY OF PRESENT ILLNESS
[FreeTextEntry1] : 68 year old diabetic, previous smoker with PAD s/p right BKA 10 years ago presents with left great toe gangrene. He reports that he developed severe constant pain in his great toe 2 weeks ago and subsequently noted that his toe began to turn black.\par Patient has a right leg prosthesis and ambulates without assistance. He denies claudication in either leg. No previous MI or stroke. \par He lives at home and is retired. [de-identified] : S/P left SFA JESSI and PT angioplasty 1 month ago.\par Subsequently had his great toe amputated by podiatry. Healing well\par Without complaints.\par

## 2019-03-08 ENCOUNTER — MEDICATION RENEWAL (OUTPATIENT)
Age: 69
End: 2019-03-08

## 2019-03-15 ENCOUNTER — APPOINTMENT (OUTPATIENT)
Dept: INTERNAL MEDICINE | Facility: CLINIC | Age: 69
End: 2019-03-15
Payer: MEDICARE

## 2019-03-15 ENCOUNTER — NON-APPOINTMENT (OUTPATIENT)
Age: 69
End: 2019-03-15

## 2019-03-15 ENCOUNTER — LABORATORY RESULT (OUTPATIENT)
Age: 69
End: 2019-03-15

## 2019-03-15 VITALS
WEIGHT: 175 LBS | TEMPERATURE: 97.9 F | DIASTOLIC BLOOD PRESSURE: 60 MMHG | BODY MASS INDEX: 25.92 KG/M2 | SYSTOLIC BLOOD PRESSURE: 110 MMHG | HEART RATE: 58 BPM | OXYGEN SATURATION: 97 % | HEIGHT: 69 IN

## 2019-03-15 DIAGNOSIS — I96 GANGRENE, NOT ELSEWHERE CLASSIFIED: ICD-10-CM

## 2019-03-15 PROCEDURE — 92552 PURE TONE AUDIOMETRY AIR: CPT

## 2019-03-15 PROCEDURE — 82043 UR ALBUMIN QUANTITATIVE: CPT | Mod: QW

## 2019-03-15 PROCEDURE — G0439: CPT

## 2019-03-15 PROCEDURE — 93000 ELECTROCARDIOGRAM COMPLETE: CPT

## 2019-03-15 PROCEDURE — 94010 BREATHING CAPACITY TEST: CPT

## 2019-03-15 PROCEDURE — 36415 COLL VENOUS BLD VENIPUNCTURE: CPT

## 2019-03-15 PROCEDURE — 83036 HEMOGLOBIN GLYCOSYLATED A1C: CPT | Mod: QW

## 2019-03-15 PROCEDURE — G0444 DEPRESSION SCREEN ANNUAL: CPT | Mod: 59

## 2019-03-18 LAB — HBA1C MFR BLD HPLC: 8.2

## 2019-03-22 ENCOUNTER — CHART COPY (OUTPATIENT)
Age: 69
End: 2019-03-22

## 2019-03-22 LAB
25(OH)D3 SERPL-MCNC: 20.3 NG/ML
ALBUMIN SERPL ELPH-MCNC: 4.4 G/DL
ALP BLD-CCNC: 100 U/L
ALT SERPL-CCNC: 9 U/L
ANION GAP SERPL CALC-SCNC: 22 MMOL/L
APPEARANCE: ABNORMAL
AST SERPL-CCNC: 13 U/L
BASOPHILS # BLD AUTO: 0.04 K/UL
BASOPHILS NFR BLD AUTO: 0.6 %
BILIRUB SERPL-MCNC: <0.2 MG/DL
BILIRUBIN URINE: NEGATIVE
BLOOD URINE: NEGATIVE
BUN SERPL-MCNC: 16 MG/DL
CALCIUM SERPL-MCNC: 9.3 MG/DL
CHLORIDE SERPL-SCNC: 100 MMOL/L
CHOLEST SERPL-MCNC: 143 MG/DL
CHOLEST/HDLC SERPL: 4.3 RATIO
CK SERPL-CCNC: 99 U/L
CO2 SERPL-SCNC: 19 MMOL/L
COLOR: YELLOW
CREAT SERPL-MCNC: 0.92 MG/DL
EOSINOPHIL # BLD AUTO: 0.12 K/UL
EOSINOPHIL NFR BLD AUTO: 1.8 %
GLUCOSE QUALITATIVE U: NEGATIVE
GLUCOSE SERPL-MCNC: 84 MG/DL
HCT VFR BLD CALC: 37.7 %
HDLC SERPL-MCNC: 33 MG/DL
HGB BLD-MCNC: 11.6 G/DL
IMM GRANULOCYTES NFR BLD AUTO: 0.3 %
KETONES URINE: NORMAL
LDLC SERPL CALC-MCNC: 92 MG/DL
LEUKOCYTE ESTERASE URINE: ABNORMAL
LYMPHOCYTES # BLD AUTO: 1.36 K/UL
LYMPHOCYTES NFR BLD AUTO: 20.8 %
MAN DIFF?: NORMAL
MCHC RBC-ENTMCNC: 27.5 PG
MCHC RBC-ENTMCNC: 30.8 GM/DL
MCV RBC AUTO: 89.3 FL
MONOCYTES # BLD AUTO: 0.78 K/UL
MONOCYTES NFR BLD AUTO: 11.9 %
NEUTROPHILS # BLD AUTO: 4.21 K/UL
NEUTROPHILS NFR BLD AUTO: 64.6 %
NITRITE URINE: NEGATIVE
PH URINE: 5.5
PLATELET # BLD AUTO: 310 K/UL
POTASSIUM SERPL-SCNC: 5.2 MMOL/L
PROT SERPL-MCNC: 7.6 G/DL
PROTEIN URINE: NORMAL
RBC # BLD: 4.22 M/UL
RBC # FLD: 13.4 %
SODIUM SERPL-SCNC: 141 MMOL/L
SPECIFIC GRAVITY URINE: 1.03
TRIGL SERPL-MCNC: 90 MG/DL
TSH SERPL-ACNC: 1.78 UIU/ML
URATE SERPL-MCNC: 3.8 MG/DL
UROBILINOGEN URINE: ABNORMAL
WBC # FLD AUTO: 6.53 K/UL

## 2019-03-25 ENCOUNTER — RX RENEWAL (OUTPATIENT)
Age: 69
End: 2019-03-25

## 2019-04-03 ENCOUNTER — RX RENEWAL (OUTPATIENT)
Age: 69
End: 2019-04-03

## 2019-04-17 ENCOUNTER — MEDICATION RENEWAL (OUTPATIENT)
Age: 69
End: 2019-04-17

## 2019-04-19 ENCOUNTER — MEDICATION RENEWAL (OUTPATIENT)
Age: 69
End: 2019-04-19

## 2019-04-24 ENCOUNTER — APPOINTMENT (OUTPATIENT)
Dept: INTERNAL MEDICINE | Facility: CLINIC | Age: 69
End: 2019-04-24
Payer: MEDICARE

## 2019-04-24 VITALS
OXYGEN SATURATION: 96 % | HEIGHT: 69 IN | BODY MASS INDEX: 26.22 KG/M2 | HEART RATE: 67 BPM | SYSTOLIC BLOOD PRESSURE: 110 MMHG | WEIGHT: 177 LBS | DIASTOLIC BLOOD PRESSURE: 70 MMHG | TEMPERATURE: 97.7 F

## 2019-04-24 PROCEDURE — 82962 GLUCOSE BLOOD TEST: CPT

## 2019-04-24 PROCEDURE — 99214 OFFICE O/P EST MOD 30 MIN: CPT | Mod: 25

## 2019-04-26 ENCOUNTER — RX RENEWAL (OUTPATIENT)
Age: 69
End: 2019-04-26

## 2019-04-26 LAB — GLUCOSE BLDC GLUCOMTR-MCNC: 134

## 2019-05-13 ENCOUNTER — MEDICATION RENEWAL (OUTPATIENT)
Age: 69
End: 2019-05-13

## 2019-05-23 ENCOUNTER — APPOINTMENT (OUTPATIENT)
Dept: VASCULAR SURGERY | Facility: CLINIC | Age: 69
End: 2019-05-23
Payer: MEDICARE

## 2019-05-23 VITALS
TEMPERATURE: 98.7 F | SYSTOLIC BLOOD PRESSURE: 165 MMHG | DIASTOLIC BLOOD PRESSURE: 67 MMHG | OXYGEN SATURATION: 100 % | HEART RATE: 70 BPM | HEIGHT: 69 IN

## 2019-05-23 PROCEDURE — 99213 OFFICE O/P EST LOW 20 MIN: CPT

## 2019-05-23 NOTE — PHYSICAL EXAM
[Carotid Bruits] : no carotid bruits [Normal Breath Sounds] : Normal breath sounds [Normal Heart Sounds] : normal heart sounds [2+] : left 2+ [0] : left 0 [Ankle Swelling (On Exam)] : not present [Varicose Veins Of Lower Extremities] : not present [] : not present [Abdomen Masses] : No abdominal masses [Alert] : alert [Oriented to Person] : oriented to person [Oriented to Place] : oriented to place [Oriented to Time] : oriented to time [de-identified] : Well appearing, NAD [de-identified] : NCAT [de-identified] : Supple [FreeTextEntry1] : Right BKA \par Well healed left 1st toe amputation wound\par PT signal ++\par Absent pedal pulses

## 2019-05-23 NOTE — ASSESSMENT
[FreeTextEntry1] : 68 year old male with left great toe dry gangrene s/p left SFA stenting and PT angioplasty.\par His great toe amputation wound has healed well.\par No claudication or rest pain.\par He remains on Plavix.\par Will return tomorrow for surveillance duplex US. (Patient cannot wait to have it performed today)

## 2019-05-23 NOTE — HISTORY OF PRESENT ILLNESS
[FreeTextEntry1] : 68 year old diabetic, previous smoker with PAD s/p right BKA 10 years ago presents with left great toe gangrene. He reports that he developed severe constant pain in his great toe 4 months and subsequently noted that his toe began to turn black.\par Patient has a right leg prosthesis and ambulates without assistance. He denies claudication in either leg. No previous MI or stroke. \par He lives at home and is retired. [de-identified] : S/P SFA stent and PT angioplasty 3 months ago.\par Had great toe amputation that is well healed.\par Does not smoke

## 2019-05-24 ENCOUNTER — APPOINTMENT (OUTPATIENT)
Dept: VASCULAR SURGERY | Facility: CLINIC | Age: 69
End: 2019-05-24
Payer: MEDICARE

## 2019-05-24 PROCEDURE — 93926 LOWER EXTREMITY STUDY: CPT

## 2019-06-09 PROBLEM — N47.1 PHIMOSIS: Status: ACTIVE | Noted: 2018-02-06

## 2019-07-01 ENCOUNTER — APPOINTMENT (OUTPATIENT)
Dept: INTERNAL MEDICINE | Facility: CLINIC | Age: 69
End: 2019-07-01
Payer: MEDICARE

## 2019-07-01 VITALS
HEART RATE: 83 BPM | BODY MASS INDEX: 26.36 KG/M2 | DIASTOLIC BLOOD PRESSURE: 70 MMHG | HEIGHT: 69 IN | WEIGHT: 178 LBS | TEMPERATURE: 97.6 F | OXYGEN SATURATION: 97 % | SYSTOLIC BLOOD PRESSURE: 110 MMHG

## 2019-07-01 PROCEDURE — 82962 GLUCOSE BLOOD TEST: CPT

## 2019-07-01 PROCEDURE — 83036 HEMOGLOBIN GLYCOSYLATED A1C: CPT | Mod: QW

## 2019-07-01 PROCEDURE — 99214 OFFICE O/P EST MOD 30 MIN: CPT | Mod: 25

## 2019-07-01 RX ORDER — PANTOPRAZOLE 40 MG/1
40 TABLET, DELAYED RELEASE ORAL DAILY
Qty: 90 | Refills: 1 | Status: DISCONTINUED | COMMUNITY
Start: 2017-04-25 | End: 2019-07-01

## 2019-07-02 LAB
ANION GAP SERPL CALC-SCNC: 12 MMOL/L
BUN SERPL-MCNC: 20 MG/DL
CALCIUM SERPL-MCNC: 9.8 MG/DL
CHLORIDE SERPL-SCNC: 101 MMOL/L
CO2 SERPL-SCNC: 24 MMOL/L
CREAT SERPL-MCNC: 1.09 MG/DL
GLUCOSE BLDC GLUCOMTR-MCNC: 232
GLUCOSE SERPL-MCNC: 228 MG/DL
POTASSIUM SERPL-SCNC: 4.8 MMOL/L
SODIUM SERPL-SCNC: 137 MMOL/L

## 2019-07-16 ENCOUNTER — APPOINTMENT (OUTPATIENT)
Dept: INTERNAL MEDICINE | Facility: CLINIC | Age: 69
End: 2019-07-16
Payer: MEDICARE

## 2019-07-16 PROCEDURE — 82962 GLUCOSE BLOOD TEST: CPT

## 2019-07-17 LAB — GLUCOSE BLDC GLUCOMTR-MCNC: 153

## 2019-07-19 ENCOUNTER — APPOINTMENT (OUTPATIENT)
Dept: INTERNAL MEDICINE | Facility: CLINIC | Age: 69
End: 2019-07-19

## 2019-07-22 ENCOUNTER — LABORATORY RESULT (OUTPATIENT)
Age: 69
End: 2019-07-22

## 2019-07-22 ENCOUNTER — RX RENEWAL (OUTPATIENT)
Age: 69
End: 2019-07-22

## 2019-07-23 ENCOUNTER — APPOINTMENT (OUTPATIENT)
Dept: INTERNAL MEDICINE | Facility: CLINIC | Age: 69
End: 2019-07-23

## 2019-07-30 ENCOUNTER — RX RENEWAL (OUTPATIENT)
Age: 69
End: 2019-07-30

## 2019-08-05 ENCOUNTER — APPOINTMENT (OUTPATIENT)
Dept: INTERNAL MEDICINE | Facility: CLINIC | Age: 69
End: 2019-08-05
Payer: MEDICARE

## 2019-08-05 VITALS
HEIGHT: 69 IN | WEIGHT: 178 LBS | DIASTOLIC BLOOD PRESSURE: 65 MMHG | HEART RATE: 70 BPM | SYSTOLIC BLOOD PRESSURE: 130 MMHG | BODY MASS INDEX: 26.36 KG/M2 | TEMPERATURE: 98.3 F | OXYGEN SATURATION: 93 %

## 2019-08-05 PROCEDURE — 99214 OFFICE O/P EST MOD 30 MIN: CPT | Mod: 25

## 2019-08-05 PROCEDURE — 83036 HEMOGLOBIN GLYCOSYLATED A1C: CPT | Mod: QW

## 2019-08-05 PROCEDURE — 82962 GLUCOSE BLOOD TEST: CPT

## 2019-08-16 ENCOUNTER — MEDICATION RENEWAL (OUTPATIENT)
Age: 69
End: 2019-08-16

## 2019-08-25 LAB
ALBUMIN: 30
CREATININE: 200
MICROALBUMIN/CREAT UR TEST STR-RTO: <30

## 2019-08-26 ENCOUNTER — CHART COPY (OUTPATIENT)
Age: 69
End: 2019-08-26

## 2019-08-30 ENCOUNTER — RX RENEWAL (OUTPATIENT)
Age: 69
End: 2019-08-30

## 2019-09-10 ENCOUNTER — OTHER (OUTPATIENT)
Age: 69
End: 2019-09-10

## 2019-09-10 ENCOUNTER — APPOINTMENT (OUTPATIENT)
Dept: INTERNAL MEDICINE | Facility: CLINIC | Age: 69
End: 2019-09-10
Payer: MEDICARE

## 2019-09-10 VITALS
WEIGHT: 177 LBS | OXYGEN SATURATION: 97 % | TEMPERATURE: 97.4 F | DIASTOLIC BLOOD PRESSURE: 70 MMHG | BODY MASS INDEX: 26.22 KG/M2 | HEART RATE: 85 BPM | SYSTOLIC BLOOD PRESSURE: 100 MMHG | HEIGHT: 69 IN

## 2019-09-10 DIAGNOSIS — Z86.31 PERSONAL HISTORY OF DIABETIC FOOT ULCER: ICD-10-CM

## 2019-09-10 DIAGNOSIS — L97.509 PERSONAL HISTORY OF DIABETIC FOOT ULCER: ICD-10-CM

## 2019-09-10 PROCEDURE — 99213 OFFICE O/P EST LOW 20 MIN: CPT

## 2019-09-18 ENCOUNTER — APPOINTMENT (OUTPATIENT)
Dept: INTERNAL MEDICINE | Facility: CLINIC | Age: 69
End: 2019-09-18
Payer: MEDICARE

## 2019-09-18 VITALS
BODY MASS INDEX: 26.22 KG/M2 | HEIGHT: 69 IN | HEART RATE: 70 BPM | SYSTOLIC BLOOD PRESSURE: 100 MMHG | TEMPERATURE: 97.7 F | WEIGHT: 177 LBS | OXYGEN SATURATION: 95 % | DIASTOLIC BLOOD PRESSURE: 70 MMHG

## 2019-09-18 PROCEDURE — G0008: CPT

## 2019-09-18 PROCEDURE — 99213 OFFICE O/P EST LOW 20 MIN: CPT | Mod: 25

## 2019-09-18 PROCEDURE — 90662 IIV NO PRSV INCREASED AG IM: CPT

## 2019-09-18 NOTE — PHYSICAL EXAM
[Alert and Oriented x3] : oriented to person, place, and time [de-identified] : sore on the right stump is healing well and has become less deep

## 2019-10-15 ENCOUNTER — APPOINTMENT (OUTPATIENT)
Dept: INTERNAL MEDICINE | Facility: CLINIC | Age: 69
End: 2019-10-15
Payer: MEDICARE

## 2019-10-15 PROCEDURE — 99213 OFFICE O/P EST LOW 20 MIN: CPT

## 2019-10-30 ENCOUNTER — APPOINTMENT (OUTPATIENT)
Dept: INTERNAL MEDICINE | Facility: CLINIC | Age: 69
End: 2019-10-30

## 2019-10-31 ENCOUNTER — INPATIENT (INPATIENT)
Facility: HOSPITAL | Age: 69
LOS: 4 days | Discharge: HOME | DRG: 638 | End: 2019-11-05
Attending: GENERAL ACUTE CARE HOSPITAL | Admitting: HOSPITALIST
Payer: MEDICARE

## 2019-10-31 VITALS
HEIGHT: 69 IN | DIASTOLIC BLOOD PRESSURE: 89 MMHG | TEMPERATURE: 98 F | OXYGEN SATURATION: 98 % | HEART RATE: 122 BPM | RESPIRATION RATE: 14 BRPM | SYSTOLIC BLOOD PRESSURE: 133 MMHG | WEIGHT: 179.9 LBS

## 2019-10-31 DIAGNOSIS — Z89.422 ACQUIRED ABSENCE OF OTHER LEFT TOE(S): Chronic | ICD-10-CM

## 2019-10-31 DIAGNOSIS — E11.40 TYPE 2 DIABETES MELLITUS WITH DIABETIC NEUROPATHY, UNSPECIFIED: ICD-10-CM

## 2019-10-31 DIAGNOSIS — Z89.511 ACQUIRED ABSENCE OF RIGHT LEG BELOW KNEE: Chronic | ICD-10-CM

## 2019-10-31 LAB
ACETONE SERPL-MCNC: ABNORMAL
ALBUMIN SERPL ELPH-MCNC: 4.3 G/DL — SIGNIFICANT CHANGE UP (ref 3.3–5.2)
ALP SERPL-CCNC: 111 U/L — SIGNIFICANT CHANGE UP (ref 40–120)
ALT FLD-CCNC: 8 U/L — SIGNIFICANT CHANGE UP
AMPHET UR-MCNC: NEGATIVE — SIGNIFICANT CHANGE UP
ANION GAP SERPL CALC-SCNC: 28 MMOL/L — HIGH (ref 5–17)
APPEARANCE UR: CLEAR — SIGNIFICANT CHANGE UP
APTT BLD: 28.6 SEC — SIGNIFICANT CHANGE UP (ref 27.5–36.3)
AST SERPL-CCNC: 11 U/L — SIGNIFICANT CHANGE UP
BACTERIA # UR AUTO: ABNORMAL
BARBITURATES UR SCN-MCNC: NEGATIVE — SIGNIFICANT CHANGE UP
BASE EXCESS BLDV CALC-SCNC: -2.2 MMOL/L — LOW (ref -2–2)
BASOPHILS # BLD AUTO: 0.02 K/UL — SIGNIFICANT CHANGE UP (ref 0–0.2)
BASOPHILS NFR BLD AUTO: 0.1 % — SIGNIFICANT CHANGE UP (ref 0–2)
BENZODIAZ UR-MCNC: NEGATIVE — SIGNIFICANT CHANGE UP
BILIRUB SERPL-MCNC: 0.3 MG/DL — LOW (ref 0.4–2)
BILIRUB UR-MCNC: NEGATIVE — SIGNIFICANT CHANGE UP
BUN SERPL-MCNC: 47 MG/DL — HIGH (ref 8–20)
CALCIUM SERPL-MCNC: 9.4 MG/DL — SIGNIFICANT CHANGE UP (ref 8.6–10.2)
CHLORIDE SERPL-SCNC: 88 MMOL/L — LOW (ref 98–107)
CO2 SERPL-SCNC: 21 MMOL/L — LOW (ref 22–29)
COCAINE METAB.OTHER UR-MCNC: NEGATIVE — SIGNIFICANT CHANGE UP
COLOR SPEC: YELLOW — SIGNIFICANT CHANGE UP
COMMENT - URINE: SIGNIFICANT CHANGE UP
CREAT SERPL-MCNC: 1.52 MG/DL — HIGH (ref 0.5–1.3)
DIFF PNL FLD: ABNORMAL
EOSINOPHIL # BLD AUTO: 0 K/UL — SIGNIFICANT CHANGE UP (ref 0–0.5)
EOSINOPHIL NFR BLD AUTO: 0 % — SIGNIFICANT CHANGE UP (ref 0–6)
EPI CELLS # UR: SIGNIFICANT CHANGE UP
ETHANOL SERPL-MCNC: <10 MG/DL — SIGNIFICANT CHANGE UP
GAS PNL BLDV: SIGNIFICANT CHANGE UP
GLUCOSE SERPL-MCNC: 426 MG/DL — HIGH (ref 70–115)
GLUCOSE UR QL: 1000 MG/DL
HCO3 BLDV-SCNC: 22 MMOL/L — SIGNIFICANT CHANGE UP (ref 20–26)
HCT VFR BLD CALC: 46.5 % — SIGNIFICANT CHANGE UP (ref 39–50)
HGB BLD-MCNC: 15.2 G/DL — SIGNIFICANT CHANGE UP (ref 13–17)
IMM GRANULOCYTES NFR BLD AUTO: 0.6 % — SIGNIFICANT CHANGE UP (ref 0–1.5)
INR BLD: 1.22 RATIO — HIGH (ref 0.88–1.16)
KETONES UR-MCNC: ABNORMAL
LACTATE BLDV-MCNC: 3.3 MMOL/L — HIGH (ref 0.5–2)
LEUKOCYTE ESTERASE UR-ACNC: ABNORMAL
LYMPHOCYTES # BLD AUTO: 0.97 K/UL — LOW (ref 1–3.3)
LYMPHOCYTES # BLD AUTO: 6.5 % — LOW (ref 13–44)
MAGNESIUM SERPL-MCNC: 2.5 MG/DL — SIGNIFICANT CHANGE UP (ref 1.6–2.6)
MCHC RBC-ENTMCNC: 27.4 PG — SIGNIFICANT CHANGE UP (ref 27–34)
MCHC RBC-ENTMCNC: 32.7 GM/DL — SIGNIFICANT CHANGE UP (ref 32–36)
MCV RBC AUTO: 83.9 FL — SIGNIFICANT CHANGE UP (ref 80–100)
METHADONE UR-MCNC: NEGATIVE — SIGNIFICANT CHANGE UP
MONOCYTES # BLD AUTO: 1.09 K/UL — HIGH (ref 0–0.9)
MONOCYTES NFR BLD AUTO: 7.3 % — SIGNIFICANT CHANGE UP (ref 2–14)
NEUTROPHILS # BLD AUTO: 12.71 K/UL — HIGH (ref 1.8–7.4)
NEUTROPHILS NFR BLD AUTO: 85.5 % — HIGH (ref 43–77)
NITRITE UR-MCNC: NEGATIVE — SIGNIFICANT CHANGE UP
OPIATES UR-MCNC: NEGATIVE — SIGNIFICANT CHANGE UP
PCO2 BLDV: 45 MMHG — SIGNIFICANT CHANGE UP (ref 35–50)
PCP SPEC-MCNC: SIGNIFICANT CHANGE UP
PCP UR-MCNC: NEGATIVE — SIGNIFICANT CHANGE UP
PH BLDV: 7.34 — SIGNIFICANT CHANGE UP (ref 7.32–7.43)
PH UR: 5 — SIGNIFICANT CHANGE UP (ref 5–8)
PLATELET # BLD AUTO: 394 K/UL — SIGNIFICANT CHANGE UP (ref 150–400)
PO2 BLDV: 34 MMHG — SIGNIFICANT CHANGE UP (ref 25–45)
POTASSIUM SERPL-MCNC: 5.1 MMOL/L — SIGNIFICANT CHANGE UP (ref 3.5–5.3)
POTASSIUM SERPL-SCNC: 5.1 MMOL/L — SIGNIFICANT CHANGE UP (ref 3.5–5.3)
PROT SERPL-MCNC: 8.2 G/DL — SIGNIFICANT CHANGE UP (ref 6.6–8.7)
PROT UR-MCNC: 15 MG/DL
PROTHROM AB SERPL-ACNC: 14.1 SEC — HIGH (ref 10–12.9)
RAPID RVP RESULT: SIGNIFICANT CHANGE UP
RBC # BLD: 5.54 M/UL — SIGNIFICANT CHANGE UP (ref 4.2–5.8)
RBC # FLD: 12.6 % — SIGNIFICANT CHANGE UP (ref 10.3–14.5)
RBC CASTS # UR COMP ASSIST: SIGNIFICANT CHANGE UP /HPF (ref 0–4)
SAO2 % BLDV: 57 % — SIGNIFICANT CHANGE UP
SODIUM SERPL-SCNC: 137 MMOL/L — SIGNIFICANT CHANGE UP (ref 135–145)
SP GR SPEC: 1.02 — SIGNIFICANT CHANGE UP (ref 1.01–1.02)
THC UR QL: NEGATIVE — SIGNIFICANT CHANGE UP
TROPONIN T SERPL-MCNC: <0.01 NG/ML — SIGNIFICANT CHANGE UP (ref 0–0.06)
TSH SERPL-MCNC: 1.68 UIU/ML — SIGNIFICANT CHANGE UP (ref 0.27–4.2)
UROBILINOGEN FLD QL: NEGATIVE MG/DL — SIGNIFICANT CHANGE UP
WBC # BLD: 14.88 K/UL — HIGH (ref 3.8–10.5)
WBC # FLD AUTO: 14.88 K/UL — HIGH (ref 3.8–10.5)
WBC UR QL: ABNORMAL

## 2019-10-31 PROCEDURE — 99291 CRITICAL CARE FIRST HOUR: CPT

## 2019-10-31 PROCEDURE — 70450 CT HEAD/BRAIN W/O DYE: CPT | Mod: 26

## 2019-10-31 PROCEDURE — 93010 ELECTROCARDIOGRAM REPORT: CPT

## 2019-10-31 PROCEDURE — 71045 X-RAY EXAM CHEST 1 VIEW: CPT | Mod: 26

## 2019-10-31 RX ORDER — INSULIN HUMAN 100 [IU]/ML
10 INJECTION, SOLUTION SUBCUTANEOUS ONCE
Refills: 0 | Status: COMPLETED | OUTPATIENT
Start: 2019-10-31 | End: 2019-10-31

## 2019-10-31 RX ORDER — INSULIN HUMAN 100 [IU]/ML
10 INJECTION, SOLUTION SUBCUTANEOUS ONCE
Refills: 0 | Status: DISCONTINUED | OUTPATIENT
Start: 2019-10-31 | End: 2019-10-31

## 2019-10-31 RX ORDER — VANCOMYCIN HCL 1 G
1000 VIAL (EA) INTRAVENOUS ONCE
Refills: 0 | Status: COMPLETED | OUTPATIENT
Start: 2019-10-31 | End: 2019-10-31

## 2019-10-31 RX ORDER — SODIUM CHLORIDE 9 MG/ML
2500 INJECTION INTRAMUSCULAR; INTRAVENOUS; SUBCUTANEOUS ONCE
Refills: 0 | Status: COMPLETED | OUTPATIENT
Start: 2019-10-31 | End: 2019-10-31

## 2019-10-31 RX ORDER — INSULIN HUMAN 100 [IU]/ML
2 INJECTION, SOLUTION SUBCUTANEOUS
Qty: 100 | Refills: 0 | Status: DISCONTINUED | OUTPATIENT
Start: 2019-10-31 | End: 2019-11-01

## 2019-10-31 RX ORDER — PIPERACILLIN AND TAZOBACTAM 4; .5 G/20ML; G/20ML
3.38 INJECTION, POWDER, LYOPHILIZED, FOR SOLUTION INTRAVENOUS ONCE
Refills: 0 | Status: COMPLETED | OUTPATIENT
Start: 2019-10-31 | End: 2019-10-31

## 2019-10-31 RX ADMIN — SODIUM CHLORIDE 2500 MILLILITER(S): 9 INJECTION INTRAMUSCULAR; INTRAVENOUS; SUBCUTANEOUS at 20:24

## 2019-10-31 RX ADMIN — SODIUM CHLORIDE 2500 MILLILITER(S): 9 INJECTION INTRAMUSCULAR; INTRAVENOUS; SUBCUTANEOUS at 21:24

## 2019-10-31 RX ADMIN — INSULIN HUMAN 10 UNIT(S): 100 INJECTION, SOLUTION SUBCUTANEOUS at 20:40

## 2019-10-31 RX ADMIN — INSULIN HUMAN 2 UNIT(S)/HR: 100 INJECTION, SOLUTION SUBCUTANEOUS at 23:53

## 2019-10-31 RX ADMIN — Medication 1000 MILLIGRAM(S): at 22:40

## 2019-10-31 RX ADMIN — Medication 250 MILLIGRAM(S): at 21:40

## 2019-10-31 RX ADMIN — PIPERACILLIN AND TAZOBACTAM 3.38 GRAM(S): 4; .5 INJECTION, POWDER, LYOPHILIZED, FOR SOLUTION INTRAVENOUS at 20:54

## 2019-10-31 RX ADMIN — PIPERACILLIN AND TAZOBACTAM 200 GRAM(S): 4; .5 INJECTION, POWDER, LYOPHILIZED, FOR SOLUTION INTRAVENOUS at 20:24

## 2019-10-31 NOTE — ED ADULT NURSE REASSESSMENT NOTE - NS ED NURSE REASSESS COMMENT FT1
Patient is awaiting admission to ICU. Patient is resting comfortably, family at bedside.  Offers no complaints at this time.  Sinus tachycardia on cardiac monitor with frequent PVCs.

## 2019-10-31 NOTE — ED PROVIDER NOTE - OBJECTIVE STATEMENT
70 y/o M pt with PMHx of hypertension and diabetes mellitus presents to the ED but unable to state why. Pt states he was at home when his  called the EMS. Pt states he was "out of service and complaining to him." Pt states he isn't feeling well and is not himself.  He reports not eating well due to not being hungry. Pt reports being admitted to [facility] in early October for similar symptoms.   Denies fever/chills and abdominal pain.

## 2019-10-31 NOTE — ED ADULT NURSE NOTE - NSIMPLEMENTINTERV_GEN_ALL_ED
Implemented All Fall Risk Interventions:  [place] to call system. Call bell, personal items and telephone within reach. Instruct patient to call for assistance. Room bathroom lighting operational. Non-slip footwear when patient is off stretcher. Physically safe environment: no spills, clutter or unnecessary equipment. Stretcher in lowest position, wheels locked, appropriate side rails in place. Provide visual cue, wrist band, yellow gown, etc. Monitor gait and stability. Monitor for mental status changes and reorient to person, place, and time. Review medications for side effects contributing to fall risk. Reinforce activity limits and safety measures with patient and family.

## 2019-10-31 NOTE — ED PROVIDER NOTE - PHYSICAL EXAMINATION
Constitutional : Appears fatigue, talking in incomprehensible words. Decreased eye contact.   Head :NC AT , no swelling  Eyes :eomi, no swelling  Mouth :mm appear moist   Neck : supple, trachea in midline  Chest :[place] air entry, symm chest expansion, no distress  Heart :S1 S2 distant  Abdomen :abd soft, non tender  Musc/Skel :ext no swelling, no deformity, no spine tenderness, distal pulses present  Neuro  :AAO 3 no focal deficits   Skin: appears pale and ashy

## 2019-10-31 NOTE — ED PROVIDER NOTE - CLINICAL SUMMARY MEDICAL DECISION MAKING FREE TEXT BOX
Pt is a 68 y/o M with hx of diabetes mellitus with leg amputation presents with weakness, progressive wheezing for 1 day. Plan to check rectal temp, sepsis lab, CT head, give fluids and reevaluate.

## 2019-10-31 NOTE — ED ADULT NURSE NOTE - OBJECTIVE STATEMENT
Patient with altered mental status and hyperglycemia.  Paster called and pt states he hasn't been acting like himself.  Patient is lethargic, respirations are unlabored, states he hasn't been eating well lately and hasn't taken his insulin.  Patient is initially sinus tachycardia in and out of trigeminy and bigeminy.  Patient denies chest pain, no respiratory distress.  Has a previous right bka.

## 2019-10-31 NOTE — ED ADULT TRIAGE NOTE - CHIEF COMPLAINT QUOTE
Patient A&Ox4 complaining of weakness beginning today. Stated has not taken his insulin because he has not been eating. Stated he does not know why he hasn't been eating. EMS reports initial BGL of 529. BGL in triage 411. Did not receive any meds or fluids via EMS. Patient placed in CC area, MD called to bedside.

## 2019-11-01 DIAGNOSIS — N39.0 URINARY TRACT INFECTION, SITE NOT SPECIFIED: ICD-10-CM

## 2019-11-01 DIAGNOSIS — E11.10 TYPE 2 DIABETES MELLITUS WITH KETOACIDOSIS WITHOUT COMA: ICD-10-CM

## 2019-11-01 DIAGNOSIS — I10 ESSENTIAL (PRIMARY) HYPERTENSION: ICD-10-CM

## 2019-11-01 DIAGNOSIS — N17.9 ACUTE KIDNEY FAILURE, UNSPECIFIED: ICD-10-CM

## 2019-11-01 LAB
ANION GAP SERPL CALC-SCNC: 12 MMOL/L — SIGNIFICANT CHANGE UP (ref 5–17)
BUN SERPL-MCNC: 32 MG/DL — HIGH (ref 8–20)
CALCIUM SERPL-MCNC: 7.6 MG/DL — LOW (ref 8.6–10.2)
CHLORIDE SERPL-SCNC: 104 MMOL/L — SIGNIFICANT CHANGE UP (ref 98–107)
CO2 SERPL-SCNC: 21 MMOL/L — LOW (ref 22–29)
CREAT SERPL-MCNC: 0.8 MG/DL — SIGNIFICANT CHANGE UP (ref 0.5–1.3)
GLUCOSE BLDC GLUCOMTR-MCNC: 170 MG/DL — HIGH (ref 70–99)
GLUCOSE BLDC GLUCOMTR-MCNC: 184 MG/DL — HIGH (ref 70–99)
GLUCOSE BLDC GLUCOMTR-MCNC: 187 MG/DL — HIGH (ref 70–99)
GLUCOSE BLDC GLUCOMTR-MCNC: 200 MG/DL — HIGH (ref 70–99)
GLUCOSE BLDC GLUCOMTR-MCNC: 203 MG/DL — HIGH (ref 70–99)
GLUCOSE BLDC GLUCOMTR-MCNC: 211 MG/DL — HIGH (ref 70–99)
GLUCOSE BLDC GLUCOMTR-MCNC: 212 MG/DL — HIGH (ref 70–99)
GLUCOSE BLDC GLUCOMTR-MCNC: 217 MG/DL — HIGH (ref 70–99)
GLUCOSE BLDC GLUCOMTR-MCNC: 246 MG/DL — HIGH (ref 70–99)
GLUCOSE BLDC GLUCOMTR-MCNC: 389 MG/DL — HIGH (ref 70–99)
GLUCOSE BLDC GLUCOMTR-MCNC: 399 MG/DL — HIGH (ref 70–99)
GLUCOSE SERPL-MCNC: 209 MG/DL — HIGH (ref 70–115)
HBA1C BLD-MCNC: 9.7 % — HIGH (ref 4–5.6)
HCT VFR BLD CALC: 33.5 % — LOW (ref 39–50)
HGB BLD-MCNC: 11.4 G/DL — LOW (ref 13–17)
MAGNESIUM SERPL-MCNC: 2 MG/DL — SIGNIFICANT CHANGE UP (ref 1.6–2.6)
MCHC RBC-ENTMCNC: 28.3 PG — SIGNIFICANT CHANGE UP (ref 27–34)
MCHC RBC-ENTMCNC: 34 GM/DL — SIGNIFICANT CHANGE UP (ref 32–36)
MCV RBC AUTO: 83.1 FL — SIGNIFICANT CHANGE UP (ref 80–100)
PHOSPHATE SERPL-MCNC: 2.5 MG/DL — SIGNIFICANT CHANGE UP (ref 2.4–4.7)
PLATELET # BLD AUTO: 288 K/UL — SIGNIFICANT CHANGE UP (ref 150–400)
POTASSIUM SERPL-MCNC: 3.8 MMOL/L — SIGNIFICANT CHANGE UP (ref 3.5–5.3)
POTASSIUM SERPL-SCNC: 3.8 MMOL/L — SIGNIFICANT CHANGE UP (ref 3.5–5.3)
PROCALCITONIN SERPL-MCNC: 0.1 NG/ML — SIGNIFICANT CHANGE UP (ref 0.02–0.1)
RBC # BLD: 4.03 M/UL — LOW (ref 4.2–5.8)
RBC # FLD: 12.6 % — SIGNIFICANT CHANGE UP (ref 10.3–14.5)
SODIUM SERPL-SCNC: 137 MMOL/L — SIGNIFICANT CHANGE UP (ref 135–145)
WBC # BLD: 12.95 K/UL — HIGH (ref 3.8–10.5)
WBC # FLD AUTO: 12.95 K/UL — HIGH (ref 3.8–10.5)

## 2019-11-01 PROCEDURE — 99223 1ST HOSP IP/OBS HIGH 75: CPT

## 2019-11-01 PROCEDURE — 99222 1ST HOSP IP/OBS MODERATE 55: CPT

## 2019-11-01 RX ORDER — CHLORHEXIDINE GLUCONATE 213 G/1000ML
1 SOLUTION TOPICAL
Refills: 0 | Status: DISCONTINUED | OUTPATIENT
Start: 2019-11-01 | End: 2019-11-02

## 2019-11-01 RX ORDER — DEXTROSE 50 % IN WATER 50 %
15 SYRINGE (ML) INTRAVENOUS ONCE
Refills: 0 | Status: DISCONTINUED | OUTPATIENT
Start: 2019-11-01 | End: 2019-11-05

## 2019-11-01 RX ORDER — DEXTROSE 50 % IN WATER 50 %
25 SYRINGE (ML) INTRAVENOUS ONCE
Refills: 0 | Status: DISCONTINUED | OUTPATIENT
Start: 2019-11-01 | End: 2019-11-05

## 2019-11-01 RX ORDER — SODIUM CHLORIDE 9 MG/ML
1000 INJECTION INTRAMUSCULAR; INTRAVENOUS; SUBCUTANEOUS ONCE
Refills: 0 | Status: COMPLETED | OUTPATIENT
Start: 2019-11-01 | End: 2019-11-01

## 2019-11-01 RX ORDER — SODIUM CHLORIDE 9 MG/ML
1000 INJECTION, SOLUTION INTRAVENOUS ONCE
Refills: 0 | Status: DISCONTINUED | OUTPATIENT
Start: 2019-11-01 | End: 2019-11-01

## 2019-11-01 RX ORDER — SODIUM CHLORIDE 9 MG/ML
1000 INJECTION, SOLUTION INTRAVENOUS
Refills: 0 | Status: DISCONTINUED | OUTPATIENT
Start: 2019-11-01 | End: 2019-11-01

## 2019-11-01 RX ORDER — PANTOPRAZOLE SODIUM 20 MG/1
40 TABLET, DELAYED RELEASE ORAL
Refills: 0 | Status: DISCONTINUED | OUTPATIENT
Start: 2019-11-01 | End: 2019-11-05

## 2019-11-01 RX ORDER — DEXTROSE 50 % IN WATER 50 %
12.5 SYRINGE (ML) INTRAVENOUS ONCE
Refills: 0 | Status: DISCONTINUED | OUTPATIENT
Start: 2019-11-01 | End: 2019-11-05

## 2019-11-01 RX ORDER — CEFTRIAXONE 500 MG/1
1000 INJECTION, POWDER, FOR SOLUTION INTRAMUSCULAR; INTRAVENOUS EVERY 24 HOURS
Refills: 0 | Status: DISCONTINUED | OUTPATIENT
Start: 2019-11-01 | End: 2019-11-03

## 2019-11-01 RX ORDER — SODIUM CHLORIDE 9 MG/ML
1000 INJECTION INTRAMUSCULAR; INTRAVENOUS; SUBCUTANEOUS
Refills: 0 | Status: DISCONTINUED | OUTPATIENT
Start: 2019-11-01 | End: 2019-11-01

## 2019-11-01 RX ORDER — GLUCAGON INJECTION, SOLUTION 0.5 MG/.1ML
1 INJECTION, SOLUTION SUBCUTANEOUS ONCE
Refills: 0 | Status: DISCONTINUED | OUTPATIENT
Start: 2019-11-01 | End: 2019-11-05

## 2019-11-01 RX ORDER — HEPARIN SODIUM 5000 [USP'U]/ML
5000 INJECTION INTRAVENOUS; SUBCUTANEOUS EVERY 12 HOURS
Refills: 0 | Status: DISCONTINUED | OUTPATIENT
Start: 2019-11-01 | End: 2019-11-05

## 2019-11-01 RX ORDER — INSULIN GLARGINE 100 [IU]/ML
15 INJECTION, SOLUTION SUBCUTANEOUS EVERY MORNING
Refills: 0 | Status: DISCONTINUED | OUTPATIENT
Start: 2019-11-01 | End: 2019-11-05

## 2019-11-01 RX ORDER — GABAPENTIN 400 MG/1
100 CAPSULE ORAL THREE TIMES A DAY
Refills: 0 | Status: DISCONTINUED | OUTPATIENT
Start: 2019-11-01 | End: 2019-11-05

## 2019-11-01 RX ORDER — SODIUM CHLORIDE 9 MG/ML
1000 INJECTION, SOLUTION INTRAVENOUS
Refills: 0 | Status: DISCONTINUED | OUTPATIENT
Start: 2019-11-01 | End: 2019-11-05

## 2019-11-01 RX ORDER — INSULIN LISPRO 100/ML
VIAL (ML) SUBCUTANEOUS
Refills: 0 | Status: DISCONTINUED | OUTPATIENT
Start: 2019-11-01 | End: 2019-11-05

## 2019-11-01 RX ADMIN — GABAPENTIN 100 MILLIGRAM(S): 400 CAPSULE ORAL at 22:25

## 2019-11-01 RX ADMIN — Medication 4: at 11:38

## 2019-11-01 RX ADMIN — HEPARIN SODIUM 5000 UNIT(S): 5000 INJECTION INTRAVENOUS; SUBCUTANEOUS at 06:15

## 2019-11-01 RX ADMIN — CHLORHEXIDINE GLUCONATE 1 APPLICATION(S): 213 SOLUTION TOPICAL at 06:14

## 2019-11-01 RX ADMIN — SODIUM CHLORIDE 1000 MILLILITER(S): 9 INJECTION INTRAMUSCULAR; INTRAVENOUS; SUBCUTANEOUS at 01:21

## 2019-11-01 RX ADMIN — HEPARIN SODIUM 5000 UNIT(S): 5000 INJECTION INTRAVENOUS; SUBCUTANEOUS at 17:26

## 2019-11-01 RX ADMIN — Medication 10: at 22:25

## 2019-11-01 RX ADMIN — SODIUM CHLORIDE 150 MILLILITER(S): 9 INJECTION, SOLUTION INTRAVENOUS at 03:39

## 2019-11-01 RX ADMIN — Medication 4: at 17:26

## 2019-11-01 RX ADMIN — GABAPENTIN 100 MILLIGRAM(S): 400 CAPSULE ORAL at 13:24

## 2019-11-01 RX ADMIN — INSULIN GLARGINE 15 UNIT(S): 100 INJECTION, SOLUTION SUBCUTANEOUS at 10:42

## 2019-11-01 RX ADMIN — CEFTRIAXONE 100 MILLIGRAM(S): 500 INJECTION, POWDER, FOR SOLUTION INTRAMUSCULAR; INTRAVENOUS at 02:32

## 2019-11-01 NOTE — H&P ADULT - NSICDXPASTSURGICALHX_GEN_ALL_CORE_FT
PAST SURGICAL HISTORY:  S/P BKA (below knee amputation) unilateral, right     Toe amputation status, left 2nd 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

## 2019-11-01 NOTE — H&P ADULT - ASSESSMENT
70 y/o M with a h/o HTN, psoriasis, DM (previous admissions for DKA), right BKA and left toe amputations secondary to gangrene, with DKA, UTI, BRIAN.    Case discussed in detail with MICU attending, Dr. Bueno. 68 y/o M with a h/o HTN, Hep B (s/p treatment), DM (previous admissions for DKA), right BKA and left toe amputations secondary to gangrene, with DKA, UTI, BRIAN.    Case discussed in detail with MICU attending, Dr. Bueno.

## 2019-11-01 NOTE — PROGRESS NOTE ADULT - ASSESSMENT
68 y/o M with hx of HTN, Hep B s/p treatment, DM, right BKA and left toe amputations secondary to gangrene admitted to MICU for DKA. Pt now off insulin drip and anion gap closed. On admission had rectal temp of 99'F and a (+)UA started on iv rocephin and cx results pending. Pt transferred out of MICU to medical service.    1) DKA with DM  - DKA resolve  - off insulin drip  - placed on lantus 15 units  - CSI and fingerstick monitor  - diabetic diet    2) UTI  - on iv Rocephin  - blood and urine cx pending results    3) HTN    4) Prophylactic measure  - DVT ppx: heparin 68 y/o M with hx of HTN, Hep B s/p treatment, DM, right BKA and left toe amputations secondary to gangrene admitted to MICU for DKA. Pt now off insulin drip and anion gap closed. On admission had rectal temp of 99'F and a (+)UA started on iv rocephin and cx results pending. Pt transferred out of MICU to medical service.    1) DKA with DM  - DKA resolve  - off insulin drip  - placed on lantus 15 units  - CSI and fingerstick monitor  - diabetic diet    2) UTI  - on iv Rocephin  - blood and urine cx pending results    3) HTN  - pt does not recall what BP meds he is on  - called [facility] in [place], per cvs not on BP meds  - will monitor and add BP medication if needed     4) Prophylactic measure  - DVT ppx: heparin

## 2019-11-01 NOTE — H&P ADULT - HISTORY OF PRESENT ILLNESS
68 y/o M with a h/o HTN, psoriasis, DM (previous admissions for DKA), right BKA and 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 B. A. Patient is somewhat lethargic and a poor historian at this time, but does admit to missing doses of his medications, although he is unable to specify what medications he takes other than insulin. Noted to have a rectal temp of 99'F and a (+)UA. He does report an episode of n/v and recent constipation, but denies fevers, abdominal pain, diarrhea, or dysuria. 70 y/o M with a h/o HTN, Hep B (s/p treatment), DM (previous admissions for DKA), right BKA and 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 B. A. Patient is somewhat lethargic and a poor historian at this time, but does admit to missing doses of his medications, although he is unable to specify what medications he takes other than insulin. Noted to have a rectal temp of 99'F and a (+)UA. He does report an episode of n/v and recent constipation, but denies fevers, abdominal pain, diarrhea, or dysuria.

## 2019-11-01 NOTE — H&P ADULT - PROBLEM SELECTOR PLAN 1
May be secondary to active infection, although he did admit to medication noncompliance. Received 10u IV insulin bolus and 2.5L IVF bolus. Will start insulin infusion now and give additional 1L IVF bolus and place on maintenance fluids. When BG trends down to 200-250 will add dextrose to IVF. Trend BMP/mag/phos Q 6 hours and follow AG- when closed will bridge off infusion with long acting insulin and transition to sliding scale coverage. Send HgbA1c.

## 2019-11-01 NOTE — CHART NOTE - NSCHARTNOTEFT_GEN_A_CORE
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 AG has closed this morning. Given 15 units lantus. Tolerating PO diet. Off insulin infusion. At this time optimized for transfer out of MICU to any medical bed.    Plan:  - ISS w/ hypoglycemia protocol.  - Lantus 15 units daily  - HgA1c 9.7. Needs endocrine consult and diabetes education.  - mildly + UA, c/w Rocephin w/ elevated WBC and low grades fevers. f/u blood and urine cultures

## 2019-11-01 NOTE — H&P ADULT - NSICDXPASTMEDICALHX_GEN_ALL_CORE_FT
PAST MEDICAL HISTORY:  Cataract     DM (diabetes mellitus)     Dry eye     HTN (hypertension)     Neuropathy     Psoriasis

## 2019-11-01 NOTE — PROGRESS NOTE ADULT - SUBJECTIVE AND OBJECTIVE BOX
Transfer Note    Patient is a 69y old  Male who presents with a chief complaint of DKA     CC:    HPI:  70 y/o M with hx of HTN, Hep B s/p treatment, DM, right BKA and left toe amputations secondary to gangrene admitted to MICU for DKA. Pt now off insulin drip and anion gap closed. On admission had rectal temp of 99'F and a (+)UA started on iv rocephin and cx results pending. Pt transferred out of MICU to medical service.        PAST MEDICAL & SURGICAL HISTORY:  Psoriasis  Neuropathy  Cataract  HTN (hypertension)  Dry eye  DM (diabetes mellitus)  Toe amputation status, left: 2nd 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  S/P BKA (below knee amputation) unilateral, right      Social History:  Tabacco -   ETOH -   Illicit drug abuse - denies    FAMILY HISTORY:  Family history of diabetes mellitus      Allergies: No Known Allergies      REVIEW OF SYSTEMS:    All other ROS are negative except for HPI    MEDICATIONS  (STANDING):  cefTRIAXone   IVPB 1000 milliGRAM(s) IV Intermittent every 24 hours  chlorhexidine 2% Cloths 1 Application(s) Topical <User Schedule>  dextrose 5%. 1000 milliLiter(s) (50 mL/Hr) IV Continuous <Continuous>  dextrose 50% Injectable 12.5 Gram(s) IV Push once  dextrose 50% Injectable 25 Gram(s) IV Push once  dextrose 50% Injectable 25 Gram(s) IV Push once  gabapentin 100 milliGRAM(s) Oral three times a day  heparin  Injectable 5000 Unit(s) SubCutaneous every 12 hours  insulin glargine Injectable (LANTUS) 15 Unit(s) SubCutaneous every morning  insulin lispro (HumaLOG) corrective regimen sliding scale   SubCutaneous Before meals and at bedtime  pantoprazole    Tablet 40 milliGRAM(s) Oral before breakfast    MEDICATIONS  (PRN):  dextrose 40% Gel 15 Gram(s) Oral once PRN Blood Glucose LESS THAN 70 milliGRAM(s)/deciliter  glucagon  Injectable 1 milliGRAM(s) IntraMuscular once PRN Glucose LESS THAN 70 milligrams/deciliter      Vital Signs Last 24 Hrs  T(C): 36.6 (2019 11:54), Max: 37.2 (31 Oct 2019 20:04)  T(F): 97.8 (2019 11:54), Max: 99 (31 Oct 2019 20:04)  HR: 105 (2019 11:00) (90 - 124)  BP: 139/68 (2019 11:00) (108/56 - 185/86)  BP(mean): 97 (2019 11:00) (75 - 123)  RR: 21 (2019 11:) (14 - 24)  SpO2: 99% (2019 11:00) (98% - 100%)    PHYSICAL EXAM:    GENERAL: NAD, well-groomed, well-developed  HEAD:  Atraumatic, Normocephalic  EYES: EOMI, PERRLA, conjunctiva and sclera clear  NECK: Supple, No JVD, Normal thyroid  NERVOUS SYSTEM:  Alert & Oriented X3, Good concentration; Motor Strength 5/5 B/L upper and lower extremities; DTRs 2+ intact and symmetric  CHEST/LUNG: CTA  b/l,  no rales, rhonchi, wheezing, or rubs  HEART: Regular rate and rhythm; No murmurs, rubs, or gallops  ABDOMEN: Soft, Nontender, Nondistended; Bowel sounds present  EXTREMITIES:  2+ Peripheral Pulses, No clubbing, cyanosis, or edema ,   LYMPH: No lymphadenopathy noted  SKIN: No rashes or lesions    LABS:                        11.4   12.95 )-----------( 288      ( 2019 06:27 )             33.5         137  |  104  |  32.0<H>  ----------------------------<  209<H>  3.8   |  21.0<L>  |  0.80    Ca    7.6<L>      2019 06:27  Phos  2.5       Mg     2.0         TPro  8.2  /  Alb  4.3  /  TBili  0.3<L>  /  DBili  x   /  AST  11  /  ALT  8   /  AlkPhos  111  10-    PT/INR - ( 31 Oct 2019 20:20 )   PT: 14.1 sec;   INR: 1.22 ratio         PTT - ( 31 Oct 2019 20:20 )  PTT:28.6 sec  Urinalysis Basic - ( 31 Oct 2019 22:04 )    Color: Yellow / Appearance: Clear / S.025 / pH: x  Gluc: x / Ketone: Large  / Bili: Negative / Urobili: Negative mg/dL   Blood: x / Protein: 15 mg/dL / Nitrite: Negative   Leuk Esterase: Small / RBC: 0-2 /HPF / WBC 6-10   Sq Epi: x / Non Sq Epi: Occasional / Bacteria: Few        RADIOLOGY & ADDITIONAL STUDIES:  CT Brain:  Impression:  1. no acute findings.    LUCY MCKEON M.D., ATTENDING RADIOLOGIST  This document has been electronically signed. 2019  8:19AM      CXR:  IMPRESSION: No acute cardiopulmonary disease process.    LAKESHA RODRIGUEZ M.D., ATTENDING RADIOLOGIST  This document has been electronically signed. 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 Pt now off insulin drip and anion gap closed. On admission had rectal temp of 99'F and a (+)UA started on iv rocephin and cx results pending. Pt transferred out of MICU to medical service.    Pt denies any n/v abd pain, no sob, no fevers, no chest pain, tolerating po intake.    PAST MEDICAL & SURGICAL HISTORY:  Psoriasis  Neuropathy  Cataract  HTN (hypertension)  Dry eye  DM (diabetes mellitus)  Toe amputation status, left: 2nd 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  S/P BKA (below knee amputation) unilateral, right      Social History:  Tabacco - Quit smoking 20 years ago  ETOH - social drinker  Illicit drug abuse - denies    FAMILY HISTORY:  Father diabetes mellitus  Mother: HTN    Allergies: No Known Allergies      REVIEW OF SYSTEMS:    All other ROS are negative except for HPI    MEDICATIONS  (STANDING):  cefTRIAXone   IVPB 1000 milliGRAM(s) IV Intermittent every 24 hours  chlorhexidine 2% Cloths 1 Application(s) Topical <User Schedule>  dextrose 5%. 1000 milliLiter(s) (50 mL/Hr) IV Continuous <Continuous>  dextrose 50% Injectable 12.5 Gram(s) IV Push once  dextrose 50% Injectable 25 Gram(s) IV Push once  dextrose 50% Injectable 25 Gram(s) IV Push once  gabapentin 100 milliGRAM(s) Oral three times a day  heparin  Injectable 5000 Unit(s) SubCutaneous every 12 hours  insulin glargine Injectable (LANTUS) 15 Unit(s) SubCutaneous every morning  insulin lispro (HumaLOG) corrective regimen sliding scale   SubCutaneous Before meals and at bedtime  pantoprazole    Tablet 40 milliGRAM(s) Oral before breakfast    MEDICATIONS  (PRN):  dextrose 40% Gel 15 Gram(s) Oral once PRN Blood Glucose LESS THAN 70 milliGRAM(s)/deciliter  glucagon  Injectable 1 milliGRAM(s) IntraMuscular once PRN Glucose LESS THAN 70 milligrams/deciliter      Vital Signs Last 24 Hrs  T(C): 36.6 (2019 11:54), Max: 37.2 (31 Oct 2019 20:04)  T(F): 97.8 (2019 11:54), Max: 99 (31 Oct 2019 20:04)  HR: 105 (2019 11:00) (90 - 124)  BP: 139/68 (2019 11:00) (108/56 - 185/86)  BP(mean): 97 (2019 11:00) (75 - 123)  RR: 21 (2019 11:00) (14 - 24)  SpO2: 99% (2019 11:) (98% - 100%)    PHYSICAL EXAM:    GENERAL: NAD, well-groomed, well-developed  HEAD:  Atraumatic, Normocephalic  EYES: EOMI, PERRLA, conjunctiva and sclera clear  NERVOUS SYSTEM:  Alert & Oriented X3, Good concentration; Motor Strength 5/5 B/L upper and lower extremities  CHEST/LUNG: CTA  b/l,  no rales, rhonchi, wheezing, or rubs  HEART: Regular rate and rhythm; No murmurs  ABDOMEN: Soft, Nontender, Nondistended; Bowel sounds present  EXTREMITIES:  No clubbing, cyanosis, or edema , Right BKA wearing prosthetic leg  SKIN: No rashes or lesions    LABS:                        11.4   12.95 )-----------( 288      ( 2019 06:27 )             33.5         137  |  104  |  32.0<H>  ----------------------------<  209<H>  3.8   |  21.0<L>  |  0.80    Ca    7.6<L>      2019 06:27  Phos  2.5       Mg     2.0         TPro  8.2  /  Alb  4.3  /  TBili  0.3<L>  /  DBili  x   /  AST  11  /  ALT  8   /  AlkPhos  111  10-31    PT/INR - ( 31 Oct 2019 20:20 )   PT: 14.1 sec;   INR: 1.22 ratio         PTT - ( 31 Oct 2019 20:20 )  PTT:28.6 sec  Urinalysis Basic - ( 31 Oct 2019 22:04 )    Color: Yellow / Appearance: Clear / S.025 / pH: x  Gluc: x / Ketone: Large  / Bili: Negative / Urobili: Negative mg/dL   Blood: x / Protein: 15 mg/dL / Nitrite: Negative   Leuk Esterase: Small / RBC: 0-2 /HPF / WBC 6-10   Sq Epi: x / Non Sq Epi: Occasional / Bacteria: Few        RADIOLOGY & ADDITIONAL STUDIES:  CT Brain:  Impression:  1. no acute findings.    LUCY MCKEON M.D., ATTENDING RADIOLOGIST  This document has been electronically signed. 2019  8:19AM      CXR:  IMPRESSION: No acute cardiopulmonary disease process.    LAKESHA RODRIGUEZ M.D., ATTENDING RADIOLOGIST  This document has been electronically signed. 2019  9:26AM

## 2019-11-01 NOTE — CONSULT NOTE ADULT - SUBJECTIVE AND OBJECTIVE BOX
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 B. A. Patient is somewhat lethargic and a poor historian at this time, but does admit to missing doses of his medications, although he is unable to specify what medications he takes other than insulin. Noted to have a rectal temp of 99'F and a (+)UA. He does report an episode of n/v and recent constipation, but denies fevers, abdominal pain, diarrhea, or dysuria.       PAST MEDICAL & SURGICAL HISTORY:  Psoriasis  Neuropathy  Cataract  HTN (hypertension)  Dry eye  DM (diabetes mellitus)  Toe amputation status, left: 2nd 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  S/P BKA (below knee amputation) unilateral, right    FAMILY HISTORY:  Family history of diabetes mellitus    SOCIAL HISTORY:    REVIEW OF SYSTEMS:  Constitutional: No fever, no chills, no change in weight.  Eyes: No eye swelling,no  blurry vision, no redness, no loss of vision.  Neck: No neck pain, no change in voice.  Lungs: No shortness of breath, no wheezing, no cough  CV: No chest pain, no palpitations, no pain with walking.  GI: No nausea, no vomiting, no constipation, no diarrhea, no abdominal pain  : No urinary frequency, no blood in urine, no urinary burning, no difficulty voiding.  Musculoskeletal: No muscle pain, no joint pain, no swelling.  Skin: No rash, no infections.  Neurologic: No headaches, no weakness, no burning or pain in feet, no tremor.  Endocrine: No heat intolerance, no cold intolerance, no increased sweating, no shakiness between meals.  Psych: No depression, no anxiety, no trouble concentrating    MEDICATIONS  (STANDING):  cefTRIAXone   IVPB 1000 milliGRAM(s) IV Intermittent every 24 hours  chlorhexidine 2% Cloths 1 Application(s) Topical <User Schedule>  dextrose 5%. 1000 milliLiter(s) (50 mL/Hr) IV Continuous <Continuous>  dextrose 50% Injectable 12.5 Gram(s) IV Push once  dextrose 50% Injectable 25 Gram(s) IV Push once  dextrose 50% Injectable 25 Gram(s) IV Push once  gabapentin 100 milliGRAM(s) Oral three times a day  heparin  Injectable 5000 Unit(s) SubCutaneous every 12 hours  insulin glargine Injectable (LANTUS) 15 Unit(s) SubCutaneous every morning  insulin lispro (HumaLOG) corrective regimen sliding scale   SubCutaneous Before meals and at bedtime  pantoprazole    Tablet 40 milliGRAM(s) Oral before breakfast    MEDICATIONS  (PRN):  dextrose 40% Gel 15 Gram(s) Oral once PRN Blood Glucose LESS THAN 70 milliGRAM(s)/deciliter  glucagon  Injectable 1 milliGRAM(s) IntraMuscular once PRN Glucose LESS THAN 70 milligrams/deciliter    Allergies  No Known Allergies    CAPILLARY BLOOD GLUCOSE  POCT Blood Glucose.: 217 mg/dL (2019 08:19)      PHYSICAL EXAM:    Vital Signs Last 24 Hrs  T(C): 36.4 (2019 07:41), Max: 37.2 (31 Oct 2019 20:04)  T(F): 97.5 (2019 07:41), Max: 99 (31 Oct 2019 20:04)  HR: 105 (2019 10:00) (90 - 124)  BP: 125/62 (2019 10:00) (108/56 - 185/86)  BP(mean): 89 (2019 10:00) (75 - 123)  RR: 19 (2019 10:00) (14 - 24)  SpO2: 98% (2019 10:00) (98% - 100%)    General appearance: Well developed, well nourished.    Eyes: Pupils equal and reactive to light. EOM full. No exophthalmos.    Neck: Trachea midline. No thyroid enlargement.    Lungs: Normal respiratory excursion. Lungs clear.    CV: Regular cardiac rhythm. No murmur. Carotid and pedal pulses intact.    Abdomen: Soft, non tender, no organomegaly or mass.    Musculoskeletal: No cyanosis, clubbing, or edema. No pedal lesions.    Skin: Warm and moist. No rash. No acanthosis.    Neuro: Cranial nerves intact. Normal motor and sensory function. DTR's normal.    Psych: Normal affect, good judgement.            LABS:                        11.4   12.95 )-----------( 288      ( 2019 06:27 )             33.5         137  |  104  |  32.0<H>  ----------------------------<  209<H>  3.8   |  21.0<L>  |  0.80    Ca    7.6<L>      2019 06:27  Phos  2.5       Mg     2.0         TPro  8.2  /  Alb  4.3  /  TBili  0.3<L>  /  DBili  x   /  AST  11  /  ALT  8   /  AlkPhos  111  10-31    Urinalysis Basic - ( 31 Oct 2019 22:04 )    Color: Yellow / Appearance: Clear / S.025 / pH: x  Gluc: x / Ketone: Large  / Bili: Negative / Urobili: Negative mg/dL   Blood: x / Protein: 15 mg/dL / Nitrite: Negative   Leuk Esterase: Small / RBC: 0-2 /HPF / WBC 6-10   Sq Epi: x / Non Sq Epi: Occasional / Bacteria: Few    LIVER FUNCTIONS - ( 31 Oct 2019 20:20 )  Alb: 4.3 g/dL / Pro: 8.2 g/dL / ALK PHOS: 111 U/L / ALT: 8 U/L / AST: 11 U/L / GGT: x         Hemoglobin A1C, Whole Blood: 9.7 % ( @ 06:27) HPI:  70 y/o M with a h/o HTN, Hep B (s/p treatment), DM (previous admissions for DKA), right BKA and 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 B. A. Patient is somewhat lethargic and a poor historian at this time, but does admit to missing doses of his medications. He does report an episode of n/v and recent constipation, but denies fevers, abdominal pain, diarrhea, or dysuria. He had dm for 35 years. he takes insulin at home basal and bolus. But he misses a lot of meal insulin.     PAST MEDICAL & SURGICAL HISTORY:  Psoriasis  Neuropathy  Cataract  HTN (hypertension)  Dry eye  DM (diabetes mellitus)  Toe amputation status, left: 2nd 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  S/P BKA (below knee amputation) unilateral, right    FAMILY HISTORY:  Family history of diabetes mellitus    SOCIAL HISTORY: no etoh, no tobacco, no drugs     REVIEW OF SYSTEMS:  Constitutional: No fever, no chills, no change in weight.  Eyes: No eye swelling,no  blurry vision, no redness, no loss of vision.  Neck: No neck pain, no change in voice.  Lungs: No shortness of breath, no wheezing, no cough  CV: No chest pain, no palpitations, no pain with walking.  GI: No nausea, no vomiting, no constipation, no diarrhea, no abdominal pain  : No urinary frequency, no blood in urine, no urinary burning, no difficulty voiding.  Musculoskeletal: No muscle pain, no joint pain, no swelling.  Skin: No rash, no infections.  Neurologic: No headaches, no weakness, no burning or pain in feet, no tremor.  Endocrine: No heat intolerance, no cold intolerance, no increased sweating, no shakiness between meals.  Psych: No depression, no anxiety, no trouble concentrating    MEDICATIONS  (STANDING):  cefTRIAXone   IVPB 1000 milliGRAM(s) IV Intermittent every 24 hours  chlorhexidine 2% Cloths 1 Application(s) Topical <User Schedule>  dextrose 5%. 1000 milliLiter(s) (50 mL/Hr) IV Continuous <Continuous>  dextrose 50% Injectable 12.5 Gram(s) IV Push once  dextrose 50% Injectable 25 Gram(s) IV Push once  dextrose 50% Injectable 25 Gram(s) IV Push once  gabapentin 100 milliGRAM(s) Oral three times a day  heparin  Injectable 5000 Unit(s) SubCutaneous every 12 hours  insulin glargine Injectable (LANTUS) 15 Unit(s) SubCutaneous every morning  insulin lispro (HumaLOG) corrective regimen sliding scale   SubCutaneous Before meals and at bedtime  pantoprazole    Tablet 40 milliGRAM(s) Oral before breakfast    MEDICATIONS  (PRN):  dextrose 40% Gel 15 Gram(s) Oral once PRN Blood Glucose LESS THAN 70 milliGRAM(s)/deciliter  glucagon  Injectable 1 milliGRAM(s) IntraMuscular once PRN Glucose LESS THAN 70 milligrams/deciliter    Allergies  No Known Allergies    CAPILLARY BLOOD GLUCOSE  POCT Blood Glucose.: 217 mg/dL (2019 08:19)      PHYSICAL EXAM:    Vital Signs Last 24 Hrs  T(C): 36.4 (2019 07:41), Max: 37.2 (31 Oct 2019 20:04)  T(F): 97.5 (2019 07:41), Max: 99 (31 Oct 2019 20:04)  HR: 105 (2019 10:00) (90 - 124)  BP: 125/62 (2019 10:00) (108/56 - 185/86)  BP(mean): 89 (2019 10:00) (75 - 123)  RR: 19 (2019 10:00) (14 - 24)  SpO2: 98% (2019 10:00) (98% - 100%)  GENERAL: NAD, well-groomed, well-developed  HEAD:  Atraumatic, Normocephalic  EYES: EOMI, PERRLA, conjunctiva and sclera clear  NERVOUS SYSTEM:  Alert & Oriented X3, Good concentration; Motor Strength 5/5 B/L upper and lower extremities  CHEST/LUNG: CTA  b/l,  no rales, rhonchi, wheezing, or rubs  HEART: Regular rate and rhythm; No murmurs  ABDOMEN: Soft, Nontender, Nondistended; Bowel sounds present  EXTREMITIES:  No clubbing, cyanosis, or edema , Right BKA wearing prosthetic leg  SKIN: No rashes or lesions    LABS:                        11.4   12.95 )-----------( 288      ( 2019 06:27 )             33.5     11-    137  |  104  |  32.0<H>  ----------------------------<  209<H>  3.8   |  21.0<L>  |  0.80    Ca    7.6<L>      2019 06:27  Phos  2.5       Mg     2.0         TPro  8.2  /  Alb  4.3  /  TBili  0.3<L>  /  DBili  x   /  AST  11  /  ALT  8   /  AlkPhos  111  10-31    Urinalysis Basic - ( 31 Oct 2019 22:04 )    Color: Yellow / Appearance: Clear / S.025 / pH: x  Gluc: x / Ketone: Large  / Bili: Negative / Urobili: Negative mg/dL   Blood: x / Protein: 15 mg/dL / Nitrite: Negative   Leuk Esterase: Small / RBC: 0-2 /HPF / WBC 6-10   Sq Epi: x / Non Sq Epi: Occasional / Bacteria: Few    LIVER FUNCTIONS - ( 31 Oct 2019 20:20 )  Alb: 4.3 g/dL / Pro: 8.2 g/dL / ALK PHOS: 111 U/L / ALT: 8 U/L / AST: 11 U/L / GGT: x         Hemoglobin A1C, Whole Blood: 9.7 % ( @ 06:27)

## 2019-11-01 NOTE — H&P ADULT - PROBLEM SELECTOR PLAN 3
Secondary to dehydration/hypovolemia from osmotic diuresis. Continue aggressive volume resuscitation. Trend BUN/Cr. Monitor lytes and UOP. Avoid nephrotoxic agents.

## 2019-11-01 NOTE — H&P ADULT - PROBLEM SELECTOR PLAN 2
Weakly positive UA, although in the setting of elevated WBC, low grade fever, and DKA, will start course of ceftriaxone. Blood and urine cultures pending. Send procalcitonin. Deescalate abx as able.

## 2019-11-01 NOTE — H&P ADULT - PROBLEM SELECTOR PLAN 4
BP currently stable. Unable to obtain medications from patient/family, will need to investigate further.

## 2019-11-01 NOTE — CONSULT NOTE ADULT - ASSESSMENT
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 AG has closed this morning. Given 15 units lantus. Tolerating PO diet. Off insulin infusion. At this time optimized for transfer out of MICU to any medical bed.    DKA   insulin gtt per protocol.   cmp q4-6 hr, monitor and replete electrolytes  NPO for now  Bg q1hr   monitor ABG  HgA1c 9.7      - mildly + UA, c/w Rocephin w/ elevated WBC and low grades fevers. f/u blood and urine cultures. 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 AG has closed this morning. Given 15 units lantus. Tolerating PO diet. Off insulin infusion. At this time optimized for transfer out of MICU to any medical bed.    DKA /uncontrolled DM : off isnulin gtt now, Gap closed and bicarbonate slightly low.   he was started on lantus 15 u   use SSI for coverage and determine meal insulin doses.   bgs actid and hs     UTI:  on iv Rocephin  - blood and urine cx pending results    HTN: will monitor and add BP medication if needed

## 2019-11-02 LAB
ANION GAP SERPL CALC-SCNC: 12 MMOL/L — SIGNIFICANT CHANGE UP (ref 5–17)
BUN SERPL-MCNC: 16 MG/DL — SIGNIFICANT CHANGE UP (ref 8–20)
CALCIUM SERPL-MCNC: 7.9 MG/DL — LOW (ref 8.6–10.2)
CHLORIDE SERPL-SCNC: 101 MMOL/L — SIGNIFICANT CHANGE UP (ref 98–107)
CO2 SERPL-SCNC: 24 MMOL/L — SIGNIFICANT CHANGE UP (ref 22–29)
CREAT SERPL-MCNC: 0.68 MG/DL — SIGNIFICANT CHANGE UP (ref 0.5–1.3)
CULTURE RESULTS: SIGNIFICANT CHANGE UP
GLUCOSE BLDC GLUCOMTR-MCNC: 125 MG/DL — HIGH (ref 70–99)
GLUCOSE BLDC GLUCOMTR-MCNC: 138 MG/DL — HIGH (ref 70–99)
GLUCOSE BLDC GLUCOMTR-MCNC: 181 MG/DL — HIGH (ref 70–99)
GLUCOSE BLDC GLUCOMTR-MCNC: 235 MG/DL — HIGH (ref 70–99)
GLUCOSE BLDC GLUCOMTR-MCNC: 274 MG/DL — HIGH (ref 70–99)
GLUCOSE SERPL-MCNC: 120 MG/DL — HIGH (ref 70–115)
HCT VFR BLD CALC: 33.4 % — LOW (ref 39–50)
HGB BLD-MCNC: 11 G/DL — LOW (ref 13–17)
MAGNESIUM SERPL-MCNC: 1.9 MG/DL — SIGNIFICANT CHANGE UP (ref 1.6–2.6)
MCHC RBC-ENTMCNC: 27.3 PG — SIGNIFICANT CHANGE UP (ref 27–34)
MCHC RBC-ENTMCNC: 32.9 GM/DL — SIGNIFICANT CHANGE UP (ref 32–36)
MCV RBC AUTO: 82.9 FL — SIGNIFICANT CHANGE UP (ref 80–100)
PLATELET # BLD AUTO: 269 K/UL — SIGNIFICANT CHANGE UP (ref 150–400)
POTASSIUM SERPL-MCNC: 3.5 MMOL/L — SIGNIFICANT CHANGE UP (ref 3.5–5.3)
POTASSIUM SERPL-SCNC: 3.5 MMOL/L — SIGNIFICANT CHANGE UP (ref 3.5–5.3)
RBC # BLD: 4.03 M/UL — LOW (ref 4.2–5.8)
RBC # FLD: 12.6 % — SIGNIFICANT CHANGE UP (ref 10.3–14.5)
SODIUM SERPL-SCNC: 137 MMOL/L — SIGNIFICANT CHANGE UP (ref 135–145)
SPECIMEN SOURCE: SIGNIFICANT CHANGE UP
WBC # BLD: 9.76 K/UL — SIGNIFICANT CHANGE UP (ref 3.8–10.5)
WBC # FLD AUTO: 9.76 K/UL — SIGNIFICANT CHANGE UP (ref 3.8–10.5)

## 2019-11-02 PROCEDURE — 99232 SBSQ HOSP IP/OBS MODERATE 35: CPT

## 2019-11-02 RX ORDER — CLOPIDOGREL BISULFATE 75 MG/1
75 TABLET, FILM COATED ORAL DAILY
Refills: 0 | Status: DISCONTINUED | OUTPATIENT
Start: 2019-11-02 | End: 2019-11-05

## 2019-11-02 RX ORDER — ATENOLOL 25 MG/1
50 TABLET ORAL DAILY
Refills: 0 | Status: DISCONTINUED | OUTPATIENT
Start: 2019-11-02 | End: 2019-11-05

## 2019-11-02 RX ORDER — ATORVASTATIN CALCIUM 80 MG/1
20 TABLET, FILM COATED ORAL AT BEDTIME
Refills: 0 | Status: DISCONTINUED | OUTPATIENT
Start: 2019-11-02 | End: 2019-11-05

## 2019-11-02 RX ADMIN — HEPARIN SODIUM 5000 UNIT(S): 5000 INJECTION INTRAVENOUS; SUBCUTANEOUS at 17:39

## 2019-11-02 RX ADMIN — Medication 6: at 21:48

## 2019-11-02 RX ADMIN — GABAPENTIN 100 MILLIGRAM(S): 400 CAPSULE ORAL at 17:39

## 2019-11-02 RX ADMIN — PANTOPRAZOLE SODIUM 40 MILLIGRAM(S): 20 TABLET, DELAYED RELEASE ORAL at 05:09

## 2019-11-02 RX ADMIN — CEFTRIAXONE 100 MILLIGRAM(S): 500 INJECTION, POWDER, FOR SOLUTION INTRAMUSCULAR; INTRAVENOUS at 01:37

## 2019-11-02 RX ADMIN — HEPARIN SODIUM 5000 UNIT(S): 5000 INJECTION INTRAVENOUS; SUBCUTANEOUS at 05:08

## 2019-11-02 RX ADMIN — Medication 2: at 17:45

## 2019-11-02 RX ADMIN — CHLORHEXIDINE GLUCONATE 1 APPLICATION(S): 213 SOLUTION TOPICAL at 05:12

## 2019-11-02 RX ADMIN — GABAPENTIN 100 MILLIGRAM(S): 400 CAPSULE ORAL at 05:08

## 2019-11-02 RX ADMIN — ATORVASTATIN CALCIUM 20 MILLIGRAM(S): 80 TABLET, FILM COATED ORAL at 21:47

## 2019-11-02 RX ADMIN — Medication 4: at 12:39

## 2019-11-02 NOTE — PROGRESS NOTE ADULT - SUBJECTIVE AND OBJECTIVE BOX
CC: Follow up    INTERVAL HPI/OVERNIGHT EVENTS: Patient seen and examined,       Vital Signs Last 24 Hrs  T(C): 36.3 (2019 08:47), Max: 36.8 (2019 20:40)  T(F): 97.4 (2019 08:47), Max: 98.2 (2019 20:40)  HR: 89 (2019 08:47) (89 - 103)  BP: 132/68 (2019 08:47) (128/60 - 165/79)  BP(mean): 86 (2019 20:00) (86 - 113)  RR: 18 (2019 08:47) (15 - 18)  SpO2: 97% (2019 08:47) (94% - 98%)    PHYSICAL EXAM:    GENERAL: NAD, well-groomed, well-developed  HEAD:  Atraumatic, Normocephalic  EYES: EOMI, PERRLA, conjunctiva and sclera clear  ENMT: Moist mucous membranes  NECK: Supple, No JVD  NERVOUS SYSTEM:  Alert & Oriented X3, Motor Strength 5/5 B/L upper and lower extremities; DTRs 2+ intact and symmetric  CHEST/LUNG: Clear to auscultation bilaterally; No rales, rhonchi, wheezing, or rubs  HEART: Regular rate and rhythm; No murmurs, rubs, or gallops  ABDOMEN: Soft, Nontender, Nondistended; Bowel sounds present  EXTREMITIES:  2+ Peripheral Pulses, No clubbing, cyanosis, or edema        MEDICATIONS  (STANDING):  cefTRIAXone   IVPB 1000 milliGRAM(s) IV Intermittent every 24 hours  chlorhexidine 2% Cloths 1 Application(s) Topical <User Schedule>  dextrose 5%. 1000 milliLiter(s) (50 mL/Hr) IV Continuous <Continuous>  dextrose 50% Injectable 12.5 Gram(s) IV Push once  dextrose 50% Injectable 25 Gram(s) IV Push once  dextrose 50% Injectable 25 Gram(s) IV Push once  gabapentin 100 milliGRAM(s) Oral three times a day  heparin  Injectable 5000 Unit(s) SubCutaneous every 12 hours  insulin glargine Injectable (LANTUS) 15 Unit(s) SubCutaneous every morning  insulin lispro (HumaLOG) corrective regimen sliding scale   SubCutaneous Before meals and at bedtime  pantoprazole    Tablet 40 milliGRAM(s) Oral before breakfast    MEDICATIONS  (PRN):  dextrose 40% Gel 15 Gram(s) Oral once PRN Blood Glucose LESS THAN 70 milliGRAM(s)/deciliter  glucagon  Injectable 1 milliGRAM(s) IntraMuscular once PRN Glucose LESS THAN 70 milligrams/deciliter      Allergies    No Known Allergies    Intolerances          LABS:                          11.0   9.76  )-----------( 269      ( 2019 08:03 )             33.4     11-    137  |  101  |  16.0  ----------------------------<  120<H>  3.5   |  24.0  |  0.68    Ca    7.9<L>      2019 08:03  Phos  2.5     11-  Mg     1.9         TPro  8.2  /  Alb  4.3  /  TBili  0.3<L>  /  DBili  x   /  AST  11  /  ALT  8   /  AlkPhos  111  10-    PT/INR - ( 31 Oct 2019 20:20 )   PT: 14.1 sec;   INR: 1.22 ratio         PTT - ( 31 Oct 2019 20:20 )  PTT:28.6 sec  Urinalysis Basic - ( 31 Oct 2019 22:04 )    Color: Yellow / Appearance: Clear / S.025 / pH: x  Gluc: x / Ketone: Large  / Bili: Negative / Urobili: Negative mg/dL   Blood: x / Protein: 15 mg/dL / Nitrite: Negative   Leuk Esterase: Small / RBC: 0-2 /HPF / WBC 6-10   Sq Epi: x / Non Sq Epi: Occasional / Bacteria: Few        RADIOLOGY & ADDITIONAL TESTS: CC: Follow up    INTERVAL HPI/OVERNIGHT EVENTS: Patient seen and examined, denies any complaints overnight.       Vital Signs Last 24 Hrs  T(C): 36.3 (2019 08:47), Max: 36.8 (2019 20:40)  T(F): 97.4 (2019 08:47), Max: 98.2 (2019 20:40)  HR: 89 (2019 08:47) (89 - 103)  BP: 132/68 (2019 08:47) (128/60 - 165/79)  BP(mean): 86 (2019 20:00) (86 - 113)  RR: 18 (2019 08:47) (15 - 18)  SpO2: 97% (2019 08:47) (94% - 98%)    PHYSICAL EXAM:    GENERAL: NAD, AOX3  ENMT: Moist mucous membranes  NECK: Supple, No JVD  CHEST/LUNG: Clear to auscultation bilaterally; No rales, rhonchi, wheezing, or rubs  HEART: Regular rate and rhythm; No murmurs, rubs, or gallops  ABDOMEN: Soft, Nontender, Nondistended; Bowel sounds present  EXTREMITIES:  2+ Peripheral Pulses, No clubbing, cyanosis, or edema        MEDICATIONS  (STANDING):  cefTRIAXone   IVPB 1000 milliGRAM(s) IV Intermittent every 24 hours  chlorhexidine 2% Cloths 1 Application(s) Topical <User Schedule>  dextrose 5%. 1000 milliLiter(s) (50 mL/Hr) IV Continuous <Continuous>  dextrose 50% Injectable 12.5 Gram(s) IV Push once  dextrose 50% Injectable 25 Gram(s) IV Push once  dextrose 50% Injectable 25 Gram(s) IV Push once  gabapentin 100 milliGRAM(s) Oral three times a day  heparin  Injectable 5000 Unit(s) SubCutaneous every 12 hours  insulin glargine Injectable (LANTUS) 15 Unit(s) SubCutaneous every morning  insulin lispro (HumaLOG) corrective regimen sliding scale   SubCutaneous Before meals and at bedtime  pantoprazole    Tablet 40 milliGRAM(s) Oral before breakfast    MEDICATIONS  (PRN):  dextrose 40% Gel 15 Gram(s) Oral once PRN Blood Glucose LESS THAN 70 milliGRAM(s)/deciliter  glucagon  Injectable 1 milliGRAM(s) IntraMuscular once PRN Glucose LESS THAN 70 milligrams/deciliter      Allergies    No Known Allergies    Intolerances          LABS:                          11.0   9.76  )-----------( 269      ( 2019 08:03 )             33.4     11    137  |  101  |  16.0  ----------------------------<  120<H>  3.5   |  24.0  |  0.68    Ca    7.9<L>      2019 08:03  Phos  2.5     11-  Mg     1.9         TPro  8.2  /  Alb  4.3  /  TBili  0.3<L>  /  DBili  x   /  AST  11  /  ALT  8   /  AlkPhos  111  10-31    PT/INR - ( 31 Oct 2019 20:20 )   PT: 14.1 sec;   INR: 1.22 ratio         PTT - ( 31 Oct 2019 20:20 )  PTT:28.6 sec  Urinalysis Basic - ( 31 Oct 2019 22:04 )    Color: Yellow / Appearance: Clear / S.025 / pH: x  Gluc: x / Ketone: Large  / Bili: Negative / Urobili: Negative mg/dL   Blood: x / Protein: 15 mg/dL / Nitrite: Negative   Leuk Esterase: Small / RBC: 0-2 /HPF / WBC 6-10   Sq Epi: x / Non Sq Epi: Occasional / Bacteria: Few        RADIOLOGY & ADDITIONAL TESTS: CC: Follow up    INTERVAL HPI/OVERNIGHT EVENTS: Patient seen and examined, denies any complaints overnight.       Vital Signs Last 24 Hrs  T(C): 36.3 (2019 08:47), Max: 36.8 (2019 20:40)  T(F): 97.4 (2019 08:47), Max: 98.2 (2019 20:40)  HR: 89 (2019 08:47) (89 - 103)  BP: 132/68 (2019 08:47) (128/60 - 165/79)  BP(mean): 86 (2019 20:00) (86 - 113)  RR: 18 (2019 08:47) (15 - 18)  SpO2: 97% (2019 08:47) (94% - 98%)    PHYSICAL EXAM:    GENERAL: NAD, AOX3  ENMT: Moist mucous membranes  NECK: Supple, No JVD  CHEST/LUNG: Clear to auscultation bilaterally; No rales, rhonchi, wheezing, or rubs  HEART: Regular rate and rhythm; No murmurs, rubs, or gallops  ABDOMEN: Soft, Nontender, Nondistended; Bowel sounds present  EXTREMITIES:  Right bka         MEDICATIONS  (STANDING):  cefTRIAXone   IVPB 1000 milliGRAM(s) IV Intermittent every 24 hours  chlorhexidine 2% Cloths 1 Application(s) Topical <User Schedule>  dextrose 5%. 1000 milliLiter(s) (50 mL/Hr) IV Continuous <Continuous>  dextrose 50% Injectable 12.5 Gram(s) IV Push once  dextrose 50% Injectable 25 Gram(s) IV Push once  dextrose 50% Injectable 25 Gram(s) IV Push once  gabapentin 100 milliGRAM(s) Oral three times a day  heparin  Injectable 5000 Unit(s) SubCutaneous every 12 hours  insulin glargine Injectable (LANTUS) 15 Unit(s) SubCutaneous every morning  insulin lispro (HumaLOG) corrective regimen sliding scale   SubCutaneous Before meals and at bedtime  pantoprazole    Tablet 40 milliGRAM(s) Oral before breakfast    MEDICATIONS  (PRN):  dextrose 40% Gel 15 Gram(s) Oral once PRN Blood Glucose LESS THAN 70 milliGRAM(s)/deciliter  glucagon  Injectable 1 milliGRAM(s) IntraMuscular once PRN Glucose LESS THAN 70 milligrams/deciliter      Allergies    No Known Allergies    Intolerances          LABS:                          11.0   9.76  )-----------( 269      ( 2019 08:03 )             33.4         137  |  101  |  16.0  ----------------------------<  120<H>  3.5   |  24.0  |  0.68    Ca    7.9<L>      2019 08:03  Phos  2.5       Mg     1.9         TPro  8.2  /  Alb  4.3  /  TBili  0.3<L>  /  DBili  x   /  AST  11  /  ALT  8   /  AlkPhos  111  10-31    PT/INR - ( 31 Oct 2019 20:20 )   PT: 14.1 sec;   INR: 1.22 ratio         PTT - ( 31 Oct 2019 20:20 )  PTT:28.6 sec  Urinalysis Basic - ( 31 Oct 2019 22:04 )    Color: Yellow / Appearance: Clear / S.025 / pH: x  Gluc: x / Ketone: Large  / Bili: Negative / Urobili: Negative mg/dL   Blood: x / Protein: 15 mg/dL / Nitrite: Negative   Leuk Esterase: Small / RBC: 0-2 /HPF / WBC 6-10   Sq Epi: x / Non Sq Epi: Occasional / Bacteria: Few        RADIOLOGY & ADDITIONAL TESTS:

## 2019-11-02 NOTE — PROGRESS NOTE ADULT - ASSESSMENT
The patient is a 69 year old male with a history of hypertension, hepatitis B status post treatment, diabetes mellitus type 2, PVD status post right BKA and left toe amputations  admitted to MICU for DKA. Patient was treated with IV fluids and insulin, bridged with Lantus. On admission patient had a rectal temp of 99'F; UA was positive and was started on IV rocephin.     Assessment/Plan:    1. DKA now resolved; Continue lantus QHS   Continue HSS  mOnitor BSL  Endocrine following    2. Diabetes mellitus type 2    3. UTI: IV rocephin day 2  Follow up urine and blood cultures ent on 10/31    4. Diabetic neuropathy on gabapentin      VTE- heparin subcut The patient is a 69 year old male with a history of hypertension, hepatitis B status post treatment, diabetes mellitus type 2, PVD status post right BKA and left toe amputations  admitted to MICU for DKA. Patient was treated with IV fluids and insulin, bridged with Lantus. On admission patient had a rectal temp of 99'F; UA was positive and was started on IV rocephin.     Assessment/Plan:    1. DKA now resolved; Continue lantus QHS   Continue HSS  mOnitor BSL  Endocrine following    2. Diabetes mellitus type 2    3. UTI: IV rocephin day 2  Follow up urine and blood cultures ent on 10/31    4. Diabetic neuropathy on gabapentin    5. BRIAN on admission resolved      VTE- heparin subcut The patient is a 69 year old male with a history of hypertension, hepatitis B status post treatment, diabetes mellitus type 2, PVD status post right BKA and left toe amputations  admitted to MICU for DKA. Patient was treated with IV fluids and insulin, bridged with Lantus. On admission patient had a rectal temp of 99'F; UA was positive and was started on IV rocephin.     Assessment/Plan:    1. DKA now resolved; Continue lantus QHS   Continue HSS  mOnitor BSL  Endocrine following    2. Diabetes mellitus type 2    3. UTI: IV rocephin day 2  Follow up urine and blood cultures ent on 10/31    4. Diabetic neuropathy on gabapentin    5. BRIAN on admission resolved      VTE- heparin subcut     Patient states his pharmacy is [facility] in [place] on [address]- called pharmacy the last time he picked up medication was 08/2019 Levemir 15 units at bedtime    [facility] Julio Martin- The patient is a 69 year old male with a history of hypertension, hepatitis B status post treatment, diabetes mellitus type 2, PVD status post right BKA and left toe amputations  admitted to MICU for DKA. Patient was treated with IV fluids and insulin, bridged with Lantus. On admission patient had a rectal temp of 99'F; UA was positive and was started on IV rocephin.     Assessment/Plan:    1. DKA now resolved; Continue lantus QHS   Continue HSS  mOnitor BSL  Endocrine following    2. Diabetes mellitus type 2    3. UTI: IV rocephin day 2  Follow up urine and blood cultures ent on 10/31    4. Diabetic neuropathy on gabapentin    5. BRIAN on admission resolved      VTE- heparin subcut     Patient states his pharmacy is [facility] in [place] on [address]- called pharmacy the last time he picked up medication was 08/2019 Levemir 15 units at bedtime    [facility] Julio Martin- Has not Filled medications since 04/2019 The patient is a 69 year old male with a history of hypertension, hepatitis B status post treatment, diabetes mellitus type 2, PVD status post right BKA and left toe amputations  admitted to MICU for DKA. Patient was treated with IV fluids and insulin, bridged with Lantus. On admission patient had a rectal temp of 99'F; UA was positive and was started on IV rocephin.     Assessment/Plan:    1. DKA now resolved; Continue lantus QHS   Continue HSS  mOnitor BSL  Endocrine following    2. Diabetes mellitus type 2    3. UTI: IV rocephin day 2  Follow up urine and blood cultures ent on 10/31    4. Diabetic neuropathy on gabapentin    5. BRIAN on admission resolved      VTE- heparin subcut     Patient states his pharmacy is [facility] in [place] [address]- called pharmacy the last time he picked up medication was 08/2019 Levemir 15 units at bedtime    [facility] Julio Martin- Has not Filled medications since 04/2019    Per Divina RAMIREZ: In feb 2019 patient was prescribed Novolog  Metformin 500mg PO BID  Glipizide 10 OD  Neurontin 100mg OD  Aspirin 81  Plavix 75  Atenolol 50     Patient is a poor historian and cannot clearly tell me what he takes at home The patient is a 69 year old male with a history of hypertension, hepatitis B status post treatment, diabetes mellitus type 2, PVD status post right BKA and left toe amputations  admitted to MICU for DKA. Patient was treated with IV fluids and insulin, bridged with Lantus. On admission patient had a rectal temp of 99'F; UA was positive and was started on IV rocephin.     Assessment/Plan:    1. DKA now resolved; Continue lantus QHS   Continue HSS  mOnitor BSL  Endocrine following    2. Diabetes mellitus type 2    3. UTI: IV rocephin day 2  Follow up urine and blood cultures ent on 10/31    4. PVD status post right bka  On plavix and statin    5. BRIAN on admission resolved    6. Hypertension: Atenolol resumed  Monitor BP     VTE- heparin subcut     Medications were documented and reconciled Per Divina RAMIREZ:  PMD Dr Maldonado note on 10/15

## 2019-11-03 LAB
ANION GAP SERPL CALC-SCNC: 13 MMOL/L — SIGNIFICANT CHANGE UP (ref 5–17)
BUN SERPL-MCNC: 15 MG/DL — SIGNIFICANT CHANGE UP (ref 8–20)
CALCIUM SERPL-MCNC: 8.4 MG/DL — LOW (ref 8.6–10.2)
CHLORIDE SERPL-SCNC: 98 MMOL/L — SIGNIFICANT CHANGE UP (ref 98–107)
CO2 SERPL-SCNC: 25 MMOL/L — SIGNIFICANT CHANGE UP (ref 22–29)
CREAT SERPL-MCNC: 0.68 MG/DL — SIGNIFICANT CHANGE UP (ref 0.5–1.3)
GLUCOSE BLDC GLUCOMTR-MCNC: 208 MG/DL — HIGH (ref 70–99)
GLUCOSE BLDC GLUCOMTR-MCNC: 215 MG/DL — HIGH (ref 70–99)
GLUCOSE BLDC GLUCOMTR-MCNC: 234 MG/DL — HIGH (ref 70–99)
GLUCOSE BLDC GLUCOMTR-MCNC: 322 MG/DL — HIGH (ref 70–99)
GLUCOSE SERPL-MCNC: 244 MG/DL — HIGH (ref 70–115)
HCT VFR BLD CALC: 34.7 % — LOW (ref 39–50)
HGB BLD-MCNC: 11.8 G/DL — LOW (ref 13–17)
MAGNESIUM SERPL-MCNC: 1.9 MG/DL — SIGNIFICANT CHANGE UP (ref 1.8–2.6)
MCHC RBC-ENTMCNC: 28 PG — SIGNIFICANT CHANGE UP (ref 27–34)
MCHC RBC-ENTMCNC: 34 GM/DL — SIGNIFICANT CHANGE UP (ref 32–36)
MCV RBC AUTO: 82.2 FL — SIGNIFICANT CHANGE UP (ref 80–100)
PHOSPHATE SERPL-MCNC: 3.4 MG/DL — SIGNIFICANT CHANGE UP (ref 2.4–4.7)
PLATELET # BLD AUTO: 267 K/UL — SIGNIFICANT CHANGE UP (ref 150–400)
POTASSIUM SERPL-MCNC: 3.8 MMOL/L — SIGNIFICANT CHANGE UP (ref 3.5–5.3)
POTASSIUM SERPL-SCNC: 3.8 MMOL/L — SIGNIFICANT CHANGE UP (ref 3.5–5.3)
RBC # BLD: 4.22 M/UL — SIGNIFICANT CHANGE UP (ref 4.2–5.8)
RBC # FLD: 12.4 % — SIGNIFICANT CHANGE UP (ref 10.3–14.5)
SODIUM SERPL-SCNC: 136 MMOL/L — SIGNIFICANT CHANGE UP (ref 135–145)
WBC # BLD: 8.08 K/UL — SIGNIFICANT CHANGE UP (ref 3.8–10.5)
WBC # FLD AUTO: 8.08 K/UL — SIGNIFICANT CHANGE UP (ref 3.8–10.5)

## 2019-11-03 PROCEDURE — 99233 SBSQ HOSP IP/OBS HIGH 50: CPT

## 2019-11-03 PROCEDURE — 99232 SBSQ HOSP IP/OBS MODERATE 35: CPT

## 2019-11-03 RX ORDER — BACITRACIN ZINC 500 UNIT/G
1 OINTMENT IN PACKET (EA) TOPICAL
Refills: 0 | Status: DISCONTINUED | OUTPATIENT
Start: 2019-11-03 | End: 2019-11-05

## 2019-11-03 RX ADMIN — GABAPENTIN 100 MILLIGRAM(S): 400 CAPSULE ORAL at 22:53

## 2019-11-03 RX ADMIN — GABAPENTIN 100 MILLIGRAM(S): 400 CAPSULE ORAL at 01:02

## 2019-11-03 RX ADMIN — Medication 1 APPLICATION(S): at 18:23

## 2019-11-03 RX ADMIN — HEPARIN SODIUM 5000 UNIT(S): 5000 INJECTION INTRAVENOUS; SUBCUTANEOUS at 06:07

## 2019-11-03 RX ADMIN — ATORVASTATIN CALCIUM 20 MILLIGRAM(S): 80 TABLET, FILM COATED ORAL at 22:44

## 2019-11-03 RX ADMIN — ATENOLOL 50 MILLIGRAM(S): 25 TABLET ORAL at 06:07

## 2019-11-03 RX ADMIN — CLOPIDOGREL BISULFATE 75 MILLIGRAM(S): 75 TABLET, FILM COATED ORAL at 12:12

## 2019-11-03 RX ADMIN — GABAPENTIN 100 MILLIGRAM(S): 400 CAPSULE ORAL at 12:11

## 2019-11-03 RX ADMIN — INSULIN GLARGINE 15 UNIT(S): 100 INJECTION, SOLUTION SUBCUTANEOUS at 09:01

## 2019-11-03 RX ADMIN — CEFTRIAXONE 100 MILLIGRAM(S): 500 INJECTION, POWDER, FOR SOLUTION INTRAMUSCULAR; INTRAVENOUS at 01:04

## 2019-11-03 RX ADMIN — Medication 4: at 12:10

## 2019-11-03 RX ADMIN — Medication 8: at 22:43

## 2019-11-03 RX ADMIN — GABAPENTIN 100 MILLIGRAM(S): 400 CAPSULE ORAL at 06:07

## 2019-11-03 RX ADMIN — HEPARIN SODIUM 5000 UNIT(S): 5000 INJECTION INTRAVENOUS; SUBCUTANEOUS at 17:02

## 2019-11-03 RX ADMIN — Medication 4: at 17:01

## 2019-11-03 RX ADMIN — Medication 4: at 08:36

## 2019-11-03 RX ADMIN — PANTOPRAZOLE SODIUM 40 MILLIGRAM(S): 20 TABLET, DELAYED RELEASE ORAL at 06:07

## 2019-11-03 NOTE — PROGRESS NOTE ADULT - ASSESSMENT
· Assessment		  The patient is a 69 year old male with a history of hypertension, hepatitis B status post treatment, diabetes mellitus type 2, PVD status post right BKA and left toe amputations  admitted to MICU for DKA. Patient was treated with IV fluids and insulin, bridged with Lantus. On admission patient had a rectal temp of 99'F; UA was positive and was started on IV rocephin.     DKA now resolved; Continue lantus QHS   start lispro 3 u TID w meals.   SSI   bgs actid and hs    UTI: IV rocephin day 2  Follow up urine and blood cultures ent on 10/31    PVD status post right bka  On plavix and statin    BRIAN on admission resolved    6. Hypertension: Atenolol resumed  Monitor BP

## 2019-11-03 NOTE — PROGRESS NOTE ADULT - SUBJECTIVE AND OBJECTIVE BOX
CHIEF COMPLAINT/INTERVAL HISTORY:    Patient is a 69y old  Male who presents with a chief complaint of DKA (03 Nov 2019 14:08)    SUBJECTIVE & OBJECTIVE: Pt seen and examined at bedside. No overnight events. Denies any new complaints. Sugars > 200; lispro added per endo. PT evaluation.     ROS: No chest pain, palpitations, SOB, light headedness, dizziness, headache, nausea/vomiting, fevers/chills, abdominal pain, dysuria or increased urinary frequency.    ICU Vital Signs Last 24 Hrs  T(C): 37 (03 Nov 2019 16:29), Max: 37 (03 Nov 2019 16:29)  T(F): 98.6 (03 Nov 2019 16:29), Max: 98.6 (03 Nov 2019 16:29)  HR: 76 (03 Nov 2019 16:29) (73 - 92)  BP: 150/86 (03 Nov 2019 16:29) (136/72 - 164/79)  RR: 18 (03 Nov 2019 16:29) (18 - 18)  SpO2: 94% (03 Nov 2019 16:29) (92% - 94%)      MEDICATIONS  (STANDING):  ATENolol  Tablet 50 milliGRAM(s) Oral daily  atorvastatin 20 milliGRAM(s) Oral at bedtime  cefTRIAXone   IVPB 1000 milliGRAM(s) IV Intermittent every 24 hours  clopidogrel Tablet 75 milliGRAM(s) Oral daily  dextrose 5%. 1000 milliLiter(s) (50 mL/Hr) IV Continuous <Continuous>  dextrose 50% Injectable 12.5 Gram(s) IV Push once  dextrose 50% Injectable 25 Gram(s) IV Push once  dextrose 50% Injectable 25 Gram(s) IV Push once  gabapentin 100 milliGRAM(s) Oral three times a day  heparin  Injectable 5000 Unit(s) SubCutaneous every 12 hours  insulin glargine Injectable (LANTUS) 15 Unit(s) SubCutaneous every morning  insulin lispro (HumaLOG) corrective regimen sliding scale   SubCutaneous Before meals and at bedtime  pantoprazole    Tablet 40 milliGRAM(s) Oral before breakfast    MEDICATIONS  (PRN):  dextrose 40% Gel 15 Gram(s) Oral once PRN Blood Glucose LESS THAN 70 milliGRAM(s)/deciliter  glucagon  Injectable 1 milliGRAM(s) IntraMuscular once PRN Glucose LESS THAN 70 milligrams/deciliter      LABS:                        11.8   8.08  )-----------( 267      ( 03 Nov 2019 08:42 )             34.7     11-03    136  |  98  |  15.0  ----------------------------<  244<H>  3.8   |  25.0  |  0.68    Ca    8.4<L>      03 Nov 2019 08:42  Phos  3.4     11-03  Mg     1.9     11-03    CAPILLARY BLOOD GLUCOSE      POCT Blood Glucose.: 208 mg/dL (03 Nov 2019 12:08)  POCT Blood Glucose.: 215 mg/dL (03 Nov 2019 07:22)  POCT Blood Glucose.: 274 mg/dL (02 Nov 2019 21:42)  POCT Blood Glucose.: 181 mg/dL (02 Nov 2019 17:35)      PHYSICAL EXAM:    GENERAL: elderly male, laying in bed, NAD  HEAD:  Atraumatic, Normocephalic  EYES: conjunctiva and sclera clear  ENMT: Moist mucous membranes  NECK: Supple   NERVOUS SYSTEM:  Alert & Oriented X3   CHEST/LUNG: Clear to auscultation bilaterally; No rales, rhonchi, wheezing, or rubs  HEART: Regular rate and rhythm; + S1/S2  ABDOMEN: Soft, Nontender, Nondistended; Bowel sounds present  EXTREMITIES: right BKA

## 2019-11-03 NOTE — PROGRESS NOTE ADULT - ASSESSMENT
The patient is a 69 year old male with a history of hypertension, hepatitis B status post treatment, diabetes mellitus type 2, PVD status post right BKA and left toe amputations  admitted to MICU for DKA. Patient was treated with IV fluids and insulin, bridged with Lantus. On admission patient had a rectal temp of 99'F; UA was positive and was started on IV rocephin.     1. DKA due to medication noncomplaince  DKA now resolved  Continue lantus QHS   Lispro 3 units AC added today  Continue HSS  ADA diet  Endocrine recommendations appreciated    2. UTI  -s/p 3 doses of Ceftriaxone    urine culture < 10,000 yeast, patient is asymptomatic- no further abx  blood culture - no growth to date    3. PVD status post right BKA  -continue plavix and statin  -bacitracin for small superficial stump wound  -outpatient follow up with vascular    4. BRIAN on admission resolved    6. Hypertension  -BP stable; continue atenolol  -low sodium diet    7. Neuropathy  -continue gabapentin    8. GERD  -continue PPI    DVT ppx - Heparin SC    PT evaluation

## 2019-11-03 NOTE — PROGRESS NOTE ADULT - SUBJECTIVE AND OBJECTIVE BOX
INTERVAL HPI/OVERNIGHT EVENTS:  follow up for dm2 management.     MEDICATIONS  (STANDING):  ATENolol  Tablet 50 milliGRAM(s) Oral daily  atorvastatin 20 milliGRAM(s) Oral at bedtime  cefTRIAXone   IVPB 1000 milliGRAM(s) IV Intermittent every 24 hours  clopidogrel Tablet 75 milliGRAM(s) Oral daily  dextrose 5%. 1000 milliLiter(s) (50 mL/Hr) IV Continuous <Continuous>  dextrose 50% Injectable 12.5 Gram(s) IV Push once  dextrose 50% Injectable 25 Gram(s) IV Push once  dextrose 50% Injectable 25 Gram(s) IV Push once  gabapentin 100 milliGRAM(s) Oral three times a day  heparin  Injectable 5000 Unit(s) SubCutaneous every 12 hours  insulin glargine Injectable (LANTUS) 15 Unit(s) SubCutaneous every morning  insulin lispro (HumaLOG) corrective regimen sliding scale   SubCutaneous Before meals and at bedtime  pantoprazole    Tablet 40 milliGRAM(s) Oral before breakfast    MEDICATIONS  (PRN):  dextrose 40% Gel 15 Gram(s) Oral once PRN Blood Glucose LESS THAN 70 milliGRAM(s)/deciliter  glucagon  Injectable 1 milliGRAM(s) IntraMuscular once PRN Glucose LESS THAN 70 milligrams/deciliter      Allergies  No Known Allergies    Review of systems: no n/v, no cp, no sob     Vital Signs Last 24 Hrs  T(C): 36.6 (03 Nov 2019 09:37), Max: 36.6 (03 Nov 2019 09:37)  T(F): 97.8 (03 Nov 2019 09:37), Max: 97.8 (03 Nov 2019 09:37)  HR: 73 (03 Nov 2019 09:37) (73 - 92)  BP: 164/79 (03 Nov 2019 09:37) (121/73 - 164/79)  BP(mean): --  RR: 18 (03 Nov 2019 09:37) (18 - 18)  SpO2: 92% (03 Nov 2019 09:37) (92% - 95%)    PHYSICAL EXAM:  GENERAL: NAD, AOX3  ENMT: Moist mucous membranes  NECK: Supple, No JVD  CHEST/LUNG: Clear to auscultation bilaterally; No rales, rhonchi, wheezing, or rubs  HEART: Regular rate and rhythm; No murmurs, rubs, or gallops  ABDOMEN: Soft, Nontender, Nondistended; Bowel sounds present  EXTREMITIES:  Right bka       LABS:                        11.8   8.08  )-----------( 267      ( 03 Nov 2019 08:42 )             34.7     11-03    136  |  98  |  15.0  ----------------------------<  244<H>  3.8   |  25.0  |  0.68    Ca    8.4<L>      03 Nov 2019 08:42  Phos  3.4     11-03  Mg     1.9     11-03

## 2019-11-04 LAB
ANION GAP SERPL CALC-SCNC: 12 MMOL/L — SIGNIFICANT CHANGE UP (ref 5–17)
BUN SERPL-MCNC: 15 MG/DL — SIGNIFICANT CHANGE UP (ref 8–20)
CALCIUM SERPL-MCNC: 8.2 MG/DL — LOW (ref 8.6–10.2)
CHLORIDE SERPL-SCNC: 96 MMOL/L — LOW (ref 98–107)
CO2 SERPL-SCNC: 27 MMOL/L — SIGNIFICANT CHANGE UP (ref 22–29)
CREAT SERPL-MCNC: 0.76 MG/DL — SIGNIFICANT CHANGE UP (ref 0.5–1.3)
GLUCOSE BLDC GLUCOMTR-MCNC: 209 MG/DL — HIGH (ref 70–99)
GLUCOSE BLDC GLUCOMTR-MCNC: 219 MG/DL — HIGH (ref 70–99)
GLUCOSE BLDC GLUCOMTR-MCNC: 280 MG/DL — HIGH (ref 70–99)
GLUCOSE BLDC GLUCOMTR-MCNC: 291 MG/DL — HIGH (ref 70–99)
GLUCOSE SERPL-MCNC: 208 MG/DL — HIGH (ref 70–115)
MAGNESIUM SERPL-MCNC: 1.9 MG/DL — SIGNIFICANT CHANGE UP (ref 1.6–2.6)
PHOSPHATE SERPL-MCNC: 4 MG/DL — SIGNIFICANT CHANGE UP (ref 2.4–4.7)
POTASSIUM SERPL-MCNC: 3.4 MMOL/L — LOW (ref 3.5–5.3)
POTASSIUM SERPL-SCNC: 3.4 MMOL/L — LOW (ref 3.5–5.3)
SODIUM SERPL-SCNC: 135 MMOL/L — SIGNIFICANT CHANGE UP (ref 135–145)

## 2019-11-04 PROCEDURE — 99232 SBSQ HOSP IP/OBS MODERATE 35: CPT

## 2019-11-04 RX ORDER — INSULIN LISPRO 100/ML
3 VIAL (ML) SUBCUTANEOUS
Refills: 0 | Status: DISCONTINUED | OUTPATIENT
Start: 2019-11-04 | End: 2019-11-05

## 2019-11-04 RX ORDER — POTASSIUM CHLORIDE 20 MEQ
40 PACKET (EA) ORAL ONCE
Refills: 0 | Status: COMPLETED | OUTPATIENT
Start: 2019-11-04 | End: 2019-11-04

## 2019-11-04 RX ORDER — ASCORBIC ACID 60 MG
500 TABLET,CHEWABLE ORAL DAILY
Refills: 0 | Status: DISCONTINUED | OUTPATIENT
Start: 2019-11-04 | End: 2019-11-05

## 2019-11-04 RX ADMIN — Medication 3 UNIT(S): at 17:15

## 2019-11-04 RX ADMIN — Medication 500 MILLIGRAM(S): at 12:59

## 2019-11-04 RX ADMIN — Medication 6: at 21:38

## 2019-11-04 RX ADMIN — GABAPENTIN 100 MILLIGRAM(S): 400 CAPSULE ORAL at 12:33

## 2019-11-04 RX ADMIN — Medication 40 MILLIEQUIVALENT(S): at 12:33

## 2019-11-04 RX ADMIN — ATORVASTATIN CALCIUM 20 MILLIGRAM(S): 80 TABLET, FILM COATED ORAL at 21:38

## 2019-11-04 RX ADMIN — Medication 1 APPLICATION(S): at 06:14

## 2019-11-04 RX ADMIN — Medication 4: at 08:09

## 2019-11-04 RX ADMIN — INSULIN GLARGINE 15 UNIT(S): 100 INJECTION, SOLUTION SUBCUTANEOUS at 08:29

## 2019-11-04 RX ADMIN — CLOPIDOGREL BISULFATE 75 MILLIGRAM(S): 75 TABLET, FILM COATED ORAL at 12:33

## 2019-11-04 RX ADMIN — HEPARIN SODIUM 5000 UNIT(S): 5000 INJECTION INTRAVENOUS; SUBCUTANEOUS at 17:17

## 2019-11-04 RX ADMIN — HEPARIN SODIUM 5000 UNIT(S): 5000 INJECTION INTRAVENOUS; SUBCUTANEOUS at 06:14

## 2019-11-04 RX ADMIN — Medication 6: at 12:34

## 2019-11-04 RX ADMIN — Medication 1 TABLET(S): at 12:59

## 2019-11-04 RX ADMIN — GABAPENTIN 100 MILLIGRAM(S): 400 CAPSULE ORAL at 06:14

## 2019-11-04 RX ADMIN — Medication 1 APPLICATION(S): at 17:19

## 2019-11-04 RX ADMIN — GABAPENTIN 100 MILLIGRAM(S): 400 CAPSULE ORAL at 21:38

## 2019-11-04 RX ADMIN — Medication 4: at 17:14

## 2019-11-04 RX ADMIN — ATENOLOL 50 MILLIGRAM(S): 25 TABLET ORAL at 06:14

## 2019-11-04 RX ADMIN — PANTOPRAZOLE SODIUM 40 MILLIGRAM(S): 20 TABLET, DELAYED RELEASE ORAL at 06:13

## 2019-11-04 NOTE — DIETITIAN INITIAL EVALUATION ADULT. - PERTINENT LABORATORY DATA
11-04 Na135 mmol/L Glu 208 mg/dL<H> K+ 3.4 mmol/L<L> Cr  0.76 mg/dL BUN 15.0 mg/dL Phos 4.0 mg/dL Alb n/a   PAB n/a

## 2019-11-04 NOTE — PROGRESS NOTE ADULT - SUBJECTIVE AND OBJECTIVE BOX
INTERVAL HPI/OVERNIGHT EVENTS:  follow up T1DM     Pt reports to be feeling ok   some weakness earlier today    appetite is fair     MEDICATIONS  (STANDING):  ascorbic acid 500 milliGRAM(s) Oral daily  ATENolol  Tablet 50 milliGRAM(s) Oral daily  atorvastatin 20 milliGRAM(s) Oral at bedtime  BACItracin   Ointment 1 Application(s) Topical two times a day  clopidogrel Tablet 75 milliGRAM(s) Oral daily  dextrose 5%. 1000 milliLiter(s) (50 mL/Hr) IV Continuous <Continuous>  dextrose 50% Injectable 12.5 Gram(s) IV Push once  dextrose 50% Injectable 25 Gram(s) IV Push once  dextrose 50% Injectable 25 Gram(s) IV Push once  gabapentin 100 milliGRAM(s) Oral three times a day  heparin  Injectable 5000 Unit(s) SubCutaneous every 12 hours  insulin glargine Injectable (LANTUS) 15 Unit(s) SubCutaneous every morning  insulin lispro (HumaLOG) corrective regimen sliding scale   SubCutaneous Before meals and at bedtime  insulin lispro Injectable (HumaLOG) 3 Unit(s) SubCutaneous three times a day before meals  multivitamin 1 Tablet(s) Oral daily  pantoprazole    Tablet 40 milliGRAM(s) Oral before breakfast    MEDICATIONS  (PRN):  dextrose 40% Gel 15 Gram(s) Oral once PRN Blood Glucose LESS THAN 70 milliGRAM(s)/deciliter  glucagon  Injectable 1 milliGRAM(s) IntraMuscular once PRN Glucose LESS THAN 70 milligrams/deciliter      Allergies    No Known Allergies    Intolerances        Review of systems:    Vital Signs Last 24 Hrs  T(C): 37.1 (04 Nov 2019 23:31), Max: 37.2 (04 Nov 2019 05:54)  T(F): 98.8 (04 Nov 2019 23:31), Max: 98.9 (04 Nov 2019 05:54)  HR: 69 (04 Nov 2019 23:31) (67 - 75)  BP: 114/67 (04 Nov 2019 23:31) (114/67 - 123/70)  BP(mean): --  RR: 18 (04 Nov 2019 23:31) (18 - 18)  SpO2: 96% (04 Nov 2019 23:31) (96% - 98%)    PHYSICAL EXAM:      Constitutional: NAD, well-groomed, well-developed  HEENT: PERRLA, EOMI,  Neck: No LAD, No JVD, trachea midline, no thyroid enlargement  Back: Normal spine flexure, No CVA tenderness  Respiratory: CTAB  Cardiovascular: S1 and S2, RRR, no M/G/R    Extremities: No peripheral edema, Rigth BKA   Vascular: 2+ peripheral pulse  Neurological: A/O x 3, no focal deficits  Psychiatric: Normal mood, normal affect        LABS:                        11.8   8.08  )-----------( 267      ( 03 Nov 2019 08:42 )             34.7     11-04    135  |  96<L>  |  15.0  ----------------------------<  208<H>  3.4<L>   |  27.0  |  0.76    Ca    8.2<L>      04 Nov 2019 07:52  Phos  4.0     11-04  Mg     1.9     11-04            CAPILLARY BLOOD GLUCOSE  CAPILLARY BLOOD GLUCOSE      POCT Blood Glucose.: 280 mg/dL (04 Nov 2019 21:37)  POCT Blood Glucose.: 219 mg/dL (04 Nov 2019 17:05)  POCT Blood Glucose.: 291 mg/dL (04 Nov 2019 11:52)  POCT Blood Glucose.: 209 mg/dL (04 Nov 2019 08:07)      RADIOLOGY & ADDITIONAL TESTS:

## 2019-11-04 NOTE — PHYSICAL THERAPY INITIAL EVALUATION ADULT - ADDITIONAL COMMENTS
pt states he lives with his wife in a second floor apartment with 12 steps to enter (+left rail ascending). pt states he has a cane and a RW, uses cane primarily, uses RW to get to the shower. also has shower chair. uses right BKA prosthesis. +working. +driving. wife works.

## 2019-11-04 NOTE — DIETITIAN INITIAL EVALUATION ADULT. - NUTRITION DIAGNOSTIC #1 OTHER
Limited adherence to food and nutrition recommendations Limited adherence to food and nutrition related recommendations

## 2019-11-04 NOTE — PROGRESS NOTE ADULT - ATTENDING COMMENTS
I have seen and examined the patient and agree with the above assessment and plan. Patient's sugars labile; lispro added today. Seen by PT - patient is weak will need reassessment in 24 hours and possible discharge home.    Vitals stable  Physical exam  General - elderly male, laying in bed, NAD  HEENT - MMM  CVS - RRR, + S1/S2  Resp - bilateral air entry, coarse  GI - soft, NT/ND  Ext - right BKA

## 2019-11-04 NOTE — PHYSICAL THERAPY INITIAL EVALUATION ADULT - GENERAL OBSERVATIONS, REHAB EVAL
awake in bed on room air. +mepalex dressing on proximal medial anterior residual limb for stage 2 wound per nurse.

## 2019-11-04 NOTE — PROGRESS NOTE ADULT - ASSESSMENT
T1Dm  post DKA   increase AM lantus to 18 untis   mealtime insulin began only with dinner   qhs reading still high   cont premeal insulin - may need to increase tomorrow

## 2019-11-04 NOTE — DIETITIAN INITIAL EVALUATION ADULT. - OTHER INFO
The patient is a 69 year old male with a history of hypertension, hepatitis B status post treatment, diabetes mellitus type 2, PVD status post right BKA and left toe amputations, admitted for DKA, BRIAN (both now resolved). Spoke with pt at bedside states  lbs, denies recent wt loss. Pt reports poor appetite/ po intake 1 week prior to admission, current appetite/ po intake improved. Pt does not follow any particular diet, reports "doesn't eat what he shouldn't be eating". Reviewed with pt Hga1c level (9.7), explained pt Hga1c should be <7. The patient is a 69 year old male with a history of hypertension, hepatitis B status post treatment, diabetes mellitus type 2, PVD status post right BKA and left toe amputations, admitted for DKA, BRIAN (both now resolved). Spoke with pt at bedside states  lbs, denies recent wt loss. Pt reports poor appetite/ po intake 1 week prior to admission, current appetite/ po intake improved. Pt does not follow any particular diet, reports "doesn't eat what he shouldn't be eating". As per diet recall pt does not follow DM diet guidelines. Reviewed with pt Hga1c level (9.7), explained pt Hga1c should be <7. Provided pt verbal/written materials education on DM diet, pt states he understands guidelines, compliance is questionable. The patient is a 69 year old male with a history of hypertension, hepatitis B status post treatment, diabetes mellitus type 2, PVD status post right BKA and left toe amputations, admitted for DKA, BRIAN (both now resolved). Spoke with pt at bedside states  lbs, denies recent wt loss. Pt reports poor appetite/ po intake 1 week prior to admission, current appetite/ po intake improved. Pt does not follow any particular diet, reports "doesn't eat what he shouldn't be eating". As per diet recall pt does not follow DM diet guidelines. Reviewed with pt Hga1c level (9.7). Provided pt verbal/written materials education on DM diet, pt states he understands guidelines, compliance is questionable.

## 2019-11-04 NOTE — PROGRESS NOTE ADULT - ASSESSMENT
The patient is a 69 year old male with a history of hypertension, hepatitis B status post treatment, diabetes mellitus type 2, PVD status post right BKA and left toe amputations  admitted to MICU for DKA. Patient was treated with IV fluids and insulin, bridged with Lantus. On admission patient had a rectal temp of 99'F; UA was positive and was started on IV rocephin. Completed 3 days.     1. DKA due to medication noncomplaince  DKA now resolved  Continue lantus QHS   Lispro 3 units AC added today  Continue HSS  ADA diet  Endocrine recommendations appreciated    2. UTI  -s/p 3 doses of Ceftriaxone    urine culture < 10,000 yeast, patient is asymptomatic- no further abx  blood culture - no growth to date    3. PVD status post right BKA  -continue plavix and statin  -bacitracin for small superficial stump wound  -outpatient follow up with vascular    4. BRIAN on admission resolved    6. Hypertension  -BP stable; continue atenolol  -low sodium diet    7. Neuropathy  -continue gabapentin    8. GERD  -continue PPI    DVT ppx - Heparin SC    Dispo: PT to see patient again in am to assess stairs. Probable dc in 24 hours.

## 2019-11-04 NOTE — DIETITIAN INITIAL EVALUATION ADULT. - DIET TYPE
consistent carbohydrate (evening snack)/DASH/TLC (sodium and cholesterol restricted diet) supplement (specify)/DASH/TLC (sodium and cholesterol restricted diet)/consistent carbohydrate (evening snack)/Glucerna shake TID

## 2019-11-04 NOTE — DIETITIAN INITIAL EVALUATION ADULT. - ADD RECOMMEND
Rec Glucerna BID, MVI daily, Vit C 500 mg daily, RD remain available for further diet education as time permits. MVI daily, Vit C 500 mg daily, RD remain available for further diet education as time permits.

## 2019-11-04 NOTE — PHYSICAL THERAPY INITIAL EVALUATION ADULT - PERTINENT HX OF CURRENT PROBLEM, REHAB EVAL
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

## 2019-11-04 NOTE — PROGRESS NOTE ADULT - SUBJECTIVE AND OBJECTIVE BOX
CC: DKA    INTERVAL HPI/OVERNIGHT EVENTS: Patient seen and examined. Feeling tired. Worked with PT but was unsteady on stairs. Lives in second floor apartment. Denies chest pain, SOB, dizziness, nausea, vomiting, fever, chills.     Vital Signs Last 24 Hrs  T(C): 36.4 (04 Nov 2019 08:46), Max: 37.2 (04 Nov 2019 05:54)  T(F): 97.6 (04 Nov 2019 08:46), Max: 98.9 (04 Nov 2019 05:54)  HR: 75 (04 Nov 2019 08:46) (74 - 77)  BP: 123/70 (04 Nov 2019 08:46) (118/78 - 150/86)  BP(mean): --  RR: 18 (04 Nov 2019 08:46) (18 - 18)  SpO2: 98% (04 Nov 2019 08:46) (94% - 98%)    PHYSICAL EXAM:    GENERAL:  NAD  HEAD:  Atraumatic, Normocephalic  EYES: conjunctiva and sclera clear  ENMT: Moist mucous membranes  NECK: Supple   NERVOUS SYSTEM:  Alert & Oriented X3   CHEST/LUNG: Clear to auscultation bilaterally; No rales, rhonchi, wheezing, or rubs  HEART: Regular rate and rhythm; + S1/S2  ABDOMEN: Soft, Nontender, Nondistended; Bowel sounds present  EXTREMITIES: right BKA          I&O's Detail    03 Nov 2019 07:01  -  04 Nov 2019 07:00  --------------------------------------------------------  IN:  Total IN: 0 mL    OUT:    Voided: 400 mL  Total OUT: 400 mL    Total NET: -400 mL                                    11.8   8.08  )-----------( 267      ( 03 Nov 2019 08:42 )             34.7     04 Nov 2019 07:52    135    |  96     |  15.0   ----------------------------<  208    3.4     |  27.0   |  0.76     Ca    8.2        04 Nov 2019 07:52  Phos  4.0       04 Nov 2019 07:52  Mg     1.9       04 Nov 2019 07:52        CAPILLARY BLOOD GLUCOSE      POCT Blood Glucose.: 291 mg/dL (04 Nov 2019 11:52)  POCT Blood Glucose.: 209 mg/dL (04 Nov 2019 08:07)  POCT Blood Glucose.: 322 mg/dL (03 Nov 2019 22:41)  POCT Blood Glucose.: 234 mg/dL (03 Nov 2019 16:49)        Hemoglobin A1C, Whole Blood: 9.7 % (11-01-19 @ 06:27)    MEDICATIONS  (STANDING):  ascorbic acid 500 milliGRAM(s) Oral daily  ATENolol  Tablet 50 milliGRAM(s) Oral daily  atorvastatin 20 milliGRAM(s) Oral at bedtime  BACItracin   Ointment 1 Application(s) Topical two times a day  clopidogrel Tablet 75 milliGRAM(s) Oral daily  dextrose 5%. 1000 milliLiter(s) (50 mL/Hr) IV Continuous <Continuous>  dextrose 50% Injectable 12.5 Gram(s) IV Push once  dextrose 50% Injectable 25 Gram(s) IV Push once  dextrose 50% Injectable 25 Gram(s) IV Push once  gabapentin 100 milliGRAM(s) Oral three times a day  heparin  Injectable 5000 Unit(s) SubCutaneous every 12 hours  insulin glargine Injectable (LANTUS) 15 Unit(s) SubCutaneous every morning  insulin lispro (HumaLOG) corrective regimen sliding scale   SubCutaneous Before meals and at bedtime  insulin lispro Injectable (HumaLOG) 3 Unit(s) SubCutaneous three times a day before meals  multivitamin 1 Tablet(s) Oral daily  pantoprazole    Tablet 40 milliGRAM(s) Oral before breakfast  potassium chloride    Tablet ER 40 milliEquivalent(s) Oral once    MEDICATIONS  (PRN):  dextrose 40% Gel 15 Gram(s) Oral once PRN Blood Glucose LESS THAN 70 milliGRAM(s)/deciliter  glucagon  Injectable 1 milliGRAM(s) IntraMuscular once PRN Glucose LESS THAN 70 milligrams/deciliter      RADIOLOGY & ADDITIONAL TESTS:

## 2019-11-04 NOTE — PHYSICAL THERAPY INITIAL EVALUATION ADULT - CRITERIA FOR SKILLED THERAPEUTIC INTERVENTIONS
impairments found/rehab potential/risk reduction/prevention/functional limitations in following categories/therapy frequency/anticipated equipment needs at discharge/anticipated discharge recommendation

## 2019-11-04 NOTE — PHYSICAL THERAPY INITIAL EVALUATION ADULT - NEUROVASCULAR ASSESSMENT RLE
no discoloration/warm/denies/pt with stage 2 wound proximal anterior medial residual limb (stage 2 per nurse) dry and covered with mepalex

## 2019-11-04 NOTE — PHYSICAL THERAPY INITIAL EVALUATION ADULT - LEVEL OF INDEPENDENCE: SCOOT/BRIDGE, REHAB EVAL
[facility]    [address] [address]    Phone:  [phone]    Fax:  [phone]       Thank you for choosing us for your health care needs. Please help us to ensure your records are accurate. Discuss any inaccuracies with the treating / prescribing physician or your Primary Care Provider.             Herman Martinez    5/10/2017 9:00 AM   Office Visit    Description:  79 year old male   Provider:  Yfn Lee MD; ISAIAH TREATMENT CHAIR; ISAIAH INFUSION RN   Department:  [facility] [place]            Your Information     Date Of Birth Race Ethnicity Preferred Language       1938 White Not of  or  Origin English       Your Upcoming Appointment*(Max 25)     Friday May 12, 2017  1:45 PM CDT   LAB CENTRAL LINE with [id number] LAB   [facility]    [address] [address] [phone]            Friday May 12, 2017  2:00 PM CDT   Follow-up Visit with Yfn Lee MD   [facility]    [address] [address] [phone]            Friday May 12, 2017  2:30 PM CDT   Injection with [id number] INFUSION RN, [place] [address] TREATMENT CHAIR   [facility]    [address] [address] [phone]                    Conditions Discussed Today or Order-Related Diagnoses        Comments    Non-follicular lymphoma of intrathoracic lymph nodes, unspecified non-follicular lymphoma type    -  Primary       Your Vitals Were     BP Pulse Temp Height Weight SpO2    108/64 85 97.8 °F (36.6 °C) (Oral) 5' 11.46\" (1.815 m) 199 lb 1.9 oz (90.3 kg) 100%    BMI Smoking Status                27.42 kg/m2 Former Smoker          Current Medications (as reported by you and your prescribing providers):        Instructions    potassium chloride (K-DUR,KLOR-CON) 20 MEQ CR tablet Take 1 tablet by mouth 2 times daily.    potassium  chloride (KLOR-CON M20) 20 MEQ CR tablet Take 1 tablet by mouth twice daily.    vitamin B-12 (CYANOCOBALAMIN) 1000 MCG tablet Take 1,000 mcg by mouth daily.    loperamide (IMODIUM) 2 MG capsule Take 2 mg by mouth as needed for Diarrhea.    CARBOXYMETHYLCELLUL-GLYCERIN OP Apply to eye as needed.    acyclovir (ZOVIRAX) 200 MG capsule Take 2 capsules by mouth 2 times daily.    ondansetron (ZOFRAN ODT) 8 MG disintegrating tablet Take 1 tablet by mouth every 8 hours as needed for Nausea.    atorvastatin (LIPITOR) 20 MG tablet Take 1 tablet by mouth daily.    Aspirin 81 MG OR TABS 1 TABLET DAILY    Atenolol 50 MG OR TABS 1 TABLET DAILY      Facility-Administered Medications        Instructions    heparin flush 100 UNIT/ML lock flush 500 Units 5 mLs by Intercatheter route once.    heparin flush 100 UNIT/ML lock flush 500 Units 5 mLs by Intercatheter route as needed for Central Line Capping.      Allergies     No Known Allergies      Immunization History as of 5/10/2017     Name Date    Pneumococcal Conjugate 13 Valent 12/8/2014      Problem List as of 5/10/2017     DLBCL    Dehydration    Observation for suspected malignant neoplasm    Agranulocytosis secondary to cancer chemotherapy    Chemotherapy-induced neutropenia    Follicular lymphoma of intrathoracic lymph nodes, unspecified grade    PICC (peripherally inserted central catheter) in place        May 2017   [place] Monday Tuesday Wednesday Thursday Friday Saturday        1     2     3     LAB CENTRAL LINE   10:30 AM   (15 min.)   [id number] LAB   [facility] [place]     NURSE VISIT   11:00 AM   (30 min.)   [id number] INFUSION RN   [facility] 4     5     6                7     8     9     10     LAB CENTRAL LINE    8:30 AM   (15 min.)   [id number] LAB   [facility]     PROVIDER & CHEMO    9:00 AM   (255 min.)   Yfn Lee MD   [facility] 11     12     LAB CENTRAL LINE    1:45 PM   (15 min.)   [id number]  LAB   [facility]     FOLLOW-UP VISIT    2:00 PM   (15 min.)   Yfn Lee MD   [facility]     INJECTION    2:30 PM   (30 min.)   [email or web address] INFUSION RN   [facility] 13          Cycle 11, Day 1 Cycle 11, Day 2 Cycle 11, Day 3    14     15     16     17     18     19     20                21     22     23     24     25     26     27          Cycle 12, Day 1 Cycle 12, Day 2 Cycle 12, Day 3    28     29     30     31                                  Treatment Details   5/10/2017 - Cycle 11, Day 1       Pre-Medications, if Inpatient use P&T substitute: acetaminophen (TYLENOL), diphenhydrAMINE (BENADRYL)      Chemotherapy: RITUXIMAB INFUSION UNSPECIFIED CONCENTRATION       5/11/2017 - Cycle 11, Day 2       Pre-Medications, if Inpatient use P&T substitute: palonosetron (ALOXI), DEXAMETHASONE IVPB ONCOLOGY      Chemotherapy: GEMCITABINE  INFUSION, OXALIPLATIN  INFUSION       5/12/2017 - Cycle 11, Day 3       MD Order Selection: CSF TREATMENT CONDITIONS, CSF TREATMENT CONDITIONS      Supportive Care: pegfilgrastim (NEULASTA)       5/24/2017 - Cycle 12, Day 1       Pre-Medications, if Inpatient use P&T substitute: acetaminophen (TYLENOL), diphenhydrAMINE (BENADRYL)      Chemotherapy: RITUXIMAB INFUSION UNSPECIFIED CONCENTRATION       5/25/2017 - Cycle 12, Day 2       Pre-Medications, if Inpatient use P&T substitute: palonosetron (ALOXI), DEXAMETHASONE IVPB ONCOLOGY      Chemotherapy: GEMCITABINE  INFUSION, OXALIPLATIN  INFUSION       5/26/2017 - Cycle 12, Day 3       MD Order Selection: CSF TREATMENT CONDITIONS, CSF TREATMENT CONDITIONS      Supportive Care: pegfilgrastim (NEULASTA)          independent

## 2019-11-05 ENCOUNTER — TRANSCRIPTION ENCOUNTER (OUTPATIENT)
Age: 69
End: 2019-11-05

## 2019-11-05 VITALS
TEMPERATURE: 97 F | RESPIRATION RATE: 18 BRPM | HEART RATE: 63 BPM | DIASTOLIC BLOOD PRESSURE: 77 MMHG | OXYGEN SATURATION: 100 % | SYSTOLIC BLOOD PRESSURE: 130 MMHG

## 2019-11-05 LAB
ANION GAP SERPL CALC-SCNC: 12 MMOL/L — SIGNIFICANT CHANGE UP (ref 5–17)
BUN SERPL-MCNC: 14 MG/DL — SIGNIFICANT CHANGE UP (ref 8–20)
CALCIUM SERPL-MCNC: 8.3 MG/DL — LOW (ref 8.6–10.2)
CHLORIDE SERPL-SCNC: 96 MMOL/L — LOW (ref 98–107)
CO2 SERPL-SCNC: 27 MMOL/L — SIGNIFICANT CHANGE UP (ref 22–29)
CREAT SERPL-MCNC: 0.76 MG/DL — SIGNIFICANT CHANGE UP (ref 0.5–1.3)
CULTURE RESULTS: SIGNIFICANT CHANGE UP
CULTURE RESULTS: SIGNIFICANT CHANGE UP
GLUCOSE BLDC GLUCOMTR-MCNC: 188 MG/DL — HIGH (ref 70–99)
GLUCOSE BLDC GLUCOMTR-MCNC: 228 MG/DL — HIGH (ref 70–99)
GLUCOSE BLDC GLUCOMTR-MCNC: 298 MG/DL — HIGH (ref 70–99)
GLUCOSE SERPL-MCNC: 227 MG/DL — HIGH (ref 70–115)
MAGNESIUM SERPL-MCNC: 1.9 MG/DL — SIGNIFICANT CHANGE UP (ref 1.6–2.6)
PHOSPHATE SERPL-MCNC: 3.3 MG/DL — SIGNIFICANT CHANGE UP (ref 2.4–4.7)
POTASSIUM SERPL-MCNC: 3.8 MMOL/L — SIGNIFICANT CHANGE UP (ref 3.5–5.3)
POTASSIUM SERPL-SCNC: 3.8 MMOL/L — SIGNIFICANT CHANGE UP (ref 3.5–5.3)
SODIUM SERPL-SCNC: 135 MMOL/L — SIGNIFICANT CHANGE UP (ref 135–145)
SPECIMEN SOURCE: SIGNIFICANT CHANGE UP
SPECIMEN SOURCE: SIGNIFICANT CHANGE UP

## 2019-11-05 PROCEDURE — 99239 HOSP IP/OBS DSCHRG MGMT >30: CPT

## 2019-11-05 RX ORDER — METFORMIN HYDROCHLORIDE 850 MG/1
1 TABLET ORAL
Qty: 0 | Refills: 0 | DISCHARGE

## 2019-11-05 RX ORDER — ASCORBIC ACID 60 MG
1 TABLET,CHEWABLE ORAL
Qty: 30 | Refills: 0
Start: 2019-11-05 | End: 2019-12-04

## 2019-11-05 RX ORDER — INSULIN GLARGINE 100 [IU]/ML
25 INJECTION, SOLUTION SUBCUTANEOUS
Qty: 0 | Refills: 0 | DISCHARGE
Start: 2019-11-05 | End: 2019-12-04

## 2019-11-05 RX ORDER — INSULIN LISPRO 100/ML
5 VIAL (ML) SUBCUTANEOUS
Qty: 1 | Refills: 0
Start: 2019-11-05 | End: 2019-12-04

## 2019-11-05 RX ORDER — INSULIN GLARGINE 100 [IU]/ML
18 INJECTION, SOLUTION SUBCUTANEOUS EVERY MORNING
Refills: 0 | Status: DISCONTINUED | OUTPATIENT
Start: 2019-11-05 | End: 2019-11-05

## 2019-11-05 RX ORDER — INSULIN LISPRO 100/ML
5 VIAL (ML) SUBCUTANEOUS
Refills: 0 | Status: DISCONTINUED | OUTPATIENT
Start: 2019-11-05 | End: 2019-11-05

## 2019-11-05 RX ORDER — INSULIN GLARGINE 100 [IU]/ML
18 INJECTION, SOLUTION SUBCUTANEOUS
Qty: 1 | Refills: 0
Start: 2019-11-05 | End: 2019-12-04

## 2019-11-05 RX ORDER — INSULIN DETEMIR 100/ML (3)
15 INSULIN PEN (ML) SUBCUTANEOUS
Qty: 0 | Refills: 0 | DISCHARGE

## 2019-11-05 RX ADMIN — PANTOPRAZOLE SODIUM 40 MILLIGRAM(S): 20 TABLET, DELAYED RELEASE ORAL at 05:31

## 2019-11-05 RX ADMIN — Medication 5 UNIT(S): at 16:42

## 2019-11-05 RX ADMIN — Medication 4: at 07:53

## 2019-11-05 RX ADMIN — INSULIN GLARGINE 18 UNIT(S): 100 INJECTION, SOLUTION SUBCUTANEOUS at 07:53

## 2019-11-05 RX ADMIN — Medication 6: at 11:52

## 2019-11-05 RX ADMIN — Medication 3 UNIT(S): at 11:52

## 2019-11-05 RX ADMIN — Medication 1 TABLET(S): at 11:56

## 2019-11-05 RX ADMIN — Medication 1 APPLICATION(S): at 05:28

## 2019-11-05 RX ADMIN — ATENOLOL 50 MILLIGRAM(S): 25 TABLET ORAL at 05:28

## 2019-11-05 RX ADMIN — Medication 500 MILLIGRAM(S): at 11:53

## 2019-11-05 RX ADMIN — HEPARIN SODIUM 5000 UNIT(S): 5000 INJECTION INTRAVENOUS; SUBCUTANEOUS at 05:28

## 2019-11-05 RX ADMIN — CLOPIDOGREL BISULFATE 75 MILLIGRAM(S): 75 TABLET, FILM COATED ORAL at 11:53

## 2019-11-05 RX ADMIN — Medication 1 APPLICATION(S): at 16:45

## 2019-11-05 RX ADMIN — GABAPENTIN 100 MILLIGRAM(S): 400 CAPSULE ORAL at 13:58

## 2019-11-05 RX ADMIN — Medication 2: at 16:43

## 2019-11-05 RX ADMIN — Medication 3 UNIT(S): at 07:54

## 2019-11-05 RX ADMIN — GABAPENTIN 100 MILLIGRAM(S): 400 CAPSULE ORAL at 05:28

## 2019-11-05 NOTE — DISCHARGE NOTE PROVIDER - HOSPITAL COURSE
The patient is a 69 year old male with a history of hypertension, hepatitis B status post treatment, diabetes mellitus type 2, PVD status post right BKA and left toe amputations  admitted to MICU for DKA. Patient was treated with IV fluids and insulin, bridged with Lantus. On admission patient had a rectal temp of 99'F; UA was positive and was started on IV Rocephin. Completed 3 days for uncomplicated UTI. The patient seen in consultation by Endocrine. DKA resolved. The patient was maintained on Lantus and pre meal insulin with better glucose control. BRIAN on admission. Bacitracin for small superficial stump wound ordered. The patient was seen by PT and recommend home with home PT.     Vital Signs Last 24 Hrs    T(C): 36.9 (05 Nov 2019 07:41), Max: 37.1 (04 Nov 2019 23:31)    T(F): 98.5 (05 Nov 2019 07:41), Max: 98.8 (04 Nov 2019 23:31)    HR: 69 (05 Nov 2019 07:41) (62 - 69)    BP: 130/79 (05 Nov 2019 07:41) (114/63 - 130/79)    BP(mean): --    RR: 18 (05 Nov 2019 07:41) (18 - 18)    SpO2: 96% (05 Nov 2019 07:41) (94% - 98%)                05 Nov 2019 07:50        135    |  96     |  14.0     ----------------------------<  227      3.8     |  27.0   |  0.76         Ca    8.3        05 Nov 2019 07:50    Phos  3.3       05 Nov 2019 07:50    Mg     1.9       05 Nov 2019 07:50                CAPILLARY BLOOD GLUCOSE            POCT Blood Glucose.: 228 mg/dL (05 Nov 2019 07:52)    POCT Blood Glucose.: 280 mg/dL (04 Nov 2019 21:37)    POCT Blood Glucose.: 219 mg/dL (04 Nov 2019 17:05)    POCT Blood Glucose.: 291 mg/dL (04 Nov 2019 11:52)                Hemoglobin A1C, Whole Blood: 9.7 % (11-01 @ 06:27)    PHYSICAL EXAM:        GENERAL:  NAD    HEAD:  Atraumatic, Normocephalic    EYES: conjunctiva and sclera clear    ENMT: Moist mucous membranes    NECK: Supple     NERVOUS SYSTEM:  Alert & Oriented X3     CHEST/LUNG: Clear to auscultation bilaterally; No rales, rhonchi, wheezing, or rubs    HEART: Regular rate and rhythm; + S1/S2    ABDOMEN: Soft, Nontender, Nondistended; Bowel sounds present    EXTREMITIES: right BKA The patient is a 69 year old male with a history of hypertension, hepatitis B status post treatment, diabetes mellitus type 2, PVD status post right BKA and left toe amputations  admitted to MICU for DKA. Patient was treated with IV fluids and insulin, bridged with Lantus. On admission patient had a rectal temp of 99'F; UA was positive and was started on IV Rocephin. Completed 3 days for uncomplicated UTI. The patient seen in consultation by Endocrine. DKA resolved. The patient was maintained on Lantus and pre meal insulin with better glucose control. BRIAN on admission. Bacitracin for small superficial stump wound ordered. The patient was seen by PT and recommend home with home PT.     Vital Signs Last 24 Hrs    T(C): 36.9 (05 Nov 2019 07:41), Max: 37.1 (04 Nov 2019 23:31)    T(F): 98.5 (05 Nov 2019 07:41), Max: 98.8 (04 Nov 2019 23:31)    HR: 69 (05 Nov 2019 07:41) (62 - 69)    BP: 130/79 (05 Nov 2019 07:41) (114/63 - 130/79)    BP(mean): --    RR: 18 (05 Nov 2019 07:41) (18 - 18)    SpO2: 96% (05 Nov 2019 07:41) (94% - 98%)        05 Nov 2019 07:50        135    |  96     |  14.0     ----------------------------<  227      3.8     |  27.0   |  0.76         Ca    8.3        05 Nov 2019 07:50    Phos  3.3       05 Nov 2019 07:50    Mg     1.9       05 Nov 2019 07:50                CAPILLARY BLOOD GLUCOSE            POCT Blood Glucose.: 228 mg/dL (05 Nov 2019 07:52)    POCT Blood Glucose.: 280 mg/dL (04 Nov 2019 21:37)    POCT Blood Glucose.: 219 mg/dL (04 Nov 2019 17:05)    POCT Blood Glucose.: 291 mg/dL (04 Nov 2019 11:52)                Hemoglobin A1C, Whole Blood: 9.7 % (11-01 @ 06:27)    PHYSICAL EXAM:        GENERAL:  NAD    HEAD:  Atraumatic, Normocephalic    EYES: conjunctiva and sclera clear    ENMT: Moist mucous membranes    NECK: Supple     NERVOUS SYSTEM:  Alert & Oriented X3     CHEST/LUNG: Clear to auscultation bilaterally; No rales, rhonchi, wheezing, or rubs    HEART: Regular rate and rhythm; + S1/S2    ABDOMEN: Soft, Nontender, Nondistended; Bowel sounds present    EXTREMITIES: right BKA'        Medically stable for discharge. Time spent on discharge 45 minutes.

## 2019-11-05 NOTE — DISCHARGE NOTE PROVIDER - NSDCCPCAREPLAN_GEN_ALL_CORE_FT
PRINCIPAL DISCHARGE DIAGNOSIS  Diagnosis: DKA, type 2  Assessment and Plan of Treatment: Resolved  Continue current insulin RX  Monitor blood glucose before meals and at bedtime and keep log  Follow up with endocrine 5-7 days, sooner if needed  Maintain Consistent Carbohydrate diet      SECONDARY DISCHARGE DIAGNOSES  Diagnosis: PVD (peripheral vascular disease)  Assessment and Plan of Treatment: Bacitracin to stump wound as prescribed  Follow up with Vascular surgery 1-2 weeks, sooner if needed  Continue aspirin and plavix

## 2019-11-05 NOTE — ADVANCED PRACTICE NURSE CONSULT - RECOMMEDATIONS
after chart review please continue diabetes self management education  pls consider dc pt on lantus insulin pen 20 units qhs, and humalog 5 units before meals.  cc- please task pt to diabetes wellness out pt

## 2019-11-05 NOTE — DISCHARGE NOTE PROVIDER - CARE PROVIDER_API CALL
Odessa Martínez)  EndocrinologyMetabDiabetes; Internal Medicine  [address] [place] [address]  Phone: [phone]  Fax: [phone]  Follow Up Time:

## 2019-11-05 NOTE — DISCHARGE NOTE PROVIDER - NSDCFUSCHEDAPPT_GEN_ALL_CORE_FT
ANDRIY LEBLANC ; 11/26/2019 ; [facility] [address] ; 12/06/2019 ; [facility] [address]

## 2019-11-05 NOTE — DISCHARGE NOTE NURSING/CASE MANAGEMENT/SOCIAL WORK - PATIENT PORTAL LINK FT
You can access the FollowMyHealth Patient Portal offered by [facility] by registering at the following website: [email or web address]. By joining [employer]’s FollowMyHealth portal, you will also be able to view your health information using other applications (apps) compatible with our system.

## 2019-11-05 NOTE — DISCHARGE NOTE PROVIDER - NSDCMRMEDTOKEN_GEN_ALL_CORE_FT
atenolol 50 mg oral tablet: 1 tab(s) orally once a day  famotidine 40 mg oral tablet: 1 tab(s) orally once a day (at bedtime)  glipiZIDE 10 mg oral tablet: 1 tab(s) orally 2 times a day  Levemir FlexPen 100 units/mL subcutaneous solution: 15 unit(s) subcutaneous once a day (at bedtime)  metFORMIN 1000 mg oral tablet: 1 tab(s) orally 2 times a day  Plavix 75 mg oral tablet: 1 tab(s) orally once a day  rosuvastatin 5 mg oral tablet: 1 tab(s) orally once a day ascorbic acid 500 mg oral tablet: 1 tab(s) orally once a day  atenolol 50 mg oral tablet: 1 tab(s) orally once a day  famotidine 40 mg oral tablet: 1 tab(s) orally once a day (at bedtime)  HumaLOG 100 units/mL injectable solution: 5 unit(s) injectable 3 times a day (before meals)   Lantus 100 units/mL subcutaneous solution: 18 unit(s) subcutaneous once a day   Multiple Vitamins oral tablet: 1 tab(s) orally once a day  Plavix 75 mg oral tablet: 1 tab(s) orally once a day  rosuvastatin 5 mg oral tablet: 1 tab(s) orally once a day

## 2019-11-05 NOTE — DISCHARGE NOTE PROVIDER - NSDCHHNEEDSERVICE_GEN_ALL_CORE
Medication teaching and assessment/Rehabilitation services/Wound care and assessment/Observation and assessment

## 2019-11-06 ENCOUNTER — CHART COPY (OUTPATIENT)
Age: 69
End: 2019-11-06

## 2019-11-07 PROBLEM — L40.9 PSORIASIS, UNSPECIFIED: Chronic | Status: ACTIVE | Noted: 2019-11-01

## 2019-11-12 ENCOUNTER — MEDICATION RENEWAL (OUTPATIENT)
Age: 69
End: 2019-11-12

## 2019-11-12 PROCEDURE — 87040 BLOOD CULTURE FOR BACTERIA: CPT

## 2019-11-12 PROCEDURE — 82009 KETONE BODYS QUAL: CPT

## 2019-11-12 PROCEDURE — 99285 EMERGENCY DEPT VISIT HI MDM: CPT | Mod: 25

## 2019-11-12 PROCEDURE — 87581 M.PNEUMON DNA AMP PROBE: CPT

## 2019-11-12 PROCEDURE — 87486 CHLMYD PNEUM DNA AMP PROBE: CPT

## 2019-11-12 PROCEDURE — 96365 THER/PROPH/DIAG IV INF INIT: CPT

## 2019-11-12 PROCEDURE — 85027 COMPLETE CBC AUTOMATED: CPT

## 2019-11-12 PROCEDURE — 87086 URINE CULTURE/COLONY COUNT: CPT

## 2019-11-12 PROCEDURE — 83735 ASSAY OF MAGNESIUM: CPT

## 2019-11-12 PROCEDURE — 84145 PROCALCITONIN (PCT): CPT

## 2019-11-12 PROCEDURE — 84443 ASSAY THYROID STIM HORMONE: CPT

## 2019-11-12 PROCEDURE — 80053 COMPREHEN METABOLIC PANEL: CPT

## 2019-11-12 PROCEDURE — 85730 THROMBOPLASTIN TIME PARTIAL: CPT

## 2019-11-12 PROCEDURE — 97163 PT EVAL HIGH COMPLEX 45 MIN: CPT

## 2019-11-12 PROCEDURE — 85610 PROTHROMBIN TIME: CPT

## 2019-11-12 PROCEDURE — 71045 X-RAY EXAM CHEST 1 VIEW: CPT

## 2019-11-12 PROCEDURE — 97530 THERAPEUTIC ACTIVITIES: CPT

## 2019-11-12 PROCEDURE — 84484 ASSAY OF TROPONIN QUANT: CPT

## 2019-11-12 PROCEDURE — 87798 DETECT AGENT NOS DNA AMP: CPT

## 2019-11-12 PROCEDURE — 80307 DRUG TEST PRSMV CHEM ANLYZR: CPT

## 2019-11-12 PROCEDURE — 83036 HEMOGLOBIN GLYCOSYLATED A1C: CPT

## 2019-11-12 PROCEDURE — 96375 TX/PRO/DX INJ NEW DRUG ADDON: CPT

## 2019-11-12 PROCEDURE — 87633 RESP VIRUS 12-25 TARGETS: CPT

## 2019-11-12 PROCEDURE — 96367 TX/PROPH/DG ADDL SEQ IV INF: CPT

## 2019-11-12 PROCEDURE — 36415 COLL VENOUS BLD VENIPUNCTURE: CPT

## 2019-11-12 PROCEDURE — 82803 BLOOD GASES ANY COMBINATION: CPT

## 2019-11-12 PROCEDURE — 97116 GAIT TRAINING THERAPY: CPT

## 2019-11-12 PROCEDURE — 93005 ELECTROCARDIOGRAM TRACING: CPT

## 2019-11-12 PROCEDURE — 84100 ASSAY OF PHOSPHORUS: CPT

## 2019-11-12 PROCEDURE — 83605 ASSAY OF LACTIC ACID: CPT

## 2019-11-12 PROCEDURE — 82962 GLUCOSE BLOOD TEST: CPT

## 2019-11-12 PROCEDURE — 80048 BASIC METABOLIC PNL TOTAL CA: CPT

## 2019-11-12 PROCEDURE — 81001 URINALYSIS AUTO W/SCOPE: CPT

## 2019-11-12 PROCEDURE — 70450 CT HEAD/BRAIN W/O DYE: CPT

## 2019-11-20 ENCOUNTER — APPOINTMENT (OUTPATIENT)
Dept: INTERNAL MEDICINE | Facility: CLINIC | Age: 69
End: 2019-11-20

## 2019-11-22 ENCOUNTER — APPOINTMENT (OUTPATIENT)
Dept: INTERNAL MEDICINE | Facility: CLINIC | Age: 69
End: 2019-11-22

## 2019-11-25 ENCOUNTER — LABORATORY RESULT (OUTPATIENT)
Age: 69
End: 2019-11-25

## 2019-11-25 ENCOUNTER — APPOINTMENT (OUTPATIENT)
Dept: INTERNAL MEDICINE | Facility: CLINIC | Age: 69
End: 2019-11-25
Payer: MEDICARE

## 2019-11-25 VITALS
WEIGHT: 170 LBS | TEMPERATURE: 97.5 F | BODY MASS INDEX: 25.18 KG/M2 | SYSTOLIC BLOOD PRESSURE: 100 MMHG | DIASTOLIC BLOOD PRESSURE: 60 MMHG | HEART RATE: 68 BPM | OXYGEN SATURATION: 98 % | HEIGHT: 69 IN

## 2019-11-25 PROCEDURE — 83036 HEMOGLOBIN GLYCOSYLATED A1C: CPT | Mod: QW

## 2019-11-25 PROCEDURE — 99215 OFFICE O/P EST HI 40 MIN: CPT | Mod: 25

## 2019-11-25 RX ORDER — LANCING DEVICE/LANCETS
KIT MISCELLANEOUS
Qty: 1 | Refills: 0 | Status: DISCONTINUED | COMMUNITY
Start: 2017-02-01 | End: 2019-11-25

## 2019-11-25 RX ORDER — BLOOD-GLUCOSE METER
W/DEVICE EACH MISCELLANEOUS
Qty: 1 | Refills: 0 | Status: DISCONTINUED | COMMUNITY
Start: 2017-02-01 | End: 2019-11-25

## 2019-11-26 ENCOUNTER — APPOINTMENT (OUTPATIENT)
Dept: INTERNAL MEDICINE | Facility: CLINIC | Age: 69
End: 2019-11-26

## 2019-11-26 LAB
ALBUMIN SERPL ELPH-MCNC: 3.9 G/DL
ALP BLD-CCNC: 88 U/L
ALT SERPL-CCNC: 7 U/L
ANION GAP SERPL CALC-SCNC: 14 MMOL/L
AST SERPL-CCNC: 10 U/L
BASOPHILS # BLD AUTO: 0.03 K/UL
BASOPHILS NFR BLD AUTO: 0.3 %
BILIRUB SERPL-MCNC: 0.2 MG/DL
BUN SERPL-MCNC: 13 MG/DL
CALCIUM SERPL-MCNC: 9.2 MG/DL
CHLORIDE SERPL-SCNC: 101 MMOL/L
CO2 SERPL-SCNC: 27 MMOL/L
CREAT SERPL-MCNC: 1.07 MG/DL
EOSINOPHIL # BLD AUTO: 0.1 K/UL
EOSINOPHIL NFR BLD AUTO: 1.1 %
GLUCOSE SERPL-MCNC: 192 MG/DL
HBA1C MFR BLD HPLC: 8.6
HCT VFR BLD CALC: 36.5 %
HGB BLD-MCNC: 11.6 G/DL
IMM GRANULOCYTES NFR BLD AUTO: 0.6 %
LYMPHOCYTES # BLD AUTO: 2.02 K/UL
LYMPHOCYTES NFR BLD AUTO: 22.4 %
MAN DIFF?: NORMAL
MCHC RBC-ENTMCNC: 27.4 PG
MCHC RBC-ENTMCNC: 31.8 GM/DL
MCV RBC AUTO: 86.3 FL
MONOCYTES # BLD AUTO: 0.79 K/UL
MONOCYTES NFR BLD AUTO: 8.7 %
NEUTROPHILS # BLD AUTO: 6.04 K/UL
NEUTROPHILS NFR BLD AUTO: 66.9 %
PLATELET # BLD AUTO: 361 K/UL
POTASSIUM SERPL-SCNC: 4 MMOL/L
PROT SERPL-MCNC: 6.7 G/DL
RBC # BLD: 4.23 M/UL
RBC # FLD: 13.7 %
SODIUM SERPL-SCNC: 142 MMOL/L
WBC # FLD AUTO: 9.03 K/UL

## 2019-11-27 ENCOUNTER — CHART COPY (OUTPATIENT)
Age: 69
End: 2019-11-27

## 2019-11-27 DIAGNOSIS — R31.9 HEMATURIA, UNSPECIFIED: ICD-10-CM

## 2019-11-27 LAB
APPEARANCE: ABNORMAL
BILIRUBIN URINE: NEGATIVE
BLOOD URINE: NORMAL
COLOR: YELLOW
GLUCOSE QUALITATIVE U: NEGATIVE
KETONES URINE: NORMAL
LEUKOCYTE ESTERASE URINE: ABNORMAL
NITRITE URINE: NEGATIVE
PH URINE: 5.5
PROTEIN URINE: ABNORMAL
SPECIFIC GRAVITY URINE: 1.05
UROBILINOGEN URINE: ABNORMAL

## 2019-12-01 ENCOUNTER — FORM ENCOUNTER (OUTPATIENT)
Age: 69
End: 2019-12-01

## 2019-12-02 ENCOUNTER — OUTPATIENT (OUTPATIENT)
Dept: OUTPATIENT SERVICES | Facility: HOSPITAL | Age: 69
LOS: 1 days | End: 2019-12-02
Payer: MEDICARE

## 2019-12-02 ENCOUNTER — APPOINTMENT (OUTPATIENT)
Dept: ULTRASOUND IMAGING | Facility: CLINIC | Age: 69
End: 2019-12-02
Payer: MEDICARE

## 2019-12-02 DIAGNOSIS — R31.9 HEMATURIA, UNSPECIFIED: ICD-10-CM

## 2019-12-02 DIAGNOSIS — Z89.422 ACQUIRED ABSENCE OF OTHER LEFT TOE(S): Chronic | ICD-10-CM

## 2019-12-02 DIAGNOSIS — Z00.00 ENCOUNTER FOR GENERAL ADULT MEDICAL EXAMINATION WITHOUT ABNORMAL FINDINGS: ICD-10-CM

## 2019-12-02 DIAGNOSIS — Z89.511 ACQUIRED ABSENCE OF RIGHT LEG BELOW KNEE: Chronic | ICD-10-CM

## 2019-12-02 PROCEDURE — 76770 US EXAM ABDO BACK WALL COMP: CPT | Mod: 26

## 2019-12-02 PROCEDURE — 76770 US EXAM ABDO BACK WALL COMP: CPT

## 2019-12-06 ENCOUNTER — APPOINTMENT (OUTPATIENT)
Dept: INTERNAL MEDICINE | Facility: CLINIC | Age: 69
End: 2019-12-06

## 2019-12-08 ENCOUNTER — EMERGENCY (EMERGENCY)
Facility: HOSPITAL | Age: 69
LOS: 1 days | Discharge: HOME | End: 2019-12-08
Attending: EMERGENCY MEDICINE
Payer: MEDICARE

## 2019-12-08 VITALS
RESPIRATION RATE: 20 BRPM | HEART RATE: 65 BPM | DIASTOLIC BLOOD PRESSURE: 62 MMHG | WEIGHT: 173.06 LBS | SYSTOLIC BLOOD PRESSURE: 123 MMHG | OXYGEN SATURATION: 98 % | TEMPERATURE: 98 F | HEIGHT: 69 IN

## 2019-12-08 DIAGNOSIS — Z89.422 ACQUIRED ABSENCE OF OTHER LEFT TOE(S): Chronic | ICD-10-CM

## 2019-12-08 DIAGNOSIS — Z89.511 ACQUIRED ABSENCE OF RIGHT LEG BELOW KNEE: Chronic | ICD-10-CM

## 2019-12-08 LAB
ACETONE SERPL-MCNC: NEGATIVE — SIGNIFICANT CHANGE UP
ALBUMIN SERPL ELPH-MCNC: 3.9 G/DL — SIGNIFICANT CHANGE UP (ref 3.3–5.2)
ALP SERPL-CCNC: 99 U/L — SIGNIFICANT CHANGE UP (ref 40–120)
ALT FLD-CCNC: 9 U/L — SIGNIFICANT CHANGE UP
ANION GAP SERPL CALC-SCNC: 15 MMOL/L — SIGNIFICANT CHANGE UP (ref 5–17)
AST SERPL-CCNC: 25 U/L — SIGNIFICANT CHANGE UP
BASOPHILS # BLD AUTO: 0.03 K/UL — SIGNIFICANT CHANGE UP (ref 0–0.2)
BASOPHILS NFR BLD AUTO: 0.4 % — SIGNIFICANT CHANGE UP (ref 0–2)
BILIRUB SERPL-MCNC: <0.2 MG/DL — LOW (ref 0.4–2)
BUN SERPL-MCNC: 19 MG/DL — SIGNIFICANT CHANGE UP (ref 8–20)
CALCIUM SERPL-MCNC: 9.6 MG/DL — SIGNIFICANT CHANGE UP (ref 8.6–10.2)
CHLORIDE SERPL-SCNC: 100 MMOL/L — SIGNIFICANT CHANGE UP (ref 98–107)
CO2 SERPL-SCNC: 24 MMOL/L — SIGNIFICANT CHANGE UP (ref 22–29)
CREAT SERPL-MCNC: 1.1 MG/DL — SIGNIFICANT CHANGE UP (ref 0.5–1.3)
EOSINOPHIL # BLD AUTO: 0.13 K/UL — SIGNIFICANT CHANGE UP (ref 0–0.5)
EOSINOPHIL NFR BLD AUTO: 1.6 % — SIGNIFICANT CHANGE UP (ref 0–6)
GLUCOSE SERPL-MCNC: 186 MG/DL — HIGH (ref 70–115)
HCT VFR BLD CALC: 38.7 % — LOW (ref 39–50)
HGB BLD-MCNC: 12.1 G/DL — LOW (ref 13–17)
IMM GRANULOCYTES NFR BLD AUTO: 0.2 % — SIGNIFICANT CHANGE UP (ref 0–1.5)
LIDOCAIN IGE QN: 22 U/L — SIGNIFICANT CHANGE UP (ref 22–51)
LYMPHOCYTES # BLD AUTO: 2.17 K/UL — SIGNIFICANT CHANGE UP (ref 1–3.3)
LYMPHOCYTES # BLD AUTO: 26.3 % — SIGNIFICANT CHANGE UP (ref 13–44)
MCHC RBC-ENTMCNC: 27.3 PG — SIGNIFICANT CHANGE UP (ref 27–34)
MCHC RBC-ENTMCNC: 31.3 GM/DL — LOW (ref 32–36)
MCV RBC AUTO: 87.4 FL — SIGNIFICANT CHANGE UP (ref 80–100)
MONOCYTES # BLD AUTO: 0.8 K/UL — SIGNIFICANT CHANGE UP (ref 0–0.9)
MONOCYTES NFR BLD AUTO: 9.7 % — SIGNIFICANT CHANGE UP (ref 2–14)
NEUTROPHILS # BLD AUTO: 5.11 K/UL — SIGNIFICANT CHANGE UP (ref 1.8–7.4)
NEUTROPHILS NFR BLD AUTO: 61.8 % — SIGNIFICANT CHANGE UP (ref 43–77)
PLATELET # BLD AUTO: 407 K/UL — HIGH (ref 150–400)
POTASSIUM SERPL-MCNC: 5.1 MMOL/L — SIGNIFICANT CHANGE UP (ref 3.5–5.3)
POTASSIUM SERPL-SCNC: 5.1 MMOL/L — SIGNIFICANT CHANGE UP (ref 3.5–5.3)
PROT SERPL-MCNC: 8.3 G/DL — SIGNIFICANT CHANGE UP (ref 6.6–8.7)
RBC # BLD: 4.43 M/UL — SIGNIFICANT CHANGE UP (ref 4.2–5.8)
RBC # FLD: 12.9 % — SIGNIFICANT CHANGE UP (ref 10.3–14.5)
SODIUM SERPL-SCNC: 139 MMOL/L — SIGNIFICANT CHANGE UP (ref 135–145)
TROPONIN T SERPL-MCNC: <0.01 NG/ML — SIGNIFICANT CHANGE UP (ref 0–0.06)
WBC # BLD: 8.26 K/UL — SIGNIFICANT CHANGE UP (ref 3.8–10.5)
WBC # FLD AUTO: 8.26 K/UL — SIGNIFICANT CHANGE UP (ref 3.8–10.5)

## 2019-12-08 PROCEDURE — 99284 EMERGENCY DEPT VISIT MOD MDM: CPT

## 2019-12-08 PROCEDURE — 71045 X-RAY EXAM CHEST 1 VIEW: CPT | Mod: 26

## 2019-12-08 PROCEDURE — 93010 ELECTROCARDIOGRAM REPORT: CPT

## 2019-12-08 RX ORDER — SODIUM CHLORIDE 9 MG/ML
2400 INJECTION INTRAMUSCULAR; INTRAVENOUS; SUBCUTANEOUS ONCE
Refills: 0 | Status: COMPLETED | OUTPATIENT
Start: 2019-12-08 | End: 2019-12-08

## 2019-12-08 RX ORDER — SODIUM CHLORIDE 9 MG/ML
1000 INJECTION INTRAMUSCULAR; INTRAVENOUS; SUBCUTANEOUS ONCE
Refills: 0 | Status: COMPLETED | OUTPATIENT
Start: 2019-12-08 | End: 2019-12-08

## 2019-12-08 RX ADMIN — SODIUM CHLORIDE 1000 MILLILITER(S): 9 INJECTION INTRAMUSCULAR; INTRAVENOUS; SUBCUTANEOUS at 23:35

## 2019-12-08 RX ADMIN — SODIUM CHLORIDE 2400 MILLILITER(S): 9 INJECTION INTRAMUSCULAR; INTRAVENOUS; SUBCUTANEOUS at 19:57

## 2019-12-08 NOTE — ED STATDOCS - CLINICAL SUMMARY MEDICAL DECISION MAKING FREE TEXT BOX
70 y/o M pt with PMhx of DM and HTN presents to the ED c/o DKA with associated symptom of nausea and vomiting. Will obtain labs and reevaluate.

## 2019-12-08 NOTE — ED PROVIDER NOTE - OBJECTIVE STATEMENT
68 y/o M pt with hx of DM and HTN recently d/c from [facility] last month after being admitted for DKA presenting today with cc of n/v for the past month since d/c. Pt reports that he is currently prescribed insulin to take for his dm, but only takes it when he is "feeling good" and does not take it every day or monitor his blood sugars closely. Pt is also reporting increased fatigue, decreased appetite and occasional diarrhea. Pt denies any chest pain, dyspnea, fever, cough congestion, abdominal pain, headache, dizziness, blurred vision, neck pain, back pain, leg pain, leg swelling or other complaint.

## 2019-12-08 NOTE — ED PROVIDER NOTE - PROGRESS NOTE DETAILS
Reviewed negative w/u results with pt. Pt reports feeling much improved. Comfortable with plan for d/c. Pt agrees to f/u with pcp. Return instructions given, questions answered.

## 2019-12-08 NOTE — ED PROVIDER NOTE - ATTENDING CONTRIBUTION TO CARE
68 yo M with hx of IDDM non-compliant with insulin c/o worsening N/V/D x 1 month s/p d/c form [facility] MICU for DKA. No assoc fever or abd pain.  Accucheck 196.  On exam pt awake and alert in NAD, mm moist, Neck supple, Cor Reg, Lungs clear, Abd soft, no localized tenderness, BS+, Ext R BKA, FROM, no edema, Neuro non-focal.  Will check labs, IVF and re-eval

## 2019-12-08 NOTE — ED ADULT TRIAGE NOTE - AS TEMP SITE
After Visit Summary   9/20/2017    Danyelle Canales    MRN: [id number]           Patient Information     Date Of Birth          1979        Visit Information        Provider Department      9/20/2017 7:40 AM Smiley Kim PA-C [facility]        Today's Diagnoses     Encounter for routine adult medical exam with abnormal findings    -  1    Chronic pain syndrome        Need for prophylactic vaccination and inoculation against influenza        Low back pain with left-sided sciatica, unspecified back pain laterality, unspecified chronicity        Generalized anxiety disorder        Moderate major depression (H)        Poor concentration          Care Instructions    Smiley or her team will be in touch with you with results.  Naproxen twice daily x 3-5 days  Heat to area 2-3 times daily x 20 minutes  Stretching 2-3 times daily  Only fill steroid if develop radiating pain down leg.    STEWART Arreguin PA-C    Preventive Health Recommendations  Female Ages 26 - 39  Yearly exam:   See your health care provider every year in order to    Review health changes.     Discuss preventive care.      Review your medicines if you your doctor has prescribed any.    Until age 30: Get a Pap test every three years (more often if you have had an abnormal result).    After age 30: Talk to your doctor about whether you should have a Pap test every 3 years or have a Pap test with HPV screening every 5 years.   You do not need a Pap test if your uterus was removed (hysterectomy) and you have not had cancer.  You should be tested each year for STDs (sexually transmitted diseases), if you're at risk.   Talk to your provider about how often to have your cholesterol checked.  If you are at risk for diabetes, you should have a diabetes test (fasting glucose).  Shots: Get a flu shot each year. Get a tetanus shot every 10 years.   Nutrition:     Eat at least 5 servings of fruits and vegetables  each day.    Eat whole-grain bread, whole-wheat pasta and brown rice instead of white grains and rice.    Talk to your provider about Calcium and Vitamin D.     Lifestyle    Exercise at least 150 minutes a week (30 minutes a day, 5 days of the week). This will help you control your weight and prevent disease.    Limit alcohol to one drink per day.    No smoking.     Wear sunscreen to prevent skin cancer.    See your dentist every six months for an exam and cleaning.  [facility]   Discharged by : Toyin SCHMID Certified Medical Assistant (AAMA)   Paper scripts provided to patient : 1   If you have any questions regarding to your visit please contact your care team:   [facility] Hours Telephone Number   RODRIGUE [address] PA-C    7am-7pm Monday - Thursday   7am-5pm Fridays  [phone]   (Appointment scheduling available 24/7)   RN Line   [phone] option 2       What options do I have for visits at the clinic other than the traditional office visit?   To expand how we care for you, many of our providers are utilizing electronic visits (e-visits) and telephone visits, when medically appropriate, for interactions with their patients rather than a visit in the clinic. We also offer nurse visits for many medical concerns. Just like any other service, we will bill your insurance company for this type of visit based on time spent on the phone with your provider. Not all insurance companies cover these visits. Please check with your medical insurance if this type of visit is covered. You will be responsible for any charges that are not paid by your insurance.     E-visits via [employer]: generally incur a $35.00 fee.     Telephone visits:   Time spent on the phone: *charged based on time that is spent on the phone in increments of 10 minutes. Estimated cost:   5-10 mins $30.00   11-20 mins. $59.00   21-30 mins. $85.00   Use [employer] (secure email  communication and access to your chart) to send your primary care provider a message or make an appointment. Ask someone on your Team how to sign up for [employer].   For a Price Quote for your services, please call our Consumer Price Line at [phone].   As always, Thank you for trusting us with your health care needs!                [facility]:    Scheduling Appointments  [facility]  Call: [phone]    [facility]  Call: [phone]    [facility]  Call: [phone]                    Follow-ups after your visit        Additional Services     MENTAL HEALTH REFERRAL       Your provider has referred you to: [facility]: [facility] - ADHD & Bariatric Assessments - Adult ADHD Evaluations - [facility] [phone]   [email or web address]   *Please call to schedule an appointment.  [facility] [phone] [email or web address] *Please call to schedule an appointment.     All scheduling is subject to the client's specific insurance plan & benefits, provider/location availability, and provider clinical specialities.  Please arrive 15 minutes early for your first appointment and bring your completed paperwork.    Please be aware that coverage of these services is subject to the terms and limitations of your health insurance plan.  Call member services at your health plan with any benefit or coverage questions.                  Who to contact     If you have questions or need follow up information about today's clinic visit or your schedule please contact [facility] directly at [phone].  Normal or non-critical lab and imaging results will be communicated to you by MyChart, letter or phone within 4 business days after the clinic has received the results.  "If you do not hear from us within 7 days, please contact the clinic through [employer] or phone. If you have a critical or abnormal lab result, we will notify you by phone as soon as possible.  Submit refill requests through [employer] or call your pharmacy and they will forward the refill request to us. Please allow 3 business days for your refill to be completed.          Additional Information About Your Visit        [employer] Information     [employer] gives you secure access to your electronic health record. If you see a primary care provider, you can also send messages to your care team and make appointments. If you have questions, please call your primary care clinic.  If you do not have a primary care provider, please call [phone] and they will assist you.        Care EveryWhere ID     This is your Care EveryWhere ID. This could be used by other organizations to access your [facility] medical records  [id number]        Your Vitals Were     Pulse Temperature Height Last Period BMI (Body Mass Index)       84 98.6  F (37  C) (Oral) 5' 7\" (1.702 m) 08/25/2017 (Exact Date) 36.34 kg/m2        Blood Pressure from Last 3 Encounters:   09/20/17 122/84   03/27/17 128/86   11/23/16 (!) 139/92    Weight from Last 3 Encounters:   09/20/17 232 lb (105.2 kg)   03/27/17 236 lb (107 kg)   11/23/16 231 lb (104.8 kg)              We Performed the Following     FLU VAC, SPLIT VIRUS IM > 3 YO (QUADRIVALENT) [16269]     Glucose     Lipid panel reflex to direct LDL     MENTAL HEALTH REFERRAL     Vaccine Administration, Initial [69027]          Today's Medication Changes          These changes are accurate as of: 9/20/17  8:27 AM.  If you have any questions, ask your nurse or doctor.               Start taking these medicines.        Dose/Directions    methylPREDNISolone 4 MG tablet   Commonly known as:  MEDROL DOSEPAK   Used for:  Low back pain with left-sided sciatica, unspecified back pain laterality, unspecified chronicity   Started " by:  Smiley Kim PA-C        Follow package instructions   Quantity:  21 tablet   Refills:  0       naproxen 500 MG tablet   Commonly known as:  NAPROSYN   Used for:  Low back pain with left-sided sciatica, unspecified back pain laterality, unspecified chronicity   Started by:  Smiley Kim PA-C        Dose:  500 mg   Take 1 tablet (500 mg) by mouth 2 times daily as needed for moderate pain   Quantity:  30 tablet   Refills:  1         These medicines have changed or have updated prescriptions.        Dose/Directions    DULoxetine 30 MG EC capsule   Commonly known as:  CYMBALTA   This may have changed:    - how much to take  - additional instructions   Used for:  Chronic pain syndrome, Major depressive disorder, recurrent episode, moderate (H), Generalized anxiety disorder        1 capsule daily x 1 week, then increase to 2 tablets daily   Quantity:  60 capsule   Refills:  1            Where to get your medicines      These medications were sent to [place] [address] [place] [address]   [address] [facility] [address]     Phone:  [phone]     naproxen 500 MG tablet         Some of these will need a paper prescription and others can be bought over the counter.  Ask your nurse if you have questions.     Bring a paper prescription for each of these medications     methylPREDNISolone 4 MG tablet                Primary Care Provider Office Phone # Fax #    Smiley Kim PA-C [phone]       [address] [facility] [address]        Equal Access to Services     [facility] Hospital[facility] AH: Hadii mojgan justiceo Socristian, waaxda luqadaha, qaybta kaalmada adeegyada, darrell alva . So [facility] [phone].    ATENCIÓN: Si habla español, tiene a roberto disposición servicios gratuitos de asistencia lingüística. Llame al [phone].    We comply with applicable federal civil rights laws and [place] laws. We do not discriminate on the basis of  race, color, national origin, age, disability sex, sexual orientation or gender identity.            Thank you!     Thank you for choosing [facility]  for your care. Our goal is always to provide you with excellent care. Hearing back from our patients is one way we can continue to improve our services. Please take a few minutes to complete the written survey that you may receive in the mail after your visit with us. Thank you!             Your Updated Medication List - Protect others around you: Learn how to safely use, store and throw away your medicines at www.disposemymeds.org.          This list is accurate as of: 9/20/17  8:27 AM.  Always use your most recent med list.                   Brand Name Dispense Instructions for use Diagnosis    * buPROPion 150 MG 24 hr tablet    WELLBUTRIN XL    90 tablet    Take 1 tablet (150 mg) by mouth every morning Will take in addition to 300 mg XL tablet    Major depressive disorder, recurrent episode, moderate (H)       * buPROPion 300 MG 24 hr tablet    WELLBUTRIN XL    90 tablet    Take 1 tablet (300 mg) by mouth every morning        DULoxetine 30 MG EC capsule    CYMBALTA    60 capsule    1 capsule daily x 1 week, then increase to 2 tablets daily    Chronic pain syndrome, Major depressive disorder, recurrent episode, moderate (H), Generalized anxiety disorder       HYDROcodone-acetaminophen 5-325 MG per tablet    NORCO          hydrOXYzine 25 MG capsule    VISTARIL    120 capsule    Take 1-2 tablet at bedtime, may take one tablet in morning and one tablet in afternoon as well    Anxiety, Muscle tightness       methylPREDNISolone 4 MG tablet    MEDROL DOSEPAK    21 tablet    Follow package instructions    Low back pain with left-sided sciatica, unspecified back pain laterality, unspecified chronicity       naproxen 500 MG tablet    NAPROSYN    30 tablet    Take 1 tablet (500 mg) by mouth 2 times daily as needed for moderate pain    Low back pain with  left-sided sciatica, unspecified back pain laterality, unspecified chronicity       * order for DME     1 Units    Apply 1 Units topically. TENS unit use as directed    Chronic pain syndrome, Myalgia and myositis       * UNKNOWN TO PATIENT      Compound med. Prescribed by pain clinic.        zolpidem 5 MG tablet    AMBIEN    30 tablet    Take 1 tablet (5 mg) by mouth nightly as needed for sleep    Other insomnia       * Notice:  This list has 4 medication(s) that are the same as other medications prescribed for you. Read the directions carefully, and ask your doctor or other care provider to review them with you.       oral

## 2019-12-08 NOTE — ED ADULT NURSE NOTE - NSIMPLEMENTINTERV_GEN_ALL_ED
Implemented All Fall with Harm Risk Interventions:  Yolyn to call system. Call bell, personal items and telephone within reach. Instruct patient to call for assistance. Room bathroom lighting operational. Non-slip footwear when patient is off stretcher. Physically safe environment: no spills, clutter or unnecessary equipment. Stretcher in lowest position, wheels locked, appropriate side rails in place. Provide visual cue, wrist band, yellow gown, etc. Monitor gait and stability. Monitor for mental status changes and reorient to person, place, and time. Review medications for side effects contributing to fall risk. Reinforce activity limits and safety measures with patient and family. Provide visual clues: red socks.

## 2019-12-08 NOTE — ED PROVIDER NOTE - PHYSICAL EXAMINATION
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

## 2019-12-08 NOTE — ED ADULT NURSE NOTE - FAMILY HISTORY
Father  Still living? Unknown  Family history of diabetes mellitus, Age at diagnosis: Age Unknown     Mother  Still living? Unknown  Family history of diabetes mellitus, Age at diagnosis: Age Unknown     Sibling  Still living? Unknown  Family history of diabetes mellitus, Age at diagnosis: Age Unknown     Child  Still living? Unknown  Family history of diabetes mellitus, Age at diagnosis: Age Unknown

## 2019-12-08 NOTE — ED CLERICAL - NS ED CLERK NOTE PRE-ARRIVAL INFORMATION; ADDITIONAL PRE-ARRIVAL INFORMATION
This patient is eligible for (or currently enrolled in) an outpatient care management program available through [employer]. This program can coordinate outpatient follow up and assist the patient in accessing a variety of outpatient resources.  If discharged from the ED, the patient will be contacted to see if any additional resources are needed.                                                                                    Please call the Nurse Clinical Call Center at [phone] with any questions or for assistance in discharge planning.

## 2019-12-08 NOTE — ED PROVIDER NOTE - PATIENT PORTAL LINK FT
You can access the FollowMyHealth Patient Portal offered by [facility] by registering at the following website: [email or web address]. By joining [employer]’s FollowMyHealth portal, you will also be able to view your health information using other applications (apps) compatible with our system.

## 2019-12-08 NOTE — ED STATDOCS - OBJECTIVE STATEMENT
68 y/o M pt with pMhx of DM and HTN presents to the ED c/o DKA with associated symptom of nausea and vomiting. Pt was recently d/c from [facility] last month after being admitted for DKA. Pt reports that he does monitor his blood sugars closely. Pt is also reporting increased fatigue, decreased appetite and occasional diarrhea. Pt denies any chest pain, dyspnea, fever, cough congestion, abdominal pain, headache, dizziness, blurred vision, neck pain, back pain, leg pain, leg swelling. Pt has no further complaints at the moment.

## 2019-12-08 NOTE — ED PROVIDER NOTE - CLINICAL SUMMARY MEDICAL DECISION MAKING FREE TEXT BOX
70 y/o M with hx of dm and htn with poor compliance to insulin presenting today with vomiting, fatigue, diarrhea, and decreased appetite since being d/c from [facility] last month for dka. Will evaluate for dka with labs, vbg, acetone, trop, ekg, trop, fluids the reeval.

## 2019-12-08 NOTE — ED STATDOCS - ATTENDING CONTRIBUTION TO CARE
I, Christopher Buchanan, performed the initial face to face bedside interview with this patient regarding history of present illness, review of symptoms and relevant past medical, social and family history.  I completed an independent physical examination.  I was the initial provider who evaluated this patient. I have signed out the follow up of any pending tests (i.e. labs, radiological studies) to the ACP.  I have communicated the patient’s plan of care and disposition with the ACP.  The history, relevant review of systems, past medical and surgical history, medical decision making, and physical examination was documented by the scribe in my presence and I attest to the accuracy of the documentation.

## 2019-12-08 NOTE — ED ADULT NURSE NOTE - OBJECTIVE STATEMENT
pt a+ox3, reports intermittent vomiting x 1 week. pt denies fever, chills, night sweats. pt noncompliant with insulin at home, multiple hospitalizations for DKA. pt denies abd pain, states last vomited yesterday and 1 episode of diarrhea today.

## 2019-12-09 VITALS
OXYGEN SATURATION: 95 % | RESPIRATION RATE: 18 BRPM | SYSTOLIC BLOOD PRESSURE: 132 MMHG | HEART RATE: 84 BPM | DIASTOLIC BLOOD PRESSURE: 70 MMHG

## 2019-12-09 LAB
APPEARANCE UR: ABNORMAL
BACTERIA # UR AUTO: ABNORMAL
BILIRUB UR-MCNC: NEGATIVE — SIGNIFICANT CHANGE UP
COLOR SPEC: YELLOW — SIGNIFICANT CHANGE UP
COMMENT - URINE: SIGNIFICANT CHANGE UP
COMMENT - URINE: SIGNIFICANT CHANGE UP
DIFF PNL FLD: ABNORMAL
EPI CELLS # UR: ABNORMAL
GLUCOSE UR QL: NEGATIVE MG/DL — SIGNIFICANT CHANGE UP
KETONES UR-MCNC: ABNORMAL
LEUKOCYTE ESTERASE UR-ACNC: ABNORMAL
NITRITE UR-MCNC: NEGATIVE — SIGNIFICANT CHANGE UP
PH UR: 5 — SIGNIFICANT CHANGE UP (ref 5–8)
PROT UR-MCNC: 30 MG/DL
RBC CASTS # UR COMP ASSIST: ABNORMAL /HPF (ref 0–4)
SP GR SPEC: 1.02 — SIGNIFICANT CHANGE UP (ref 1.01–1.02)
UROBILINOGEN FLD QL: 1 MG/DL
WBC UR QL: ABNORMAL

## 2019-12-09 PROCEDURE — 82962 GLUCOSE BLOOD TEST: CPT

## 2019-12-09 PROCEDURE — 85027 COMPLETE CBC AUTOMATED: CPT

## 2019-12-09 PROCEDURE — 84484 ASSAY OF TROPONIN QUANT: CPT

## 2019-12-09 PROCEDURE — 99284 EMERGENCY DEPT VISIT MOD MDM: CPT | Mod: 25

## 2019-12-09 PROCEDURE — 81001 URINALYSIS AUTO W/SCOPE: CPT

## 2019-12-09 PROCEDURE — 83690 ASSAY OF LIPASE: CPT

## 2019-12-09 PROCEDURE — 36415 COLL VENOUS BLD VENIPUNCTURE: CPT

## 2019-12-09 PROCEDURE — 80053 COMPREHEN METABOLIC PANEL: CPT

## 2019-12-09 PROCEDURE — 93005 ELECTROCARDIOGRAM TRACING: CPT

## 2019-12-09 PROCEDURE — 82009 KETONE BODYS QUAL: CPT

## 2019-12-09 PROCEDURE — 71045 X-RAY EXAM CHEST 1 VIEW: CPT

## 2019-12-09 PROCEDURE — 96374 THER/PROPH/DIAG INJ IV PUSH: CPT

## 2019-12-09 RX ORDER — CEFTRIAXONE 500 MG/1
1000 INJECTION, POWDER, FOR SOLUTION INTRAMUSCULAR; INTRAVENOUS ONCE
Refills: 0 | Status: COMPLETED | OUTPATIENT
Start: 2019-12-09 | End: 2019-12-09

## 2019-12-09 RX ORDER — CEPHALEXIN 500 MG
1 CAPSULE ORAL
Qty: 28 | Refills: 0
Start: 2019-12-09 | End: 2019-12-15

## 2019-12-09 RX ADMIN — CEFTRIAXONE 100 MILLIGRAM(S): 500 INJECTION, POWDER, FOR SOLUTION INTRAMUSCULAR; INTRAVENOUS at 01:50

## 2019-12-10 ENCOUNTER — CHART COPY (OUTPATIENT)
Age: 69
End: 2019-12-10

## 2020-01-28 ENCOUNTER — APPOINTMENT (OUTPATIENT)
Dept: INTERNAL MEDICINE | Facility: CLINIC | Age: 70
End: 2020-01-28
Payer: MEDICARE

## 2020-01-28 VITALS
OXYGEN SATURATION: 96 % | HEIGHT: 69 IN | SYSTOLIC BLOOD PRESSURE: 110 MMHG | HEART RATE: 95 BPM | DIASTOLIC BLOOD PRESSURE: 60 MMHG | TEMPERATURE: 97.6 F

## 2020-01-28 DIAGNOSIS — M25.561 PAIN IN RIGHT KNEE: ICD-10-CM

## 2020-01-28 DIAGNOSIS — G89.29 PAIN IN LEFT KNEE: ICD-10-CM

## 2020-01-28 DIAGNOSIS — M25.562 PAIN IN LEFT KNEE: ICD-10-CM

## 2020-01-28 PROCEDURE — 99214 OFFICE O/P EST MOD 30 MIN: CPT | Mod: 25

## 2020-01-28 PROCEDURE — 83036 HEMOGLOBIN GLYCOSYLATED A1C: CPT | Mod: QW

## 2020-01-28 PROCEDURE — 82962 GLUCOSE BLOOD TEST: CPT

## 2020-01-29 LAB
GLUCOSE BLDC GLUCOMTR-MCNC: 260
HBA1C MFR BLD HPLC: 7.5

## 2020-02-03 ENCOUNTER — RX RENEWAL (OUTPATIENT)
Age: 70
End: 2020-02-03

## 2020-02-10 ENCOUNTER — EMERGENCY (EMERGENCY)
Facility: HOSPITAL | Age: 70
LOS: 1 days | Discharge: HOME | End: 2020-02-10
Attending: STUDENT IN AN ORGANIZED HEALTH CARE EDUCATION/TRAINING PROGRAM
Payer: MEDICARE

## 2020-02-10 VITALS
TEMPERATURE: 98 F | DIASTOLIC BLOOD PRESSURE: 88 MMHG | RESPIRATION RATE: 20 BRPM | SYSTOLIC BLOOD PRESSURE: 168 MMHG | OXYGEN SATURATION: 99 % | HEART RATE: 90 BPM | WEIGHT: 160.06 LBS

## 2020-02-10 DIAGNOSIS — Z89.422 ACQUIRED ABSENCE OF OTHER LEFT TOE(S): Chronic | ICD-10-CM

## 2020-02-10 DIAGNOSIS — Z89.511 ACQUIRED ABSENCE OF RIGHT LEG BELOW KNEE: Chronic | ICD-10-CM

## 2020-02-10 LAB
ALBUMIN SERPL ELPH-MCNC: 4.3 G/DL — SIGNIFICANT CHANGE UP (ref 3.3–5.2)
ALP SERPL-CCNC: 87 U/L — SIGNIFICANT CHANGE UP (ref 40–120)
ALT FLD-CCNC: 7 U/L — SIGNIFICANT CHANGE UP
ANION GAP SERPL CALC-SCNC: 15 MMOL/L — SIGNIFICANT CHANGE UP (ref 5–17)
AST SERPL-CCNC: 20 U/L — SIGNIFICANT CHANGE UP
BASOPHILS # BLD AUTO: 0.03 K/UL — SIGNIFICANT CHANGE UP (ref 0–0.2)
BASOPHILS NFR BLD AUTO: 0.3 % — SIGNIFICANT CHANGE UP (ref 0–2)
BILIRUB SERPL-MCNC: 0.3 MG/DL — LOW (ref 0.4–2)
BUN SERPL-MCNC: 23 MG/DL — HIGH (ref 8–20)
CALCIUM SERPL-MCNC: 9.1 MG/DL — SIGNIFICANT CHANGE UP (ref 8.6–10.2)
CHLORIDE SERPL-SCNC: 95 MMOL/L — LOW (ref 98–107)
CO2 SERPL-SCNC: 27 MMOL/L — SIGNIFICANT CHANGE UP (ref 22–29)
CREAT SERPL-MCNC: 0.81 MG/DL — SIGNIFICANT CHANGE UP (ref 0.5–1.3)
EOSINOPHIL # BLD AUTO: 0.02 K/UL — SIGNIFICANT CHANGE UP (ref 0–0.5)
EOSINOPHIL NFR BLD AUTO: 0.2 % — SIGNIFICANT CHANGE UP (ref 0–6)
GLUCOSE SERPL-MCNC: 169 MG/DL — HIGH (ref 70–99)
HCT VFR BLD CALC: 39.1 % — SIGNIFICANT CHANGE UP (ref 39–50)
HGB BLD-MCNC: 12.6 G/DL — LOW (ref 13–17)
IMM GRANULOCYTES NFR BLD AUTO: 0.3 % — SIGNIFICANT CHANGE UP (ref 0–1.5)
LIDOCAIN IGE QN: 17 U/L — LOW (ref 22–51)
LYMPHOCYTES # BLD AUTO: 1.53 K/UL — SIGNIFICANT CHANGE UP (ref 1–3.3)
LYMPHOCYTES # BLD AUTO: 15 % — SIGNIFICANT CHANGE UP (ref 13–44)
MAGNESIUM SERPL-MCNC: 1.6 MG/DL — SIGNIFICANT CHANGE UP (ref 1.6–2.6)
MCHC RBC-ENTMCNC: 26.7 PG — LOW (ref 27–34)
MCHC RBC-ENTMCNC: 32.2 GM/DL — SIGNIFICANT CHANGE UP (ref 32–36)
MCV RBC AUTO: 82.8 FL — SIGNIFICANT CHANGE UP (ref 80–100)
MONOCYTES # BLD AUTO: 0.87 K/UL — SIGNIFICANT CHANGE UP (ref 0–0.9)
MONOCYTES NFR BLD AUTO: 8.5 % — SIGNIFICANT CHANGE UP (ref 2–14)
NEUTROPHILS # BLD AUTO: 7.72 K/UL — HIGH (ref 1.8–7.4)
NEUTROPHILS NFR BLD AUTO: 75.7 % — SIGNIFICANT CHANGE UP (ref 43–77)
PLATELET # BLD AUTO: 266 K/UL — SIGNIFICANT CHANGE UP (ref 150–400)
POTASSIUM SERPL-MCNC: 4.3 MMOL/L — SIGNIFICANT CHANGE UP (ref 3.5–5.3)
POTASSIUM SERPL-SCNC: 4.3 MMOL/L — SIGNIFICANT CHANGE UP (ref 3.5–5.3)
PROT SERPL-MCNC: 7.8 G/DL — SIGNIFICANT CHANGE UP (ref 6.6–8.7)
RBC # BLD: 4.72 M/UL — SIGNIFICANT CHANGE UP (ref 4.2–5.8)
RBC # FLD: 12.6 % — SIGNIFICANT CHANGE UP (ref 10.3–14.5)
SODIUM SERPL-SCNC: 137 MMOL/L — SIGNIFICANT CHANGE UP (ref 135–145)
WBC # BLD: 10.2 K/UL — SIGNIFICANT CHANGE UP (ref 3.8–10.5)
WBC # FLD AUTO: 10.2 K/UL — SIGNIFICANT CHANGE UP (ref 3.8–10.5)

## 2020-02-10 PROCEDURE — 99284 EMERGENCY DEPT VISIT MOD MDM: CPT

## 2020-02-10 RX ORDER — SODIUM CHLORIDE 9 MG/ML
1000 INJECTION INTRAMUSCULAR; INTRAVENOUS; SUBCUTANEOUS ONCE
Refills: 0 | Status: COMPLETED | OUTPATIENT
Start: 2020-02-10 | End: 2020-02-10

## 2020-02-10 RX ORDER — ONDANSETRON 8 MG/1
4 TABLET, FILM COATED ORAL ONCE
Refills: 0 | Status: COMPLETED | OUTPATIENT
Start: 2020-02-10 | End: 2020-02-10

## 2020-02-10 RX ORDER — FAMOTIDINE 10 MG/ML
20 INJECTION INTRAVENOUS ONCE
Refills: 0 | Status: COMPLETED | OUTPATIENT
Start: 2020-02-10 | End: 2020-02-10

## 2020-02-10 RX ADMIN — SODIUM CHLORIDE 1000 MILLILITER(S): 9 INJECTION INTRAMUSCULAR; INTRAVENOUS; SUBCUTANEOUS at 21:56

## 2020-02-10 RX ADMIN — FAMOTIDINE 20 MILLIGRAM(S): 10 INJECTION INTRAVENOUS at 21:56

## 2020-02-10 RX ADMIN — ONDANSETRON 4 MILLIGRAM(S): 8 TABLET, FILM COATED ORAL at 21:56

## 2020-02-10 NOTE — ED PROVIDER NOTE - CLINICAL SUMMARY MEDICAL DECISION MAKING FREE TEXT BOX
Pt with acute GI upset. Will hydrate and treat his symptoms. Pt with acute GI upset. Will hydrate and treat his symptoms.  Patient tolerating oral intake.

## 2020-02-10 NOTE — ED ADULT TRIAGE NOTE - CHIEF COMPLAINT QUOTE
----- Message from Erin Coy Patient is alert and oriented x4 c/o nausea with vomiting since last night, denies chest pain or sob. breathing unlabored. neuro's intatc. hx of DM. hasn't taken BS meds today.  denies diarrhaea. color normal for ethnicity.

## 2020-02-10 NOTE — ED PROVIDER NOTE - CHPI ED SYMPTOMS NEG
no abdominal distension/no blood in stool/no chills/no burning urination/no diarrhea/no fever/no dysuria

## 2020-02-10 NOTE — ED ADULT NURSE NOTE - OBJECTIVE STATEMENT
Pt received with reports of nausea and vomiting since last night. Pt reporting three episodes of vomiting in total and one episode of diarrhea, pt denies blood in vomitus or stool. Pt received awake, alert, oriented x4, appears slow to respond, baseline mental status per daughter at bedside. Pt denies lightheadedness/dizziness/headache at this time. Respirations appearing even, unlabored, pt denies SOB or chest pain. Abdomen soft, nontender. Skin warm, dry, appropriate for race. Pt with BKA to RLE, prosthesis in place. Pt ambulatory with cane, denies recent falls. 20g PIV in place in L underside of arm, labs drawn and sent per orders. Pt medicated per orders. Pt with no apparent acute distress observed at this time. Safety maintained, will continue to monitor.

## 2020-02-10 NOTE — ED PROVIDER NOTE - PATIENT PORTAL LINK FT
You can access the FollowMyHealth Patient Portal offered by [facility] by registering at the following website: [email or web address]. By joining [employer]’s FollowMyHealth portal, you will also be able to view your health information using other applications (apps) compatible with our system.

## 2020-02-10 NOTE — ED PROVIDER NOTE - OBJECTIVE STATEMENT
70 y/o M pt, with PMHx of DM and HTN, presents to the ED c/o nausea, vomiting, and diarrhea. Pt reports that he has been vomiting since last night and that he has been feeling a little off starting 3 months ago; however daughter says that pt has been experiencing DKA for the past 3 months. Pt says he came to [facility] 1 month ago for similar symptoms, improved, but symptoms returned yesterday. Pt says that he vomits with any PO intake. Describes brown/food emesis. Pt had an episode of diarrhea, describes as loose stool tonight. Daughter reports that she recently had contact with a relative that also experienced vomiting and is concerned that she may have passed it to her dad, but daughter herself has no symptoms. Pt says that his BS varied in the 150s tonight. Pt denies having eaten anything unusual recently. 70 y/o M pt, with PMHx of DM and HTN, presents to the ED c/o nausea, vomiting, and diarrhea. Pt reports that he has been vomiting since last night and that he has been feeling a little off starting 3 months ago; however daughter says that pt has experienced DKA within the past 3 months. Pt says he came to [facility] 1 month ago for similar symptoms, improved, but symptoms returned yesterday. Pt says that he vomits with any PO intake. Describes brown/food emesis. Pt had an episode of diarrhea, describes as loose stool tonight. Daughter reports that she recently had contact with a relative that also experienced vomiting and is concerned that she may have passed it to her dad, but daughter herself has no symptoms. Pt says that his BS varied in the 150s tonight. Pt denies having eaten anything unusual recently.

## 2020-02-10 NOTE — ED ADULT NURSE NOTE - NSIMPLEMENTINTERV_GEN_ALL_ED
Implemented All Universal Safety Interventions:  Kendall to call system. Call bell, personal items and telephone within reach. Instruct patient to call for assistance. Room bathroom lighting operational. Non-slip footwear when patient is off stretcher. Physically safe environment: no spills, clutter or unnecessary equipment. Stretcher in lowest position, wheels locked, appropriate side rails in place.

## 2020-02-10 NOTE — ED PROVIDER NOTE - NSFOLLOWUPINSTRUCTIONS_ED_ALL_ED_FT
1) Follow up with your doctor in 1-2 days  2) Return to the ER for worsening or concerning symptoms  3) Take medication as prescribed

## 2020-02-10 NOTE — ED ADULT NURSE NOTE - CHIEF COMPLAINT QUOTE
Patient is alert and oriented x4 c/o nausea with vomiting since last night, denies chest pain or sob. breathing unlabored. neuro's intatc. hx of DM. hasn't taken BS meds today.  denies diarrhaea. color normal for ethnicity.

## 2020-02-11 ENCOUNTER — APPOINTMENT (OUTPATIENT)
Dept: INTERNAL MEDICINE | Facility: CLINIC | Age: 70
End: 2020-02-11
Payer: MEDICARE

## 2020-02-11 VITALS
SYSTOLIC BLOOD PRESSURE: 100 MMHG | OXYGEN SATURATION: 96 % | DIASTOLIC BLOOD PRESSURE: 60 MMHG | BODY MASS INDEX: 24.59 KG/M2 | WEIGHT: 166 LBS | HEART RATE: 64 BPM | HEIGHT: 69 IN

## 2020-02-11 VITALS
DIASTOLIC BLOOD PRESSURE: 86 MMHG | SYSTOLIC BLOOD PRESSURE: 146 MMHG | RESPIRATION RATE: 16 BRPM | TEMPERATURE: 98 F | OXYGEN SATURATION: 100 % | HEART RATE: 84 BPM

## 2020-02-11 LAB
APPEARANCE UR: CLEAR — SIGNIFICANT CHANGE UP
BILIRUB UR-MCNC: NEGATIVE — SIGNIFICANT CHANGE UP
COLOR SPEC: YELLOW — SIGNIFICANT CHANGE UP
DIFF PNL FLD: NEGATIVE — SIGNIFICANT CHANGE UP
EPI CELLS # UR: SIGNIFICANT CHANGE UP
GLUCOSE UR QL: 50 MG/DL
KETONES UR-MCNC: ABNORMAL
LEUKOCYTE ESTERASE UR-ACNC: ABNORMAL
NITRITE UR-MCNC: NEGATIVE — SIGNIFICANT CHANGE UP
PH UR: 5 — SIGNIFICANT CHANGE UP (ref 5–8)
PROT UR-MCNC: NEGATIVE MG/DL — SIGNIFICANT CHANGE UP
RBC CASTS # UR COMP ASSIST: NEGATIVE /HPF — SIGNIFICANT CHANGE UP (ref 0–4)
SP GR SPEC: 1.02 — SIGNIFICANT CHANGE UP (ref 1.01–1.02)
UROBILINOGEN FLD QL: NEGATIVE MG/DL — SIGNIFICANT CHANGE UP
WBC UR QL: SIGNIFICANT CHANGE UP

## 2020-02-11 PROCEDURE — 96374 THER/PROPH/DIAG INJ IV PUSH: CPT

## 2020-02-11 PROCEDURE — 96375 TX/PRO/DX INJ NEW DRUG ADDON: CPT

## 2020-02-11 PROCEDURE — 83690 ASSAY OF LIPASE: CPT

## 2020-02-11 PROCEDURE — 83735 ASSAY OF MAGNESIUM: CPT

## 2020-02-11 PROCEDURE — 81001 URINALYSIS AUTO W/SCOPE: CPT

## 2020-02-11 PROCEDURE — 85027 COMPLETE CBC AUTOMATED: CPT

## 2020-02-11 PROCEDURE — 82962 GLUCOSE BLOOD TEST: CPT

## 2020-02-11 PROCEDURE — 99284 EMERGENCY DEPT VISIT MOD MDM: CPT | Mod: 25

## 2020-02-11 PROCEDURE — 99214 OFFICE O/P EST MOD 30 MIN: CPT

## 2020-02-11 PROCEDURE — 36415 COLL VENOUS BLD VENIPUNCTURE: CPT

## 2020-02-11 PROCEDURE — 80053 COMPREHEN METABOLIC PANEL: CPT

## 2020-02-11 RX ORDER — ONDANSETRON 8 MG/1
1 TABLET, FILM COATED ORAL
Qty: 16 | Refills: 0
Start: 2020-02-11 | End: 2020-02-14

## 2020-02-24 DIAGNOSIS — R41.82 ALTERED MENTAL STATUS, UNSPECIFIED: ICD-10-CM

## 2020-04-13 ENCOUNTER — RX RENEWAL (OUTPATIENT)
Age: 70
End: 2020-04-13

## 2020-05-18 ENCOUNTER — APPOINTMENT (OUTPATIENT)
Dept: INTERNAL MEDICINE | Facility: CLINIC | Age: 70
End: 2020-05-18
Payer: MEDICARE

## 2020-05-19 ENCOUNTER — APPOINTMENT (OUTPATIENT)
Dept: INTERNAL MEDICINE | Facility: CLINIC | Age: 70
End: 2020-05-19
Payer: MEDICARE

## 2020-05-19 PROCEDURE — 99442: CPT

## 2020-05-19 NOTE — HISTORY OF PRESENT ILLNESS
[FreeTextEntry1] : f/u to DM, GERD, and amputations. [Verbal consent obtained from patient] : the patient, [unfilled] [de-identified] : Pt reports that his DM has been under good control with his BS running from 70 to 130.  He is not having any other issues with this at this time.  The GERD has been well controlled and he is not having any problems now.  The prosthesis has been giving him a problem and he states that the new one is not as good as the older one. He has been seeing someone for the prosthesis in [place] and is having problems getting there. Will attempt to find one locally for him. He states that the MD gave him a salve to put on the stump which helped a little but is not helping anymore.

## 2020-05-19 NOTE — ASSESSMENT
[FreeTextEntry1] : He needs to continue his current treatment for the diabetes.  He needs to see an orthotic dealer to deal with the issues with his prosthesis. Will try to find one locally.  He is to continue his treatment of the GERD as he has been doing .

## 2020-05-19 NOTE — REVIEW OF SYSTEMS
[Joint Pain] : joint pain [Joint Stiffness] : joint stiffness [Skin Rash] : skin rash [Negative] : Genitourinary

## 2020-08-14 ENCOUNTER — RX RENEWAL (OUTPATIENT)
Age: 70
End: 2020-08-14

## 2020-10-02 ENCOUNTER — APPOINTMENT (OUTPATIENT)
Dept: INTERNAL MEDICINE | Facility: CLINIC | Age: 70
End: 2020-10-02

## 2020-10-13 ENCOUNTER — RX RENEWAL (OUTPATIENT)
Age: 70
End: 2020-10-13

## 2020-10-28 ENCOUNTER — RX RENEWAL (OUTPATIENT)
Age: 70
End: 2020-10-28

## 2020-10-28 NOTE — ED PROVIDER NOTE - DATE/TIME 1
Problem: At Risk for Falls  Goal: # Takes action to control fall-related risks  Outcome: Outcome Met, Continue evaluating goal progress toward completion     Problem: At Risk for Falls  Goal: # Verbalizes understanding of fall risk/precautions  Description: Document education using the patient education activity  Outcome: Outcome Met, Continue evaluating goal progress toward completion     Problem: Pain  Goal: #Acceptable pain level achieved/maintained at rest using NRS/Faces  Description: This goal is used for patients who can self-report.  Acceptable means the level is at or below the identified comfort/function goal.  Outcome: Outcome Met, Continue evaluating goal progress toward completion     Problem: At Risk for Falls  Goal: # Patient does not fall  Outcome: Outcome Met, Continue evaluating goal progress toward completion     Problem: Pain  Goal: # Acceptable pain level achieved/maintained at rest using NRS/Faces without oversedation (opioid naive or PCA/Epidural infusion)  Description: This goal is used if Opioid-naïve or on PCA/Epidural Infusion.  Outcome: Outcome Met, Continue evaluating goal progress toward completion     Problem: Pain  Goal: # Acceptable pain level achieved/maintained with activity using NRS/Faces  Description: This goal is used for patients who can self-report and are not achieving acceptable pain control during activity.  Outcome: Outcome Met, Continue evaluating goal progress toward completion     Problem: Pain  Goal: # Acceptable pain level achieved/maintained with activity using NRS/Faces  Description: This goal is used for patients who can self-report and are not achieving acceptable pain control during activity.  Outcome: Outcome Met, Continue evaluating goal progress toward completion     Problem: Pain  Goal: Acceptable pain/comfort level is achieved/maintained at rest (based on Pain Behaviors Scale)  Description: This goal is used for patients who are not able to self-report  pain and are assessed for pain using the Pain Behaviors Scale  Outcome: Outcome Met, Continue evaluating goal progress toward completion     Problem: Pain  Goal: # Verbalizes understanding of pain management  Description: Documented in Patient Education Activity  Outcome: Outcome Met, Continue evaluating goal progress toward completion     Problem: Anxiety  Goal: Anxiety is at a manageable level with interventions  Outcome: Outcome Met, Continue evaluating goal progress toward completion     Problem: Anxiety  Goal: Anxiety is decreased  Outcome: Outcome Met, Continue evaluating goal progress toward completion     Problem: Anxiety  Goal: Verbalizes understanding of anxiety symptoms and management  Description: Document on Patient Education Activity   Outcome: Outcome Met, Continue evaluating goal progress toward completion     Problem: Postoperative Care  Goal: Elimination status is maintained/returned to baseline  Outcome: Outcome Not Met, Continue to Monitor     Problem: Postoperative Care  Goal: Oral intake is resumed and tolerated  Outcome: Outcome Not Met, Continue to Monitor     Problem: Postoperative Care  Goal: Activity level is resumed to level needed for d/c  Outcome: Outcome Not Met, Continue to Monitor     Problem: Postoperative Care  Goal: Verbalizes understanding of postoperative care in the hospital and after d/c  Description: Document on Patient Education Activity  Outcome: Outcome Not Met, Continue to Monitor      30-Jan-2019 21:02

## 2020-11-03 ENCOUNTER — APPOINTMENT (OUTPATIENT)
Dept: INTERNAL MEDICINE | Facility: CLINIC | Age: 70
End: 2020-11-03
Payer: MEDICARE

## 2020-11-03 VITALS
DIASTOLIC BLOOD PRESSURE: 80 MMHG | WEIGHT: 171 LBS | BODY MASS INDEX: 25.33 KG/M2 | HEART RATE: 94 BPM | TEMPERATURE: 97.6 F | HEIGHT: 69 IN | SYSTOLIC BLOOD PRESSURE: 122 MMHG | OXYGEN SATURATION: 95 %

## 2020-11-03 DIAGNOSIS — J31.0 CHRONIC RHINITIS: ICD-10-CM

## 2020-11-03 DIAGNOSIS — Z23 ENCOUNTER FOR IMMUNIZATION: ICD-10-CM

## 2020-11-03 PROCEDURE — 90662 IIV NO PRSV INCREASED AG IM: CPT

## 2020-11-03 PROCEDURE — 99072 ADDL SUPL MATRL&STAF TM PHE: CPT

## 2020-11-03 PROCEDURE — 83036 HEMOGLOBIN GLYCOSYLATED A1C: CPT | Mod: QW

## 2020-11-03 PROCEDURE — 99214 OFFICE O/P EST MOD 30 MIN: CPT | Mod: 25

## 2020-11-03 PROCEDURE — 82962 GLUCOSE BLOOD TEST: CPT

## 2020-11-03 PROCEDURE — G0008: CPT

## 2020-11-04 ENCOUNTER — NON-APPOINTMENT (OUTPATIENT)
Age: 70
End: 2020-11-04

## 2020-11-04 LAB
GLUCOSE BLDC GLUCOMTR-MCNC: 164
HBA1C MFR BLD HPLC: 7.9

## 2020-12-04 ENCOUNTER — APPOINTMENT (OUTPATIENT)
Dept: INTERNAL MEDICINE | Facility: CLINIC | Age: 70
End: 2020-12-04
Payer: MEDICARE

## 2020-12-04 VITALS
WEIGHT: 173 LBS | BODY MASS INDEX: 25.62 KG/M2 | HEIGHT: 69 IN | TEMPERATURE: 97.3 F | OXYGEN SATURATION: 98 % | HEART RATE: 77 BPM | DIASTOLIC BLOOD PRESSURE: 70 MMHG | SYSTOLIC BLOOD PRESSURE: 118 MMHG

## 2020-12-04 DIAGNOSIS — B18.2 CHRONIC VIRAL HEPATITIS C: ICD-10-CM

## 2020-12-04 DIAGNOSIS — Z87.09 PERSONAL HISTORY OF OTHER DISEASES OF THE RESPIRATORY SYSTEM: ICD-10-CM

## 2020-12-04 DIAGNOSIS — Z87.2 PERSONAL HISTORY OF DISEASES OF THE SKIN AND SUBCUTANEOUS TISSUE: ICD-10-CM

## 2020-12-04 PROCEDURE — 99072 ADDL SUPL MATRL&STAF TM PHE: CPT

## 2020-12-04 PROCEDURE — 99214 OFFICE O/P EST MOD 30 MIN: CPT

## 2020-12-30 ENCOUNTER — RX RENEWAL (OUTPATIENT)
Age: 70
End: 2020-12-30

## 2021-02-01 NOTE — H&P ADULT - EXTREMITIES
"-- DO NOT REPLY / DO NOT REPLY ALL --  -- Message is from the [facility]--      Patient is requesting a medication refill - medication is on active list    Was Medication Pended? Yes. Rx Name and Dose:  glipiZIDE (GLUCOTROL) 5 MG tablet    Duration: 90 days    Pharmacy  [id number] [id number] [place], [address]    Patient confirmed the above pharmacy as correct? Yes    Caller Information       Type Contact Phone    02/01/2021 02:14 PM CST Phone (Incoming) Jyoti Montiel (Self) [phone] (H)          Alternative phone number: NA    Turnaround time given to caller: ""This message will be sent to [facility] Provider's name]. The clinical team will fulfill your request as soon as they review your message. \""  "
Refill approved as requested.
detailed exam

## 2021-02-14 ENCOUNTER — INPATIENT (INPATIENT)
Facility: HOSPITAL | Age: 71
LOS: 9 days | Discharge: HOME | DRG: 233 | End: 2021-02-24
Attending: THORACIC SURGERY (CARDIOTHORACIC VASCULAR SURGERY) | Admitting: HOSPITALIST
Payer: COMMERCIAL

## 2021-02-14 VITALS
SYSTOLIC BLOOD PRESSURE: 145 MMHG | DIASTOLIC BLOOD PRESSURE: 81 MMHG | WEIGHT: 169.98 LBS | RESPIRATION RATE: 20 BRPM | OXYGEN SATURATION: 98 % | HEART RATE: 114 BPM | TEMPERATURE: 98 F | HEIGHT: 69 IN

## 2021-02-14 DIAGNOSIS — I21.4 NON-ST ELEVATION (NSTEMI) MYOCARDIAL INFARCTION: ICD-10-CM

## 2021-02-14 DIAGNOSIS — Z89.422 ACQUIRED ABSENCE OF OTHER LEFT TOE(S): Chronic | ICD-10-CM

## 2021-02-14 DIAGNOSIS — Z89.511 ACQUIRED ABSENCE OF RIGHT LEG BELOW KNEE: Chronic | ICD-10-CM

## 2021-02-14 LAB
ACETONE SERPL-MCNC: NEGATIVE — SIGNIFICANT CHANGE UP
ALBUMIN SERPL ELPH-MCNC: 3.5 G/DL — SIGNIFICANT CHANGE UP (ref 3.3–5.2)
ALBUMIN SERPL ELPH-MCNC: 4.3 G/DL — SIGNIFICANT CHANGE UP (ref 3.3–5.2)
ALP SERPL-CCNC: 117 U/L — SIGNIFICANT CHANGE UP (ref 40–120)
ALP SERPL-CCNC: 90 U/L — SIGNIFICANT CHANGE UP (ref 40–120)
ALT FLD-CCNC: 6 U/L — SIGNIFICANT CHANGE UP
ALT FLD-CCNC: 8 U/L — SIGNIFICANT CHANGE UP
ANION GAP SERPL CALC-SCNC: 14 MMOL/L — SIGNIFICANT CHANGE UP (ref 5–17)
ANION GAP SERPL CALC-SCNC: 23 MMOL/L — HIGH (ref 5–17)
APPEARANCE UR: CLEAR — SIGNIFICANT CHANGE UP
AST SERPL-CCNC: 16 U/L — SIGNIFICANT CHANGE UP
AST SERPL-CCNC: 21 U/L — SIGNIFICANT CHANGE UP
BACTERIA # UR AUTO: ABNORMAL
BASE EXCESS BLDV CALC-SCNC: 5.8 MMOL/L — HIGH (ref -2–2)
BASOPHILS # BLD AUTO: 0 K/UL — SIGNIFICANT CHANGE UP (ref 0–0.2)
BASOPHILS NFR BLD AUTO: 0 % — SIGNIFICANT CHANGE UP (ref 0–2)
BILIRUB SERPL-MCNC: 0.4 MG/DL — SIGNIFICANT CHANGE UP (ref 0.4–2)
BILIRUB SERPL-MCNC: 0.6 MG/DL — SIGNIFICANT CHANGE UP (ref 0.4–2)
BILIRUB UR-MCNC: NEGATIVE — SIGNIFICANT CHANGE UP
BUN SERPL-MCNC: 45 MG/DL — HIGH (ref 8–20)
BUN SERPL-MCNC: 47 MG/DL — HIGH (ref 8–20)
CA-I SERPL-SCNC: 1.02 MMOL/L — LOW (ref 1.15–1.33)
CALCIUM SERPL-MCNC: 8.5 MG/DL — LOW (ref 8.6–10.2)
CALCIUM SERPL-MCNC: 9.4 MG/DL — SIGNIFICANT CHANGE UP (ref 8.6–10.2)
CHLORIDE BLDV-SCNC: 81 MMOL/L — LOW (ref 98–107)
CHLORIDE SERPL-SCNC: 79 MMOL/L — LOW (ref 98–107)
CHLORIDE SERPL-SCNC: 87 MMOL/L — LOW (ref 98–107)
CO2 SERPL-SCNC: 25 MMOL/L — SIGNIFICANT CHANGE UP (ref 22–29)
CO2 SERPL-SCNC: 29 MMOL/L — SIGNIFICANT CHANGE UP (ref 22–29)
COLOR SPEC: YELLOW — SIGNIFICANT CHANGE UP
CREAT SERPL-MCNC: 1.03 MG/DL — SIGNIFICANT CHANGE UP (ref 0.5–1.3)
CREAT SERPL-MCNC: 1.19 MG/DL — SIGNIFICANT CHANGE UP (ref 0.5–1.3)
DIFF PNL FLD: NEGATIVE — SIGNIFICANT CHANGE UP
EOSINOPHIL # BLD AUTO: 0 K/UL — SIGNIFICANT CHANGE UP (ref 0–0.5)
EOSINOPHIL NFR BLD AUTO: 0 % — SIGNIFICANT CHANGE UP (ref 0–6)
EPI CELLS # UR: SIGNIFICANT CHANGE UP
GAS PNL BLDV: 125 MMOL/L — LOW (ref 135–145)
GAS PNL BLDV: SIGNIFICANT CHANGE UP
GAS PNL BLDV: SIGNIFICANT CHANGE UP
GIANT PLATELETS BLD QL SMEAR: PRESENT — SIGNIFICANT CHANGE UP
GLUCOSE BLDV-MCNC: 358 MG/DL — HIGH (ref 70–99)
GLUCOSE SERPL-MCNC: 297 MG/DL — HIGH (ref 70–99)
GLUCOSE SERPL-MCNC: 369 MG/DL — HIGH (ref 70–99)
GLUCOSE UR QL: 1000 MG/DL
HCO3 BLDV-SCNC: 29 MMOL/L — SIGNIFICANT CHANGE UP (ref 20–26)
HCT VFR BLD CALC: 41.3 % — SIGNIFICANT CHANGE UP (ref 39–50)
HCT VFR BLDA CALC: 45 — SIGNIFICANT CHANGE UP (ref 39–50)
HGB BLD CALC-MCNC: 14.6 G/DL — SIGNIFICANT CHANGE UP (ref 13–17)
HGB BLD-MCNC: 14.2 G/DL — SIGNIFICANT CHANGE UP (ref 13–17)
KETONES UR-MCNC: ABNORMAL
LACTATE BLDV-MCNC: 2.1 MMOL/L — HIGH (ref 0.5–2)
LACTATE BLDV-MCNC: 2.9 MMOL/L — HIGH (ref 0.5–2)
LEUKOCYTE ESTERASE UR-ACNC: ABNORMAL
LIDOCAIN IGE QN: 18 U/L — LOW (ref 22–51)
LYMPHOCYTES # BLD AUTO: 0.8 K/UL — LOW (ref 1–3.3)
LYMPHOCYTES # BLD AUTO: 3.5 % — LOW (ref 13–44)
MANUAL SMEAR VERIFICATION: SIGNIFICANT CHANGE UP
MCHC RBC-ENTMCNC: 28 PG — SIGNIFICANT CHANGE UP (ref 27–34)
MCHC RBC-ENTMCNC: 34.4 GM/DL — SIGNIFICANT CHANGE UP (ref 32–36)
MCV RBC AUTO: 81.5 FL — SIGNIFICANT CHANGE UP (ref 80–100)
MONOCYTES # BLD AUTO: 2.19 K/UL — HIGH (ref 0–0.9)
MONOCYTES NFR BLD AUTO: 9.6 % — SIGNIFICANT CHANGE UP (ref 2–14)
NEUTROPHILS # BLD AUTO: 19.41 K/UL — HIGH (ref 1.8–7.4)
NEUTROPHILS NFR BLD AUTO: 85.2 % — HIGH (ref 43–77)
NITRITE UR-MCNC: NEGATIVE — SIGNIFICANT CHANGE UP
OTHER CELLS CSF MANUAL: 18 ML/DL — SIGNIFICANT CHANGE UP (ref 18–22)
PCO2 BLDV: 50 MMHG — SIGNIFICANT CHANGE UP (ref 35–50)
PH BLDV: 7.41 — SIGNIFICANT CHANGE UP (ref 7.32–7.43)
PH UR: 5 — SIGNIFICANT CHANGE UP (ref 5–8)
PLAT MORPH BLD: NORMAL — SIGNIFICANT CHANGE UP
PLATELET # BLD AUTO: 377 K/UL — SIGNIFICANT CHANGE UP (ref 150–400)
PO2 BLDV: 60 MMHG — HIGH (ref 25–45)
POTASSIUM BLDV-SCNC: 4.3 MMOL/L — SIGNIFICANT CHANGE UP (ref 3.4–4.5)
POTASSIUM SERPL-MCNC: 4 MMOL/L — SIGNIFICANT CHANGE UP (ref 3.5–5.3)
POTASSIUM SERPL-MCNC: 4.3 MMOL/L — SIGNIFICANT CHANGE UP (ref 3.5–5.3)
POTASSIUM SERPL-SCNC: 4 MMOL/L — SIGNIFICANT CHANGE UP (ref 3.5–5.3)
POTASSIUM SERPL-SCNC: 4.3 MMOL/L — SIGNIFICANT CHANGE UP (ref 3.5–5.3)
PROT SERPL-MCNC: 6.6 G/DL — SIGNIFICANT CHANGE UP (ref 6.6–8.7)
PROT SERPL-MCNC: 7.9 G/DL — SIGNIFICANT CHANGE UP (ref 6.6–8.7)
PROT UR-MCNC: 15 MG/DL
RBC # BLD: 5.07 M/UL — SIGNIFICANT CHANGE UP (ref 4.2–5.8)
RBC # FLD: 12.5 % — SIGNIFICANT CHANGE UP (ref 10.3–14.5)
RBC BLD AUTO: NORMAL — SIGNIFICANT CHANGE UP
RBC CASTS # UR COMP ASSIST: SIGNIFICANT CHANGE UP /HPF (ref 0–4)
SAO2 % BLDV: 89 % — SIGNIFICANT CHANGE UP
SARS-COV-2 RNA SPEC QL NAA+PROBE: SIGNIFICANT CHANGE UP
SODIUM SERPL-SCNC: 127 MMOL/L — LOW (ref 135–145)
SODIUM SERPL-SCNC: 130 MMOL/L — LOW (ref 135–145)
SP GR SPEC: 1.01 — SIGNIFICANT CHANGE UP (ref 1.01–1.02)
TROPONIN T SERPL-MCNC: 0.05 NG/ML — SIGNIFICANT CHANGE UP (ref 0–0.06)
UROBILINOGEN FLD QL: NEGATIVE MG/DL — SIGNIFICANT CHANGE UP
VARIANT LYMPHS # BLD: 1.7 % — SIGNIFICANT CHANGE UP (ref 0–6)
WBC # BLD: 22.78 K/UL — HIGH (ref 3.8–10.5)
WBC # FLD AUTO: 22.78 K/UL — HIGH (ref 3.8–10.5)
WBC UR QL: SIGNIFICANT CHANGE UP

## 2021-02-14 PROCEDURE — 33533 CABG ARTERIAL SINGLE: CPT | Mod: AS

## 2021-02-14 PROCEDURE — 71045 X-RAY EXAM CHEST 1 VIEW: CPT | Mod: 26

## 2021-02-14 PROCEDURE — 76998 US GUIDE INTRAOP: CPT | Mod: 26,AS,59

## 2021-02-14 PROCEDURE — 33517 CABG ARTERY-VEIN SINGLE: CPT | Mod: AS

## 2021-02-14 PROCEDURE — 93010 ELECTROCARDIOGRAM REPORT: CPT | Mod: 77

## 2021-02-14 PROCEDURE — 74177 CT ABD & PELVIS W/CONTRAST: CPT | Mod: 26

## 2021-02-14 PROCEDURE — 36000 PLACE NEEDLE IN VEIN: CPT

## 2021-02-14 PROCEDURE — 99285 EMERGENCY DEPT VISIT HI MDM: CPT | Mod: 25

## 2021-02-14 PROCEDURE — 99223 1ST HOSP IP/OBS HIGH 75: CPT

## 2021-02-14 RX ORDER — SODIUM CHLORIDE 9 MG/ML
1000 INJECTION, SOLUTION INTRAVENOUS ONCE
Refills: 0 | Status: COMPLETED | OUTPATIENT
Start: 2021-02-14 | End: 2021-02-14

## 2021-02-14 RX ORDER — HEPARIN SODIUM 5000 [USP'U]/ML
5000 INJECTION INTRAVENOUS; SUBCUTANEOUS EVERY 8 HOURS
Refills: 0 | Status: DISCONTINUED | OUTPATIENT
Start: 2021-02-14 | End: 2021-02-18

## 2021-02-14 RX ORDER — ONDANSETRON 8 MG/1
4 TABLET, FILM COATED ORAL EVERY 6 HOURS
Refills: 0 | Status: DISCONTINUED | OUTPATIENT
Start: 2021-02-14 | End: 2021-02-19

## 2021-02-14 RX ORDER — SODIUM CHLORIDE 9 MG/ML
1000 INJECTION INTRAMUSCULAR; INTRAVENOUS; SUBCUTANEOUS
Refills: 0 | Status: DISCONTINUED | OUTPATIENT
Start: 2021-02-14 | End: 2021-02-18

## 2021-02-14 RX ORDER — ONDANSETRON 8 MG/1
4 TABLET, FILM COATED ORAL ONCE
Refills: 0 | Status: COMPLETED | OUTPATIENT
Start: 2021-02-14 | End: 2021-02-14

## 2021-02-14 RX ADMIN — SODIUM CHLORIDE 1000 MILLILITER(S): 9 INJECTION, SOLUTION INTRAVENOUS at 17:54

## 2021-02-14 RX ADMIN — SODIUM CHLORIDE 1000 MILLILITER(S): 9 INJECTION, SOLUTION INTRAVENOUS at 17:53

## 2021-02-14 RX ADMIN — SODIUM CHLORIDE 1000 MILLILITER(S): 9 INJECTION, SOLUTION INTRAVENOUS at 16:49

## 2021-02-14 RX ADMIN — ONDANSETRON 4 MILLIGRAM(S): 8 TABLET, FILM COATED ORAL at 17:51

## 2021-02-14 RX ADMIN — SODIUM CHLORIDE 1000 MILLILITER(S): 9 INJECTION, SOLUTION INTRAVENOUS at 18:32

## 2021-02-14 NOTE — ED PROVIDER NOTE - CLINICAL SUMMARY MEDICAL DECISION MAKING FREE TEXT BOX
Patient presenting with abdominal pain, nausea, vomiting, and hyperglycemia. Patient found to not be in DKA with normal pH and negative ketones. Patient will be treated symptomatically. Patient has been noncompliant with DM medication secondary to nausea. Will discharge when clinically improved. Patient presenting with abdominal pain, nausea, vomiting, and hyperglycemia.  Patient found to not be in DKA with normal pH and negative ketones.

## 2021-02-14 NOTE — H&P ADULT - ASSESSMENT
ASSESSMENT:  71 y/o Diabetic M presents c/o epigastric pain and N/V noted to have AGMA, LA, and Troponin spill admitted for NSTEMI r/o ACS     PLAN:  NSTEMI type 2 spill   -EKG Sinus Tachycardia LAE LAFB no acute ischemic changes repeat similar  -no acute ekg changes but abnormal ekg  -trend cardiac enzymmes  -tsh/a1c/FLP in AM  -check TTE  -Cardio consulted by ER   -IVF  -NPO PMN  -VTE PPX SQH    RLL Infiltrate likely Atelectasis  -DC Levaquin/Abx for now as low suspicion for PNA. Trend WBC, monitor for fever, and check Procal. If fever/sputum/bandemia/or positive procal consider restarting abx otherwise suspect atelectasis on imaging.   -COVID19 PCR negative    Leukocytosis  -reactive vs infection. plan as above. repeat cbc/diff    Esophagitis  -start PPI 40mg PO daily    AGMA, Lactic Acidosis, N/V r/o DKA/HHS  -repeat BMP AGMA cleared. Trend Lactate. Improving s/p IVF. Cont IVF NS@75cc/hr. Repeat in AM. Hold metformin in case of Metformin-induced LA  -restart basal insulin and continue IVF. Ketones negative. BS only around 300s. doubt DKA/HHS but remains on differential     DM2  -Hold Glipizide/Metformin. ISS. Eat once now then NPO PMN. Lantus 25 units daily but dose adjust 16 units overngiht now for basal insulin. Patient may be using Novolog flexpen instead of Lantus QHS at home. Verify home insulins on discharge.  ASSESSMENT:  69 y/o Diabetic M presents c/o epigastric pain and N/V noted to have AGMA, LA, and Troponin spill admitted for NSTEMI r/o ACS     PLAN:  NSTEMI type 2 spill   -may be exacerbated by dehydration from N/V and early DKA/HHS   -EKG Sinus Tachycardia LAE LAFB no acute ischemic changes repeat similar  -no acute ekg changes but abnormal ekg  -trend cardiac enzymmes  -tsh/a1c/FLP in AM  -check TTE  -Cardio consulted by ER   -IVF  -NPO PMN  -VTE PPX SQH    RLL Infiltrate likely Atelectasis  -DC Levaquin/Abx for now as low suspicion for PNA. Trend WBC, monitor for fever, and check Procal. If fever/sputum/bandemia/or positive procal consider restarting abx otherwise suspect atelectasis on imaging.   -COVID19 PCR negative    Leukocytosis  -reactive vs infection vs hemo-centration from early DKA/HHS. plan as above. repeat cbc/diff and trend.    Esophagitis  -start PPI 40mg PO daily    AGMA, Lactic Acidosis, N/V r/o DKA/HHS  -repeat BMP AGMA cleared. Trend Lactate. Improving s/p IVF. Cont IVF NS@75cc/hr. Repeat in AM. Hold metformin in case of Metformin-induced LA  -restart basal insulin and continue IVF. Ketones negative. BS only around 300s. doubt DKA/HHS but remains on differential     DM2  -Hold Glipizide/Metformin. ISS. Eat once now then NPO PMN. Lantus 25 units daily but dose adjust 16 units overngiht now for basal insulin. Patient may be using Novolog flexpen instead of Lantus QHS at home. Verify home insulins on discharge.

## 2021-02-14 NOTE — H&P ADULT - HISTORY OF PRESENT ILLNESS
69 y/o M with PMHX IDDM2 presenting with abdominal pain from home. He reports nausea and vomiting for 2 days. Patient is a poor historian. He endorses a history of Dm, on metformin and glipizide. He reports hiccuping frequently. He states he does see a PMD but doesn't recall the last time. He states that he has some mild epigastric abdominal pain, and that he has not eaten in 2 days due to the nausea and vomiting. Labs reviewed multiple abnormalities noted. CT and EKG reviewed. Improving s/p IVFB 2L. Still burping. Discussed plan below.  ROS +eructation, +n/V, +diaphoresis. +abd pain, denies CP, denies SOB, all other systems negative.

## 2021-02-14 NOTE — ED ADULT TRIAGE NOTE - CHIEF COMPLAINT QUOTE
Pt. complaining of epigastric pain, nausea, vomiting and decreased PO intake x 2 days.  Pt. with history of DM.  Pt. denies fever, chills, chest pain, sob or sick contacts.

## 2021-02-14 NOTE — ED PROCEDURE NOTE - PROCEDURE ADDITIONAL DETAILS
Peripheral IV access in the Emergency Department obtained under dynamic ultrasound guidance with dark nonpulsatile blood return.  Catheter was flushed afterwards without any resistance or resulting extravasation.  IV catheter confirmed in compressible vein after insertion.  20G left AC

## 2021-02-14 NOTE — ED PROVIDER NOTE - PROGRESS NOTE DETAILS
Spoke with daughter. States for 2 days hes vomiting and weak. He hasn't been eating much. He hasn't taken his meds for 2 days. Denies other medical problems. Reports that he regularly gets these kind of symptoms and he normally gets discharged within the day. [phone] [place] patient still tachycardic to 112. feeling better though. denies any complaints at this time. will repeat vbg, cmp, troponin after 2L of fluids. reassess Tomasz: Reviewed patient's EKG with Dr. Humphrey. Patient will be admitted for further care. Tomasz: I discussed the patient's case with hospitalist Dr. Batista. Calling cardiology to discuss patient's EKG changes/+trop and then will update her. I also have updated the patient on the plan for admission; he is agreeable.

## 2021-02-14 NOTE — ED PROVIDER NOTE - CARE PLAN
Principal Discharge DX:	Hyperglycemia  Secondary Diagnosis:	DM (diabetes mellitus)   Principal Discharge DX:	NSTEMI (non-ST elevated myocardial infarction)  Secondary Diagnosis:	DM (diabetes mellitus)  Secondary Diagnosis:	Hyperglycemia

## 2021-02-14 NOTE — ED PROVIDER NOTE - NS ED ROS FT
General: Denies fever, chills  HEENT: Denies sensory changes, sore throat  Neck: Denies neck pain, neck stiffness  Resp: Denies coughing, SOB  Cardiovascular: Denies CP, palpitations, LE edema  GI: Endorses N/V/ab pain Denies diarrhea  : Denies dysuria, hematuria, incontinence  MSK: Denies back pain  Neuro: Denies HA, dizziness, numbness, weakness  Skin: Denies rashes

## 2021-02-14 NOTE — ED PROVIDER NOTE - PHYSICAL EXAMINATION
General: Well appearing male in no acute distress  HEENT: Normocephalic, atraumatic. Dry mucous membranes. Oropharynx clear.   Eyes: L eye obscured, blind. R eye normal.   Neck: Soft and supple. Full ROM without pain. No midline tenderness  Cardiac: Regular rate and regular rhythm. No murmurs.   Resp: Lungs CTAB. No wheezes, rales or rhonchi.  Abd: Mild TTP. Soft, non-distended. No guarding or rebound.   MSK: SEBASTIAN HERNANDEZ  Neuro: AO x 3. Motor strength and sensation grossly intact.

## 2021-02-14 NOTE — ED ADULT NURSE NOTE - OBJECTIVE STATEMENT
Patient A&Ox4, flat affect, stated has not been taking his diabetic medications because he has not been eating. Stated is hungry but cannot eat. Denies any chest pain, shortness of breath, nausea or dizziness. Cardiac monitor in place, sinus tach w/PVC/s.  in progress. Respirations even & unlabored. Abdomen soft, nontender, nondistended.

## 2021-02-14 NOTE — H&P ADULT - NSHPPHYSICALEXAM_GEN_ALL_CORE
Vital Signs Last 24 Hrs  T(C): 36.7 (14 Feb 2021 23:10), Max: 36.7 (14 Feb 2021 19:10)  T(F): 98 (14 Feb 2021 23:10), Max: 98.1 (14 Feb 2021 19:10)  HR: 119 (14 Feb 2021 23:10) (111 - 119)  BP: 128/58 (14 Feb 2021 23:10) (116/56 - 145/81)  BP(mean): --  RR: 20 (14 Feb 2021 23:10) (19 - 20)  SpO2: 98% (14 Feb 2021 19:10) (98% - 98%)    Constitutional: Well-appearing, NAD, VSS +belching  Head: NC/AT  Eyes: PERRL, EOMI, anicteric sclera, conjunctiva WNL  ENT: Normal Pharynx, MMM, No tonsillar exudate/erythema  Neck: Supple, Non-tender  Chest: Non-tender, no rashes  Cardio: +Tachcyardic with PVCs, s1/s2, no appreciable murmurs/rubs/gallops  Resp: BS CTA bilaterally, no wheezing/rhonchi/rales  Abd: Soft, Non-tender, Non-distended, no rebound/guarding/rigidity  MSK: moving all extremities, no motor weakness, full ROM  Psych: appropriate, cooperative  Neuro: CN II-XII grossly intact, no focal deficits  Skin: Warm/Dry. No rashes.

## 2021-02-14 NOTE — ED PROVIDER NOTE - ATTENDING CONTRIBUTION TO CARE
70 yom pmh DM, right BKA here with vomiting and abd pain x 2 days. Patient poor historian. Reports has not taken his meds for last few days. Also with hiccups x 3 days. Denies any abd surgeries. Denies f/c, n/v, cp, sob, cough. Speaking with daughter, patient has had multiple ED visits for hyperglycemia  AP - tachycardic here. will give fluid hydration, labs CT imaging r/o abd pathology. eval for DKA. reassess

## 2021-02-14 NOTE — ED PROVIDER NOTE - OBJECTIVE STATEMENT
Patient is a 71yo M presenting with abdominal pain from home. He reports nausea and vomiting for 2 days. Patient is a poor historian. He endorses a history of Dm, on metformin and glipizide. He reports hiccuping frequently. He states he does see a PMD but doesn't recall the last time. 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 He states that he has some mild epigastric abdominal pain, and that he has not eaten in 2 days due to the nausea and vomiting.

## 2021-02-14 NOTE — H&P ADULT - NSICDXFAMILYHX_GEN_ALL_CORE_FT
FAMILY HISTORY:  Father  Still living? Unknown  Family history of diabetes mellitus, Age at diagnosis: Age Unknown    Mother  Still living? Unknown  Family history of diabetes mellitus, Age at diagnosis: Age Unknown    Sibling  Still living? Unknown  Family history of diabetes mellitus, Age at diagnosis: Age Unknown    Child  Still living? Unknown  Family history of diabetes mellitus, Age at diagnosis: Age Unknown

## 2021-02-14 NOTE — ED ADULT NURSE REASSESSMENT NOTE - INTERVENTIONS DEFINITIONS
[place] to call system/Call bell, personal items and telephone within reach/Non-slip footwear when patient is off stretcher/Stretcher in lowest position, wheels locked, appropriate side rails in place/Provide visual cue, wrist band, yellow gown, etc./Monitor for mental status changes and reorient to person, place, and time

## 2021-02-15 DIAGNOSIS — I10 ESSENTIAL (PRIMARY) HYPERTENSION: ICD-10-CM

## 2021-02-15 LAB
A1C WITH ESTIMATED AVERAGE GLUCOSE RESULT: 8.2 % — HIGH (ref 4–5.6)
ALBUMIN SERPL ELPH-MCNC: 3.4 G/DL — SIGNIFICANT CHANGE UP (ref 3.3–5.2)
ALP SERPL-CCNC: 85 U/L — SIGNIFICANT CHANGE UP (ref 40–120)
ALT FLD-CCNC: 7 U/L — SIGNIFICANT CHANGE UP
ANION GAP SERPL CALC-SCNC: 12 MMOL/L — SIGNIFICANT CHANGE UP (ref 5–17)
APTT BLD: 27.6 SEC — SIGNIFICANT CHANGE UP (ref 27.5–35.5)
AST SERPL-CCNC: 15 U/L — SIGNIFICANT CHANGE UP
B-OH-BUTYR SERPL-SCNC: 1.9 MMOL/L — HIGH
BASOPHILS # BLD AUTO: 0.02 K/UL — SIGNIFICANT CHANGE UP (ref 0–0.2)
BASOPHILS NFR BLD AUTO: 0.1 % — SIGNIFICANT CHANGE UP (ref 0–2)
BILIRUB SERPL-MCNC: 0.3 MG/DL — LOW (ref 0.4–2)
BUN SERPL-MCNC: 39 MG/DL — HIGH (ref 8–20)
CALCIUM SERPL-MCNC: 8.4 MG/DL — LOW (ref 8.6–10.2)
CHLORIDE SERPL-SCNC: 89 MMOL/L — LOW (ref 98–107)
CHOLEST SERPL-MCNC: 144 MG/DL — SIGNIFICANT CHANGE UP
CK SERPL-CCNC: 144 U/L — SIGNIFICANT CHANGE UP (ref 30–200)
CO2 SERPL-SCNC: 27 MMOL/L — SIGNIFICANT CHANGE UP (ref 22–29)
CREAT SERPL-MCNC: 1.02 MG/DL — SIGNIFICANT CHANGE UP (ref 0.5–1.3)
EOSINOPHIL # BLD AUTO: 0 K/UL — SIGNIFICANT CHANGE UP (ref 0–0.5)
EOSINOPHIL NFR BLD AUTO: 0 % — SIGNIFICANT CHANGE UP (ref 0–6)
ESTIMATED AVERAGE GLUCOSE: 189 MG/DL — HIGH (ref 68–114)
GLUCOSE BLDC GLUCOMTR-MCNC: 108 MG/DL — HIGH (ref 70–99)
GLUCOSE BLDC GLUCOMTR-MCNC: 125 MG/DL — HIGH (ref 70–99)
GLUCOSE BLDC GLUCOMTR-MCNC: 153 MG/DL — HIGH (ref 70–99)
GLUCOSE BLDC GLUCOMTR-MCNC: 216 MG/DL — HIGH (ref 70–99)
GLUCOSE BLDC GLUCOMTR-MCNC: 277 MG/DL — HIGH (ref 70–99)
GLUCOSE BLDC GLUCOMTR-MCNC: 292 MG/DL — HIGH (ref 70–99)
GLUCOSE SERPL-MCNC: 327 MG/DL — HIGH (ref 70–99)
HCT VFR BLD CALC: 32.2 % — LOW (ref 39–50)
HDLC SERPL-MCNC: 28 MG/DL — LOW
HGB BLD-MCNC: 11.2 G/DL — LOW (ref 13–17)
IMM GRANULOCYTES NFR BLD AUTO: 0.5 % — SIGNIFICANT CHANGE UP (ref 0–1.5)
INR BLD: 1.28 RATIO — HIGH (ref 0.88–1.16)
LACTATE SERPL-SCNC: 1.6 MMOL/L — SIGNIFICANT CHANGE UP (ref 0.5–2)
LIPID PNL WITH DIRECT LDL SERPL: 88 MG/DL — SIGNIFICANT CHANGE UP
LYMPHOCYTES # BLD AUTO: 1.11 K/UL — SIGNIFICANT CHANGE UP (ref 1–3.3)
LYMPHOCYTES # BLD AUTO: 6.3 % — LOW (ref 13–44)
MAGNESIUM SERPL-MCNC: 2.2 MG/DL — SIGNIFICANT CHANGE UP (ref 1.6–2.6)
MCHC RBC-ENTMCNC: 28.4 PG — SIGNIFICANT CHANGE UP (ref 27–34)
MCHC RBC-ENTMCNC: 34.8 GM/DL — SIGNIFICANT CHANGE UP (ref 32–36)
MCV RBC AUTO: 81.5 FL — SIGNIFICANT CHANGE UP (ref 80–100)
MONOCYTES # BLD AUTO: 1.49 K/UL — HIGH (ref 0–0.9)
MONOCYTES NFR BLD AUTO: 8.4 % — SIGNIFICANT CHANGE UP (ref 2–14)
NEUTROPHILS # BLD AUTO: 14.94 K/UL — HIGH (ref 1.8–7.4)
NEUTROPHILS NFR BLD AUTO: 84.7 % — HIGH (ref 43–77)
NON HDL CHOLESTEROL: 116 MG/DL — SIGNIFICANT CHANGE UP
NT-PROBNP SERPL-SCNC: 176 PG/ML — SIGNIFICANT CHANGE UP (ref 0–300)
PHOSPHATE SERPL-MCNC: 2.8 MG/DL — SIGNIFICANT CHANGE UP (ref 2.4–4.7)
PLATELET # BLD AUTO: 260 K/UL — SIGNIFICANT CHANGE UP (ref 150–400)
POTASSIUM SERPL-MCNC: 3.8 MMOL/L — SIGNIFICANT CHANGE UP (ref 3.5–5.3)
POTASSIUM SERPL-SCNC: 3.8 MMOL/L — SIGNIFICANT CHANGE UP (ref 3.5–5.3)
PROCALCITONIN SERPL-MCNC: 0.17 NG/ML — HIGH (ref 0.02–0.1)
PROT SERPL-MCNC: 6.3 G/DL — LOW (ref 6.6–8.7)
PROTHROM AB SERPL-ACNC: 14.7 SEC — HIGH (ref 10.6–13.6)
RBC # BLD: 3.95 M/UL — LOW (ref 4.2–5.8)
RBC # FLD: 12.3 % — SIGNIFICANT CHANGE UP (ref 10.3–14.5)
SARS-COV-2 IGG SERPL QL IA: NEGATIVE — SIGNIFICANT CHANGE UP
SARS-COV-2 IGM SERPL IA-ACNC: 0.11 INDEX — SIGNIFICANT CHANGE UP
SODIUM SERPL-SCNC: 128 MMOL/L — LOW (ref 135–145)
TRIGL SERPL-MCNC: 139 MG/DL — SIGNIFICANT CHANGE UP
TROPONIN T SERPL-MCNC: 0.1 NG/ML — HIGH (ref 0–0.06)
TSH SERPL-MCNC: 1.15 UIU/ML — SIGNIFICANT CHANGE UP (ref 0.27–4.2)
WBC # BLD: 17.64 K/UL — HIGH (ref 3.8–10.5)
WBC # FLD AUTO: 17.64 K/UL — HIGH (ref 3.8–10.5)

## 2021-02-15 PROCEDURE — 93306 TTE W/DOPPLER COMPLETE: CPT | Mod: 26

## 2021-02-15 PROCEDURE — 93010 ELECTROCARDIOGRAM REPORT: CPT

## 2021-02-15 PROCEDURE — 99233 SBSQ HOSP IP/OBS HIGH 50: CPT

## 2021-02-15 PROCEDURE — 99232 SBSQ HOSP IP/OBS MODERATE 35: CPT

## 2021-02-15 RX ORDER — FAMOTIDINE 10 MG/ML
1 INJECTION INTRAVENOUS
Qty: 0 | Refills: 0 | DISCHARGE

## 2021-02-15 RX ORDER — INSULIN GLARGINE 100 [IU]/ML
25 INJECTION, SOLUTION SUBCUTANEOUS AT BEDTIME
Refills: 0 | Status: DISCONTINUED | OUTPATIENT
Start: 2021-02-15 | End: 2021-02-16

## 2021-02-15 RX ORDER — ASPIRIN/CALCIUM CARB/MAGNESIUM 324 MG
81 TABLET ORAL DAILY
Refills: 0 | Status: DISCONTINUED | OUTPATIENT
Start: 2021-02-15 | End: 2021-02-19

## 2021-02-15 RX ORDER — ASPIRIN/CALCIUM CARB/MAGNESIUM 324 MG
325 TABLET ORAL ONCE
Refills: 0 | Status: COMPLETED | OUTPATIENT
Start: 2021-02-15 | End: 2021-02-15

## 2021-02-15 RX ORDER — INSULIN GLARGINE 100 [IU]/ML
16 INJECTION, SOLUTION SUBCUTANEOUS ONCE
Refills: 0 | Status: COMPLETED | OUTPATIENT
Start: 2021-02-15 | End: 2021-02-15

## 2021-02-15 RX ORDER — DEXTROSE 50 % IN WATER 50 %
15 SYRINGE (ML) INTRAVENOUS ONCE
Refills: 0 | Status: DISCONTINUED | OUTPATIENT
Start: 2021-02-15 | End: 2021-02-19

## 2021-02-15 RX ORDER — SODIUM CHLORIDE 9 MG/ML
1000 INJECTION, SOLUTION INTRAVENOUS
Refills: 0 | Status: DISCONTINUED | OUTPATIENT
Start: 2021-02-15 | End: 2021-02-19

## 2021-02-15 RX ORDER — DEXTROSE 50 % IN WATER 50 %
12.5 SYRINGE (ML) INTRAVENOUS ONCE
Refills: 0 | Status: DISCONTINUED | OUTPATIENT
Start: 2021-02-15 | End: 2021-02-19

## 2021-02-15 RX ORDER — ROSUVASTATIN CALCIUM 5 MG/1
1 TABLET ORAL
Qty: 0 | Refills: 0 | DISCHARGE

## 2021-02-15 RX ORDER — DEXTROSE 50 % IN WATER 50 %
25 SYRINGE (ML) INTRAVENOUS ONCE
Refills: 0 | Status: DISCONTINUED | OUTPATIENT
Start: 2021-02-15 | End: 2021-02-19

## 2021-02-15 RX ORDER — PANTOPRAZOLE SODIUM 20 MG/1
40 TABLET, DELAYED RELEASE ORAL DAILY
Refills: 0 | Status: DISCONTINUED | OUTPATIENT
Start: 2021-02-15 | End: 2021-02-17

## 2021-02-15 RX ORDER — CLOPIDOGREL BISULFATE 75 MG/1
1 TABLET, FILM COATED ORAL
Qty: 0 | Refills: 0 | DISCHARGE

## 2021-02-15 RX ORDER — PANTOPRAZOLE SODIUM 20 MG/1
40 TABLET, DELAYED RELEASE ORAL
Refills: 0 | Status: DISCONTINUED | OUTPATIENT
Start: 2021-02-15 | End: 2021-02-15

## 2021-02-15 RX ORDER — INSULIN LISPRO 100/ML
VIAL (ML) SUBCUTANEOUS EVERY 6 HOURS
Refills: 0 | Status: DISCONTINUED | OUTPATIENT
Start: 2021-02-15 | End: 2021-02-19

## 2021-02-15 RX ORDER — ATENOLOL 25 MG/1
1 TABLET ORAL
Qty: 0 | Refills: 0 | DISCHARGE

## 2021-02-15 RX ORDER — MAGNESIUM SULFATE 500 MG/ML
1 VIAL (ML) INJECTION ONCE
Refills: 0 | Status: COMPLETED | OUTPATIENT
Start: 2021-02-15 | End: 2021-02-15

## 2021-02-15 RX ORDER — GLUCAGON INJECTION, SOLUTION 0.5 MG/.1ML
1 INJECTION, SOLUTION SUBCUTANEOUS ONCE
Refills: 0 | Status: DISCONTINUED | OUTPATIENT
Start: 2021-02-15 | End: 2021-02-19

## 2021-02-15 RX ADMIN — HEPARIN SODIUM 5000 UNIT(S): 5000 INJECTION INTRAVENOUS; SUBCUTANEOUS at 05:48

## 2021-02-15 RX ADMIN — INSULIN GLARGINE 25 UNIT(S): 100 INJECTION, SOLUTION SUBCUTANEOUS at 21:48

## 2021-02-15 RX ADMIN — Medication 0.5 MILLIGRAM(S): at 02:21

## 2021-02-15 RX ADMIN — Medication 325 MILLIGRAM(S): at 02:21

## 2021-02-15 RX ADMIN — Medication 3: at 05:49

## 2021-02-15 RX ADMIN — HEPARIN SODIUM 5000 UNIT(S): 5000 INJECTION INTRAVENOUS; SUBCUTANEOUS at 15:08

## 2021-02-15 RX ADMIN — Medication 2: at 16:52

## 2021-02-15 RX ADMIN — SODIUM CHLORIDE 125 MILLILITER(S): 9 INJECTION INTRAMUSCULAR; INTRAVENOUS; SUBCUTANEOUS at 17:21

## 2021-02-15 RX ADMIN — SODIUM CHLORIDE 125 MILLILITER(S): 9 INJECTION INTRAMUSCULAR; INTRAVENOUS; SUBCUTANEOUS at 11:57

## 2021-02-15 RX ADMIN — PANTOPRAZOLE SODIUM 40 MILLIGRAM(S): 20 TABLET, DELAYED RELEASE ORAL at 02:21

## 2021-02-15 RX ADMIN — Medication 100 GRAM(S): at 02:21

## 2021-02-15 RX ADMIN — INSULIN GLARGINE 16 UNIT(S): 100 INJECTION, SOLUTION SUBCUTANEOUS at 01:39

## 2021-02-15 RX ADMIN — SODIUM CHLORIDE 125 MILLILITER(S): 9 INJECTION INTRAMUSCULAR; INTRAVENOUS; SUBCUTANEOUS at 05:48

## 2021-02-15 RX ADMIN — Medication 81 MILLIGRAM(S): at 11:57

## 2021-02-15 RX ADMIN — HEPARIN SODIUM 5000 UNIT(S): 5000 INJECTION INTRAVENOUS; SUBCUTANEOUS at 21:50

## 2021-02-15 RX ADMIN — SODIUM CHLORIDE 75 MILLILITER(S): 9 INJECTION INTRAMUSCULAR; INTRAVENOUS; SUBCUTANEOUS at 01:39

## 2021-02-15 NOTE — ED ADULT NURSE REASSESSMENT NOTE - NS ED NURSE REASSESS COMMENT FT1
Patient A&Ox4, denies any pain or discomfort. Denies any chest pain, shortness of breath, nausea or dizziness. Cardiac monitor in place. Respirations even & unlabored. Repositioned to comfort. Saline lock in place, patent, negative s/s phlebitis or infiltration. IVF in progress, tolerated well. Negative s/s fluid volume overload.
Patient A&Ox4, denies any pain or discomfort. Resting comfortably. Denies any chest pain, shortness of breath, nausea or dizziness. Respirations even & unlabored. Cardiac monitor in place. Sinus tach w/PVC's. Saline lock in place, patent, negative s/s phlebitis or infiltration. IVF in progress, tolerated well. Negative s/s fluid volume overload.
Report given to DIMITRI Chavez at this time.
Report received from DIMITRI Garcia at 2300 and patient care assumed at that time. Pt received awake, alert oriented x4 with flat affect. Pt denies lightheadedness/dizziness at this time. Pt with L eye slightly drooped, pt reporting as normal as patient is stating he is blind. Pt with respirations appearing even, unlabored, pt denies SOB or chest pain. Skin warm, dry, appropriate for race, peripheral pulses palpable with capillary refill <3 seconds. Pt with abdomen soft, nontender, denies pain at this time, denies nausea. Cardiac monitor in place showing sinus tachycardia HR 110s. 20g PIV in L ac patent ot flush with dressing CDI. Pt with R prosthetic leg on and cane at bedside. Fall risk band applied and call bell within reach. Pt offering no complaints at this time, pt denies needs. Pt with no apparent acute distress observed. Safety maintained. Will continue to monitor.

## 2021-02-15 NOTE — PROGRESS NOTE ADULT - ASSESSMENT
ASSESSMENT:  71 y/o Diabetic M presents c/o epigastric pain and N/V noted to have AGMA, LA, and Troponin spill admitted for NSTEMI r/o ACS         1. Anion gap metabolic acidosis secondary to DKA/HHS  -Hold Glipizide/Metformin.  s/p IV fluids and Insulin on admission, AG closed   HBA1C 8.2, BG controlled, but pt was NPO   Resume diet   C/w IV fluids   C/w Lantus 25 units and Lispro per SS     2. NSTEMI type 2 spill   -may be exacerbated by dehydration from N/V and early DKA/HHS   -EKG Sinus Tachycardia LAE LAFB no acute ischemic changes repeat similar  TTE done: EF 60%   c/w aspirin 81 mg po daily   LDL within normal limits, no indication for statin     3. Leukocytosis likely reactive, trending down       4. Esophagitis  c/w Pantoprazole  40mg PO daily         5. Type 2 DM with hyperglycemia. plan as above DM2   6. DVT prophylaxis

## 2021-02-15 NOTE — PATIENT PROFILE ADULT - VISION (WITH CORRECTIVE LENSES IF THE PATIENT USUALLY WEARS THEM):
unable to see with left eye, wears eyeglasses to read/Partially impaired: cannot see medication labels or newsprint, but can see obstacles in path, and the surrounding layout; can count fingers at arm's length

## 2021-02-15 NOTE — CONSULT NOTE ADULT - PROBLEM SELECTOR RECOMMENDATION 9
Telemetry   Abnormal EKG  Troponin first 0.05, second 0.11, third pending. Start heparin gtt for AC  Echo: performed. Reading pending   Check A1C and fasting lipid panel   mg received in ED. Continue ASA 81 OD  Keep NPO for possible cath in AM  cardiac rehab info provided/referral and communication to cardiac rehab completed    Further recommendation base on above findings Telemetry   Abnormal EKG  Troponin first 0.05, second 0.11, third pending.   Serial EKG  Echo: performed. Reading pending   Check A1C and fasting lipid panel  Maintain K+ >4 and Mag >2   mg received in ED.   Keep NPO for possible cath in AM  cardiac rehab info provided/referral and communication to cardiac rehab completed  R/O PE Telemetry   Abnormal EKG  Troponin first 0.05, second 0.11, third pending.   Serial EKG  Echo in AM. Ordered by ER MD  Check A1C and fasting lipid panel  Maintain K+ >4 and Mag >2   mg received in ED.   Keep NPO for possible cath in AM  cardiac rehab info provided/referral and communication to cardiac rehab completed  R/O PE Telemetry   Abnormal EKG  Troponin first 0.05, second 0.11, third pending.   Serial EKG  Echo in AM. Ordered by ER MD  Check A1C and fasting lipid panel  Maintain K+ >4 and Mag >2   mg received in ED.   R/O PE    cardiac rehab info provided/referral and communication to cardiac rehab completed

## 2021-02-15 NOTE — CHART NOTE - NSCHARTNOTEFT_GEN_A_CORE
Notified of patient fall by RN. Patient seen/examined. AAOx3. Denies current complaints. Says he lost balance. Patient has a prosthetic leg and had a mechanical fall. VSS. No AC at home. No bony stepffs or spinous process tenderness. Pelvis stable to rock. Chest no TTP. Add Fall Precautions. Neurochecks q8. No need for imaging at this time but monitor closely in case of any changes.

## 2021-02-15 NOTE — CONSULT NOTE ADULT - SUBJECTIVE AND OBJECTIVE BOX
[place] [facility]                                                        Office: [address] [address]                                                       Telephone: [phone]. Fax:[phone]    CARDIOLOGY CONSULTATION NOTE                                                                                             History obtained by: Patient and medical record   obtained: No    HPI:  69 y/o M, poor historian with PMHx of HTN and DM2 (on metformin and glipizide) present to ED co non-radiating, mild epigastric/abdominal pain x 2 days. Associated symptoms  hiccups, nausea and vomiting x 2 days. He reports hiccuping frequently. Poor sleep and decreased fluids/solids intake last 2 days due to symptoms. Symptoms improved after drinking water. No chest pain,  No dyspnea,  No fatigue, No syncope,  No palpitations, No dizziness, No Orthopnea. Denies fever, cough or any other medical symptom at this time    Of Note: He states he does see a PMD but doesn't recall the last time.    REVIEW OF SYMPTOMS:   Cardiovascular:  No chest pain,  No dyspnea,  No fatigue, No syncope,  No palpitations, No dizziness, No Orthopnea, No Paroxsymal nocturnal dyspnea.  Respiratory: + hiccups. No Dyspnea, No cough.  Genitourinary:  No dysuria, no hematuria.  Gastrointestinal:  + abdominal pain 1/10. No nausea, no vomiting. No diarrhea.   Neurological: No headache, no dizziness, no slurred speech.  Psychiatric: No agitation, no anxiety.    ALL OTHER REVIEW OF SYSTEMS ARE NEGATIVE.    Allergies  No Known Allergies    CURRENT MEDICATIONS:  pantoprazole  Injectable  aspirin  chewable  glucagon  Injectable  heparin   Injectable  insulin glargine Injectable (LANTUS)  insulin lispro (ADMELOG) corrective regimen sliding scale  sodium chloride 0.9%.    Home Medications:  Patient doesn't remember some meds and doses. States taking Metformin and Glyburide (dose unknow)    Past Medical History  Psoriasis  Neuropathy  Cataract  HTN (hypertension)  Dry eye  DM (diabetes mellitus)    Past Surgical History  Toe amputation status, left  S/P BKA (below knee amputation) unilateral, right    FAMILY HISTORY:  Family history of diabetes mellitus (Father, Mother, Sibling, Child)  No CAD in family    Social History:  Denies ever smoking, alcohol or drugs use:    Vital Signs Last 24 Hrs  T(C): 36.7 (2021 23:10), Max: 36.7 (2021 19:10)  T(F): 98 (2021 23:10), Max: 98.1 (2021 19:10)  HR: 119 (2021 23:10) (111 - 119)  BP: 128/58 (2021 23:10) (116/56 - 145/81)  RR: 20 (2021 23:10) (19 - 20)  SpO2: 98% (2021 19:10) (98% - 98%)    PHYSICAL EXAM:  Constitutional: Comfortable . No acute distress.   HEENT: Atraumatic and normocephalic , neck is supple . no JVD.  PEERL   CNS: A&O x3. No focal neurologic deficits.   Respiratory: CTAB. No wheezing, rhonchi or crackles   Cardiovascular: S1S2 RRR. No murmur or rubs  Gastrointestinal: Soft non-tender and non distended . +Bowel sounds.   Extremities: No pitting  edema x 4 extremities  Psychiatric: Calm . no agitation.    LABS:                        14.2   22.78 )-----------( 377      ( 2021 16:42 )             41.3     02-14    130<L>  |  87<L>  |  45.0<H>  ----------------------------<  297<H>  4.0   |  29.0  |  1.03    Ca    8.5<L>      2021 21:19    TPro  6.6  /  Alb  3.5  /  TBili  0.4  /  DBili  x   /  AST  16  /  ALT  6   /  AlkPhos  90  02-14    CARDIAC MARKERS ( 2021 21:19 )  x     / 0.11 ng/mL / x     / x     / x      CARDIAC MARKERS ( 2021 16:42 )  x     / 0.05 ng/mL / x     / x     / x        Blood Gas Venous - Lactate: 2.9: Elevated lactate.    Urinalysis Basic - ( 2021 19:44 )  Color: Yellow / Appearance: Clear / S.015 / pH: x  Gluc: x / Ketone: Moderate  / Bili: Negative / Urobili: Negative mg/dL   Blood: x / Protein: 15 mg/dL / Nitrite: Negative   Leuk Esterase: Small / RBC: 0-2 /HPF / WBC 3-5   Sq Epi: x / Non Sq Epi: Occasional / Bacteria: Occasional    EKG: Reviewed by me.     RADIOLOGY & ADDITIONAL STUDIES:    Chest x-ray: Unremarkable. Official reading pending    CT Abdomen and Pelvis w/ IV Cont (21 @ 18:28) >  IMPRESSION:  Thickening of the distal esophageal walls with submucosal edema and mucosal hyperenhancement compatible with an esophagitis.  Peripheral groundglass opacity in the right lower lobe likely representing dependent atelectasis, however, atypical/viral pneumonia such as Covid 19 is also a consideration.  < end of copied text >    Cardiac Cath Lab - Adult (19 @ 08:12) >  --  Rlhgf-kcqi-uyldlrzaw angiography.  --  Left leg angiography.  Findings  Severe distal SFA stenosis  Proximal PT occlusion. Occluded AT. Occluded peroneal artery  Final catheter position: Left posterior tibial artery  < end of copied text >    ECHO FINDINGS: Pending      Preliminary evaluation, please await official recommendations   Assessment and recommendations are final when note is signed by the attending.                                                                        [place] [facility]                                                        Office: [address] [address]                                                       Telephone: [phone]. Fax:[phone]    CARDIOLOGY CONSULTATION NOTE                                                                                             History obtained by: Patient and medical record   obtained: No    HPI:  71 y/o M, poor historian with PMHx of HTN and DM2 (on metformin and glipizide) present to ED co non-radiating, mild epigastric/abdominal pain x 2 days. Associated symptoms  hiccups, nausea and vomiting x 2 days. He reports hiccuping frequently. Poor sleep and decreased fluids/solids intake last 2 days due to symptoms. Symptoms improved after drinking water. No chest pain,  No dyspnea,  No fatigue, No syncope,  No palpitations, No dizziness, No Orthopnea. Denies fever, cough or any other medical symptom at this time    Of Note: He states he does see a PMD but doesn't recall the last time.    REVIEW OF SYMPTOMS:   Cardiovascular:  No chest pain,  No dyspnea,  No fatigue, No syncope,  No palpitations, No dizziness, No Orthopnea, No Paroxsymal nocturnal dyspnea.  Respiratory: + hiccups. No Dyspnea, No cough.  Genitourinary:  No dysuria, no hematuria.  Gastrointestinal:  + abdominal pain 1/10. No nausea, no vomiting. No diarrhea.   Neurological: No headache, no dizziness, no slurred speech.  Psychiatric: No agitation, no anxiety.    ALL OTHER REVIEW OF SYSTEMS ARE NEGATIVE.    Allergies  No Known Allergies    CURRENT MEDICATIONS:  pantoprazole  Injectable  aspirin  chewable  glucagon  Injectable  heparin   Injectable  insulin glargine Injectable (LANTUS)  insulin lispro (ADMELOG) corrective regimen sliding scale  sodium chloride 0.9%.    Home Medications:  Patient doesn't remember some meds and doses. States taking Metformin and Glyburide (dose unknow)    Past Medical History  Psoriasis  Neuropathy  Cataract  HTN (hypertension)  Dry eye  DM (diabetes mellitus)    Past Surgical History  Toe amputation status, left  S/P BKA (below knee amputation) unilateral, right    FAMILY HISTORY:  Family history of diabetes mellitus (Father, Mother, Sibling, Child)  No CAD in family    Social History:  Denies ever smoking, alcohol or drugs use:    Vital Signs Last 24 Hrs  T(C): 36.7 (2021 23:10), Max: 36.7 (2021 19:10)  T(F): 98 (2021 23:10), Max: 98.1 (2021 19:10)  HR: 119 (2021 23:10) (111 - 119)  BP: 128/58 (2021 23:10) (116/56 - 145/81)  RR: 20 (2021 23:10) (19 - 20)  SpO2: 98% (2021 19:10) (98% - 98%)    PHYSICAL EXAM:  Constitutional: Comfortable . No acute distress.   HEENT: Atraumatic and normocephalic , neck is supple . no JVD.  PEERL   CNS: A&O x3. No focal neurologic deficits.   Respiratory: CTAB. No wheezing, rhonchi or crackles   Cardiovascular: S1S2 RRR. No murmur or rubs  Gastrointestinal: Soft non-tender and non distended . +Bowel sounds.   Extremities: No pitting  edema x 4 extremities  Psychiatric: Calm . no agitation.    LABS:                        14.2   22.78 )-----------( 377      ( 2021 16:42 )             41.3     02-14    130<L>  |  87<L>  |  45.0<H>  ----------------------------<  297<H>  4.0   |  29.0  |  1.03    Ca    8.5<L>      2021 21:19    TPro  6.6  /  Alb  3.5  /  TBili  0.4  /  DBili  x   /  AST  16  /  ALT  6   /  AlkPhos  90  02-14    CARDIAC MARKERS ( 2021 21:19 )  x     / 0.11 ng/mL / x     / x     / x      CARDIAC MARKERS ( 2021 16:42 )  x     / 0.05 ng/mL / x     / x     / x        Blood Gas Venous - Lactate: 2.9: Elevated lactate.    Urinalysis Basic - ( 2021 19:44 )  Color: Yellow / Appearance: Clear / S.015 / pH: x  Gluc: x / Ketone: Moderate  / Bili: Negative / Urobili: Negative mg/dL   Blood: x / Protein: 15 mg/dL / Nitrite: Negative   Leuk Esterase: Small / RBC: 0-2 /HPF / WBC 3-5   Sq Epi: x / Non Sq Epi: Occasional / Bacteria: Occasional    EKG: Sinus tachycardia. Inverted T waves in I and ST abnormalities in II, V1-V3. Patient had EKG in 2019 and also present ST abnormalities in II    RADIOLOGY & ADDITIONAL STUDIES:    Chest x-ray: Unremarkable. Official reading pending    CT Abdomen and Pelvis w/ IV Cont (21 @ 18:28) >  IMPRESSION:  Thickening of the distal esophageal walls with submucosal edema and mucosal hyperenhancement compatible with an esophagitis.  Peripheral groundglass opacity in the right lower lobe likely representing dependent atelectasis, however, atypical/viral pneumonia such as Covid 19 is also a consideration.  < end of copied text >    Cardiac Cath Lab - Adult (19 @ 08:12) >  --  Sycjv-mtkl-vxrafvjhw angiography.  --  Left leg angiography.  Findings  Severe distal SFA stenosis  Proximal PT occlusion. Occluded AT. Occluded peroneal artery  Final catheter position: Left posterior tibial artery  < end of copied text >    ECHO FINDINGS: Pending    Preliminary evaluation, please await official recommendations   Assessment and recommendations are final when note is signed by the attending.                                                                        [place] [facility]                                                        Office: [address] [address]                                                       Telephone: [phone]. Fax:[phone]    CARDIOLOGY CONSULTATION NOTE                                                                                             History obtained by: Patient and medical record   obtained: No    HPI:  69 y/o M, poor historian with PMHx of HTN and DM2 (on metformin and glipizide) present to ED co non-radiating, mild epigastric/abdominal pain x 2 days. Associated symptoms  hiccups, nausea and vomiting x 2 days. He reports hiccuping frequently. Poor sleep and decreased fluids/solids intake last 2 days due to symptoms. Symptoms improved after drinking water. No chest pain,  No dyspnea,  No fatigue, No syncope,  No palpitations, No dizziness, No Orthopnea. Denies fever, cough or any other medical symptom at this time    Of Note: He states he does see a PMD but doesn't recall the last time.    REVIEW OF SYMPTOMS:   Cardiovascular:  No chest pain,  No dyspnea,  No fatigue, No syncope,  No palpitations, No dizziness, No Orthopnea, No Paroxsymal nocturnal dyspnea.  Respiratory: + hiccups. No Dyspnea, No cough.  Genitourinary:  No dysuria, no hematuria.  Gastrointestinal:  + abdominal pain 1/10. No nausea, no vomiting. No diarrhea.   Neurological: No headache, no dizziness, no slurred speech.  Psychiatric: No agitation, no anxiety.    ALL OTHER REVIEW OF SYSTEMS ARE NEGATIVE.    Allergies  No Known Allergies    CURRENT MEDICATIONS:  pantoprazole  Injectable  aspirin  chewable  glucagon  Injectable  heparin   Injectable  insulin glargine Injectable (LANTUS)  insulin lispro (ADMELOG) corrective regimen sliding scale  sodium chloride 0.9%.    Home Medications:  Patient doesn't remember some meds and doses. States taking Metformin and Glyburide (dose unknow)    Past Medical History  Psoriasis  Neuropathy  Cataract  HTN (hypertension)  Dry eye  DM (diabetes mellitus)    Past Surgical History  Toe amputation status, left  S/P BKA (below knee amputation) unilateral, right    FAMILY HISTORY:  Family history of diabetes mellitus (Father, Mother, Sibling, Child)  No CAD in family    Social History:  Denies ever smoking, alcohol or drugs use:    Vital Signs Last 24 Hrs  T(C): 36.7 (2021 23:10), Max: 36.7 (2021 19:10)  T(F): 98 (2021 23:10), Max: 98.1 (2021 19:10)  HR: 119 (2021 23:10) (111 - 119)  BP: 128/58 (2021 23:10) (116/56 - 145/81)  RR: 20 (2021 23:10) (19 - 20)  SpO2: 98% (2021 19:10) (98% - 98%)    PHYSICAL EXAM:  Constitutional: Comfortable . No acute distress.   HEENT: Atraumatic and normocephalic , neck is supple . no JVD.  PEERL   CNS: A&O x3. No focal neurologic deficits.   Respiratory: CTAB. No wheezing, rhonchi or crackles   Cardiovascular: S1S2 RRR. No murmur or rubs  Gastrointestinal: Soft non-tender and non distended . +Bowel sounds.   Extremities: No pitting  edema x 3 extremities  Psychiatric: Calm . no agitation.    LABS:                        14.2   22.78 )-----------( 377      ( 2021 16:42 )             41.3     02-14    130<L>  |  87<L>  |  45.0<H>  ----------------------------<  297<H>  4.0   |  29.0  |  1.03    Ca    8.5<L>      2021 21:19    TPro  6.6  /  Alb  3.5  /  TBili  0.4  /  DBili  x   /  AST  16  /  ALT  6   /  AlkPhos  90  02-14    CARDIAC MARKERS ( 2021 21:19 )  x     / 0.11 ng/mL / x     / x     / x      CARDIAC MARKERS ( 2021 16:42 )  x     / 0.05 ng/mL / x     / x     / x        Blood Gas Venous - Lactate: 2.9: Elevated lactate.    Urinalysis Basic - ( 2021 19:44 )  Color: Yellow / Appearance: Clear / S.015 / pH: x  Gluc: x / Ketone: Moderate  / Bili: Negative / Urobili: Negative mg/dL   Blood: x / Protein: 15 mg/dL / Nitrite: Negative   Leuk Esterase: Small / RBC: 0-2 /HPF / WBC 3-5   Sq Epi: x / Non Sq Epi: Occasional / Bacteria: Occasional    EKG: Sinus tachycardia. Inverted T waves in I and ST abnormalities in II, V1-V3. Patient had EKG in 2019 and also present ST abnormalities in II    RADIOLOGY & ADDITIONAL STUDIES:    Chest x-ray: Unremarkable. Official reading pending    CT Abdomen and Pelvis w/ IV Cont (21 @ 18:28) >  IMPRESSION:  Thickening of the distal esophageal walls with submucosal edema and mucosal hyperenhancement compatible with an esophagitis.  Peripheral groundglass opacity in the right lower lobe likely representing dependent atelectasis, however, atypical/viral pneumonia such as Covid 19 is also a consideration.  < end of copied text >    Cardiac Cath Lab - Adult (19 @ 08:12) >  --  Mzwmm-stmr-tjsmitqum angiography.  --  Left leg angiography.  Findings  Severe distal SFA stenosis  Proximal PT occlusion. Occluded AT. Occluded peroneal artery  Final catheter position: Left posterior tibial artery  < end of copied text >    ECHO FINDINGS: Pending

## 2021-02-16 LAB
ANION GAP SERPL CALC-SCNC: 12 MMOL/L — SIGNIFICANT CHANGE UP (ref 5–17)
BASOPHILS # BLD AUTO: 0.02 K/UL — SIGNIFICANT CHANGE UP (ref 0–0.2)
BASOPHILS NFR BLD AUTO: 0.2 % — SIGNIFICANT CHANGE UP (ref 0–2)
BUN SERPL-MCNC: 19 MG/DL — SIGNIFICANT CHANGE UP (ref 8–20)
CALCIUM SERPL-MCNC: 8.1 MG/DL — LOW (ref 8.6–10.2)
CHLORIDE SERPL-SCNC: 99 MMOL/L — SIGNIFICANT CHANGE UP (ref 98–107)
CO2 SERPL-SCNC: 26 MMOL/L — SIGNIFICANT CHANGE UP (ref 22–29)
CREAT SERPL-MCNC: 0.8 MG/DL — SIGNIFICANT CHANGE UP (ref 0.5–1.3)
EOSINOPHIL # BLD AUTO: 0.2 K/UL — SIGNIFICANT CHANGE UP (ref 0–0.5)
EOSINOPHIL NFR BLD AUTO: 1.6 % — SIGNIFICANT CHANGE UP (ref 0–6)
GLUCOSE BLDC GLUCOMTR-MCNC: 121 MG/DL — HIGH (ref 70–99)
GLUCOSE BLDC GLUCOMTR-MCNC: 147 MG/DL — HIGH (ref 70–99)
GLUCOSE BLDC GLUCOMTR-MCNC: 150 MG/DL — HIGH (ref 70–99)
GLUCOSE BLDC GLUCOMTR-MCNC: 61 MG/DL — LOW (ref 70–99)
GLUCOSE SERPL-MCNC: 69 MG/DL — LOW (ref 70–99)
HCT VFR BLD CALC: 31.2 % — LOW (ref 39–50)
HGB BLD-MCNC: 10.5 G/DL — LOW (ref 13–17)
IMM GRANULOCYTES NFR BLD AUTO: 0.5 % — SIGNIFICANT CHANGE UP (ref 0–1.5)
LYMPHOCYTES # BLD AUTO: 1.5 K/UL — SIGNIFICANT CHANGE UP (ref 1–3.3)
LYMPHOCYTES # BLD AUTO: 11.7 % — LOW (ref 13–44)
MCHC RBC-ENTMCNC: 28.3 PG — SIGNIFICANT CHANGE UP (ref 27–34)
MCHC RBC-ENTMCNC: 33.7 GM/DL — SIGNIFICANT CHANGE UP (ref 32–36)
MCV RBC AUTO: 84.1 FL — SIGNIFICANT CHANGE UP (ref 80–100)
MONOCYTES # BLD AUTO: 1.25 K/UL — HIGH (ref 0–0.9)
MONOCYTES NFR BLD AUTO: 9.7 % — SIGNIFICANT CHANGE UP (ref 2–14)
NEUTROPHILS # BLD AUTO: 9.82 K/UL — HIGH (ref 1.8–7.4)
NEUTROPHILS NFR BLD AUTO: 76.3 % — SIGNIFICANT CHANGE UP (ref 43–77)
PLATELET # BLD AUTO: 263 K/UL — SIGNIFICANT CHANGE UP (ref 150–400)
POTASSIUM SERPL-MCNC: 3.3 MMOL/L — LOW (ref 3.5–5.3)
POTASSIUM SERPL-SCNC: 3.3 MMOL/L — LOW (ref 3.5–5.3)
RBC # BLD: 3.71 M/UL — LOW (ref 4.2–5.8)
RBC # FLD: 12.5 % — SIGNIFICANT CHANGE UP (ref 10.3–14.5)
SODIUM SERPL-SCNC: 137 MMOL/L — SIGNIFICANT CHANGE UP (ref 135–145)
WBC # BLD: 12.86 K/UL — HIGH (ref 3.8–10.5)
WBC # FLD AUTO: 12.86 K/UL — HIGH (ref 3.8–10.5)

## 2021-02-16 PROCEDURE — 99233 SBSQ HOSP IP/OBS HIGH 50: CPT

## 2021-02-16 PROCEDURE — 71250 CT THORAX DX C-: CPT | Mod: 26

## 2021-02-16 RX ORDER — ATORVASTATIN CALCIUM 80 MG/1
20 TABLET, FILM COATED ORAL AT BEDTIME
Refills: 0 | Status: DISCONTINUED | OUTPATIENT
Start: 2021-02-16 | End: 2021-02-19

## 2021-02-16 RX ORDER — INSULIN GLARGINE 100 [IU]/ML
20 INJECTION, SOLUTION SUBCUTANEOUS AT BEDTIME
Refills: 0 | Status: DISCONTINUED | OUTPATIENT
Start: 2021-02-16 | End: 2021-02-19

## 2021-02-16 RX ORDER — CHLORPROMAZINE HCL 10 MG
25 TABLET ORAL
Refills: 0 | Status: DISCONTINUED | OUTPATIENT
Start: 2021-02-16 | End: 2021-02-19

## 2021-02-16 RX ORDER — DEXTROSE 50 % IN WATER 50 %
15 SYRINGE (ML) INTRAVENOUS ONCE
Refills: 0 | Status: COMPLETED | OUTPATIENT
Start: 2021-02-16 | End: 2021-02-16

## 2021-02-16 RX ADMIN — SODIUM CHLORIDE 125 MILLILITER(S): 9 INJECTION INTRAMUSCULAR; INTRAVENOUS; SUBCUTANEOUS at 05:57

## 2021-02-16 RX ADMIN — HEPARIN SODIUM 5000 UNIT(S): 5000 INJECTION INTRAVENOUS; SUBCUTANEOUS at 05:56

## 2021-02-16 RX ADMIN — PANTOPRAZOLE SODIUM 40 MILLIGRAM(S): 20 TABLET, DELAYED RELEASE ORAL at 11:24

## 2021-02-16 RX ADMIN — SODIUM CHLORIDE 125 MILLILITER(S): 9 INJECTION INTRAMUSCULAR; INTRAVENOUS; SUBCUTANEOUS at 16:43

## 2021-02-16 RX ADMIN — HEPARIN SODIUM 5000 UNIT(S): 5000 INJECTION INTRAVENOUS; SUBCUTANEOUS at 11:24

## 2021-02-16 RX ADMIN — Medication 15 GRAM(S): at 07:47

## 2021-02-16 RX ADMIN — Medication 25 MILLIGRAM(S): at 16:43

## 2021-02-16 RX ADMIN — Medication 81 MILLIGRAM(S): at 11:24

## 2021-02-16 NOTE — PROGRESS NOTE ADULT - ASSESSMENT
ASSESSMENT:  69 y/o Diabetic M presents c/o epigastric pain and N/V noted to have AGMA, LA, and Troponin spill admitted for NSTEMI r/o ACS         1. Anion gap metabolic acidosis secondary to DKA/HHS - resolved   -Hold Glipizide/Metformin.  s/p IV fluids and Insulin on admission, AG closed       2. Type 2 DM with hyperglycemia. HBA1C 8.2, BG controlled   Resume diet   C/w IV fluids   C/w Lantus 25 units and Lispro per SS     3. NSTEMI type 2 spill   -may be exacerbated by dehydration from N/V and early DKA/HHS   -EKG Sinus Tachycardia LAE LAFB no acute ischemic changes repeat similar  TTE done: EF 60%   c/w aspirin 81 mg po daily   LDL within normal limits, no indication for statin     3. Leukocytosis likely reactive, trending down       4. Esophagitis  c/w Pantoprazole  40mg PO daily    5. Hiccups; start Thorazine 25 mg po three times a day   Noted to have enlarge lymph node  phrenic nerve   Will discuss with CT surgery for further management       6. DVT prophylaxis  ASSESSMENT:  69 y/o Diabetic M presents c/o epigastric pain and N/V noted to have AGMA, LA, and Troponin spill admitted for NSTEMI r/o ACS         1. Anion gap metabolic acidosis secondary to DKA/HHS - resolved   -Hold Glipizide/Metformin.  s/p IV fluids and Insulin on admission, AG closed       2. Type 2 DM with hyperglycemia. HBA1C 8.2, BG controlled   Resume diet   C/w IV fluids   C/w Lantus 25 units and Lispro per SS     3. NSTEMI type 2 spill   -may be exacerbated by dehydration from N/V and early DKA/HHS   -EKG Sinus Tachycardia LAE LAFB no acute ischemic changes repeat similar  TTE done: EF 60%   c/w aspirin 81 mg po daily   LDL within normal limits, no indication for statin     3. Leukocytosis likely reactive, trending down       4. Esophagitis  c/w Pantoprazole  40mg PO daily    5. Hiccups; start Thorazine 25 mg po three times a day   Noted to have enlarge lymph node  phrenic nerve   Will discuss with CT surgery for further management   CT chest ordered      6. DVT prophylaxis

## 2021-02-16 NOTE — PROGRESS NOTE ADULT - ASSESSMENT
70M with abdominal pain, hiccups, and elevated troponin     Elevated Troponin  - no chest pain, unchanged EKG  - atypical presentation of MI in setting of DM2 versus demand ischemia due to DKA/HHS  - segmental wall motion abnormality on echo  - need to rule out ischemia; pharm NST tomorrow  - NPO after midnight  - continue ASA 81mg daily  - start atorvastatin 20mg daily    Abdominal Pain  - esophagitis, continue treatment per primary team  - EGD recommended by Thoracic surgery

## 2021-02-16 NOTE — CONSULT NOTE ADULT - SUBJECTIVE AND OBJECTIVE BOX
SUBJECTIVE       INTERIM HISTORY SIGNIFICANT FOR   admitted with w/o ACS found to have NSTEMI since team concerned for recurrent hiccups- CT surgery consulted for lymph node found on CT scan     Patient is a 70y old  Male who presents with a chief complaint of NSTEMI (16 Feb 2021 14:15)    HPI:  69 y/o M with PMHX IDDM2 presenting with abdominal pain from home. He reports nausea and vomiting for 2 days. Patient is a poor historian. He endorses a history of Dm, on metformin and glipizide. He reports hiccuping frequently. He states he does see a PMD but doesn't recall the last time. He states that he has some mild epigastric abdominal pain, and that he has not eaten in 2 days due to the nausea and vomiting. Labs reviewed multiple abnormalities noted. CT and EKG reviewed. Improving s/p IVFB 2L. Still burping. Discussed plan below.  ROS +eructation, +n/V, +diaphoresis. +abd pain, denies CP, denies SOB, all other systems negative.  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  S/P BKA (below knee amputation) unilateral, right      No Known Allergies    Home Medications:  glipiZIDE 10 mg oral tablet, extended release: 1 tab(s) orally once a day (15 Feb 2021 01:08)  Lantus 100 units/mL subcutaneous solution: 25 unit(s) subcutaneous once a day (15 Feb 2021 01:00)  metFORMIN 1000 mg oral tablet: 1 tab(s) orally 2 times a day (15 Feb 2021 01:08)    VITALS  ICU Vital Signs Last 24 Hrs  T(C): 37.1 (16 Feb 2021 08:48), Max: 37.1 (16 Feb 2021 08:48)  T(F): 98.7 (16 Feb 2021 08:48), Max: 98.7 (16 Feb 2021 08:48)  HR: 101 (16 Feb 2021 08:48) (96 - 101)  BP: 128/68 (16 Feb 2021 08:48) (118/72 - 128/81)  RR: 18 (16 Feb 2021 08:48) (16 - 18)  SpO2: 97% (16 Feb 2021 08:48) (96% - 100%)      PVRI: --  LABS                        10.5   12.86 )-----------( 263      ( 16 Feb 2021 08:30 )             31.2   PT/INR - ( 15 Feb 2021 04:04 )   PT: 14.7 sec;   INR: 1.28 ratio         PTT - ( 15 Feb 2021 04:04 )  PTT:27.6 [id number]    137  |  99  |  19.0  ----------------------------<  69<L>  3.3<L>   |  26.0  |  0.80    Ca    8.1<L>      16 Feb 2021 08:30  Phos  2.8     02-15  Mg     2.2     02-15    TPro  6.3<L>  /  Alb  3.4  /  TBili  0.3<L>  /  DBili  x   /  AST  15  /  ALT  7   /  AlkPhos  85  02-15  CAPILLARY BLOOD GLUCOSE      POCT Blood Glucose.: 121 mg/dL (16 Feb 2021 11:20)  CARDIAC MARKERS ( 15 Feb 2021 04:04 )  x     / 0.10 ng/mL / 144 U/L / x     / x      CARDIAC MARKERS ( 14 Feb 2021 21:19 )  x     / 0.11 ng/mL / x     / x     / x      CARDIAC MARKERS ( 14 Feb 2021 16:42 )  x     / 0.05 ng/mL / x     / x     / x            IN/OUT    02-15-21 @ 07:01  -  02-16-21 @ 07:00  --------------------------------------------------------  IN: 0 mL / OUT: 200 mL / NET: -200 mL      IMAGING  personally reviewed imaging     CURRENT MEDICATIONS  MEDICATIONS  (STANDING):  aspirin  chewable 81 milliGRAM(s) Oral daily  chlorproMAZINE    Tablet 25 milliGRAM(s) Oral two times a day  dextrose 40% Gel 15 Gram(s) Oral once  dextrose 5%. 1000 milliLiter(s) (50 mL/Hr) IV Continuous <Continuous>  dextrose 5%. 1000 milliLiter(s) (100 mL/Hr) IV Continuous <Continuous>  dextrose 50% Injectable 25 Gram(s) IV Push once  dextrose 50% Injectable 12.5 Gram(s) IV Push once  dextrose 50% Injectable 25 Gram(s) IV Push once  glucagon  Injectable 1 milliGRAM(s) IntraMuscular once  heparin   Injectable 5000 Unit(s) SubCutaneous every 8 hours  insulin glargine Injectable (LANTUS) 20 Unit(s) SubCutaneous at bedtime  insulin lispro (ADMELOG) corrective regimen sliding scale   SubCutaneous every 6 hours  pantoprazole  Injectable 40 milliGRAM(s) IV Push daily  sodium chloride 0.9%. 1000 milliLiter(s) (125 mL/Hr) IV Continuous <Continuous>    MEDICATIONS  (PRN):  aluminum hydroxide/magnesium hydroxide/simethicone Suspension 30 milliLiter(s) Oral every 4 hours PRN Dyspepsia  ondansetron Injectable 4 milliGRAM(s) IV Push every 6 hours PRN Nausea     SUBJECTIVE   "i thought i was maybe going to rehab today"   without acute complaints at this time     HPI patient is a 70 M with history of DM - uncontrolled at this time secondary to social barriers - presenting with abdominal pain admitted with r/o ACS found to have NSTEMi since being treated for esophagitis and hiccups which has per patient are not provoked or palliated by anything - has admits having them over the last week for as long as 2-3 hours at a time awaking him from his sleep - they are not noted to be life limiting at this time -     INTERIM HISTORY SIGNIFICANT FOR   admitted with w/o ACS found to have NSTEMI since team concerned for recurrent hiccups- CT surgery consulted for lymph node found on CT scan     Patient is a 70y old  Male who presents with a chief complaint of NSTEMI (16 Feb 2021 14:15)    HPI:  71 y/o M with PMHX IDDM2 presenting with abdominal pain from home. He reports nausea and vomiting for 2 days. Patient is a poor historian. He endorses a history of Dm, on metformin and glipizide. He reports hiccuping frequently. He states he does see a PMD but doesn't recall the last time. He states that he has some mild epigastric abdominal pain, and that he has not eaten in 2 days due to the nausea and vomiting. Labs reviewed multiple abnormalities noted. CT and EKG reviewed. Improving s/p IVFB 2L. Still burping. Discussed plan below.  ROS +eructation, +n/V, +diaphoresis. +abd pain, denies CP, denies SOB, all other systems negative.  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  S/P BKA (below knee amputation) unilateral, right      No Known Allergies    Home Medications:  glipiZIDE 10 mg oral tablet, extended release: 1 tab(s) orally once a day (15 Feb 2021 01:08)  Lantus 100 units/mL subcutaneous solution: 25 unit(s) subcutaneous once a day (15 Feb 2021 01:00)  metFORMIN 1000 mg oral tablet: 1 tab(s) orally 2 times a day (15 Feb 2021 01:08)    VITALS  ICU Vital Signs Last 24 Hrs  T(C): 37.1 (16 Feb 2021 08:48), Max: 37.1 (16 Feb 2021 08:48)  T(F): 98.7 (16 Feb 2021 08:48), Max: 98.7 (16 Feb 2021 08:48)  HR: 101 (16 Feb 2021 08:48) (96 - 101)  BP: 128/68 (16 Feb 2021 08:48) (118/72 - 128/81)  RR: 18 (16 Feb 2021 08:48) (16 - 18)  SpO2: 97% (16 Feb 2021 08:48) (96% - 100%)      PVRI: --  LABS                        10.5   12.86 )-----------( 263      ( 16 Feb 2021 08:30 )             31.2   PT/INR - ( 15 Feb 2021 04:04 )   PT: 14.7 sec;   INR: 1.28 ratio         PTT - ( 15 Feb 2021 04:04 )  PTT:27.6 [id number]    137  |  99  |  19.0  ----------------------------<  69<L>  3.3<L>   |  26.0  |  0.80    Ca    8.1<L>      16 Feb 2021 08:30  Phos  2.8     02-15  Mg     2.2     02-15    TPro  6.3<L>  /  Alb  3.4  /  TBili  0.3<L>  /  DBili  x   /  AST  15  /  ALT  7   /  AlkPhos  85  02-15  CAPILLARY BLOOD GLUCOSE      POCT Blood Glucose.: 121 mg/dL (16 Feb 2021 11:20)  CARDIAC MARKERS ( 15 Feb 2021 04:04 )  x     / 0.10 ng/mL / 144 U/L / x     / x      CARDIAC MARKERS ( 14 Feb 2021 21:19 )  x     / 0.11 ng/mL / x     / x     / x      CARDIAC MARKERS ( 14 Feb 2021 16:42 )  x     / 0.05 ng/mL / x     / x     / x            IN/OUT    02-15-21 @ 07:01  -  02-16-21 @ 07:00  --------------------------------------------------------  IN: 0 mL / OUT: 200 mL / NET: -200 mL      IMAGING  personally reviewed imaging     CURRENT MEDICATIONS  MEDICATIONS  (STANDING):  aspirin  chewable 81 milliGRAM(s) Oral daily  chlorproMAZINE    Tablet 25 milliGRAM(s) Oral two times a day  dextrose 40% Gel 15 Gram(s) Oral once  dextrose 5%. 1000 milliLiter(s) (50 mL/Hr) IV Continuous <Continuous>  dextrose 5%. 1000 milliLiter(s) (100 mL/Hr) IV Continuous <Continuous>  dextrose 50% Injectable 25 Gram(s) IV Push once  dextrose 50% Injectable 12.5 Gram(s) IV Push once  dextrose 50% Injectable 25 Gram(s) IV Push once  glucagon  Injectable 1 milliGRAM(s) IntraMuscular once  heparin   Injectable 5000 Unit(s) SubCutaneous every 8 hours  insulin glargine Injectable (LANTUS) 20 Unit(s) SubCutaneous at bedtime  insulin lispro (ADMELOG) corrective regimen sliding scale   SubCutaneous every 6 hours  pantoprazole  Injectable 40 milliGRAM(s) IV Push daily  sodium chloride 0.9%. 1000 milliLiter(s) (125 mL/Hr) IV Continuous <Continuous>    MEDICATIONS  (PRN):  aluminum hydroxide/magnesium hydroxide/simethicone Suspension 30 milliLiter(s) Oral every 4 hours PRN Dyspepsia  ondansetron Injectable 4 milliGRAM(s) IV Push every 6 hours PRN Nausea

## 2021-02-17 DIAGNOSIS — R10.13 EPIGASTRIC PAIN: ICD-10-CM

## 2021-02-17 DIAGNOSIS — R11.2 NAUSEA WITH VOMITING, UNSPECIFIED: ICD-10-CM

## 2021-02-17 LAB
ALBUMIN SERPL ELPH-MCNC: 3.1 G/DL — LOW (ref 3.3–5.2)
ALP SERPL-CCNC: 88 U/L — SIGNIFICANT CHANGE UP (ref 40–120)
ALT FLD-CCNC: 7 U/L — SIGNIFICANT CHANGE UP
ANION GAP SERPL CALC-SCNC: 10 MMOL/L — SIGNIFICANT CHANGE UP (ref 5–17)
AST SERPL-CCNC: 16 U/L — SIGNIFICANT CHANGE UP
BASOPHILS # BLD AUTO: 0.02 K/UL — SIGNIFICANT CHANGE UP (ref 0–0.2)
BASOPHILS NFR BLD AUTO: 0.2 % — SIGNIFICANT CHANGE UP (ref 0–2)
BILIRUB SERPL-MCNC: 0.3 MG/DL — LOW (ref 0.4–2)
BUN SERPL-MCNC: 11 MG/DL — SIGNIFICANT CHANGE UP (ref 8–20)
CALCIUM SERPL-MCNC: 8.1 MG/DL — LOW (ref 8.6–10.2)
CHLORIDE SERPL-SCNC: 101 MMOL/L — SIGNIFICANT CHANGE UP (ref 98–107)
CO2 SERPL-SCNC: 26 MMOL/L — SIGNIFICANT CHANGE UP (ref 22–29)
CREAT SERPL-MCNC: 0.8 MG/DL — SIGNIFICANT CHANGE UP (ref 0.5–1.3)
EOSINOPHIL # BLD AUTO: 0.04 K/UL — SIGNIFICANT CHANGE UP (ref 0–0.5)
EOSINOPHIL NFR BLD AUTO: 0.4 % — SIGNIFICANT CHANGE UP (ref 0–6)
GLUCOSE BLDC GLUCOMTR-MCNC: 112 MG/DL — HIGH (ref 70–99)
GLUCOSE BLDC GLUCOMTR-MCNC: 160 MG/DL — HIGH (ref 70–99)
GLUCOSE BLDC GLUCOMTR-MCNC: 166 MG/DL — HIGH (ref 70–99)
GLUCOSE BLDC GLUCOMTR-MCNC: 186 MG/DL — HIGH (ref 70–99)
GLUCOSE BLDC GLUCOMTR-MCNC: 288 MG/DL — HIGH (ref 70–99)
GLUCOSE SERPL-MCNC: 113 MG/DL — HIGH (ref 70–99)
HCT VFR BLD CALC: 31.4 % — LOW (ref 39–50)
HGB BLD-MCNC: 10.1 G/DL — LOW (ref 13–17)
IMM GRANULOCYTES NFR BLD AUTO: 0.4 % — SIGNIFICANT CHANGE UP (ref 0–1.5)
LYMPHOCYTES # BLD AUTO: 1.6 K/UL — SIGNIFICANT CHANGE UP (ref 1–3.3)
LYMPHOCYTES # BLD AUTO: 16.9 % — SIGNIFICANT CHANGE UP (ref 13–44)
MAGNESIUM SERPL-MCNC: 2.2 MG/DL — SIGNIFICANT CHANGE UP (ref 1.6–2.6)
MCHC RBC-ENTMCNC: 27.6 PG — SIGNIFICANT CHANGE UP (ref 27–34)
MCHC RBC-ENTMCNC: 32.2 GM/DL — SIGNIFICANT CHANGE UP (ref 32–36)
MCV RBC AUTO: 85.8 FL — SIGNIFICANT CHANGE UP (ref 80–100)
MONOCYTES # BLD AUTO: 0.98 K/UL — HIGH (ref 0–0.9)
MONOCYTES NFR BLD AUTO: 10.4 % — SIGNIFICANT CHANGE UP (ref 2–14)
NEUTROPHILS # BLD AUTO: 6.78 K/UL — SIGNIFICANT CHANGE UP (ref 1.8–7.4)
NEUTROPHILS NFR BLD AUTO: 71.7 % — SIGNIFICANT CHANGE UP (ref 43–77)
PLATELET # BLD AUTO: 226 K/UL — SIGNIFICANT CHANGE UP (ref 150–400)
POTASSIUM SERPL-MCNC: 3.4 MMOL/L — LOW (ref 3.5–5.3)
POTASSIUM SERPL-SCNC: 3.4 MMOL/L — LOW (ref 3.5–5.3)
PROT SERPL-MCNC: 6.4 G/DL — LOW (ref 6.6–8.7)
RBC # BLD: 3.66 M/UL — LOW (ref 4.2–5.8)
RBC # FLD: 12.7 % — SIGNIFICANT CHANGE UP (ref 10.3–14.5)
SODIUM SERPL-SCNC: 137 MMOL/L — SIGNIFICANT CHANGE UP (ref 135–145)
TROPONIN T SERPL-MCNC: 0.06 NG/ML — SIGNIFICANT CHANGE UP (ref 0–0.06)
WBC # BLD: 9.46 K/UL — SIGNIFICANT CHANGE UP (ref 3.8–10.5)
WBC # FLD AUTO: 9.46 K/UL — SIGNIFICANT CHANGE UP (ref 3.8–10.5)

## 2021-02-17 PROCEDURE — 99233 SBSQ HOSP IP/OBS HIGH 50: CPT

## 2021-02-17 PROCEDURE — 99223 1ST HOSP IP/OBS HIGH 75: CPT

## 2021-02-17 PROCEDURE — 93010 ELECTROCARDIOGRAM REPORT: CPT

## 2021-02-17 PROCEDURE — 93018 CV STRESS TEST I&R ONLY: CPT

## 2021-02-17 PROCEDURE — 78452 HT MUSCLE IMAGE SPECT MULT: CPT | Mod: 26

## 2021-02-17 PROCEDURE — 93016 CV STRESS TEST SUPVJ ONLY: CPT

## 2021-02-17 RX ORDER — POTASSIUM CHLORIDE 20 MEQ
40 PACKET (EA) ORAL ONCE
Refills: 0 | Status: DISCONTINUED | OUTPATIENT
Start: 2021-02-17 | End: 2021-02-17

## 2021-02-17 RX ORDER — PANTOPRAZOLE SODIUM 20 MG/1
40 TABLET, DELAYED RELEASE ORAL ONCE
Refills: 0 | Status: COMPLETED | OUTPATIENT
Start: 2021-02-17 | End: 2021-02-17

## 2021-02-17 RX ORDER — METOPROLOL TARTRATE 50 MG
25 TABLET ORAL
Refills: 0 | Status: DISCONTINUED | OUTPATIENT
Start: 2021-02-17 | End: 2021-02-19

## 2021-02-17 RX ORDER — PANTOPRAZOLE SODIUM 20 MG/1
40 TABLET, DELAYED RELEASE ORAL
Refills: 0 | Status: DISCONTINUED | OUTPATIENT
Start: 2021-02-17 | End: 2021-02-19

## 2021-02-17 RX ORDER — POTASSIUM CHLORIDE 20 MEQ
10 PACKET (EA) ORAL ONCE
Refills: 0 | Status: DISCONTINUED | OUTPATIENT
Start: 2021-02-17 | End: 2021-02-17

## 2021-02-17 RX ORDER — POTASSIUM CHLORIDE 20 MEQ
40 PACKET (EA) ORAL ONCE
Refills: 0 | Status: COMPLETED | OUTPATIENT
Start: 2021-02-17 | End: 2021-02-17

## 2021-02-17 RX ADMIN — INSULIN GLARGINE 20 UNIT(S): 100 INJECTION, SOLUTION SUBCUTANEOUS at 23:22

## 2021-02-17 RX ADMIN — Medication 25 MILLIGRAM(S): at 17:25

## 2021-02-17 RX ADMIN — Medication 30 MILLILITER(S): at 17:24

## 2021-02-17 RX ADMIN — INSULIN GLARGINE 20 UNIT(S): 100 INJECTION, SOLUTION SUBCUTANEOUS at 00:17

## 2021-02-17 RX ADMIN — SODIUM CHLORIDE 125 MILLILITER(S): 9 INJECTION INTRAMUSCULAR; INTRAVENOUS; SUBCUTANEOUS at 05:09

## 2021-02-17 RX ADMIN — ATORVASTATIN CALCIUM 20 MILLIGRAM(S): 80 TABLET, FILM COATED ORAL at 00:17

## 2021-02-17 RX ADMIN — PANTOPRAZOLE SODIUM 40 MILLIGRAM(S): 20 TABLET, DELAYED RELEASE ORAL at 18:13

## 2021-02-17 RX ADMIN — Medication 81 MILLIGRAM(S): at 14:25

## 2021-02-17 RX ADMIN — Medication 25 MILLIGRAM(S): at 05:06

## 2021-02-17 RX ADMIN — PANTOPRAZOLE SODIUM 40 MILLIGRAM(S): 20 TABLET, DELAYED RELEASE ORAL at 13:10

## 2021-02-17 RX ADMIN — Medication 1: at 00:18

## 2021-02-17 RX ADMIN — Medication 1: at 17:25

## 2021-02-17 RX ADMIN — HEPARIN SODIUM 5000 UNIT(S): 5000 INJECTION INTRAVENOUS; SUBCUTANEOUS at 00:17

## 2021-02-17 RX ADMIN — Medication 1: at 13:09

## 2021-02-17 RX ADMIN — HEPARIN SODIUM 5000 UNIT(S): 5000 INJECTION INTRAVENOUS; SUBCUTANEOUS at 23:22

## 2021-02-17 RX ADMIN — Medication 25 MILLIGRAM(S): at 18:13

## 2021-02-17 RX ADMIN — Medication 40 MILLIEQUIVALENT(S): at 08:34

## 2021-02-17 RX ADMIN — ATORVASTATIN CALCIUM 20 MILLIGRAM(S): 80 TABLET, FILM COATED ORAL at 23:22

## 2021-02-17 RX ADMIN — HEPARIN SODIUM 5000 UNIT(S): 5000 INJECTION INTRAVENOUS; SUBCUTANEOUS at 05:06

## 2021-02-17 RX ADMIN — Medication 3: at 23:22

## 2021-02-17 RX ADMIN — HEPARIN SODIUM 5000 UNIT(S): 5000 INJECTION INTRAVENOUS; SUBCUTANEOUS at 13:10

## 2021-02-17 NOTE — CONSULT NOTE ADULT - SUBJECTIVE AND OBJECTIVE BOX
Patient is a 70y old  Male who presents with a chief complaint of NSTEMI (16 Feb 2021 18:01)      HPI:  69 y/o M with PMHX IDDM2 presenting with abdominal pain from home. He reports nausea and vomiting for 2 days. Patient is a poor historian. He endorses a history of Dm, on metformin and glipizide. He reports hiccuping frequently. He states he does see a PMD but doesn't recall the last time. He states that he has some mild epigastric abdominal pain, and that he has not eaten in 2 days due to the nausea and vomiting. Labs reviewed multiple abnormalities noted. CT and EKG reviewed. Improving s/p IVFB 2L. Still burping. Discussed plan below.  ROS +eructation, +n/V, +diaphoresis. +abd pain, denies CP, denies SOB, all other systems negative.  (14 Feb 2021 23:45)    We are asked to see patient due to hx of GERD and Cat scan c/w possible esophagitis. Since admission patient with DX of STEMI. He as been treated with pantoprazole 40mg daily and yesterday Thorazine 25mg BID for hiccups was added. He is currently pain free and denies any heartburn or dysphagia. There is no longer any nausea or vomiting either. The hiccups have resolved since starting the thorazine. He tolerated dinner last night without difficulty. A cat scan showed thickening of the distal esophageal walls with submucosal edema and mucosal hyperenhancement compatible with an esophagitis. Peripheral groundglass opacity in the right lower lobe likely representing dependent atelectasis, however, atypical/viral pneumonia such as Covid 19 is also a consideration.                  REVIEW OF SYSTEMS:    CONSTITUTIONAL: No fever, weight loss, or fatigue  EYES: No eye pain, visual disturbances, or discharge  ENMT:  No difficulty hearing, tinnitus, vertigo; No sinus or throat pain  NECK: No pain or stiffness  RESPIRATORY: No cough, wheezing, chills or hemoptysis; No shortness of breath  CARDIOVASCULAR: No chest pain, palpitations, dizziness, or leg swelling  GASTROINTESTINAL: as above  NEUROLOGICAL: No headaches, memory loss, loss of strength, numbness, or tremors  SKIN: No itching, burning, rashes, or lesions   LYMPH NODES: No enlarged glands  MUSCULOSKELETAL: No joint pain or swelling; No muscle, back, or extremity pain  PSYCHIATRIC: No depression, anxiety, mood swings, or difficulty sleeping  HEME/LYMPH: No easy bruising, or bleeding gums  ALLERY AND IMMUNOLOGIC: No hives or eczema      PAST MEDICAL & SURGICAL HISTORY:  Psoriasis    Neuropathy    Cataract    HTN (hypertension)    Dry eye    DM (diabetes mellitus)    Toe amputation status, left  2nd 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    S/P BKA (below knee amputation) unilateral, right        FAMILY HISTORY:  Family history of diabetes mellitus (Father, Mother, Sibling, Child)        SOCIAL HISTORY:  Smoking Status: [ ] Current, [ ] Former, [ ] Never  Pack Years:  Alcohol Use:    Home Medications:  glipiZIDE 10 mg oral tablet, extended release: 1 tab(s) orally once a day (15 Feb 2021 01:08)  Lantus 100 units/mL subcutaneous solution: 25 unit(s) subcutaneous once a day (15 Feb 2021 01:00)  metFORMIN 1000 mg oral tablet: 1 tab(s) orally 2 times a day (15 Feb 2021 01:08)      MEDICATIONS:  MEDICATIONS  (STANDING):  aspirin  chewable 81 milliGRAM(s) Oral daily  atorvastatin 20 milliGRAM(s) Oral at bedtime  chlorproMAZINE    Tablet 25 milliGRAM(s) Oral two times a day  dextrose 40% Gel 15 Gram(s) Oral once  dextrose 5%. 1000 milliLiter(s) (50 mL/Hr) IV Continuous <Continuous>  dextrose 5%. 1000 milliLiter(s) (100 mL/Hr) IV Continuous <Continuous>  dextrose 50% Injectable 25 Gram(s) IV Push once  dextrose 50% Injectable 12.5 Gram(s) IV Push once  dextrose 50% Injectable 25 Gram(s) IV Push once  glucagon  Injectable 1 milliGRAM(s) IntraMuscular once  heparin   Injectable 5000 Unit(s) SubCutaneous every 8 hours  insulin glargine Injectable (LANTUS) 20 Unit(s) SubCutaneous at bedtime  insulin lispro (ADMELOG) corrective regimen sliding scale   SubCutaneous every 6 hours  pantoprazole  Injectable 40 milliGRAM(s) IV Push daily  sodium chloride 0.9%. 1000 milliLiter(s) (125 mL/Hr) IV Continuous <Continuous>    MEDICATIONS  (PRN):  aluminum hydroxide/magnesium hydroxide/simethicone Suspension 30 milliLiter(s) Oral every 4 hours PRN Dyspepsia  ondansetron Injectable 4 milliGRAM(s) IV Push every 6 hours PRN Nausea      Allergies    No Known Allergies    Intolerances        Vital Signs Last 24 Hrs  T(C): 36.8 (17 Feb 2021 08:00), Max: 37.3 (16 Feb 2021 21:14)  T(F): 98.2 (17 Feb 2021 08:00), Max: 99.2 (16 Feb 2021 21:14)  HR: 96 (17 Feb 2021 08:00) (50 - 96)  BP: 134/56 (17 Feb 2021 08:00) (134/56 - 158/94)  BP(mean): --  RR: 19 (17 Feb 2021 08:00) (19 - 20)  SpO2: 96% (17 Feb 2021 08:00) (95% - 99%)        PHYSICAL EXAM:    General: Well developed; well nourished; in no acute distress  HEENT: MMM, conjunctiva and sclera clear  Lungs: Clear  Heart: Rhythm Regular, No Murmurs  Gastrointestinal: Soft, non-tender non-distended; Normal bowel sounds; No rebound or guarding; No Organomegaly & No Masses  Extremities: Normal range of motion, No clubbing, cyanosis or edema, RLE prosthesis present  Neurological: Alert and oriented x3, Non-focal  Skin: Warm and dry. No obvious rash      LABS:                        10.1   9.46  )-----------( 226      ( 17 Feb 2021 09:01 )             31.4     02-17    137  |  101  |  11.0  ----------------------------<  113<H>  3.4<L>   |  26.0  |  0.80    Ca    8.1<L>      17 Feb 2021 09:01  Mg     2.2     02-17    TPro  6.4<L>  /  Alb  3.1<L>  /  TBili  0.3<L>  /  DBili  x   /  AST  16  /  ALT  7   /  AlkPhos  88  02-17          RADIOLOGY & ADDITIONAL STUDIES:    < from: CT Abdomen and Pelvis w/ IV Cont (02.14.21 @ 18:28) >     EXAM:  CT ABDOMEN AND PELVIS IC                          PROCEDURE DATE:  02/14/2021          INTERPRETATION:  CLINICAL INFORMATION: Abdominal pain, nausea, vomiting.    COMPARISON: Abdominal sonogram from 10/22/2015.    PROCEDURE:  CT of the Abdomen and Pelvis was performed with intravenous contrast.  Intravenous contrast: 90 ml Omnipaque 350. 10 ml discarded.  Oral contrast: None.  Sagittal and coronal reformats were performed.    FINDINGS:  LOWER CHEST: Platelike scarring in the right middle lobe. Right middle lobe nodule measuring 4 mm. Peripheral groundglass opacity in the right lower lobe. Coronary artery atherosclerotic calcifications. Right-sided pericardiophrenic lymph node measuring 1.1 cm. Thickening of the distal esophageal walls with submucosal edema and mucosal hyperenhancement.    LIVER: Within normal limits.  BILE DUCTS: Normal caliber.  GALLBLADDER: Within normal limits.  SPLEEN: Within normal limits.  PANCREAS: Within normal limits.  ADRENALS: Within normal limits.  KIDNEYS/URETERS: Kidneys enhance symmetrically without hydronephrosis. Tiny subcentimeter hypodense lesion in the right kidney which is too small to characterize.    BLADDER: Within normal limits.  REPRODUCTIVE ORGANS: Mild median hypertrophy of the prostate gland.    BOWEL: No bowel obstruction. Normal appendix. Moderate volume of stool in the colon.  PERITONEUM: No ascites, pneumoperitoneum, or loculated collection. No mesenteric lymphadenopathy.  VESSELS: Heavy atherosclerotic disease of the aortoiliac tree and abdominal aortic branches. Normal caliber abdominal aorta.  RETROPERITONEUM/LYMPH NODES: No lymphadenopathy.  ABDOMINAL WALL: Within normal limits.  BONES: Degenerative changes of the spine.    IMPRESSION:  Thickening of the distal esophageal walls with submucosal edema and mucosal hyperenhancement compatible with an esophagitis.    Peripheral groundglass opacity in the right lower lobe likely representing dependent atelectasis, however, atypical/viral pneumonia such as Covid 19 is also a consideration.              MITUL TRIPP DO; Attending Radiologist    < end of copied text >

## 2021-02-17 NOTE — PROVIDER CONTACT NOTE (CHANGE IN STATUS NOTIFICATION) - ACTION/TREATMENT ORDERED:
Md stated patient is going to cath lab tomorrow and stated she will review chart and put orders for lopressor.     @18:07 MD Dickerson came to bedside to assess patient.

## 2021-02-17 NOTE — PROGRESS NOTE ADULT - ASSESSMENT
70M with abdominal pain, hiccups, and elevated troponin     2/17- patient resting comfortably c/o mild epigastric discomfort and being hungry. Denies CP, SOB. evaluated s/p NST today which revealed "moderate to severe defects in apical, distal septal walls that are reversible, suggestive of ischemia. Patient not able to prone for image attenuation correction." Discussed w/pt plan for [facility] tomorrow 2/18.    Elevated Troponin  - no chest pain, unchanged EKG  - atypical presentation of MI in setting of DM2 versus demand ischemia due to DKA/HHS  - segmental wall motion abnormality on echo  -NST 2/17 revealed "moderate to severe defects in apical, distal septal walls that are reversible, suggestive of ischemia. Patient not able to prone for image attenuation correction."  - continue ASA 81mg daily  - c/w atorvastatin 20mg daily  - NPO after midnight for [facility] 2/18    Abdominal Pain  - esophagitis, continue treatment per primary team  - EGD recommended by Thoracic surgery   - GI recs appreciated    Assessment and recommendations are final when note is signed by the attending.  70M with abdominal pain, hiccups, and elevated troponin     2/17- patient resting comfortably c/o mild epigastric discomfort and being hungry. Denies CP, SOB. evaluated s/p NST today which revealed "moderate to severe defects in apical, distal septal walls that are reversible, suggestive of ischemia. Patient not able to prone for image attenuation correction." Discussed w/pt plan for [facility] tomorrow 2/18.    Elevated Troponin  - no chest pain, unchanged EKG  - atypical presentation of MI in setting of DM2 versus demand ischemia due to DKA/HHS  - segmental wall motion abnormality on echo  -NST 2/17 revealed "moderate to severe defects in apical, distal septal walls that are reversible, suggestive of ischemia. Patient not able to prone for image attenuation correction."  - continue ASA 81mg daily  - c/w atorvastatin 20mg daily  - NPO after midnight for [facility] 2/18    Abdominal Pain  - esophagitis, continue treatment per primary team  - EGD recommended by Thoracic surgery   - GI recs appreciated

## 2021-02-17 NOTE — PROVIDER CONTACT NOTE (CHANGE IN STATUS NOTIFICATION) - SITUATION
MD Dickerson made aware Tele had called and notified patient had episodes of de-satting to 70s% with good wave form. This coincided with time of chest pain.  Patient last BP was 178/89 . EKG done was abnormal and positive for infarct  (age undetermined) how ever old EKG not available to me.

## 2021-02-17 NOTE — CHART NOTE - NSCHARTNOTEFT_GEN_A_CORE
Pt is currently A & O x 3. Pt's room mate is on 1:1 observation. Ok to change pt to enhanced supervision

## 2021-02-17 NOTE — PROGRESS NOTE ADULT - ASSESSMENT
ASSESSMENT:  71 y/o Diabetic M presents c/o epigastric pain and N/V noted to have AGMA, LA, and Troponin spill admitted for NSTEMI r/o ACS         3. NSTEMI   s/p NST today, found to have ischemic changes   Plan for cardiac cauterization tomorrow   Keep NPO after midnight   c/w aspirin 81 mg po daily   LDL within normal limits, but given ischemic changes on NST started Atorvastatin 40 mg po daily      1. Anion gap metabolic acidosis secondary to DKA/HHS - resolved   -Hold Glipizide/Metformin.  s/p IV fluids and Insulin on admission, AG closed       2. Type 2 DM with hyperglycemia. HBA1C 8.2, BG controlled   Resume diet   C/w IV fluids   C/w Lantus 25 units and Lispro per SS         3. Leukocytosis likely reactive, trending down       4. Esophagitis  c/w Pantoprazole  40mg PO daily  GI consult noted   EGD as outpatient     5. Hiccups; start Thorazine 25 mg po three times a day   No intervention    6. DVT prophylaxis

## 2021-02-17 NOTE — CHART NOTE - NSCHARTNOTEFT_GEN_A_CORE
70M with history of DM - uncontrolled at this time secondary to social barriers - presenting with abdominal pain admitted with r/o ACS found to have NSTEMi since being treated for esophagitis and hiccups found to have right pericardiophrenic space lymph node on CT. Thoracic surgery consulted. d/w Dr. Howard, recommend EGD with GI. pt s/p nuclear stress test today with mod to severe reversible defect. plan for C tomorrow. per GI, can follow up as outpt for EGD in 6-8 weeks. no further thoracic surgery intervention necessary. will sign off, please reconsult as needed.    d/w Dr. Howard

## 2021-02-17 NOTE — PROVIDER CONTACT NOTE (OTHER) - SITUATION
MD Dickerson notified patient c/o of pain in the top part of his sternum. patient unable to describe type of pain and said it just hurts.

## 2021-02-18 DIAGNOSIS — I21.4 NON-ST ELEVATION (NSTEMI) MYOCARDIAL INFARCTION: ICD-10-CM

## 2021-02-18 DIAGNOSIS — R93.3 ABNORMAL FINDINGS ON DIAGNOSTIC IMAGING OF OTHER PARTS OF DIGESTIVE TRACT: ICD-10-CM

## 2021-02-18 DIAGNOSIS — E11.52 TYPE 2 DIABETES MELLITUS WITH DIABETIC PERIPHERAL ANGIOPATHY WITH GANGRENE: ICD-10-CM

## 2021-02-18 DIAGNOSIS — I10 ESSENTIAL (PRIMARY) HYPERTENSION: ICD-10-CM

## 2021-02-18 DIAGNOSIS — I25.10 ATHEROSCLEROTIC HEART DISEASE OF NATIVE CORONARY ARTERY WITHOUT ANGINA PECTORIS: ICD-10-CM

## 2021-02-18 LAB
GLUCOSE BLDC GLUCOMTR-MCNC: 128 MG/DL — HIGH (ref 70–99)
GLUCOSE BLDC GLUCOMTR-MCNC: 147 MG/DL — HIGH (ref 70–99)
GLUCOSE BLDC GLUCOMTR-MCNC: 187 MG/DL — HIGH (ref 70–99)
GLUCOSE BLDC GLUCOMTR-MCNC: 237 MG/DL — HIGH (ref 70–99)
SARS-COV-2 RNA SPEC QL NAA+PROBE: SIGNIFICANT CHANGE UP

## 2021-02-18 PROCEDURE — 99233 SBSQ HOSP IP/OBS HIGH 50: CPT

## 2021-02-18 PROCEDURE — 93458 L HRT ARTERY/VENTRICLE ANGIO: CPT | Mod: 26

## 2021-02-18 PROCEDURE — 93880 EXTRACRANIAL BILAT STUDY: CPT | Mod: 26

## 2021-02-18 PROCEDURE — 99223 1ST HOSP IP/OBS HIGH 75: CPT

## 2021-02-18 PROCEDURE — 99152 MOD SED SAME PHYS/QHP 5/>YRS: CPT

## 2021-02-18 RX ORDER — VANCOMYCIN HCL 1 G
1000 VIAL (EA) INTRAVENOUS ONCE
Refills: 0 | Status: DISCONTINUED | OUTPATIENT
Start: 2021-02-19 | End: 2021-02-19

## 2021-02-18 RX ORDER — CEFUROXIME AXETIL 250 MG
1500 TABLET ORAL ONCE
Refills: 0 | Status: DISCONTINUED | OUTPATIENT
Start: 2021-02-19 | End: 2021-02-19

## 2021-02-18 RX ORDER — MUPIROCIN 20 MG/G
1 OINTMENT TOPICAL EVERY 12 HOURS
Refills: 0 | Status: DISCONTINUED | OUTPATIENT
Start: 2021-02-18 | End: 2021-02-19

## 2021-02-18 RX ADMIN — PANTOPRAZOLE SODIUM 40 MILLIGRAM(S): 20 TABLET, DELAYED RELEASE ORAL at 18:27

## 2021-02-18 RX ADMIN — Medication 1: at 05:34

## 2021-02-18 RX ADMIN — SODIUM CHLORIDE 125 MILLILITER(S): 9 INJECTION INTRAMUSCULAR; INTRAVENOUS; SUBCUTANEOUS at 05:34

## 2021-02-18 RX ADMIN — Medication 25 MILLIGRAM(S): at 18:27

## 2021-02-18 RX ADMIN — INSULIN GLARGINE 20 UNIT(S): 100 INJECTION, SOLUTION SUBCUTANEOUS at 21:59

## 2021-02-18 RX ADMIN — ATORVASTATIN CALCIUM 20 MILLIGRAM(S): 80 TABLET, FILM COATED ORAL at 22:00

## 2021-02-18 RX ADMIN — Medication 25 MILLIGRAM(S): at 05:34

## 2021-02-18 RX ADMIN — HEPARIN SODIUM 5000 UNIT(S): 5000 INJECTION INTRAVENOUS; SUBCUTANEOUS at 12:13

## 2021-02-18 RX ADMIN — Medication 81 MILLIGRAM(S): at 12:13

## 2021-02-18 NOTE — CONSULT NOTE ADULT - PROBLEM SELECTOR RECOMMENDATION 2
as above
same as above and resolved with current pharmacologic interventions. Most likely he has reflux esophagitis which can cause hiccups. He should eventually undergo and EGD to rule out Barretts or other pathology but currently with no symptoms and just had STEMI so would wait 6-8 weeks and perform electively as outpatient. Suggest omeprazole 40mg PO BID as outpatient for 12 weeks then qAM. Continue thorazine for about 5 days. F/Y with me electively as outpatient. Will prn your request.
Well Controlled   Continue DASH diet  Continue monitor BP    Further recommendations base on above findings

## 2021-02-18 NOTE — PROGRESS NOTE ADULT - ASSESSMENT
ASSESSMENT:  69 y/o Diabetic M presents c/o epigastric pain and N/V noted to have AGMA, LA, and Troponin spill admitted for NSTEMI r/o ACS         1. NSTEMI   s/p NST today, found to have ischemic changes   Awaiting  cardiac cauterization  c/w aspirin 81 mg po daily and metoprolol 25 mg po twice daily along with Atorvastatin 40 mg po daily      2. Anion gap metabolic acidosis secondary to DKA/HHS - resolved   -Hold Glipizide/Metformin.  s/p IV fluids and Insulin on admission, AG closed       3. Type 2 DM with hyperglycemia. HBA1C 8.2, BG controlled   Resume diet   C/w IV fluids   C/w Lantus 25 units and Lispro per SS         4. Leukocytosis likely reactive, resolved       5. Esophagitis  c/w Pantoprazole  40mg PO daily  GI consult noted   EGD as outpatient     6. Hiccups; resolved    No intervention      7. DVT prophylaxis

## 2021-02-18 NOTE — CONSULT NOTE ADULT - SUBJECTIVE AND OBJECTIVE BOX
History of Present Illness:  HPI:  69 y/o M with PMHX IDDM2 presenting with abdominal pain from home. He reports nausea and vomiting for 2 days. Patient is a poor historian. He endorses a history of Dm, on metformin and glipizide. He reports hiccuping frequently. He states he does see a PMD but doesn't recall the last time. He states that he has some mild epigastric abdominal pain, and that he has not eaten in 2 days due to the nausea and vomiting. Labs reviewed multiple abnormalities noted. CT and EKG reviewed. Improving s/p IVFB 2L. Still burping. Discussed plan below.  ROS +eructation, +n/V, +diaphoresis. +abd pain, denies CP, denies SOB, all other systems negative.  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 Patient thought to have esophagitis however EGD deferred d/t NSTEMI. LHC today revealed TVD.    Past Medical History  Psoriasis    Neuropathy    Cataract    HTN (hypertension)    Dry eye    DM (diabetes mellitus)        Past Surgical History  Toe amputation status, left  2nd 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    S/P BKA (below knee amputation) unilateral, right        MEDICATIONS  (STANDING):  aspirin  chewable 81 milliGRAM(s) Oral daily  atorvastatin 20 milliGRAM(s) Oral at bedtime  chlorproMAZINE    Tablet 25 milliGRAM(s) Oral two times a day  dextrose 40% Gel 15 Gram(s) Oral once  dextrose 5%. 1000 milliLiter(s) (50 mL/Hr) IV Continuous <Continuous>  dextrose 5%. 1000 milliLiter(s) (100 mL/Hr) IV Continuous <Continuous>  dextrose 50% Injectable 25 Gram(s) IV Push once  dextrose 50% Injectable 12.5 Gram(s) IV Push once  dextrose 50% Injectable 25 Gram(s) IV Push once  glucagon  Injectable 1 milliGRAM(s) IntraMuscular once  heparin   Injectable 5000 Unit(s) SubCutaneous every 8 hours  insulin glargine Injectable (LANTUS) 20 Unit(s) SubCutaneous at bedtime  insulin lispro (ADMELOG) corrective regimen sliding scale   SubCutaneous every 6 hours  metoprolol tartrate 25 milliGRAM(s) Oral two times a day  mupirocin 2% Ointment 1 Application(s) Both Nostrils every 12 hours  pantoprazole    Tablet 40 milliGRAM(s) Oral two times a day  sodium chloride 0.9%. 1000 milliLiter(s) (125 mL/Hr) IV Continuous <Continuous>    MEDICATIONS  (PRN):  aluminum hydroxide/magnesium hydroxide/simethicone Suspension 30 milliLiter(s) Oral every 4 hours PRN Dyspepsia  ondansetron Injectable 4 milliGRAM(s) IV Push every 6 hours PRN Nausea    Antiplatelet therapy:        ASA 81                   Last dose/amt:    Allergies: No Known Allergies      SOCIAL HISTORY:  Smoker: [x ] Yes  [ ] No        PACK YEARS:                         WHEN QUIT? 30 years ago  ETOH use: [ ] Yes  [x ] No              FREQUENCY / QUANTITY:  Ilicit Drug use:  [ ] Yes  [ x] No  Occupation: retired, [employer] transportation  Live with: daughter  Assist device use: cane    PMD: can't recall  Referring Cardiologist: Davi    Relevant Family History  FAMILY HISTORY:  Family history of diabetes mellitus (Father, Mother, Sibling, Child)  Father heart disease        Review of Systems  GENERAL:  Fevers[] chills[] sweats[] fatigue[] weight loss[] weight gain []                                      [x ] NEGATIVE  NEURO:  parathesias[] seizures []  syncope []  confusion []                                                                [ x] NEGATIVE                 EYES: glasses[x]  blurry vision[]  discharge[] pain[] glaucoma []                                                           [ ] NEGATIVE                 ENMT:  difficulty hearing []  vertigo[]  dysphagia[] epistaxis[] recent dental work []                     [x ] NEGATIVE                 CV:  chest pain[x] palpitations[] MATTHEW [] diaphoresis [] edema[]                                                           [ ] NEGATIVE                                 RESPIRATORY:  wheezing[] SOB[] cough [] sputum[] hemoptysis[]                                                   [ ] NEGATIVE               GI:  nausea[x]  vomiting [x]  diarrhea[] constipation [x] melena []                                                          [ ] NEGATIVE            : hematuria[ ]  dysuria[ ] urgency[] incontinence[]                                                                          [x ] NEGATIVE                    MUSKULOSKELETAL:  arthritis[ ]  joint swelling [ ] muscle weakness [ ]                                            [x ] NEGATIVE                     SKIN/BREAST:  rash[ ] itching [ ]  hair loss[ ] masses[ ]                                                                          [x ] NEGATIVE                     PSYCH:  dementia [ ] depression [ ] anxiety[ ]                                                                                          [x ] NEGATIVE                        HEME/LYMPH:  bruises easily[ ] enlarged lymph nodes[ ] tender lymph nodes[ ]                           [x ] NEGATIVE                      ENDOCRINE:  cold intolerance[ ] heat intolerance[ ] polydipsia[ ]                                                      [ x] NEGATIVE                        Telemetry: SR    PHYSICAL EXAM  Vital Signs Last 24 Hrs  T(C): 36.4 (18 Feb 2021 16:32), Max: 37.2 (18 Feb 2021 05:04)  T(F): 97.6 (18 Feb 2021 16:32), Max: 98.9 (18 Feb 2021 05:04)  HR: 81 (18 Feb 2021 18:00) (72 - 91)  BP: 125/57 (18 Feb 2021 18:00) (125/57 - 158/70)  BP(mean): --  RR: 17 (18 Feb 2021 18:00) (16 - 20)  SpO2: 100% (18 Feb 2021 18:00) (97% - 100%)    General: Well nourished, well developed, no acute distress.                                                         Neuro: Normal exam oriented to person/place & time with no focal motor or sensory  deficits.                    Eyes: L eye closed, abnormal, R PERRLA  ENT: Normal exam of nasal/oral mucosa with absence of cyanosis.   Neck: Normal exam of jugular veins, trachea & thyroid.   Chest: Normal lung exam with good air movement absence of wheezes, rales, or rhonchi:                                                                          CV:  Auscultation: normal [x ] S3[ ] S4[ ] Irregular [ ] Rub[ ] Clicks[ ]  Murmurs none:[ ]systolic [ ]  diastolic [ ] holosystolic [ ]  Carotids: No Bruits[x ] Other____________ Abdominal Aorta: normal [x ] nonpalpable[ ]                                                                         GI: Normal exam of abdomen, liver & spleen with no noted masses or tenderness.                                                                                              Extremities: Normal no evidence of cyanosis or deformity Edema: none[x ]trace[ ]1+[ ]2+[ ]3+[ ]4+[ ] RIGHT BKA  Lower Extremity Pulses: Right[ ] Left[ +]Varicosities[ ]  SKIN : Normal exam to inspection & palpation.                                                           LABS:                        10.1   9.46  )-----------( 226      ( 17 Feb 2021 09:01 )             31.4     02-17    137  |  101  |  11.0  ----------------------------<  113<H>  3.4<L>   |  26.0  |  0.80    Ca    8.1<L>      17 Feb 2021 09:01  Mg     2.2     02-17    TPro  6.4<L>  /  Alb  3.1<L>  /  TBili  0.3<L>  /  DBili  x   /  AST  16  /  ALT  7   /  AlkPhos  88  02-17        CARDIAC MARKERS ( 17 Feb 2021 18:21 )  x     / 0.06 ng/mL / x     / x     / x              Cardiac Cath: TVD, official report to follow    TTE : < from: TTE Echo Complete w/ Contrast w/ Doppler (02.15.21 @ 08:53) >      Summary:   1. Normal global left ventricular systolic function.   2. Left ventricular ejection fraction, by visual estimation, is 60 to 65%.   3. Mildly increased LV wall thickness.   4. Left ventricle chordal calcification. The basal and mid inferior wall and the basal and mid inferoseptal wall appear mildly hypokinetic.   5. The right ventricle is not well visualized, appears normal in size with normal systolic function.   6. Normal left atrial size.   7. Normal right atrial size.   8. Mild thickening of the anterior and posterior mitral valve leaflets.   9. Mild mitral annular calcification.  10. The aortic valve leaflets are not well visualized, appears to be a small, round echodensity on the aortic valve which may be consistent with calcification, recommend clinical correlation. No aortic valve stenosis.  11. There is mild aortic root calcification.  12. Trivial pericardial effusion.  13. Recommend clinical correlation with the above findings.    < end of copied text >       History of Present Illness:  HPI:  71 y/o M with PMHX IDDM2 presenting with abdominal pain from home. He reports nausea and vomiting for 2 days. Patient is a poor historian. He endorses a history of Dm, on metformin and glipizide. He reports hiccuping frequently. He states he does see a PMD but doesn't recall the last time. He states that he has some mild epigastric abdominal pain, and that he has not eaten in 2 days due to the nausea and vomiting. Labs reviewed multiple abnormalities noted. CT and EKG reviewed. Improving s/p IVFB 2L. Still burping. Discussed plan below.  ROS +eructation, +n/V, +diaphoresis. +abd pain, denies CP, denies SOB, all other systems negative.  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 Patient thought to have esophagitis however EGD deferred d/t NSTEMI. LHC today revealed TVD.    Past Medical History  Psoriasis    Neuropathy    Cataract    HTN (hypertension)    Dry eye    DM (diabetes mellitus)        Past Surgical History  Toe amputation status, left  2nd 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    S/P BKA (below knee amputation) unilateral, right        MEDICATIONS  (STANDING):  aspirin  chewable 81 milliGRAM(s) Oral daily  atorvastatin 20 milliGRAM(s) Oral at bedtime  chlorproMAZINE    Tablet 25 milliGRAM(s) Oral two times a day  dextrose 40% Gel 15 Gram(s) Oral once  dextrose 5%. 1000 milliLiter(s) (50 mL/Hr) IV Continuous <Continuous>  dextrose 5%. 1000 milliLiter(s) (100 mL/Hr) IV Continuous <Continuous>  dextrose 50% Injectable 25 Gram(s) IV Push once  dextrose 50% Injectable 12.5 Gram(s) IV Push once  dextrose 50% Injectable 25 Gram(s) IV Push once  glucagon  Injectable 1 milliGRAM(s) IntraMuscular once  heparin   Injectable 5000 Unit(s) SubCutaneous every 8 hours  insulin glargine Injectable (LANTUS) 20 Unit(s) SubCutaneous at bedtime  insulin lispro (ADMELOG) corrective regimen sliding scale   SubCutaneous every 6 hours  metoprolol tartrate 25 milliGRAM(s) Oral two times a day  mupirocin 2% Ointment 1 Application(s) Both Nostrils every 12 hours  pantoprazole    Tablet 40 milliGRAM(s) Oral two times a day  sodium chloride 0.9%. 1000 milliLiter(s) (125 mL/Hr) IV Continuous <Continuous>    MEDICATIONS  (PRN):  aluminum hydroxide/magnesium hydroxide/simethicone Suspension 30 milliLiter(s) Oral every 4 hours PRN Dyspepsia  ondansetron Injectable 4 milliGRAM(s) IV Push every 6 hours PRN Nausea    Antiplatelet therapy:        ASA 81                   Last dose/amt:    Allergies: No Known Allergies      SOCIAL HISTORY:  Smoker: [x ] Yes  [ ] No        PACK YEARS:                         WHEN QUIT? 30 years ago  ETOH use: [ ] Yes  [x ] No              FREQUENCY / QUANTITY:  Ilicit Drug use:  [ ] Yes  [ x] No  Occupation: retired, [employer] transportation  Live with: daughter  Assist device use: cane    PMD: can't recall  Referring Cardiologist: Davi    Relevant Family History  FAMILY HISTORY:  Family history of diabetes mellitus (Father, Mother, Sibling, Child)  Father heart disease        Review of Systems  GENERAL:  Fevers[] chills[] sweats[] fatigue[] weight loss[] weight gain []                                      [x ] NEGATIVE  NEURO:  parathesias[] seizures []  syncope []  confusion []                                                                [ x] NEGATIVE                 EYES: glasses[x]  blurry vision[]  discharge[] pain[] glaucoma []                                                           [ ] NEGATIVE                 ENMT:  difficulty hearing []  vertigo[]  dysphagia[] epistaxis[] recent dental work []                     [x ] NEGATIVE                 CV:  chest pain[x] palpitations[] MATTHEW [] diaphoresis [] edema[]                                                           [ ] NEGATIVE                                 RESPIRATORY:  wheezing[] SOB[] cough [] sputum[] hemoptysis[]                                                   [ ] NEGATIVE               GI:  nausea[x]  vomiting [x]  diarrhea[] constipation [x] melena []                                                          [ ] NEGATIVE            : hematuria[ ]  dysuria[ ] urgency[] incontinence[]                                                                          [x ] NEGATIVE                    MUSKULOSKELETAL:  arthritis[ ]  joint swelling [ ] muscle weakness [ ]                                            [x ] NEGATIVE                     SKIN/BREAST:  rash[ ] itching [ ]  hair loss[ ] masses[ ]                                                                          [x ] NEGATIVE                     PSYCH:  dementia [ ] depression [ ] anxiety[ ]                                                                                          [x ] NEGATIVE                        HEME/LYMPH:  bruises easily[ ] enlarged lymph nodes[ ] tender lymph nodes[ ]                           [x ] NEGATIVE                      ENDOCRINE:  cold intolerance[ ] heat intolerance[ ] polydipsia[ ]                                                      [ x] NEGATIVE                        Telemetry: SR    PHYSICAL EXAM  Vital Signs Last 24 Hrs  T(C): 36.4 (18 Feb 2021 16:32), Max: 37.2 (18 Feb 2021 05:04)  T(F): 97.6 (18 Feb 2021 16:32), Max: 98.9 (18 Feb 2021 05:04)  HR: 81 (18 Feb 2021 18:00) (72 - 91)  BP: 125/57 (18 Feb 2021 18:00) (125/57 - 158/70)  BP(mean): --  RR: 17 (18 Feb 2021 18:00) (16 - 20)  SpO2: 100% (18 Feb 2021 18:00) (97% - 100%)    General: Well nourished, well developed, no acute distress.                                                         Neuro: Normal exam oriented to person/place & time with no focal motor or sensory  deficits.                    Eyes: L eye closed, abnormal, R PERRLA  ENT: Normal exam of nasal/oral mucosa with absence of cyanosis. dentures lower jaw,  Neck: Normal exam of jugular veins, trachea & thyroid.   Chest: Normal lung exam with good air movement absence of wheezes, rales, or rhonchi:                                                                          CV:  Auscultation: normal [x ] S3[ ] S4[ ] Irregular [ ] Rub[ ] Clicks[ ]  Murmurs none:[ ]systolic [ ]  diastolic [ ] holosystolic [ ]  Carotids: No Bruits[x ] Other____________ Abdominal Aorta: normal [x ] nonpalpable[ ]                                                                         GI: Normal exam of abdomen, liver & spleen with no noted masses or tenderness.                                                                                              Extremities: Normal no evidence of cyanosis or deformity Edema: none[x ]trace[ ]1+[ ]2+[ ]3+[ ]4+[ ] RIGHT BKA  Lower Extremity Pulses: Right[ ] Left[ +]Varicosities[ ]  SKIN : Normal exam to inspection & palpation.                                                           LABS:                        10.1   9.46  )-----------( 226      ( 17 Feb 2021 09:01 )             31.4     02-17    137  |  101  |  11.0  ----------------------------<  113<H>  3.4<L>   |  26.0  |  0.80    Ca    8.1<L>      17 Feb 2021 09:01  Mg     2.2     02-17    TPro  6.4<L>  /  Alb  3.1<L>  /  TBili  0.3<L>  /  DBili  x   /  AST  16  /  ALT  7   /  AlkPhos  88  02-17        CARDIAC MARKERS ( 17 Feb 2021 18:21 )  x     / 0.06 ng/mL / x     / x     / x              Cardiac Cath: TVD, official report to follow    TTE : < from: TTE Echo Complete w/ Contrast w/ Doppler (02.15.21 @ 08:53) >      Summary:   1. Normal global left ventricular systolic function.   2. Left ventricular ejection fraction, by visual estimation, is 60 to 65%.   3. Mildly increased LV wall thickness.   4. Left ventricle chordal calcification. The basal and mid inferior wall and the basal and mid inferoseptal wall appear mildly hypokinetic.   5. The right ventricle is not well visualized, appears normal in size with normal systolic function.   6. Normal left atrial size.   7. Normal right atrial size.   8. Mild thickening of the anterior and posterior mitral valve leaflets.   9. Mild mitral annular calcification.  10. The aortic valve leaflets are not well visualized, appears to be a small, round echodensity on the aortic valve which may be consistent with calcification, recommend clinical correlation. No aortic valve stenosis.  11. There is mild aortic root calcification.  12. Trivial pericardial effusion.  13. Recommend clinical correlation with the above findings.    < end of copied text >

## 2021-02-18 NOTE — PROGRESS NOTE ADULT - ASSESSMENT
70y  Male is now s/p left heart catheterization via right groin approach with Dr. Tomlinson    -Pt is already in-patient  -post cardiac cath orders  -radial and groin precautions  -bedrest x 2 hours post procedure  -continue current medical therapy  -Single anti platelet therapy with aspirin  -statin therapy; atorvastatin  -beta blocker; metoprolol 25 BID  - CTS to be consulted by Dr. Tomlinson  -[facility] following during hospitalization  -Management per Hospitalist

## 2021-02-18 NOTE — PROGRESS NOTE ADULT - ASSESSMENT
70 M with a significant PMH of PAD s/p R BKA and L SFA stenting, IDDM, HTN and HLD who presents today for C with Dr. Tomlinson.      -consent to be obtained  -procedure discussed with patient; risks and benefits explained, questions answered  -labs and ECG reviewed

## 2021-02-18 NOTE — CONSULT NOTE ADULT - PROBLEM SELECTOR PROBLEM 1
Epigastric pain
R/O NSTEMI (non-ST elevated myocardial infarction)
NSTEMI (non-ST elevated myocardial infarction)

## 2021-02-18 NOTE — CONSULT NOTE ADULT - PROBLEM SELECTOR PROBLEM 2
Coronary artery disease involving native coronary artery of native heart without angina pectoris
Nausea and vomiting, intractability of vomiting not specified, unspecified vomiting type
HTN (hypertension)

## 2021-02-19 ENCOUNTER — APPOINTMENT (OUTPATIENT)
Dept: CARDIOTHORACIC SURGERY | Facility: HOSPITAL | Age: 71
End: 2021-02-19

## 2021-02-19 LAB
A1C WITH ESTIMATED AVERAGE GLUCOSE RESULT: 8.5 % — HIGH (ref 4–5.6)
ALBUMIN SERPL ELPH-MCNC: 2.8 G/DL — LOW (ref 3.3–5.2)
ALBUMIN SERPL ELPH-MCNC: 2.9 G/DL — LOW (ref 3.3–5.2)
ALBUMIN SERPL ELPH-MCNC: 2.9 G/DL — LOW (ref 3.3–5.2)
ALP SERPL-CCNC: 59 U/L — SIGNIFICANT CHANGE UP (ref 40–120)
ALP SERPL-CCNC: 69 U/L — SIGNIFICANT CHANGE UP (ref 40–120)
ALP SERPL-CCNC: 73 U/L — SIGNIFICANT CHANGE UP (ref 40–120)
ALT FLD-CCNC: 6 U/L — SIGNIFICANT CHANGE UP
ALT FLD-CCNC: <5 U/L — SIGNIFICANT CHANGE UP
ALT FLD-CCNC: <5 U/L — SIGNIFICANT CHANGE UP
ANION GAP SERPL CALC-SCNC: 13 MMOL/L — SIGNIFICANT CHANGE UP (ref 5–17)
ANION GAP SERPL CALC-SCNC: 9 MMOL/L — SIGNIFICANT CHANGE UP (ref 5–17)
ANION GAP SERPL CALC-SCNC: 9 MMOL/L — SIGNIFICANT CHANGE UP (ref 5–17)
APPEARANCE UR: CLEAR — SIGNIFICANT CHANGE UP
APTT BLD: 31.1 SEC — SIGNIFICANT CHANGE UP (ref 27.5–35.5)
APTT BLD: 35.3 SEC — SIGNIFICANT CHANGE UP (ref 27.5–35.5)
AST SERPL-CCNC: 18 U/L — SIGNIFICANT CHANGE UP
AST SERPL-CCNC: 8 U/L — SIGNIFICANT CHANGE UP
AST SERPL-CCNC: 9 U/L — SIGNIFICANT CHANGE UP
BACTERIA # UR AUTO: ABNORMAL
BASOPHILS # BLD AUTO: 0.01 K/UL — SIGNIFICANT CHANGE UP (ref 0–0.2)
BASOPHILS # BLD AUTO: 0.01 K/UL — SIGNIFICANT CHANGE UP (ref 0–0.2)
BASOPHILS NFR BLD AUTO: 0.1 % — SIGNIFICANT CHANGE UP (ref 0–2)
BASOPHILS NFR BLD AUTO: 0.1 % — SIGNIFICANT CHANGE UP (ref 0–2)
BILIRUB SERPL-MCNC: 0.5 MG/DL — SIGNIFICANT CHANGE UP (ref 0.4–2)
BILIRUB SERPL-MCNC: 0.7 MG/DL — SIGNIFICANT CHANGE UP (ref 0.4–2)
BILIRUB SERPL-MCNC: <0.2 MG/DL — LOW (ref 0.4–2)
BILIRUB UR-MCNC: NEGATIVE — SIGNIFICANT CHANGE UP
BLD GP AB SCN SERPL QL: SIGNIFICANT CHANGE UP
BUN SERPL-MCNC: 10 MG/DL — SIGNIFICANT CHANGE UP (ref 8–20)
BUN SERPL-MCNC: 12 MG/DL — SIGNIFICANT CHANGE UP (ref 8–20)
BUN SERPL-MCNC: 9 MG/DL — SIGNIFICANT CHANGE UP (ref 8–20)
CALCIUM SERPL-MCNC: 7.9 MG/DL — LOW (ref 8.6–10.2)
CALCIUM SERPL-MCNC: 7.9 MG/DL — LOW (ref 8.6–10.2)
CALCIUM SERPL-MCNC: 8 MG/DL — LOW (ref 8.6–10.2)
CHLORIDE SERPL-SCNC: 101 MMOL/L — SIGNIFICANT CHANGE UP (ref 98–107)
CHLORIDE SERPL-SCNC: 105 MMOL/L — SIGNIFICANT CHANGE UP (ref 98–107)
CHLORIDE SERPL-SCNC: 98 MMOL/L — SIGNIFICANT CHANGE UP (ref 98–107)
CK MB CFR SERPL CALC: 16.7 NG/ML — HIGH (ref 0–6.7)
CK SERPL-CCNC: 201 U/L — HIGH (ref 30–200)
CO2 SERPL-SCNC: 19 MMOL/L — LOW (ref 22–29)
CO2 SERPL-SCNC: 27 MMOL/L — SIGNIFICANT CHANGE UP (ref 22–29)
CO2 SERPL-SCNC: 28 MMOL/L — SIGNIFICANT CHANGE UP (ref 22–29)
COLOR SPEC: YELLOW — SIGNIFICANT CHANGE UP
CREAT SERPL-MCNC: 0.59 MG/DL — SIGNIFICANT CHANGE UP (ref 0.5–1.3)
CREAT SERPL-MCNC: 0.79 MG/DL — SIGNIFICANT CHANGE UP (ref 0.5–1.3)
CREAT SERPL-MCNC: 0.82 MG/DL — SIGNIFICANT CHANGE UP (ref 0.5–1.3)
DIFF PNL FLD: NEGATIVE — SIGNIFICANT CHANGE UP
EOSINOPHIL # BLD AUTO: 0.06 K/UL — SIGNIFICANT CHANGE UP (ref 0–0.5)
EOSINOPHIL # BLD AUTO: 0.06 K/UL — SIGNIFICANT CHANGE UP (ref 0–0.5)
EOSINOPHIL NFR BLD AUTO: 0.9 % — SIGNIFICANT CHANGE UP (ref 0–6)
EOSINOPHIL NFR BLD AUTO: 0.9 % — SIGNIFICANT CHANGE UP (ref 0–6)
EPI CELLS # UR: SIGNIFICANT CHANGE UP
ESTIMATED AVERAGE GLUCOSE: 197 MG/DL — HIGH (ref 68–114)
FIBRINOGEN PPP-MCNC: 610 MG/DL — HIGH (ref 290–520)
GAS PNL BLDA: SIGNIFICANT CHANGE UP
GLUCOSE BLDC GLUCOMTR-MCNC: 114 MG/DL — HIGH (ref 70–99)
GLUCOSE BLDC GLUCOMTR-MCNC: 118 MG/DL — HIGH (ref 70–99)
GLUCOSE BLDC GLUCOMTR-MCNC: 124 MG/DL — HIGH (ref 70–99)
GLUCOSE BLDC GLUCOMTR-MCNC: 148 MG/DL — HIGH (ref 70–99)
GLUCOSE BLDC GLUCOMTR-MCNC: 159 MG/DL — HIGH (ref 70–99)
GLUCOSE BLDC GLUCOMTR-MCNC: 175 MG/DL — HIGH (ref 70–99)
GLUCOSE BLDC GLUCOMTR-MCNC: 188 MG/DL — HIGH (ref 70–99)
GLUCOSE BLDC GLUCOMTR-MCNC: 196 MG/DL — HIGH (ref 70–99)
GLUCOSE SERPL-MCNC: 126 MG/DL — HIGH (ref 70–99)
GLUCOSE SERPL-MCNC: 156 MG/DL — HIGH (ref 70–99)
GLUCOSE SERPL-MCNC: 201 MG/DL — HIGH (ref 70–99)
GLUCOSE UR QL: 250 MG/DL
HCT VFR BLD CALC: 26.7 % — LOW (ref 39–50)
HCT VFR BLD CALC: 31.9 % — LOW (ref 39–50)
HCT VFR BLD CALC: 38.1 % — LOW (ref 39–50)
HGB BLD-MCNC: 10.9 G/DL — LOW (ref 13–17)
HGB BLD-MCNC: 13.1 G/DL — SIGNIFICANT CHANGE UP (ref 13–17)
HGB BLD-MCNC: 8.8 G/DL — LOW (ref 13–17)
IMM GRANULOCYTES NFR BLD AUTO: 0.3 % — SIGNIFICANT CHANGE UP (ref 0–1.5)
IMM GRANULOCYTES NFR BLD AUTO: 0.3 % — SIGNIFICANT CHANGE UP (ref 0–1.5)
INR BLD: 1.11 RATIO — SIGNIFICANT CHANGE UP (ref 0.88–1.16)
INR BLD: 1.35 RATIO — HIGH (ref 0.88–1.16)
KETONES UR-MCNC: ABNORMAL
LEUKOCYTE ESTERASE UR-ACNC: ABNORMAL
LYMPHOCYTES # BLD AUTO: 1.3 K/UL — SIGNIFICANT CHANGE UP (ref 1–3.3)
LYMPHOCYTES # BLD AUTO: 1.41 K/UL — SIGNIFICANT CHANGE UP (ref 1–3.3)
LYMPHOCYTES # BLD AUTO: 18.5 % — SIGNIFICANT CHANGE UP (ref 13–44)
LYMPHOCYTES # BLD AUTO: 20.8 % — SIGNIFICANT CHANGE UP (ref 13–44)
MAGNESIUM SERPL-MCNC: 3.2 MG/DL — HIGH (ref 1.6–2.6)
MCHC RBC-ENTMCNC: 28 PG — SIGNIFICANT CHANGE UP (ref 27–34)
MCHC RBC-ENTMCNC: 29 PG — SIGNIFICANT CHANGE UP (ref 27–34)
MCHC RBC-ENTMCNC: 29.1 PG — SIGNIFICANT CHANGE UP (ref 27–34)
MCHC RBC-ENTMCNC: 33 GM/DL — SIGNIFICANT CHANGE UP (ref 32–36)
MCHC RBC-ENTMCNC: 34.2 GM/DL — SIGNIFICANT CHANGE UP (ref 32–36)
MCHC RBC-ENTMCNC: 34.4 GM/DL — SIGNIFICANT CHANGE UP (ref 32–36)
MCV RBC AUTO: 84.5 FL — SIGNIFICANT CHANGE UP (ref 80–100)
MCV RBC AUTO: 85 FL — SIGNIFICANT CHANGE UP (ref 80–100)
MCV RBC AUTO: 85.1 FL — SIGNIFICANT CHANGE UP (ref 80–100)
MONOCYTES # BLD AUTO: 0.69 K/UL — SIGNIFICANT CHANGE UP (ref 0–0.9)
MONOCYTES # BLD AUTO: 0.78 K/UL — SIGNIFICANT CHANGE UP (ref 0–0.9)
MONOCYTES NFR BLD AUTO: 10.2 % — SIGNIFICANT CHANGE UP (ref 2–14)
MONOCYTES NFR BLD AUTO: 11.1 % — SIGNIFICANT CHANGE UP (ref 2–14)
MRSA PCR RESULT.: SIGNIFICANT CHANGE UP
NEUTROPHILS # BLD AUTO: 4.59 K/UL — SIGNIFICANT CHANGE UP (ref 1.8–7.4)
NEUTROPHILS # BLD AUTO: 4.87 K/UL — SIGNIFICANT CHANGE UP (ref 1.8–7.4)
NEUTROPHILS NFR BLD AUTO: 67.7 % — SIGNIFICANT CHANGE UP (ref 43–77)
NEUTROPHILS NFR BLD AUTO: 69.1 % — SIGNIFICANT CHANGE UP (ref 43–77)
NITRITE UR-MCNC: NEGATIVE — SIGNIFICANT CHANGE UP
NT-PROBNP SERPL-SCNC: 439 PG/ML — HIGH (ref 0–300)
PH UR: 7 — SIGNIFICANT CHANGE UP (ref 5–8)
PHOSPHATE SERPL-MCNC: 2.2 MG/DL — LOW (ref 2.4–4.7)
PLATELET # BLD AUTO: 174 K/UL — SIGNIFICANT CHANGE UP (ref 150–400)
PLATELET # BLD AUTO: 256 K/UL — SIGNIFICANT CHANGE UP (ref 150–400)
PLATELET # BLD AUTO: 275 K/UL — SIGNIFICANT CHANGE UP (ref 150–400)
POTASSIUM SERPL-MCNC: 3.6 MMOL/L — SIGNIFICANT CHANGE UP (ref 3.5–5.3)
POTASSIUM SERPL-MCNC: 4.1 MMOL/L — SIGNIFICANT CHANGE UP (ref 3.5–5.3)
POTASSIUM SERPL-MCNC: 4.6 MMOL/L — SIGNIFICANT CHANGE UP (ref 3.5–5.3)
POTASSIUM SERPL-SCNC: 3.6 MMOL/L — SIGNIFICANT CHANGE UP (ref 3.5–5.3)
POTASSIUM SERPL-SCNC: 4.1 MMOL/L — SIGNIFICANT CHANGE UP (ref 3.5–5.3)
POTASSIUM SERPL-SCNC: 4.6 MMOL/L — SIGNIFICANT CHANGE UP (ref 3.5–5.3)
PREALB SERPL-MCNC: 7 MG/DL — LOW (ref 18–38)
PROT SERPL-MCNC: 5.4 G/DL — LOW (ref 6.6–8.7)
PROT SERPL-MCNC: 5.8 G/DL — LOW (ref 6.6–8.7)
PROT SERPL-MCNC: 5.8 G/DL — LOW (ref 6.6–8.7)
PROT UR-MCNC: NEGATIVE MG/DL — SIGNIFICANT CHANGE UP
PROTHROM AB SERPL-ACNC: 12.8 SEC — SIGNIFICANT CHANGE UP (ref 10.6–13.6)
PROTHROM AB SERPL-ACNC: 15.4 SEC — HIGH (ref 10.6–13.6)
RBC # BLD: 3.14 M/UL — LOW (ref 4.2–5.8)
RBC # BLD: 3.75 M/UL — LOW (ref 4.2–5.8)
RBC # BLD: 4.51 M/UL — SIGNIFICANT CHANGE UP (ref 4.2–5.8)
RBC # FLD: 12.8 % — SIGNIFICANT CHANGE UP (ref 10.3–14.5)
RBC # FLD: 12.8 % — SIGNIFICANT CHANGE UP (ref 10.3–14.5)
RBC # FLD: 12.9 % — SIGNIFICANT CHANGE UP (ref 10.3–14.5)
RBC CASTS # UR COMP ASSIST: SIGNIFICANT CHANGE UP /HPF (ref 0–4)
S AUREUS DNA NOSE QL NAA+PROBE: SIGNIFICANT CHANGE UP
SODIUM SERPL-SCNC: 134 MMOL/L — LOW (ref 135–145)
SODIUM SERPL-SCNC: 137 MMOL/L — SIGNIFICANT CHANGE UP (ref 135–145)
SODIUM SERPL-SCNC: 137 MMOL/L — SIGNIFICANT CHANGE UP (ref 135–145)
SP GR SPEC: 1 — LOW (ref 1.01–1.02)
T4 FREE SERPL-MCNC: 1.2 NG/DL — SIGNIFICANT CHANGE UP (ref 0.9–1.8)
TROPONIN T SERPL-MCNC: 0.25 NG/ML — HIGH (ref 0–0.06)
TSH SERPL-MCNC: 1.5 UIU/ML — SIGNIFICANT CHANGE UP (ref 0.27–4.2)
UROBILINOGEN FLD QL: NEGATIVE MG/DL — SIGNIFICANT CHANGE UP
WBC # BLD: 17.29 K/UL — HIGH (ref 3.8–10.5)
WBC # BLD: 6.78 K/UL — SIGNIFICANT CHANGE UP (ref 3.8–10.5)
WBC # BLD: 7.04 K/UL — SIGNIFICANT CHANGE UP (ref 3.8–10.5)
WBC # FLD AUTO: 17.29 K/UL — HIGH (ref 3.8–10.5)
WBC # FLD AUTO: 6.78 K/UL — SIGNIFICANT CHANGE UP (ref 3.8–10.5)
WBC # FLD AUTO: 7.04 K/UL — SIGNIFICANT CHANGE UP (ref 3.8–10.5)
WBC UR QL: SIGNIFICANT CHANGE UP

## 2021-02-19 PROCEDURE — 76998 US GUIDE INTRAOP: CPT | Mod: 26,59

## 2021-02-19 PROCEDURE — 33533 CABG ARTERIAL SINGLE: CPT | Mod: AS

## 2021-02-19 PROCEDURE — 33517 CABG ARTERY-VEIN SINGLE: CPT | Mod: AS

## 2021-02-19 PROCEDURE — 33533 CABG ARTERIAL SINGLE: CPT

## 2021-02-19 PROCEDURE — 93010 ELECTROCARDIOGRAM REPORT: CPT

## 2021-02-19 PROCEDURE — 71045 X-RAY EXAM CHEST 1 VIEW: CPT | Mod: 26

## 2021-02-19 PROCEDURE — 33517 CABG ARTERY-VEIN SINGLE: CPT

## 2021-02-19 PROCEDURE — 76998 US GUIDE INTRAOP: CPT | Mod: 26,AS,59

## 2021-02-19 RX ORDER — SODIUM CHLORIDE 9 MG/ML
350 INJECTION, SOLUTION INTRAVENOUS ONCE
Refills: 0 | Status: DISCONTINUED | OUTPATIENT
Start: 2021-02-19 | End: 2021-02-19

## 2021-02-19 RX ORDER — PANTOPRAZOLE SODIUM 20 MG/1
40 TABLET, DELAYED RELEASE ORAL DAILY
Refills: 0 | Status: DISCONTINUED | OUTPATIENT
Start: 2021-02-20 | End: 2021-02-24

## 2021-02-19 RX ORDER — DEXTROSE 50 % IN WATER 50 %
50 SYRINGE (ML) INTRAVENOUS
Refills: 0 | Status: DISCONTINUED | OUTPATIENT
Start: 2021-02-19 | End: 2021-02-21

## 2021-02-19 RX ORDER — POTASSIUM CHLORIDE 20 MEQ
10 PACKET (EA) ORAL
Refills: 0 | Status: DISCONTINUED | OUTPATIENT
Start: 2021-02-19 | End: 2021-02-19

## 2021-02-19 RX ORDER — ENOXAPARIN SODIUM 100 MG/ML
40 INJECTION SUBCUTANEOUS DAILY
Refills: 0 | Status: DISCONTINUED | OUTPATIENT
Start: 2021-02-20 | End: 2021-02-24

## 2021-02-19 RX ORDER — ATORVASTATIN CALCIUM 80 MG/1
40 TABLET, FILM COATED ORAL AT BEDTIME
Refills: 0 | Status: DISCONTINUED | OUTPATIENT
Start: 2021-02-19 | End: 2021-02-24

## 2021-02-19 RX ORDER — PANTOPRAZOLE SODIUM 20 MG/1
40 TABLET, DELAYED RELEASE ORAL ONCE
Refills: 0 | Status: COMPLETED | OUTPATIENT
Start: 2021-02-19 | End: 2021-02-19

## 2021-02-19 RX ORDER — CEFUROXIME AXETIL 250 MG
1500 TABLET ORAL EVERY 8 HOURS
Refills: 0 | Status: COMPLETED | OUTPATIENT
Start: 2021-02-19 | End: 2021-02-21

## 2021-02-19 RX ORDER — ASPIRIN/CALCIUM CARB/MAGNESIUM 324 MG
325 TABLET ORAL DAILY
Refills: 0 | Status: DISCONTINUED | OUTPATIENT
Start: 2021-02-20 | End: 2021-02-24

## 2021-02-19 RX ORDER — HYDROMORPHONE HYDROCHLORIDE 2 MG/ML
0.5 INJECTION INTRAMUSCULAR; INTRAVENOUS; SUBCUTANEOUS ONCE
Refills: 0 | Status: DISCONTINUED | OUTPATIENT
Start: 2021-02-19 | End: 2021-02-19

## 2021-02-19 RX ORDER — CALCIUM GLUCONATE 100 MG/ML
1 VIAL (ML) INTRAVENOUS ONCE
Refills: 0 | Status: COMPLETED | OUTPATIENT
Start: 2021-02-19 | End: 2021-02-19

## 2021-02-19 RX ORDER — ASPIRIN/CALCIUM CARB/MAGNESIUM 324 MG
325 TABLET ORAL ONCE
Refills: 0 | Status: COMPLETED | OUTPATIENT
Start: 2021-02-19 | End: 2021-02-19

## 2021-02-19 RX ORDER — ALBUMIN HUMAN 25 %
250 VIAL (ML) INTRAVENOUS ONCE
Refills: 0 | Status: COMPLETED | OUTPATIENT
Start: 2021-02-19 | End: 2021-02-19

## 2021-02-19 RX ORDER — NOREPINEPHRINE BITARTRATE/D5W 8 MG/250ML
0.05 PLASTIC BAG, INJECTION (ML) INTRAVENOUS
Qty: 8 | Refills: 0 | Status: DISCONTINUED | OUTPATIENT
Start: 2021-02-19 | End: 2021-02-20

## 2021-02-19 RX ORDER — SENNA PLUS 8.6 MG/1
2 TABLET ORAL AT BEDTIME
Refills: 0 | Status: DISCONTINUED | OUTPATIENT
Start: 2021-02-19 | End: 2021-02-24

## 2021-02-19 RX ORDER — SODIUM CHLORIDE 9 MG/ML
1000 INJECTION INTRAMUSCULAR; INTRAVENOUS; SUBCUTANEOUS
Refills: 0 | Status: DISCONTINUED | OUTPATIENT
Start: 2021-02-19 | End: 2021-02-21

## 2021-02-19 RX ORDER — PSYLLIUM SEED (WITH DEXTROSE)
1 POWDER (GRAM) ORAL DAILY
Refills: 0 | Status: DISCONTINUED | OUTPATIENT
Start: 2021-02-19 | End: 2021-02-24

## 2021-02-19 RX ORDER — CHLORHEXIDINE GLUCONATE 213 G/1000ML
5 SOLUTION TOPICAL
Refills: 0 | Status: DISCONTINUED | OUTPATIENT
Start: 2021-02-19 | End: 2021-02-19

## 2021-02-19 RX ORDER — VANCOMYCIN HCL 1 G
1000 VIAL (EA) INTRAVENOUS EVERY 12 HOURS
Refills: 0 | Status: COMPLETED | OUTPATIENT
Start: 2021-02-20 | End: 2021-02-21

## 2021-02-19 RX ORDER — POTASSIUM CHLORIDE 20 MEQ
40 PACKET (EA) ORAL ONCE
Refills: 0 | Status: COMPLETED | OUTPATIENT
Start: 2021-02-19 | End: 2021-02-19

## 2021-02-19 RX ORDER — CHLORHEXIDINE GLUCONATE 213 G/1000ML
1 SOLUTION TOPICAL DAILY
Refills: 0 | Status: DISCONTINUED | OUTPATIENT
Start: 2021-02-19 | End: 2021-02-21

## 2021-02-19 RX ORDER — PROPOFOL 10 MG/ML
10.82 INJECTION, EMULSION INTRAVENOUS
Qty: 1000 | Refills: 0 | Status: DISCONTINUED | OUTPATIENT
Start: 2021-02-19 | End: 2021-02-19

## 2021-02-19 RX ORDER — INSULIN HUMAN 100 [IU]/ML
2 INJECTION, SOLUTION SUBCUTANEOUS
Qty: 100 | Refills: 0 | Status: DISCONTINUED | OUTPATIENT
Start: 2021-02-19 | End: 2021-02-20

## 2021-02-19 RX ORDER — MEPERIDINE HYDROCHLORIDE 50 MG/ML
25 INJECTION INTRAMUSCULAR; INTRAVENOUS; SUBCUTANEOUS ONCE
Refills: 0 | Status: DISCONTINUED | OUTPATIENT
Start: 2021-02-19 | End: 2021-02-19

## 2021-02-19 RX ADMIN — PANTOPRAZOLE SODIUM 40 MILLIGRAM(S): 20 TABLET, DELAYED RELEASE ORAL at 06:30

## 2021-02-19 RX ADMIN — Medication 40 MILLIEQUIVALENT(S): at 09:41

## 2021-02-19 RX ADMIN — CHLORHEXIDINE GLUCONATE 5 MILLILITER(S): 213 SOLUTION TOPICAL at 20:09

## 2021-02-19 RX ADMIN — MUPIROCIN 1 APPLICATION(S): 20 OINTMENT TOPICAL at 06:30

## 2021-02-19 RX ADMIN — Medication 25 MILLIGRAM(S): at 06:30

## 2021-02-19 RX ADMIN — Medication 100 GRAM(S): at 17:51

## 2021-02-19 RX ADMIN — HYDROMORPHONE HYDROCHLORIDE 0.5 MILLIGRAM(S): 2 INJECTION INTRAMUSCULAR; INTRAVENOUS; SUBCUTANEOUS at 20:43

## 2021-02-19 RX ADMIN — Medication 100 MILLIGRAM(S): at 20:05

## 2021-02-19 RX ADMIN — PANTOPRAZOLE SODIUM 40 MILLIGRAM(S): 20 TABLET, DELAYED RELEASE ORAL at 20:09

## 2021-02-19 RX ADMIN — Medication 125 MILLILITER(S): at 17:49

## 2021-02-19 NOTE — AIRWAY REMOVAL NOTE  ADULT & PEDS - ARTIFICAL AIRWAY REMOVAL COMMENTS
patient was extubated as per PA orders post ABG. Nurse was at bedside, patient placed on aersol mask sats 100%. Patient is resting and is in no distress noted at this time. will continue to monitor

## 2021-02-19 NOTE — BRIEF OPERATIVE NOTE - NSICDXBRIEFPROCEDURE_GEN_ALL_CORE_FT
PROCEDURES:  CABG, using 1 arterial and 1 venous graft 19-Feb-2021 16:47:20 LIMA- LAD, SVG - RPDA [place]

## 2021-02-20 DIAGNOSIS — Z29.9 ENCOUNTER FOR PROPHYLACTIC MEASURES, UNSPECIFIED: ICD-10-CM

## 2021-02-20 LAB
ANION GAP SERPL CALC-SCNC: 13 MMOL/L — SIGNIFICANT CHANGE UP (ref 5–17)
ANION GAP SERPL CALC-SCNC: 9 MMOL/L — SIGNIFICANT CHANGE UP (ref 5–17)
APTT BLD: 33.7 SEC — SIGNIFICANT CHANGE UP (ref 27.5–35.5)
BUN SERPL-MCNC: 14 MG/DL — SIGNIFICANT CHANGE UP (ref 8–20)
BUN SERPL-MCNC: 16 MG/DL — SIGNIFICANT CHANGE UP (ref 8–20)
CALCIUM SERPL-MCNC: 7.8 MG/DL — LOW (ref 8.6–10.2)
CALCIUM SERPL-MCNC: 7.8 MG/DL — LOW (ref 8.6–10.2)
CHLORIDE SERPL-SCNC: 100 MMOL/L — SIGNIFICANT CHANGE UP (ref 98–107)
CHLORIDE SERPL-SCNC: 104 MMOL/L — SIGNIFICANT CHANGE UP (ref 98–107)
CK MB CFR SERPL CALC: 18.8 NG/ML — HIGH (ref 0–6.7)
CK SERPL-CCNC: 360 U/L — HIGH (ref 30–200)
CO2 SERPL-SCNC: 21 MMOL/L — LOW (ref 22–29)
CO2 SERPL-SCNC: 22 MMOL/L — SIGNIFICANT CHANGE UP (ref 22–29)
CREAT SERPL-MCNC: 0.75 MG/DL — SIGNIFICANT CHANGE UP (ref 0.5–1.3)
CREAT SERPL-MCNC: 0.78 MG/DL — SIGNIFICANT CHANGE UP (ref 0.5–1.3)
GAS PNL BLDA: SIGNIFICANT CHANGE UP
GLUCOSE BLDC GLUCOMTR-MCNC: 106 MG/DL — HIGH (ref 70–99)
GLUCOSE BLDC GLUCOMTR-MCNC: 107 MG/DL — HIGH (ref 70–99)
GLUCOSE BLDC GLUCOMTR-MCNC: 111 MG/DL — HIGH (ref 70–99)
GLUCOSE BLDC GLUCOMTR-MCNC: 116 MG/DL — HIGH (ref 70–99)
GLUCOSE BLDC GLUCOMTR-MCNC: 120 MG/DL — HIGH (ref 70–99)
GLUCOSE BLDC GLUCOMTR-MCNC: 123 MG/DL — HIGH (ref 70–99)
GLUCOSE BLDC GLUCOMTR-MCNC: 137 MG/DL — HIGH (ref 70–99)
GLUCOSE BLDC GLUCOMTR-MCNC: 137 MG/DL — HIGH (ref 70–99)
GLUCOSE BLDC GLUCOMTR-MCNC: 140 MG/DL — HIGH (ref 70–99)
GLUCOSE BLDC GLUCOMTR-MCNC: 142 MG/DL — HIGH (ref 70–99)
GLUCOSE BLDC GLUCOMTR-MCNC: 148 MG/DL — HIGH (ref 70–99)
GLUCOSE BLDC GLUCOMTR-MCNC: 152 MG/DL — HIGH (ref 70–99)
GLUCOSE BLDC GLUCOMTR-MCNC: 162 MG/DL — HIGH (ref 70–99)
GLUCOSE BLDC GLUCOMTR-MCNC: 163 MG/DL — HIGH (ref 70–99)
GLUCOSE BLDC GLUCOMTR-MCNC: 200 MG/DL — HIGH (ref 70–99)
GLUCOSE BLDC GLUCOMTR-MCNC: 84 MG/DL — SIGNIFICANT CHANGE UP (ref 70–99)
GLUCOSE BLDC GLUCOMTR-MCNC: 99 MG/DL — SIGNIFICANT CHANGE UP (ref 70–99)
GLUCOSE SERPL-MCNC: 142 MG/DL — HIGH (ref 70–99)
GLUCOSE SERPL-MCNC: 90 MG/DL — SIGNIFICANT CHANGE UP (ref 70–99)
HCT VFR BLD CALC: 38.2 % — LOW (ref 39–50)
HCT VFR BLD CALC: 38.6 % — LOW (ref 39–50)
HGB BLD-MCNC: 13.2 G/DL — SIGNIFICANT CHANGE UP (ref 13–17)
HGB BLD-MCNC: 13.3 G/DL — SIGNIFICANT CHANGE UP (ref 13–17)
INR BLD: 1.3 RATIO — HIGH (ref 0.88–1.16)
MAGNESIUM SERPL-MCNC: 2.1 MG/DL — SIGNIFICANT CHANGE UP (ref 1.6–2.6)
MAGNESIUM SERPL-MCNC: 2.2 MG/DL — SIGNIFICANT CHANGE UP (ref 1.8–2.6)
MCHC RBC-ENTMCNC: 28.8 PG — SIGNIFICANT CHANGE UP (ref 27–34)
MCHC RBC-ENTMCNC: 29.2 PG — SIGNIFICANT CHANGE UP (ref 27–34)
MCHC RBC-ENTMCNC: 34.2 GM/DL — SIGNIFICANT CHANGE UP (ref 32–36)
MCHC RBC-ENTMCNC: 34.8 GM/DL — SIGNIFICANT CHANGE UP (ref 32–36)
MCV RBC AUTO: 83.8 FL — SIGNIFICANT CHANGE UP (ref 80–100)
MCV RBC AUTO: 84.1 FL — SIGNIFICANT CHANGE UP (ref 80–100)
PLATELET # BLD AUTO: 210 K/UL — SIGNIFICANT CHANGE UP (ref 150–400)
PLATELET # BLD AUTO: 254 K/UL — SIGNIFICANT CHANGE UP (ref 150–400)
POTASSIUM SERPL-MCNC: 4.3 MMOL/L — SIGNIFICANT CHANGE UP (ref 3.5–5.3)
POTASSIUM SERPL-MCNC: 4.7 MMOL/L — SIGNIFICANT CHANGE UP (ref 3.5–5.3)
POTASSIUM SERPL-SCNC: 4.3 MMOL/L — SIGNIFICANT CHANGE UP (ref 3.5–5.3)
POTASSIUM SERPL-SCNC: 4.7 MMOL/L — SIGNIFICANT CHANGE UP (ref 3.5–5.3)
PROTHROM AB SERPL-ACNC: 14.9 SEC — HIGH (ref 10.6–13.6)
RBC # BLD: 4.56 M/UL — SIGNIFICANT CHANGE UP (ref 4.2–5.8)
RBC # BLD: 4.59 M/UL — SIGNIFICANT CHANGE UP (ref 4.2–5.8)
RBC # FLD: 12.9 % — SIGNIFICANT CHANGE UP (ref 10.3–14.5)
RBC # FLD: 13.3 % — SIGNIFICANT CHANGE UP (ref 10.3–14.5)
SODIUM SERPL-SCNC: 131 MMOL/L — LOW (ref 135–145)
SODIUM SERPL-SCNC: 137 MMOL/L — SIGNIFICANT CHANGE UP (ref 135–145)
TROPONIN T SERPL-MCNC: 0.28 NG/ML — HIGH (ref 0–0.06)
WBC # BLD: 15.81 K/UL — HIGH (ref 3.8–10.5)
WBC # BLD: 18.27 K/UL — HIGH (ref 3.8–10.5)
WBC # FLD AUTO: 15.81 K/UL — HIGH (ref 3.8–10.5)
WBC # FLD AUTO: 18.27 K/UL — HIGH (ref 3.8–10.5)

## 2021-02-20 PROCEDURE — 71045 X-RAY EXAM CHEST 1 VIEW: CPT | Mod: 26

## 2021-02-20 PROCEDURE — 93010 ELECTROCARDIOGRAM REPORT: CPT

## 2021-02-20 PROCEDURE — 99223 1ST HOSP IP/OBS HIGH 75: CPT

## 2021-02-20 RX ORDER — METOPROLOL TARTRATE 50 MG
25 TABLET ORAL
Refills: 0 | Status: DISCONTINUED | OUTPATIENT
Start: 2021-02-20 | End: 2021-02-20

## 2021-02-20 RX ORDER — DEXTROSE 50 % IN WATER 50 %
15 SYRINGE (ML) INTRAVENOUS ONCE
Refills: 0 | Status: DISCONTINUED | OUTPATIENT
Start: 2021-02-20 | End: 2021-02-21

## 2021-02-20 RX ORDER — GLUCAGON INJECTION, SOLUTION 0.5 MG/.1ML
1 INJECTION, SOLUTION SUBCUTANEOUS ONCE
Refills: 0 | Status: DISCONTINUED | OUTPATIENT
Start: 2021-02-20 | End: 2021-02-21

## 2021-02-20 RX ORDER — SODIUM CHLORIDE 9 MG/ML
1000 INJECTION, SOLUTION INTRAVENOUS
Refills: 0 | Status: DISCONTINUED | OUTPATIENT
Start: 2021-02-20 | End: 2021-02-20

## 2021-02-20 RX ORDER — SODIUM CHLORIDE 9 MG/ML
1000 INJECTION, SOLUTION INTRAVENOUS
Refills: 0 | Status: DISCONTINUED | OUTPATIENT
Start: 2021-02-20 | End: 2021-02-21

## 2021-02-20 RX ORDER — OXYCODONE HYDROCHLORIDE 5 MG/1
10 TABLET ORAL EVERY 4 HOURS
Refills: 0 | Status: DISCONTINUED | OUTPATIENT
Start: 2021-02-20 | End: 2021-02-24

## 2021-02-20 RX ORDER — INSULIN LISPRO 100/ML
2 VIAL (ML) SUBCUTANEOUS
Refills: 0 | Status: DISCONTINUED | OUTPATIENT
Start: 2021-02-20 | End: 2021-02-21

## 2021-02-20 RX ORDER — METOPROLOL TARTRATE 50 MG
50 TABLET ORAL
Refills: 0 | Status: DISCONTINUED | OUTPATIENT
Start: 2021-02-21 | End: 2021-02-24

## 2021-02-20 RX ORDER — MAGNESIUM SULFATE 500 MG/ML
2 VIAL (ML) INJECTION ONCE
Refills: 0 | Status: COMPLETED | OUTPATIENT
Start: 2021-02-20 | End: 2021-02-20

## 2021-02-20 RX ORDER — INSULIN LISPRO 100/ML
VIAL (ML) SUBCUTANEOUS
Refills: 0 | Status: DISCONTINUED | OUTPATIENT
Start: 2021-02-20 | End: 2021-02-20

## 2021-02-20 RX ORDER — INSULIN GLARGINE 100 [IU]/ML
10 INJECTION, SOLUTION SUBCUTANEOUS AT BEDTIME
Refills: 0 | Status: DISCONTINUED | OUTPATIENT
Start: 2021-02-20 | End: 2021-02-22

## 2021-02-20 RX ORDER — FUROSEMIDE 40 MG
40 TABLET ORAL DAILY
Refills: 0 | Status: DISCONTINUED | OUTPATIENT
Start: 2021-02-20 | End: 2021-02-24

## 2021-02-20 RX ORDER — SODIUM CHLORIDE 9 MG/ML
250 INJECTION, SOLUTION INTRAVENOUS ONCE
Refills: 0 | Status: COMPLETED | OUTPATIENT
Start: 2021-02-20 | End: 2021-02-20

## 2021-02-20 RX ORDER — METOPROLOL TARTRATE 50 MG
25 TABLET ORAL ONCE
Refills: 0 | Status: COMPLETED | OUTPATIENT
Start: 2021-02-20 | End: 2021-02-20

## 2021-02-20 RX ORDER — INSULIN LISPRO 100/ML
VIAL (ML) SUBCUTANEOUS
Refills: 0 | Status: DISCONTINUED | OUTPATIENT
Start: 2021-02-20 | End: 2021-02-21

## 2021-02-20 RX ORDER — OXYCODONE HYDROCHLORIDE 5 MG/1
5 TABLET ORAL EVERY 4 HOURS
Refills: 0 | Status: DISCONTINUED | OUTPATIENT
Start: 2021-02-20 | End: 2021-02-24

## 2021-02-20 RX ADMIN — OXYCODONE HYDROCHLORIDE 5 MILLIGRAM(S): 5 TABLET ORAL at 22:17

## 2021-02-20 RX ADMIN — Medication 325 MILLIGRAM(S): at 11:53

## 2021-02-20 RX ADMIN — Medication 100 MILLIGRAM(S): at 22:22

## 2021-02-20 RX ADMIN — Medication 40 MILLIGRAM(S): at 13:07

## 2021-02-20 RX ADMIN — SENNA PLUS 2 TABLET(S): 8.6 TABLET ORAL at 02:22

## 2021-02-20 RX ADMIN — Medication 25 MILLIGRAM(S): at 06:16

## 2021-02-20 RX ADMIN — SENNA PLUS 2 TABLET(S): 8.6 TABLET ORAL at 21:47

## 2021-02-20 RX ADMIN — OXYCODONE HYDROCHLORIDE 5 MILLIGRAM(S): 5 TABLET ORAL at 13:09

## 2021-02-20 RX ADMIN — Medication 250 MILLIGRAM(S): at 13:07

## 2021-02-20 RX ADMIN — CHLORHEXIDINE GLUCONATE 1 APPLICATION(S): 213 SOLUTION TOPICAL at 11:54

## 2021-02-20 RX ADMIN — Medication 2 UNIT(S): at 11:53

## 2021-02-20 RX ADMIN — ATORVASTATIN CALCIUM 40 MILLIGRAM(S): 80 TABLET, FILM COATED ORAL at 21:42

## 2021-02-20 RX ADMIN — Medication 25 MILLIGRAM(S): at 18:17

## 2021-02-20 RX ADMIN — Medication 250 MILLIGRAM(S): at 02:18

## 2021-02-20 RX ADMIN — Medication 100 MILLIGRAM(S): at 11:53

## 2021-02-20 RX ADMIN — Medication 100 MILLIGRAM(S): at 06:59

## 2021-02-20 RX ADMIN — OXYCODONE HYDROCHLORIDE 5 MILLIGRAM(S): 5 TABLET ORAL at 18:12

## 2021-02-20 RX ADMIN — Medication 325 MILLIGRAM(S): at 02:22

## 2021-02-20 RX ADMIN — INSULIN GLARGINE 10 UNIT(S): 100 INJECTION, SOLUTION SUBCUTANEOUS at 22:19

## 2021-02-20 RX ADMIN — ATORVASTATIN CALCIUM 40 MILLIGRAM(S): 80 TABLET, FILM COATED ORAL at 02:19

## 2021-02-20 RX ADMIN — Medication 25 MILLIGRAM(S): at 17:06

## 2021-02-20 RX ADMIN — SODIUM CHLORIDE 500 MILLILITER(S): 9 INJECTION, SOLUTION INTRAVENOUS at 09:19

## 2021-02-20 RX ADMIN — PANTOPRAZOLE SODIUM 40 MILLIGRAM(S): 20 TABLET, DELAYED RELEASE ORAL at 11:53

## 2021-02-20 RX ADMIN — ENOXAPARIN SODIUM 40 MILLIGRAM(S): 100 INJECTION SUBCUTANEOUS at 11:53

## 2021-02-20 RX ADMIN — Medication 50 GRAM(S): at 18:17

## 2021-02-20 NOTE — PHYSICAL THERAPY INITIAL EVALUATION ADULT - GENERAL OBSERVATIONS, REHAB EVAL
Pt. greeted resting in bed + monitor, IV, right LE prosthetic at bedside
Patient received reclining in bedside chair, +central line, IV, tele, , BP cuff, external pacer, ronquillo, 2 chest tubes, in NAD

## 2021-02-20 NOTE — PHYSICAL THERAPY INITIAL EVALUATION ADULT - PERTINENT HX OF CURRENT PROBLEM, REHAB EVAL
Patient is a 70y old  Male who presents with a chief complaint of NSTEMI
Patient with PMHx of DM2 with resulting blindness in left eye, right trans-tibial amputation, left 2 & 3 digits amputation, diagnosed with NSTEMI, requiring CABG x 2

## 2021-02-20 NOTE — CONSULT NOTE ADULT - SUBJECTIVE AND OBJECTIVE BOX
HPI:  71 y/o M with PMHX IDDM2 presenting with abdominal pain from home. He reports nausea and vomiting for 2 days. Patient is a poor historian. He endorses a history of Dm, on metformin and glipizide. He states he does see a PMD but doesn't recall the last time. He states that he has some mild epigastric abdominal pain and he could not eat for 2 days.   Discussed plan below.  ROS +eructation, +n/V, +diaphoresis. +abd pain, denies CP, denies SOB, all other systems negative.  Had dm2 for 10 years and he takes Mf and glipizde at home      PAST MEDICAL & SURGICAL HISTORY:  Psoriasis  Neuropathy  Cataract  HTN (hypertension)  Dry eye  DM (diabetes mellitus)    Toe amputation status, left 2nd 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  S/P BKA (below knee amputation) unilateral, right    FAMILY HISTORY:  Family history of diabetes mellitus (Father, Mother, Sibling, Child)    SOCIAL HISTORY: no tobacco, no etoh, no drugs     REVIEW OF SYSTEMS:  Constitutional: No fever, no chills, no change in weight.  Eyes: No eye swelling,no  blurry vision, no redness, no loss of vision.  Neck: No neck pain, no change in voice.  Lungs: No shortness of breath, no wheezing, no cough  CV: No chest pain, no palpitations, no pain with walking.  GI: No nausea, no vomiting, no constipation, no diarrhea, no abdominal pain  : No urinary frequency, no blood in urine  Musculoskeletal: No muscle pain, no joint pain, no swelling.  Skin: No rash  Neurologic: No headaches, no weakness  Endocrine: No heat intolerance, no cold intolerance  Psych: No depression, no anxiety    MEDICATIONS  (STANDING):  aspirin enteric coated 325 milliGRAM(s) Oral daily  atorvastatin 40 milliGRAM(s) Oral at bedtime  cefuroxime  IVPB 1500 milliGRAM(s) IV Intermittent every 8 hours  chlorhexidine 2% Cloths 1 Application(s) Topical daily  dextrose 50% Injectable 50 milliLiter(s) IV Push every 15 minutes  enoxaparin Injectable 40 milliGRAM(s) SubCutaneous daily  furosemide    Tablet 40 milliGRAM(s) Oral daily  insulin lispro Injectable (ADMELOG) 2 Unit(s) SubCutaneous Before meals and at bedtime  insulin regular Infusion 2 Unit(s)/Hr (2 mL/Hr) IV Continuous <Continuous>  metoprolol tartrate 25 milliGRAM(s) Oral two times a day  pantoprazole    Tablet 40 milliGRAM(s) Oral daily  senna 2 Tablet(s) Oral at bedtime  sodium chloride 0.9%. 1000 milliLiter(s) (10 mL/Hr) IV Continuous <Continuous>  sodium chloride 0.9%. 1000 milliLiter(s) (10 mL/Hr) IV Continuous <Continuous>  vancomycin  IVPB 1000 milliGRAM(s) IV Intermittent every 12 hours    MEDICATIONS  (PRN):  oxyCODONE    IR 5 milliGRAM(s) Oral every 4 hours PRN Moderate Pain (4 - 6)  oxyCODONE    IR 10 milliGRAM(s) Oral every 4 hours PRN Severe Pain (7 - 10)  psyllium Powder 1 Packet(s) Oral daily PRN Constipation    Allergies  No Known Allergies    CAPILLARY BLOOD GLUCOSE  POCT Blood Glucose.: 137 mg/dL (2021 13:58)      PHYSICAL EXAM:  Vital Signs Last 24 Hrs  T(C): 37.1 (2021 12:00), Max: 37.1 (2021 12:00)  T(F): 98.8 (2021 12:00), Max: 98.8 (2021 12:00)  HR: 88 (2021 14:00) (73 - 94)  BP: --  BP(mean): --  RR: 20 (2021 14:00) (0 - 29)  SpO2: 100% (2021 14:00) (96% - 100%)  PHYSICAL EXAM:  General:  well nourished, no acute distress  Neurology:  alert and oriented X 4, nonfocal, no gross deficits  Respiratory:  clear to auscultation bilaterally  CV:  regular rate and rhythm, normal S1 S2  Abdomen:  soft, nontender, nondistended, positive bowel sounds  Extremities:  warm, well perfused, no edema +DP pulse on L, BKA on R  Incisions:  midline sternal incision, c/d/i, sternum stable    LABS:                      13.3   15.81 )-----------( 210      ( 2021 02:46 )             38.2     02-20    137  |  104  |  14.0  ----------------------------<  142<H>  4.7   |  21.0<L>  |  0.75    Ca    7.8<L>      2021 02:46  Phos  2.2     -  Mg     2.2         TPro  5.4<L>  /  Alb  2.9<L>  /  TBili  0.7  /  DBili  x   /  AST  18  /  ALT  6   /  AlkPhos  59      Urinalysis Basic - ( 2021 04:00 )    Color: Yellow / Appearance: Clear / S.005 / pH: x  Gluc: x / Ketone: Trace  / Bili: Negative / Urobili: Negative mg/dL   Blood: x / Protein: Negative mg/dL / Nitrite: Negative   Leuk Esterase: Moderate / RBC: 0-2 /HPF / WBC 3-5   Sq Epi: x / Non Sq Epi: Few / Bacteria: Occasional    LIVER FUNCTIONS - ( 2021 16:58 )  Alb: 2.9 g/dL / Pro: 5.4 g/dL / ALK PHOS: 59 U/L / ALT: 6 U/L / AST: 18 U/L / GGT: x           CAPILLARY BLOOD GLUCOSE  POCT Blood Glucose.: 137 mg/dL (2021 13:58)  POCT Blood Glucose.: 140 mg/dL (2021 13:06)  POCT Blood Glucose.: 152 mg/dL (2021 11:48)  POCT Blood Glucose.: 163 mg/dL (2021 10:55)  POCT Blood Glucose.: 142 mg/dL (2021 09:58)  POCT Blood Glucose.: 162 mg/dL (2021 08:42)  POCT Blood Glucose.: 107 mg/dL (2021 08:01)  POCT Blood Glucose.: 106 mg/dL (2021 06:01)  POCT Blood Glucose.: 111 mg/dL (2021 05:08)  POCT Blood Glucose.: 120 mg/dL (2021 03:47)  POCT Blood Glucose.: 137 mg/dL (2021 02:33)  POCT Blood Glucose.: 148 mg/dL (2021 01:20)  POCT Blood Glucose.: 200 mg/dL (2021 23:47)  POCT Blood Glucose.: 196 mg/dL (2021 22:40)  POCT Blood Glucose.: 188 mg/dL (2021 21:13)  POCT Blood Glucose.: 175 mg/dL (2021 20:39)  POCT Blood Glucose.: 159 mg/dL (2021 18:27)  POCT Blood Glucose.: 148 mg/dL (2021 17:45)       HPI:  71 y/o M with PMHX IDDM2 presenting with abdominal pain from home. He reports nausea and vomiting for 2 days. Patient is a poor historian. He endorses a history of Dm, on metformin and glipizide. He states he does see a PMD but doesn't recall the last time. He states that he has some mild epigastric abdominal pain and he could not eat for 2 days.   Discussed plan below.  ROS +eructation, +n/V, +diaphoresis. +abd pain, denies CP, denies SOB, all other systems negative.  Had dm2 for 30 years and he takes Mf and glipizde at home  .  He checks his Bgs at home once in a while.     PAST MEDICAL & SURGICAL HISTORY:  Psoriasis  Neuropathy  Cataract  HTN (hypertension)  Dry eye  DM (diabetes mellitus)    Toe amputation status, left 2nd 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  S/P BKA (below knee amputation) unilateral, right    FAMILY HISTORY:  Family history of diabetes mellitus (Father, Mother, Sibling, Child)    SOCIAL HISTORY: no tobacco, no etoh, no drugs .Exsmoker. Lives with daughter.     REVIEW OF SYSTEMS:  Constitutional: No fever, no chills, no change in weight.  Eyes: No eye swelling,no  blurry vision, no redness, no loss of vision.  Neck: No neck pain, no change in voice.  Lungs: No shortness of breath, no wheezing, no cough  CV: No chest pain, no palpitations, no pain with walking.  GI: No nausea, no vomiting, no constipation, no diarrhea, no abdominal pain  : No urinary frequency, no blood in urine  Musculoskeletal: No muscle pain, no joint pain, no swelling.  Skin: No rash  Neurologic: No headaches, no weakness  Endocrine: No heat intolerance, no cold intolerance  Psych: No depression, no anxiety    MEDICATIONS  (STANDING):  aspirin enteric coated 325 milliGRAM(s) Oral daily  atorvastatin 40 milliGRAM(s) Oral at bedtime  cefuroxime  IVPB 1500 milliGRAM(s) IV Intermittent every 8 hours  chlorhexidine 2% Cloths 1 Application(s) Topical daily  dextrose 50% Injectable 50 milliLiter(s) IV Push every 15 minutes  enoxaparin Injectable 40 milliGRAM(s) SubCutaneous daily  furosemide    Tablet 40 milliGRAM(s) Oral daily  insulin lispro Injectable (ADMELOG) 2 Unit(s) SubCutaneous Before meals and at bedtime  insulin regular Infusion 2 Unit(s)/Hr (2 mL/Hr) IV Continuous <Continuous>  metoprolol tartrate 25 milliGRAM(s) Oral two times a day  pantoprazole    Tablet 40 milliGRAM(s) Oral daily  senna 2 Tablet(s) Oral at bedtime  sodium chloride 0.9%. 1000 milliLiter(s) (10 mL/Hr) IV Continuous <Continuous>  sodium chloride 0.9%. 1000 milliLiter(s) (10 mL/Hr) IV Continuous <Continuous>  vancomycin  IVPB 1000 milliGRAM(s) IV Intermittent every 12 hours    MEDICATIONS  (PRN):  oxyCODONE    IR 5 milliGRAM(s) Oral every 4 hours PRN Moderate Pain (4 - 6)  oxyCODONE    IR 10 milliGRAM(s) Oral every 4 hours PRN Severe Pain (7 - 10)  psyllium Powder 1 Packet(s) Oral daily PRN Constipation    Allergies  No Known Allergies    CAPILLARY BLOOD GLUCOSE  POCT Blood Glucose.: 137 mg/dL (2021 13:58)      PHYSICAL EXAM:  Vital Signs Last 24 Hrs  T(C): 37.1 (2021 12:00), Max: 37.1 (2021 12:00)  T(F): 98.8 (2021 12:00), Max: 98.8 (2021 12:00)  HR: 88 (2021 14:00) (73 - 94)  BP: --  BP(mean): --  RR: 20 (2021 14:00) (0 - 29)  SpO2: 100% (2021 14:00) (96% - 100%)  PHYSICAL EXAM:  General:  well nourished, no acute distress  Neurology:  alert and oriented X 4, nonfocal, no gross deficits  Respiratory:  clear to auscultation bilaterally  CV:  regular rate and rhythm, normal S1 S2  Abdomen:  soft, nontender, nondistended, positive bowel sounds  Extremities:  warm, well perfused, no edema +DP pulse on L, BKA on R  Incisions:  midline sternal incision, c/d/i, sternum stable    LABS:                      13.3   15.81 )-----------( 210      ( 2021 02:46 )             38.2     02-20    137  |  104  |  14.0  ----------------------------<  142<H>  4.7   |  21.0<L>  |  0.75    Ca    7.8<L>      2021 02:46  Phos  2.2       Mg     2.2         TPro  5.4<L>  /  Alb  2.9<L>  /  TBili  0.7  /  DBili  x   /  AST  18  /  ALT  6   /  AlkPhos  59      Urinalysis Basic - ( 2021 04:00 )    Color: Yellow / Appearance: Clear / S.005 / pH: x  Gluc: x / Ketone: Trace  / Bili: Negative / Urobili: Negative mg/dL   Blood: x / Protein: Negative mg/dL / Nitrite: Negative   Leuk Esterase: Moderate / RBC: 0-2 /HPF / WBC 3-5   Sq Epi: x / Non Sq Epi: Few / Bacteria: Occasional    LIVER FUNCTIONS - ( 2021 16:58 )  Alb: 2.9 g/dL / Pro: 5.4 g/dL / ALK PHOS: 59 U/L / ALT: 6 U/L / AST: 18 U/L / GGT: x           CAPILLARY BLOOD GLUCOSE  POCT Blood Glucose.: 137 mg/dL (2021 13:58)  POCT Blood Glucose.: 140 mg/dL (2021 13:06)  POCT Blood Glucose.: 152 mg/dL (2021 11:48)  POCT Blood Glucose.: 163 mg/dL (2021 10:55)  POCT Blood Glucose.: 142 mg/dL (2021 09:58)  POCT Blood Glucose.: 162 mg/dL (2021 08:42)  POCT Blood Glucose.: 107 mg/dL (2021 08:01)  POCT Blood Glucose.: 106 mg/dL (2021 06:01)  POCT Blood Glucose.: 111 mg/dL (2021 05:08)  POCT Blood Glucose.: 120 mg/dL (2021 03:47)  POCT Blood Glucose.: 137 mg/dL (2021 02:33)  POCT Blood Glucose.: 148 mg/dL (2021 01:20)  POCT Blood Glucose.: 200 mg/dL (2021 23:47)  POCT Blood Glucose.: 196 mg/dL (2021 22:40)  POCT Blood Glucose.: 188 mg/dL (2021 21:13)  POCT Blood Glucose.: 175 mg/dL (2021 20:39)  POCT Blood Glucose.: 159 mg/dL (2021 18:27)  POCT Blood Glucose.: 148 mg/dL (2021 17:45)

## 2021-02-20 NOTE — PHYSICAL THERAPY INITIAL EVALUATION ADULT - PLANNED THERAPY INTERVENTIONS, PT EVAL
stair training/gait training/transfer training
balance training/bed mobility training/gait training/strengthening/transfer training

## 2021-02-20 NOTE — PHYSICAL THERAPY INITIAL EVALUATION ADULT - ADDITIONAL COMMENTS
Pt. reports he lives with his daughter who works but her fiance is home and able to assist in an apartment. [address] with rail and no stairs inside. Pt. reports he was independent with all functional skills with right LE prosthetic and SAC PTA. Pt. also owns a shower chair and RW.
Pt. reports he lives with his daughter who is at home at all times and able to assist in an apartment. [address] with rail and no stairs inside. Pt. reports he was independent with all functional skills with right LE prosthetic and SAC PTA. Pt. also owns a shower chair and RW.

## 2021-02-20 NOTE — PHYSICAL THERAPY INITIAL EVALUATION ADULT - IMPAIRMENTS FOUND, PT EVAL
gait, locomotion, and balance/gross motor
aerobic capacity/endurance/circulation/gait, locomotion, and balance/muscle strength/ventilation and respiration/gas exchange/visual motor

## 2021-02-20 NOTE — DIETITIAN INITIAL EVALUATION ADULT. - ORAL NUTRITION SUPPLEMENTS
Add Glucerna TID to maximize nutrition status and provide an additional 220 kcal, 10g protein per serving.

## 2021-02-20 NOTE — PHYSICAL THERAPY INITIAL EVALUATION ADULT - GAIT DEVIATIONS NOTED, PT EVAL
left LE supinated, increased left hip ER/decreased isaias/decreased step length/decreased stride length/decreased weight-shifting ability
decreased step length/decreased stride length

## 2021-02-20 NOTE — PHYSICAL THERAPY INITIAL EVALUATION ADULT - ASSISTIVE DEVICE FOR TRANSFER: GAIT, REHAB EVAL
+right LE prosthetic and RW
Review of Systems:  •	CONSTITUTIONAL - No fever, No diaphoresis, No weight change  •	SKIN - No rash  •	HEMATOLOGIC - No abnormal bleeding or bruising  •	EYES - No eye pain, No blurred vision  •	ENT - No change in hearing, No sore throat, No neck pain, No rhinorrhea, No ear pain  •	RESPIRATORY - No shortness of breath, No cough  •	CARDIAC -No chest pain, No palpitations  •	GI - No abdominal pain, No nausea, + vomiting, No diarrhea, No constipation, + bright red blood per rectum No melena. No flank pain  •                 - No dysuria, frequency, hematuria.   •	ENDO - No polydypsia, No polyuria, No heat/cold intolerance  •	MUSCULOSKELETAL - No joint paint, No swelling, No back pain  •	NEUROLOGIC - No numbness, No focal weakness, + headache, No dizziness  All other systems negative, unless specified in HPI
rolling walker

## 2021-02-20 NOTE — PHYSICAL THERAPY INITIAL EVALUATION ADULT - ACTIVE RANGE OF MOTION EXAMINATION, REHAB EVAL
with exception to  right BKA/no Active ROM deficits were identified
with exception to right BKA/no Active ROM deficits were identified

## 2021-02-20 NOTE — DIETITIAN INITIAL EVALUATION ADULT. - OTHER INFO
70 year old male h/o DM, HTN, psoriasis, s/p TBI, R BKA presents with N/V, abd pain and + NSTEMI s/p C2L. Attempted to interview, pt sleeping soundly. Currently on a consistent carbohydrate, DASH/TLC diet. Noted pt with N/V and poor po intake two days PTA. RD to follow up with subjective interview/diet education as feasible.

## 2021-02-20 NOTE — PROGRESS NOTE ADULT - ASSESSMENT
70M h/o DM, HTN, psoriasis, s/p TBI, R BKA presents with N/V, abd pain and + NSTEMI. LHC,  TVD.  On 2/19 pt underwent C2L with Dr. Ramírez, post op course uneventful.

## 2021-02-20 NOTE — DIETITIAN INITIAL EVALUATION ADULT. - PERTINENT MEDS FT
MEDICATIONS  (STANDING):  aspirin enteric coated 325 milliGRAM(s) Oral daily  atorvastatin 40 milliGRAM(s) Oral at bedtime  cefuroxime  IVPB 1500 milliGRAM(s) IV Intermittent every 8 hours  chlorhexidine 2% Cloths 1 Application(s) Topical daily  dextrose 50% Injectable 50 milliLiter(s) IV Push every 15 minutes  enoxaparin Injectable 40 milliGRAM(s) SubCutaneous daily  furosemide    Tablet 40 milliGRAM(s) Oral daily  insulin lispro Injectable (ADMELOG) 2 Unit(s) SubCutaneous Before meals and at bedtime  insulin regular Infusion 2 Unit(s)/Hr (2 mL/Hr) IV Continuous <Continuous>  metoprolol tartrate 25 milliGRAM(s) Oral two times a day  pantoprazole    Tablet 40 milliGRAM(s) Oral daily  senna 2 Tablet(s) Oral at bedtime  sodium chloride 0.9%. 1000 milliLiter(s) (10 mL/Hr) IV Continuous <Continuous>  sodium chloride 0.9%. 1000 milliLiter(s) (10 mL/Hr) IV Continuous <Continuous>  vancomycin  IVPB 1000 milliGRAM(s) IV Intermittent every 12 hours    MEDICATIONS  (PRN):  oxyCODONE    IR 5 milliGRAM(s) Oral every 4 hours PRN Moderate Pain (4 - 6)  oxyCODONE    IR 10 milliGRAM(s) Oral every 4 hours PRN Severe Pain (7 - 10)  psyllium Powder 1 Packet(s) Oral daily PRN Constipation

## 2021-02-20 NOTE — PHYSICAL THERAPY INITIAL EVALUATION ADULT - MANUAL MUSCLE TESTING RESULTS, REHAB EVAL
4/5 throughout with exception to right BKA/no strength deficits were identified
did not formally strength test 2/2 recent CABG; grossly 3+/5 throughout with exception to right ankle

## 2021-02-20 NOTE — DIETITIAN INITIAL EVALUATION ADULT. - PERTINENT LABORATORY DATA
02-20 Na137 mmol/L Glu 142 mg/dL<H> K+ 4.7 mmol/L Cr  0.75 mg/dL BUN 14.0 mg/dL Phos n/a   Alb n/a   PAB n/a

## 2021-02-20 NOTE — PHYSICAL THERAPY INITIAL EVALUATION ADULT - PRECAUTIONS/LIMITATIONS, REHAB EVAL
2LPM/cardiac precautions/fall precautions/oxygen therapy device and L/min/sternal precautions/surgical precautions/vision precautions
enhanced supervision/no known precautions/limitations

## 2021-02-20 NOTE — CONSULT NOTE ADULT - ASSESSMENT
70M h/o DM, HTN, psoriasis, s/p TBI, R BKA presents with N/V, abd pain and + NSTEMI. LHC,  TVD.  On 2/19 pt underwent C2L with Dr. Ramírez    Type 2 diabetes mellitus with diabetic peripheral angiopathy and gangrene, with long-term current use of insulin.   continue SSI   trasnition to basal -bolus per protocol.   start SSI moderate for Bgs control.   he takes MF and glipziide at homoe [facility] ADA/ Cardiac     Coronary artery disease /NSTEMI (non-ST elevated myocardial infarction)  s/p C2L with Dr. Ramírez   Continue ASA and statin     Hypertension.  Plan: Start Lopressor 25 mg BID once pressor support weaned off.     HLD : continue statin   
69 y/o M, poor historian present to ED co non-radiating, mild epigastric/abdominal pain x 2 days.     Of Note: He states he does see a PMD but doesn't recall the last time. Doesn't recall all meds he is taking and doses    Cardiology consult x Abnormal EKG
69 y/o M with PMHX IDDM2 HTN- presenting with abdominal pain from home x 2 days admitted for r/o ACS found to have NSTEMI also of note with findings consistent with esophagitis and history of hiccups with lymph node found on CT scan at the right pericardiophrenic space     -surgeon to review CT scan   -formal recommendations to follow   -will continue to follow for now   -continue medical treatment for hiccups     
70M h/o DM, HTN, psoriasis, s/p TBI, R BKA presents with N/V, abd pain and + NSTEMI. LHC today + TVD

## 2021-02-21 LAB
ANION GAP SERPL CALC-SCNC: 7 MMOL/L — SIGNIFICANT CHANGE UP (ref 5–17)
BUN SERPL-MCNC: 17 MG/DL — SIGNIFICANT CHANGE UP (ref 8–20)
CALCIUM SERPL-MCNC: 7.2 MG/DL — LOW (ref 8.6–10.2)
CHLORIDE SERPL-SCNC: 104 MMOL/L — SIGNIFICANT CHANGE UP (ref 98–107)
CO2 SERPL-SCNC: 22 MMOL/L — SIGNIFICANT CHANGE UP (ref 22–29)
CREAT SERPL-MCNC: 0.99 MG/DL — SIGNIFICANT CHANGE UP (ref 0.5–1.3)
GLUCOSE BLDC GLUCOMTR-MCNC: 109 MG/DL — HIGH (ref 70–99)
GLUCOSE BLDC GLUCOMTR-MCNC: 138 MG/DL — HIGH (ref 70–99)
GLUCOSE BLDC GLUCOMTR-MCNC: 145 MG/DL — HIGH (ref 70–99)
GLUCOSE BLDC GLUCOMTR-MCNC: 175 MG/DL — HIGH (ref 70–99)
GLUCOSE BLDC GLUCOMTR-MCNC: 81 MG/DL — SIGNIFICANT CHANGE UP (ref 70–99)
GLUCOSE BLDC GLUCOMTR-MCNC: 95 MG/DL — SIGNIFICANT CHANGE UP (ref 70–99)
GLUCOSE SERPL-MCNC: 83 MG/DL — SIGNIFICANT CHANGE UP (ref 70–99)
HCT VFR BLD CALC: 35 % — LOW (ref 39–50)
HGB BLD-MCNC: 11.7 G/DL — LOW (ref 13–17)
MAGNESIUM SERPL-MCNC: 2.2 MG/DL — SIGNIFICANT CHANGE UP (ref 1.6–2.6)
MCHC RBC-ENTMCNC: 28.8 PG — SIGNIFICANT CHANGE UP (ref 27–34)
MCHC RBC-ENTMCNC: 33.4 GM/DL — SIGNIFICANT CHANGE UP (ref 32–36)
MCV RBC AUTO: 86.2 FL — SIGNIFICANT CHANGE UP (ref 80–100)
PLATELET # BLD AUTO: 225 K/UL — SIGNIFICANT CHANGE UP (ref 150–400)
POTASSIUM SERPL-MCNC: 3.8 MMOL/L — SIGNIFICANT CHANGE UP (ref 3.5–5.3)
POTASSIUM SERPL-SCNC: 3.8 MMOL/L — SIGNIFICANT CHANGE UP (ref 3.5–5.3)
RBC # BLD: 4.06 M/UL — LOW (ref 4.2–5.8)
RBC # FLD: 13.3 % — SIGNIFICANT CHANGE UP (ref 10.3–14.5)
SODIUM SERPL-SCNC: 133 MMOL/L — LOW (ref 135–145)
WBC # BLD: 14.53 K/UL — HIGH (ref 3.8–10.5)
WBC # FLD AUTO: 14.53 K/UL — HIGH (ref 3.8–10.5)

## 2021-02-21 PROCEDURE — 71045 X-RAY EXAM CHEST 1 VIEW: CPT | Mod: 26

## 2021-02-21 PROCEDURE — 93010 ELECTROCARDIOGRAM REPORT: CPT

## 2021-02-21 RX ORDER — SODIUM CHLORIDE 9 MG/ML
3 INJECTION INTRAMUSCULAR; INTRAVENOUS; SUBCUTANEOUS EVERY 8 HOURS
Refills: 0 | Status: DISCONTINUED | OUTPATIENT
Start: 2021-02-21 | End: 2021-02-24

## 2021-02-21 RX ORDER — INSULIN LISPRO 100/ML
VIAL (ML) SUBCUTANEOUS
Refills: 0 | Status: DISCONTINUED | OUTPATIENT
Start: 2021-02-21 | End: 2021-02-24

## 2021-02-21 RX ORDER — POTASSIUM CHLORIDE 20 MEQ
20 PACKET (EA) ORAL DAILY
Refills: 0 | Status: DISCONTINUED | OUTPATIENT
Start: 2021-02-22 | End: 2021-02-24

## 2021-02-21 RX ORDER — INSULIN LISPRO 100/ML
2 VIAL (ML) SUBCUTANEOUS
Refills: 0 | Status: DISCONTINUED | OUTPATIENT
Start: 2021-02-21 | End: 2021-02-22

## 2021-02-21 RX ADMIN — Medication 2 UNIT(S): at 11:30

## 2021-02-21 RX ADMIN — Medication 250 MILLIGRAM(S): at 00:48

## 2021-02-21 RX ADMIN — SODIUM CHLORIDE 3 MILLILITER(S): 9 INJECTION INTRAMUSCULAR; INTRAVENOUS; SUBCUTANEOUS at 20:35

## 2021-02-21 RX ADMIN — Medication 250 MILLIGRAM(S): at 11:32

## 2021-02-21 RX ADMIN — CHLORHEXIDINE GLUCONATE 1 APPLICATION(S): 213 SOLUTION TOPICAL at 11:34

## 2021-02-21 RX ADMIN — Medication 2 UNIT(S): at 16:12

## 2021-02-21 RX ADMIN — Medication 2 UNIT(S): at 07:38

## 2021-02-21 RX ADMIN — Medication 100 MILLIGRAM(S): at 11:32

## 2021-02-21 RX ADMIN — Medication 100 MILLIGRAM(S): at 05:29

## 2021-02-21 RX ADMIN — OXYCODONE HYDROCHLORIDE 10 MILLIGRAM(S): 5 TABLET ORAL at 15:52

## 2021-02-21 RX ADMIN — Medication 325 MILLIGRAM(S): at 11:33

## 2021-02-21 RX ADMIN — Medication 50 MILLIGRAM(S): at 16:12

## 2021-02-21 RX ADMIN — OXYCODONE HYDROCHLORIDE 5 MILLIGRAM(S): 5 TABLET ORAL at 09:01

## 2021-02-21 RX ADMIN — ATORVASTATIN CALCIUM 40 MILLIGRAM(S): 80 TABLET, FILM COATED ORAL at 20:44

## 2021-02-21 RX ADMIN — PANTOPRAZOLE SODIUM 40 MILLIGRAM(S): 20 TABLET, DELAYED RELEASE ORAL at 11:32

## 2021-02-21 RX ADMIN — Medication 50 MILLIGRAM(S): at 05:28

## 2021-02-21 RX ADMIN — SENNA PLUS 2 TABLET(S): 8.6 TABLET ORAL at 20:43

## 2021-02-21 RX ADMIN — Medication 40 MILLIGRAM(S): at 05:29

## 2021-02-21 RX ADMIN — INSULIN GLARGINE 10 UNIT(S): 100 INJECTION, SOLUTION SUBCUTANEOUS at 22:12

## 2021-02-21 RX ADMIN — OXYCODONE HYDROCHLORIDE 5 MILLIGRAM(S): 5 TABLET ORAL at 20:43

## 2021-02-21 RX ADMIN — ENOXAPARIN SODIUM 40 MILLIGRAM(S): 100 INJECTION SUBCUTANEOUS at 11:32

## 2021-02-21 NOTE — CHART NOTE - NSCHARTNOTEFT_GEN_A_CORE
DAVID Morgan obtained sign out from ICU team on patient who was downgraded from ICU at this time     70M h/o DM w/A1C 8.5, HTN, psoriasis, s/p TBI, R BKA presents with N/V, abd pain and + NSTEMI. LHC,  TVD.  2/19 C2L with Dr. Ramírez,   thus far post op course has been uneventful     Patient with stable vitals   SR 80-90 with blood pressure ranging in 110s / 70s   on room air     radiology reviewed with large gastric bubble and pulmonary vascular congestion vs atelectasis noted     patient tolerating lopressor 50 BID, currently receiving lasix 40 PO QD for post op diuresis, 20 K added PO QD   will continue ASA and lipitor for kandice proph   Pain control PRN  continue PT anticipated D/C to home   glucerna added for nutritional replacement   continue senna and miralax, metamucil added   home dose lantus was 25, currently on 10 w/ 2 pre meal - to re address w. endocrinology     +PW +silks in place +silverdeen dressing with staples under

## 2021-02-22 LAB
ANION GAP SERPL CALC-SCNC: 15 MMOL/L — SIGNIFICANT CHANGE UP (ref 5–17)
BUN SERPL-MCNC: 17 MG/DL — SIGNIFICANT CHANGE UP (ref 8–20)
CALCIUM SERPL-MCNC: 7.8 MG/DL — LOW (ref 8.6–10.2)
CHLORIDE SERPL-SCNC: 95 MMOL/L — LOW (ref 98–107)
CO2 SERPL-SCNC: 22 MMOL/L — SIGNIFICANT CHANGE UP (ref 22–29)
CREAT SERPL-MCNC: 0.88 MG/DL — SIGNIFICANT CHANGE UP (ref 0.5–1.3)
GLUCOSE BLDC GLUCOMTR-MCNC: 154 MG/DL — HIGH (ref 70–99)
GLUCOSE BLDC GLUCOMTR-MCNC: 217 MG/DL — HIGH (ref 70–99)
GLUCOSE BLDC GLUCOMTR-MCNC: 229 MG/DL — HIGH (ref 70–99)
GLUCOSE BLDC GLUCOMTR-MCNC: 233 MG/DL — HIGH (ref 70–99)
GLUCOSE SERPL-MCNC: 185 MG/DL — HIGH (ref 70–99)
HCT VFR BLD CALC: 39.7 % — SIGNIFICANT CHANGE UP (ref 39–50)
HGB BLD-MCNC: 13.3 G/DL — SIGNIFICANT CHANGE UP (ref 13–17)
MAGNESIUM SERPL-MCNC: 1.9 MG/DL — SIGNIFICANT CHANGE UP (ref 1.6–2.6)
MCHC RBC-ENTMCNC: 29.1 PG — SIGNIFICANT CHANGE UP (ref 27–34)
MCHC RBC-ENTMCNC: 33.5 GM/DL — SIGNIFICANT CHANGE UP (ref 32–36)
MCV RBC AUTO: 86.9 FL — SIGNIFICANT CHANGE UP (ref 80–100)
PLATELET # BLD AUTO: 275 K/UL — SIGNIFICANT CHANGE UP (ref 150–400)
POTASSIUM SERPL-MCNC: 4 MMOL/L — SIGNIFICANT CHANGE UP (ref 3.5–5.3)
POTASSIUM SERPL-SCNC: 4 MMOL/L — SIGNIFICANT CHANGE UP (ref 3.5–5.3)
RBC # BLD: 4.57 M/UL — SIGNIFICANT CHANGE UP (ref 4.2–5.8)
RBC # FLD: 13.3 % — SIGNIFICANT CHANGE UP (ref 10.3–14.5)
SODIUM SERPL-SCNC: 132 MMOL/L — LOW (ref 135–145)
WBC # BLD: 16.03 K/UL — HIGH (ref 3.8–10.5)
WBC # FLD AUTO: 16.03 K/UL — HIGH (ref 3.8–10.5)

## 2021-02-22 PROCEDURE — 99024 POSTOP FOLLOW-UP VISIT: CPT

## 2021-02-22 PROCEDURE — 71045 X-RAY EXAM CHEST 1 VIEW: CPT | Mod: 26

## 2021-02-22 PROCEDURE — 93010 ELECTROCARDIOGRAM REPORT: CPT

## 2021-02-22 PROCEDURE — 99231 SBSQ HOSP IP/OBS SF/LOW 25: CPT

## 2021-02-22 RX ORDER — INSULIN GLARGINE 100 [IU]/ML
15 INJECTION, SOLUTION SUBCUTANEOUS AT BEDTIME
Refills: 0 | Status: DISCONTINUED | OUTPATIENT
Start: 2021-02-22 | End: 2021-02-24

## 2021-02-22 RX ORDER — INSULIN LISPRO 100/ML
4 VIAL (ML) SUBCUTANEOUS
Refills: 0 | Status: DISCONTINUED | OUTPATIENT
Start: 2021-02-22 | End: 2021-02-23

## 2021-02-22 RX ORDER — POTASSIUM CHLORIDE 20 MEQ
20 PACKET (EA) ORAL ONCE
Refills: 0 | Status: COMPLETED | OUTPATIENT
Start: 2021-02-22 | End: 2021-02-22

## 2021-02-22 RX ORDER — MAGNESIUM SULFATE 500 MG/ML
2 VIAL (ML) INJECTION ONCE
Refills: 0 | Status: COMPLETED | OUTPATIENT
Start: 2021-02-22 | End: 2021-02-22

## 2021-02-22 RX ADMIN — PANTOPRAZOLE SODIUM 40 MILLIGRAM(S): 20 TABLET, DELAYED RELEASE ORAL at 12:11

## 2021-02-22 RX ADMIN — Medication 50 GRAM(S): at 12:09

## 2021-02-22 RX ADMIN — Medication 2 UNIT(S): at 08:21

## 2021-02-22 RX ADMIN — INSULIN GLARGINE 15 UNIT(S): 100 INJECTION, SOLUTION SUBCUTANEOUS at 23:12

## 2021-02-22 RX ADMIN — Medication 50 MILLIGRAM(S): at 17:40

## 2021-02-22 RX ADMIN — Medication 40 MILLIGRAM(S): at 04:08

## 2021-02-22 RX ADMIN — Medication 2 UNIT(S): at 17:41

## 2021-02-22 RX ADMIN — Medication 325 MILLIGRAM(S): at 12:11

## 2021-02-22 RX ADMIN — Medication 2 UNIT(S): at 12:10

## 2021-02-22 RX ADMIN — ATORVASTATIN CALCIUM 40 MILLIGRAM(S): 80 TABLET, FILM COATED ORAL at 22:14

## 2021-02-22 RX ADMIN — OXYCODONE HYDROCHLORIDE 5 MILLIGRAM(S): 5 TABLET ORAL at 22:14

## 2021-02-22 RX ADMIN — Medication 50 MILLIGRAM(S): at 04:08

## 2021-02-22 RX ADMIN — Medication 2: at 08:21

## 2021-02-22 RX ADMIN — Medication 20 MILLIEQUIVALENT(S): at 12:11

## 2021-02-22 RX ADMIN — SODIUM CHLORIDE 3 MILLILITER(S): 9 INJECTION INTRAMUSCULAR; INTRAVENOUS; SUBCUTANEOUS at 23:13

## 2021-02-22 RX ADMIN — ENOXAPARIN SODIUM 40 MILLIGRAM(S): 100 INJECTION SUBCUTANEOUS at 22:14

## 2021-02-22 RX ADMIN — SODIUM CHLORIDE 3 MILLILITER(S): 9 INJECTION INTRAMUSCULAR; INTRAVENOUS; SUBCUTANEOUS at 14:56

## 2021-02-22 RX ADMIN — Medication 4: at 17:41

## 2021-02-22 RX ADMIN — SODIUM CHLORIDE 3 MILLILITER(S): 9 INJECTION INTRAMUSCULAR; INTRAVENOUS; SUBCUTANEOUS at 04:04

## 2021-02-22 RX ADMIN — Medication 4: at 12:10

## 2021-02-22 NOTE — PROGRESS NOTE ADULT - ASSESSMENT
71 y/o M with PMHX IDDM2 presenting with abdominal pain from home. He reports nausea and vomiting for 2 days. Patient is a poor historian. He endorses a history of Dm, on metformin and glipizide. He reports hiccuping frequently. Pt NSTEMI and s/p LHC 2/18 and found to have triple vessel disease. Pt now 3 days s/p CABG. Pt denies any CP or SOB. OOB to chair and tolerating PO intake     1. Triple Vessel CAD s/p CABG  -no angina or anginal equivalents  -continue full dose ASA/lovenox   -continue metoprolol   -continue lipitor   -continue lasix PO  -further management per CTS  -monitor electrolytes     2. NSTEMI  Plan as above          69 y/o M with PMHX IDDM2 presenting with abdominal pain from home. He reports nausea and vomiting for 2 days. Patient is a poor historian. He endorses a history of Dm, on metformin and glipizide. He reports hiccuping frequently. Pt NSTEMI and s/p LHC 2/18 and found to have triple vessel disease. Pt now 3 days s/p CABG. Pt denies any CP or SOB. OOB to chair and tolerating PO intake      Triple Vessel CAD s/p CABG  - 2/18 s/p showing severe multivessel CAD  - 2/19 s/p CABG C2L   -no angina or anginal equivalents  -continue full dose ASA/lovenox, lipitor, lasix PO, metoprolol   -further management per CTS  - strict I/O   - daily weights   - Diet/lifestyle modifications and medication compliance heavily reinforced     Dispo   - no further inpatient cardiac workup or recommendations  - will sign off  - follow up outpatient within 2-4 weeks post discharge

## 2021-02-22 NOTE — CHART NOTE - NSCHARTNOTEFT_GEN_A_CORE
Brief summary     70 M s/p C2 [place] on 2/19 with Dr. Ramírez       Patient seen and examined. Notes, flowsheets, medications, radiologic images and labs reviewed.  VSS. Patient denies acute pain with radiating or aggravating factors.  He denies chest pain, shortness of breath, palpitations, headache, dizziness, nausea, or vomiting.    General:  well nourished, no acute distress  Neurology:  alert and oriented X 4, nonfocal, no gross deficits  Respiratory: clear to auscultation bilaterally  CV:  regular rate and rhythm, normal S1 S2  Abdomen:  soft, nontender, nondistended, positive bowel sounds  Extremities:  warm, well perfused, no edema +DP pulse on left, R BKA  Incisions: midline sternal incision, c/d/i, sternum stable    PLAN  CAD  cont. Lopressor 50 BID, currently receiving lasix 40 PO QD for post op diuresis, 20 K added PO QD   will continue ASA and lipitor for kandice proph   Pain control PRN  continue PT anticipated D/C to home   glucerna added for nutritional replacement   continue senna and miralax, metamucil added   home dose lantus was 25, currently increased from 10 wo 15 and 2-4 pre meal - to re address w. endocrine    NSTEMI (non-ST elevated myocardial infarction).  As above.     Essential hypertension  continue to uptitrate lopressor as tolerated.     T2DM.   Insulin gtt transitioned to Lantus, Premeal and EDSON  Endocrine consult appreciated.     Prophylactic measure.    Lovenox for DVT prophylaxis  Pantoprazole for GI prophylaxis.    PLAN OF CARE d/w CTS team in AM rounds

## 2021-02-22 NOTE — PROGRESS NOTE ADULT - ATTENDING COMMENTS
70M s/p C2L (LIMA-->LAD, SVG-->RCA)    - recovering post-operatively; appears to have uncomplicated post-op course so far  - maintain euvolemia  - duration of full dose ASA per CTS  - continue statin and lopressor  - remainder of post-operative management per CTS    follow up with Cardiology post-discharge in 1-2 weeks  please call with questions or reconsult if needed

## 2021-02-22 NOTE — PROGRESS NOTE ADULT - ASSESSMENT
70M h/o DM w/A1C 8.5, HTN, psoriasis, s/p TBI, R BKA presents with N/V, abd pain and + NSTEMI. LHC,  TVD.  2/19 C2L with Dr. Ramírez,   thus far post op course has been uneventful     patient tolerating lopressor 50 BID, currently receiving lasix 40 PO QD for post op diuresis, 20 K added PO QD   will continue ASA and lipitor for kandice proph   Pain control PRN  continue PT anticipated D/C to home   glucerna added for nutritional replacement   continue senna and miralax, metamucil added   home dose lantus was 25, currently on 10 w/ 2 pre meal - to re address w. endocrinology     +PW +silks in place +silverdeen dressing with staples under.

## 2021-02-23 ENCOUNTER — NON-APPOINTMENT (OUTPATIENT)
Age: 71
End: 2021-02-23

## 2021-02-23 LAB
ANION GAP SERPL CALC-SCNC: 8 MMOL/L — SIGNIFICANT CHANGE UP (ref 5–17)
BUN SERPL-MCNC: 13 MG/DL — SIGNIFICANT CHANGE UP (ref 8–20)
CALCIUM SERPL-MCNC: 7.7 MG/DL — LOW (ref 8.6–10.2)
CHLORIDE SERPL-SCNC: 100 MMOL/L — SIGNIFICANT CHANGE UP (ref 98–107)
CO2 SERPL-SCNC: 28 MMOL/L — SIGNIFICANT CHANGE UP (ref 22–29)
CREAT SERPL-MCNC: 0.77 MG/DL — SIGNIFICANT CHANGE UP (ref 0.5–1.3)
GLUCOSE BLDC GLUCOMTR-MCNC: 167 MG/DL — HIGH (ref 70–99)
GLUCOSE BLDC GLUCOMTR-MCNC: 186 MG/DL — HIGH (ref 70–99)
GLUCOSE BLDC GLUCOMTR-MCNC: 229 MG/DL — HIGH (ref 70–99)
GLUCOSE BLDC GLUCOMTR-MCNC: 289 MG/DL — HIGH (ref 70–99)
GLUCOSE SERPL-MCNC: 160 MG/DL — HIGH (ref 70–99)
HCT VFR BLD CALC: 33.9 % — LOW (ref 39–50)
HGB BLD-MCNC: 11.3 G/DL — LOW (ref 13–17)
MAGNESIUM SERPL-MCNC: 2.2 MG/DL — SIGNIFICANT CHANGE UP (ref 1.6–2.6)
MCHC RBC-ENTMCNC: 28.6 PG — SIGNIFICANT CHANGE UP (ref 27–34)
MCHC RBC-ENTMCNC: 33.3 GM/DL — SIGNIFICANT CHANGE UP (ref 32–36)
MCV RBC AUTO: 85.8 FL — SIGNIFICANT CHANGE UP (ref 80–100)
PLATELET # BLD AUTO: 257 K/UL — SIGNIFICANT CHANGE UP (ref 150–400)
POTASSIUM SERPL-MCNC: 4 MMOL/L — SIGNIFICANT CHANGE UP (ref 3.5–5.3)
POTASSIUM SERPL-SCNC: 4 MMOL/L — SIGNIFICANT CHANGE UP (ref 3.5–5.3)
RBC # BLD: 3.95 M/UL — LOW (ref 4.2–5.8)
RBC # FLD: 13.2 % — SIGNIFICANT CHANGE UP (ref 10.3–14.5)
SODIUM SERPL-SCNC: 136 MMOL/L — SIGNIFICANT CHANGE UP (ref 135–145)
WBC # BLD: 11.73 K/UL — HIGH (ref 3.8–10.5)
WBC # FLD AUTO: 11.73 K/UL — HIGH (ref 3.8–10.5)

## 2021-02-23 PROCEDURE — 71045 X-RAY EXAM CHEST 1 VIEW: CPT | Mod: 26

## 2021-02-23 PROCEDURE — 93010 ELECTROCARDIOGRAM REPORT: CPT

## 2021-02-23 PROCEDURE — 99233 SBSQ HOSP IP/OBS HIGH 50: CPT

## 2021-02-23 RX ORDER — INSULIN LISPRO 100/ML
6 VIAL (ML) SUBCUTANEOUS
Refills: 0 | Status: DISCONTINUED | OUTPATIENT
Start: 2021-02-23 | End: 2021-02-24

## 2021-02-23 RX ORDER — FUROSEMIDE 40 MG
20 TABLET ORAL ONCE
Refills: 0 | Status: COMPLETED | OUTPATIENT
Start: 2021-02-23 | End: 2021-02-23

## 2021-02-23 RX ADMIN — Medication 20 MILLIEQUIVALENT(S): at 12:33

## 2021-02-23 RX ADMIN — Medication 4 UNIT(S): at 17:21

## 2021-02-23 RX ADMIN — SODIUM CHLORIDE 3 MILLILITER(S): 9 INJECTION INTRAMUSCULAR; INTRAVENOUS; SUBCUTANEOUS at 06:10

## 2021-02-23 RX ADMIN — Medication 20 MILLIGRAM(S): at 17:20

## 2021-02-23 RX ADMIN — Medication 50 MILLIGRAM(S): at 06:20

## 2021-02-23 RX ADMIN — SODIUM CHLORIDE 3 MILLILITER(S): 9 INJECTION INTRAMUSCULAR; INTRAVENOUS; SUBCUTANEOUS at 20:03

## 2021-02-23 RX ADMIN — Medication 6: at 12:32

## 2021-02-23 RX ADMIN — Medication 40 MILLIGRAM(S): at 06:20

## 2021-02-23 RX ADMIN — ATORVASTATIN CALCIUM 40 MILLIGRAM(S): 80 TABLET, FILM COATED ORAL at 22:06

## 2021-02-23 RX ADMIN — INSULIN GLARGINE 15 UNIT(S): 100 INJECTION, SOLUTION SUBCUTANEOUS at 22:06

## 2021-02-23 RX ADMIN — PANTOPRAZOLE SODIUM 40 MILLIGRAM(S): 20 TABLET, DELAYED RELEASE ORAL at 12:32

## 2021-02-23 RX ADMIN — Medication 4 UNIT(S): at 09:03

## 2021-02-23 RX ADMIN — Medication 325 MILLIGRAM(S): at 12:32

## 2021-02-23 RX ADMIN — Medication 50 MILLIGRAM(S): at 17:20

## 2021-02-23 RX ADMIN — SODIUM CHLORIDE 3 MILLILITER(S): 9 INJECTION INTRAMUSCULAR; INTRAVENOUS; SUBCUTANEOUS at 16:45

## 2021-02-23 RX ADMIN — Medication 4 UNIT(S): at 12:32

## 2021-02-23 RX ADMIN — Medication 4: at 17:20

## 2021-02-23 RX ADMIN — ENOXAPARIN SODIUM 40 MILLIGRAM(S): 100 INJECTION SUBCUTANEOUS at 22:06

## 2021-02-23 RX ADMIN — Medication 2: at 09:03

## 2021-02-23 NOTE — PROGRESS NOTE ADULT - PROBLEM SELECTOR PLAN 3
cont lopressor
See above management plan recommendations.
Start Lopressor 25 mg BID once pressor support weaned off
Lopressor increased to 50 mg BID
as above   continue to uptitrate lopressor as tolerated

## 2021-02-23 NOTE — PROGRESS NOTE ADULT - SUBJECTIVE AND OBJECTIVE BOX
Department of Cardiology                                                                  [facility]/[place] [address] [address] [facility] [address]                                                            Telephone: [phone]. Fax:[phone]                                                                                 Cardiac Cath NP Note      Narrative:    This is a 70 M with a significant PMH of PAD s/p R BKA and L SFA stenting, IDDM, HTN and HLD who presents today for [facility] with Dr. Tomlinson.  He originally presented on 2/14 for epigastric pain, DKA/HHK and NSTEMI.  Follow up NST with moderate to severe defects to the apical/distal septal wall.  Troponins peaked to 0.11 and patient without active chest pain.  TTE with LVEF 60-65%, basal and mid to inferior septal wall mild hypokinesis.    	  MEDICATIONS:  metoprolol tartrate 25 milliGRAM(s) Oral two times a day        chlorproMAZINE    Tablet 25 milliGRAM(s) Oral two times a day  ondansetron Injectable 4 milliGRAM(s) IV Push every 6 hours PRN    aluminum hydroxide/magnesium hydroxide/simethicone Suspension 30 milliLiter(s) Oral every 4 hours PRN  pantoprazole    Tablet 40 milliGRAM(s) Oral two times a day    atorvastatin 20 milliGRAM(s) Oral at bedtime  dextrose 40% Gel 15 Gram(s) Oral once  dextrose 50% Injectable 25 Gram(s) IV Push once  dextrose 50% Injectable 12.5 Gram(s) IV Push once  dextrose 50% Injectable 25 Gram(s) IV Push once  glucagon  Injectable 1 milliGRAM(s) IntraMuscular once  insulin glargine Injectable (LANTUS) 20 Unit(s) SubCutaneous at bedtime  insulin lispro (ADMELOG) corrective regimen sliding scale   SubCutaneous every 6 hours    aspirin  chewable 81 milliGRAM(s) Oral daily  dextrose 5%. 1000 milliLiter(s) IV Continuous <Continuous>  dextrose 5%. 1000 milliLiter(s) IV Continuous <Continuous>  heparin   Injectable 5000 Unit(s) SubCutaneous every 8 hours  sodium chloride 0.9%. 1000 milliLiter(s) IV Continuous <Continuous>      ASA: 3  Mallampati: 2  Bleeding Risk: 2.6%  Creatinine: 0.80  GFR: 106    PHYSICAL EXAM:    T(C): 36.7 (02-18-21 @ 10:31), Max: 37.2 (02-18-21 @ 05:04)  HR: 78 (02-18-21 @ 10:31) (78 - 96)  BP: 134/54 (02-18-21 @ 10:31) (134/54 - 178/89)  RR: 19 (02-18-21 @ 10:31) (18 - 20)  SpO2: 100% (02-18-21 @ 10:31) (96% - 100%)  Wt(kg): --    I&O's Summary      Daily     Daily     Constitutional: A & O x 3  HEENT:   Normal oral mucosa, PERRL, EOMI	  Cardiovascular: Normal S1 S2, No JVD, No murmurs, No edema  Respiratory: Lungs clear to auscultation	  Gastrointestinal:  Soft, Non-tender, + BS	  Skin: No rashes, No ecchymoses, No cyanosis  Neurologic: Non-focal  Extremities: Normal range of motion, No clubbing, cyanosis or edema  Vascular: Peripheral pulses palpable 2+ bilaterally    TELEMETRY: 	    ECG: < from: 12 Lead ECG (02.17.21 @ 17:11) >    Ventricular Rate 106 BPM    Atrial Rate 106 BPM    P-R Interval 138 ms    QRS Duration 70 ms    Q-T Interval 356 ms    QTC Calculation(Bazett) 472 ms    P Axis -6 degrees    R Axis 90 degrees    T Axis -12 degrees    Diagnosis Line Sinus tachycardia  Rightward axis  Septal infarct , age undetermined  Abnormal ECG    RADIOLOGY:   DIAGNOSTIC TESTING:  [ ] Echocardiogram:  < from: TTE Echo Complete w/ Contrast w/ Doppler (02.15.21 @ 08:53) >  Left Ventricle: The left ventricular internal cavity size is normal. Left ventricular wall thickness is mildly increased.  Global LV systolic function was normal. Left ventricular ejection fraction, by visual estimation, is 60 to 65%. Left ventricle chordal calcification. The basal and mid inferior wall and the basal and mid inferoseptal wall appear mildly hypokinetic.  Right Ventricle: The right ventricle is not well visualized, appears normal in size with normal systolic function.  Left Atrium: Normal left atrial size.  Right Atrium: Normal right atrial size.  Pericardium: Trivial pericardial effusion is present. The pericardial effusion is posterior to the left ventricle.  Mitral Valve: Mild thickening of the anterior and posterior mitral valve leaflets. There is mild mitral annular calcification.  Tricuspid Valve: The tricuspid valve is normal in structure. Trivial tricuspid regurgitation is visualized.  Aortic Valve: The aortic valve leaflets are not well visualized, appears to be a small, round echodensity on the aortic valve which may be consistent with calcification, recommend clinical correlation. No aortic valve stenosis.  Pulmonic Valve: The pulmonic valve was not well visualized.  Aorta: The aortic root and ascending aorta are structurally normal, with no evidence of dilitation. There is mild aortic root calcification.  Venous: The inferior vena cava was normal sized, with respiratory size variation greater than 50%.      Summary:   1. Normal global left ventricular systolic function.   2. Left ventricular ejection fraction, by visual estimation, is 60 to 65%.   3. Mildly increased LV wall thickness.   4. Left ventricle chordal calcification. The basal and mid inferior wall and the basal and mid inferoseptal wall appear mildly hypokinetic.   5. The right ventricle is not well visualized, appears normal in size with normal systolic function.   6. Normal left atrial size.   7. Normal right atrial size.   8. Mild thickening of the anterior and posterior mitral valve leaflets.   9. Mild mitral annular calcification.  10. The aortic valve leaflets are not well visualized, appears to be a small, round echodensity on the aortic valve which may be consistent with calcification, recommend clinical correlation. No aortic valve stenosis.  11. There is mild aortic root calcification.  12. Trivial pericardial effusion.  13. Recommend clinical correlation with the above findings.    [ ]  Catheterization:  [ ] Stress Test:    < from: Nuclear Stress Test-Pharmacologic (02.17.21 @ 09:05) >  GATED ANALYSIS:  Post-stress resting myocardial perfusion gated SPECT  imaging was performed (LVEF = 60 %;LVEDV = 60 ml.)  Dyskinesis of the septal wall noted.  ------------------------------------------------------------------------    IMPRESSIONS:  * Myocardial Perfusion SPECT results are abnormal.  * Review of raw data shows: Minor motion and  subdiaphramatic artifacts.  * There are medium sized, moderateto severe defects in  apical, distal septal walls that are reversible,  suggestive of ischemia. Patient not able to prone for  image attenuation correction.  * Post-stress resting myocardial perfusion gated SPECT  imaging was performed (LVEF = 60 %;LVEDV = 60 ml.)  Dyskinesis of the septal wall noted.    No prior study is available for comparison.      OTHER: 	    LABS:	 	    CARDIAC MARKERS:                                  10.1   9.46  )-----------( 226      ( 17 Feb 2021 09:01 )             31.4     02-17    137  |  101  |  11.0  ----------------------------<  113<H>  3.4<L>   |  26.0  |  0.80    Ca    8.1<L>      17 Feb 2021 09:01  Mg     2.2     02-17    TPro  6.4<L>  /  Alb  3.1<L>  /  TBili  0.3<L>  /  DBili  x   /  AST  16  /  ALT  7   /  AlkPhos  88  02-17    proBNP: Serum Pro-Brain Natriuretic Peptide (02.15.21 @ 04:04)    Serum Pro-Brain Natriuretic Peptide: 176: NT-proBNP Interpretive comments:  Acute Congestive Heart Failure is unlikely if NT-proBNP is less than 300  pg/mL, for any age.  Consider Acute Congestive Heart Failure if:  AGE               NT-proBNP Result  ___               ________________  < 50year           > 450 pg/mL  50 - 75 years     > 900 pg/mL  > 75 years         > 1800 pg/ml  All results require clinical correlation. Consider obtaining a baseline or  "dry" NT-proBNP level when the patient is stabilized, so that subsequent  levels can be related to that. Patients with recurrent CHF may have  elevated  NT-proBNP levels. Acute failure episodes generally produce levels at  least 25  % greater than base line levels. The above values are derived from a large  multi-center international study, "TRISTAN Caldwell, et al, European Heart  Journal,  2006; 27:330-337. pg/mL      Lipid Profile: Lipid Profile (02.15.21 @ 04:04)    Cholesterol, Serum: 144 mg/dL    Triglycerides, Serum: 139 mg/dL    HDL Cholesterol, Serum: 28 mg/dL    Non HDL Cholesterol: 116: Patient’s Atherosclerotic Cardiovascular Disease (ASCVD) Risk  Optimal Level (mg/dL)  LDL Cholesterol (LDL-C)  All Patients                                < 100  ASCVD at Very High Risk1    < 70  Non-HDL Cholesterol (Non-HDL-C)  All Patients                        < 130  ASCVD at Very High Risk1   < 100  Non-HDL-Cholesterol (Non-HDL-C) is also a key target for cardiovascular  risk reduction.  Consider Familial Hypercholesterolemia when: LDL-C > 190 mg/dL or  Non-HDL-C > 220 mg/dL.  LDL-C calculation using the Friedewald equation is not provided when  triglycerides > 400 mg/dL, in which case we recommend repeating the test  after fasting, if it was not done before.  When triglycerides >150 mg/dL, calculated LDL-C is provided but may still  be inaccurate (particularly when LDL-C < 70 mg/dL). It can be  recalculated off-line using other equations (e.g. Zev SS, Daniel HART,  Phillip MCKEON, et al.JILLIAN 2013;310:2061 - 8).  1 Familia Howe.,et al. "2019 AHA/ACC. . . guideline on the  management of blood cholesterol: a report of the American College of  Cardiology/American Heart Association Task Force on Clinical Practice  Guidelines." Circulation;139:e1082 - e1143.  These values apply only to persons 20 years and older.  Lipid Panel updated with new test, reference ranges and interpretive  comments effective 10-. mg/dL    LDL Cholesterol Calculated: 88 mg/dL      HgA1c: A1C with Estimated Average Glucose (02.15.21 @ 04:04)    A1C with Estimated Average Glucose Result: 8.2 %    Estimated Average Glucose: 189 mg/dL      TSH: Thyroid Stimulating Hormone, Serum (02.15.21 @ 04:04)    Thyroid Stimulating Hormone, Serum: 1.15 uIU/mL            
                                                                         Department of Cardiology                                                                  [facility]/[place] [address] [address] [facility]                                                            Telephone: [phone]. Fax:[phone]                                                    Post- Procedure Note: Left Heart Cardiac Catheterization       Narrative:  70y  Male is now s/p left heart catheterization via right groin approach with Dr. Tomlinson.    Official report to follow.    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    S/P BKA (below knee amputation) unilateral, right      FAMILY HISTORY:  Family history of diabetes mellitus (Father, Mother, Sibling, Child)      Home Medications:  glipiZIDE 10 mg oral tablet, extended release: 1 tab(s) orally once a day (15 Feb 2021 01:08)  Lantus 100 units/mL subcutaneous solution: 25 unit(s) subcutaneous once a day (15 Feb 2021 01:00)  metFORMIN 1000 mg oral tablet: 1 tab(s) orally 2 times a day (15 Feb 2021 01:08)    Patient is a 70y old  Male who presents with a chief complaint of NSTEMI (18 Feb 2021 16:24)    HEALTH ISSUES - PROBLEM Dx:  Abnormal CT scan, esophagus  Abnormal CT scan, esophagus    Nausea and vomiting, intractability of vomiting not specified, unspecified vomiting type  Nausea and vomiting, intractability of vomiting not specified, unspecified vomiting type    Epigastric pain  Epigastric pain    HTN (hypertension)  HTN (hypertension)          HPI:  69 y/o M with PMHX IDDM2 presenting with abdominal pain from home. He reports nausea and vomiting for 2 days. Patient is a poor historian. He endorses a history of Dm, on metformin and glipizide. He reports hiccuping frequently. He states he does see a PMD but doesn't recall the last time. He states that he has some mild epigastric abdominal pain, and that he has not eaten in 2 days due to the nausea and vomiting. Labs reviewed multiple abnormalities noted. CT and EKG reviewed. Improving s/p IVFB 2L. Still burping. Discussed plan below.  ROS +eructation, +n/V, +diaphoresis. +abd pain, denies CP, denies SOB, all other systems negative.  (14 Feb 2021 23:45)    General: No fatigue, no fevers/chills  Respiratory: No dyspnea, no cough, no wheeze  CV: No chest pain, no palpitations  Abd: No nausea  Neuro: No headache, no dizziness  No Known Allergies      Objective:  Vital Signs Last 24 Hrs  T(C): 36.4 (18 Feb 2021 16:32), Max: 37.2 (18 Feb 2021 05:04)  T(F): 97.6 (18 Feb 2021 16:32), Max: 98.9 (18 Feb 2021 05:04)  HR: 78 (18 Feb 2021 16:32) (78 - 96)  BP: 148/70 (18 Feb 2021 16:32) (134/54 - 178/89)  BP(mean): --  RR: 18 (18 Feb 2021 16:32) (18 - 20)  SpO2: 100% (18 Feb 2021 16:32) (96% - 100%)    CM: SR  Neuro: A&OX3, CN 2-12 intact  HEENT: NC, AT  Lungs: CTA B/L  CV: S1, S2, no murmur, RRR  Abd: Soft  Right Groin: Soft, no bleeding, no hematoma  Right wrist: Soft, no bleeding, no hematoma  Extremity: + distal pulses                          10.1   9.46  )-----------( 226      ( 17 Feb 2021 09:01 )             31.4     02-17    137  |  101  |  11.0  ----------------------------<  113<H>  3.4<L>   |  26.0  |  0.80    Ca    8.1<L>      17 Feb 2021 09:01  Mg     2.2     02-17    TPro  6.4<L>  /  Alb  3.1<L>  /  TBili  0.3<L>  /  DBili  x   /  AST  16  /  ALT  7   /  AlkPhos  88  02-17        
                                                               [place] [facility]                                                               Office: [address] [place] [address]                                                              Telephone: [phone]. Fax:[phone]                                                                             PROGRESS NOTE  Reason for follow up: NSTEMI  Update: 2/17- patient resting comfortably c/o mild epigastric discomfort and being hungry. Denies CP, SOB. evaluated s/p NST today which revealed "moderate to severe defects in apical, distal septal walls that are reversible, suggestive of ischemia. Patient not able to prone for image attenuation correction." Discussed w/pt plan for [facility] tomorrow 2/18.        Review of symptoms:   All review of systems negative unless indicated otherwise above.     	  Vitals:  T(C): 36.8 (02-17-21 @ 08:00), Max: 37.3 (02-16-21 @ 21:14)  HR: 96 (02-17-21 @ 08:00) (50 - 96)  BP: 134/56 (02-17-21 @ 08:00) (134/56 - 158/94)  RR: 19 (02-17-21 @ 08:00) (19 - 20)  SpO2: 96% (02-17-21 @ 08:00) (95% - 99%)  Wt(kg): --  I&O's Summary    Weight (kg): 77.1 (02-14 @ 14:26)      PHYSICAL EXAM:  Appearance: NAD, well nourished	  Neurologic: A&Ox3, PERRL, EOMI, Grossly non-focal with full strength in all four extremities  HEENT:   Normal oral mucosa, sclera non-icteric	  Lymphatic: No cervical lymphadenopathy  Cardiovascular: Normal S1 S2, No JVD, No murmur, No carotid bruits  Respiratory: Lungs clear to auscultation	  Psychiatry: Mood & affect appropriate  Gastrointestinal:  Soft, Non-tender, + BS, no bruits	  Extremities: R BKA. Normal range of motion, No clubbing, cyanosis or edema  Vascular: Peripheral pulses palpable 2+ bilaterally  Skin: No Erythema, No ecchymoses, No cyanosis, No rashes  Lines and Tubes: n/a      CURRENT MEDICATIONS:    chlorproMAZINE    Tablet  pantoprazole    Tablet  atorvastatin  dextrose 40% Gel  dextrose 50% Injectable  dextrose 50% Injectable  dextrose 50% Injectable  glucagon  Injectable  insulin glargine Injectable (LANTUS)  insulin lispro (ADMELOG) corrective regimen sliding scale  aspirin  chewable  dextrose 5%.  dextrose 5%.  heparin   Injectable  sodium chloride 0.9%.      DIAGNOSTIC TESTING:  [ ] Echocardiogram:   < from: TTE Echo Complete w/ Contrast w/ Doppler (02.15.21 @ 08:53) >  Summary:   1. Normal global left ventricular systolic function.   2. Left ventricular ejection fraction, by visual estimation, is 60 to 65%.   3. Mildly increased LV wall thickness.   4. Left ventricle chordal calcification. The basal and mid inferior wall and the basal and mid inferoseptal wall appear mildly hypokinetic.   5. The right ventricle is not well visualized, appears normal in size with normal systolic function.   6. Normal left atrial size.   7. Normal right atrial size.   8. Mild thickening of the anterior and posterior mitral valve leaflets.   9. Mild mitral annular calcification.  10. The aortic valve leaflets are not well visualized, appears to be a small, round echodensity on the aortic valve which may be consistent with calcification, recommend clinical correlation. No aortic valve stenosis.  11. There is mild aortic root calcification.  12. Trivial pericardial effusion.  13. Recommend clinical correlation with the above findings.    Renny Arevalo MD Electronically signed on 2/15/2021 at 10:20:06 AM    < end of copied text >    [ ]  Catheterization:  [ ] Stress Test:    < from: Nuclear Stress Test-Pharmacologic (02.17.21 @ 09:05) >  IMPRESSIONS:  * Myocardial Perfusion SPECT results are abnormal.  * Review of raw data shows: Minor motion and  subdiaphramatic artifacts.  * There are medium sized, moderateto severe defects in  apical, distal septal walls that are reversible,  suggestive of ischemia. Patient not able to prone for  image attenuation correction.  * Post-stress resting myocardial perfusion gated SPECT  imaging was performed (LVEF = 60 %;LVEDV = 60 ml.)  Dyskinesis of the septal wall noted.    No prior study is available for comparison.    < end of copied text >    OTHER: 	      LABS:	 	  CARDIAC MARKERS ( 15 Feb 2021 04:04 )  x     / 0.10 ng/mL / 144 U/L / x     / x      p-BNP 15 Feb 2021 04:04: 176 pg/mL, CARDIAC MARKERS ( 14 Feb 2021 21:19 )  x     / 0.11 ng/mL / x     / x     / x      p-BNP 14 Feb 2021 21:19: x    , CARDIAC MARKERS ( 14 Feb 2021 16:42 )  x     / 0.05 ng/mL / x     / x     / x      p-BNP 14 Feb 2021 16:42: x                              10.1   9.46  )-----------( 226      ( 17 Feb 2021 09:01 )             31.4     02-17    137  |  101  |  11.0  ----------------------------<  113<H>  3.4<L>   |  26.0  |  0.80    Ca    8.1<L>      17 Feb 2021 09:01  Mg     2.2     02-17    TPro  6.4<L>  /  Alb  3.1<L>  /  TBili  0.3<L>  /  DBili  x   /  AST  16  /  ALT  7   /  AlkPhos  88  02-17    proBNP: Serum Pro-Brain Natriuretic Peptide: 176 pg/mL (02-15 @ 04:04)    Lipid Profile: Date: 02-15 @ 04:04  Total cholesterol 144; Direct LDL: --; HDL: 28; Triglycerides:139    HgA1c:   TSH: Thyroid Stimulating Hormone, Serum: 1.15 uIU/mL        TELEMETRY: Reviewed    ECG:  Reviewed by me. 	      
                                                               [place] [facility]                                                               Office: [address] [place] [address]                                                              Telephone: [phone]. Fax:[phone]                                                                             PROGRESS NOTE    Reason for follow up: elevated troponin  Overnight: No new events.   Update: no new events. Denies chest pain/pressure, palpitations, irregular and/or rapid heart beat, SOB, MATTHEW, syncope/near syncope, dizziness, orthopnea, PND, cough, edema, f/c, n/v/d, hematuria, or hematochezia.     Subjective: 69 y/o Diabetic M presents c/o epigastric pain and N/V noted to have AGMA, LA, and elevated troponin admitted for NSTEMI r/o ACS       REVIEW of SYSTEMS:  a 12 point ROS was negative except as per the HPI above.   	  Vitals:  T(C): 36.3 (02-16-21 @ 15:51), Max: 37.1 (02-16-21 @ 08:48)  HR: 91 (02-16-21 @ 15:51) (91 - 101)  BP: 155/75 (02-16-21 @ 15:51) (118/72 - 155/75)  RR: 20 (02-16-21 @ 15:51) (16 - 20)  SpO2: 99% (02-16-21 @ 15:51) (96% - 100%)  Wt(kg): --  I&O's Summary    15 Feb 2021 07:01  -  16 Feb 2021 07:00  --------------------------------------------------------  IN: 0 mL / OUT: 200 mL / NET: -200 mL      Weight (kg): 77.1 (02-14 @ 14:26)      PHYSICAL EXAM:  Appearance: Comfortable. No acute distress  HEENT:  Head and neck: Atraumatic. Normocephalic.  Normal oral mucosa, PERRL, Neck is supple. No JVD, No carotid bruit.   Neurologic: A & O x 3, no focal deficits. EOMI.  Lymphatic: No cervical lymphadenopathy  Cardiovascular: Normal S1 S2, No murmur, rubs/gallops. No JVD, No edema  Respiratory: Lungs clear to auscultation  Gastrointestinal:  Soft, Non-tender, + BS  Lower Extremities: No edema  Psychiatry: Patient is calm. No agitation. Mood & affect appropriate  Skin: No rashes/ ecchymoses/cyanosis/ulcers visualized on the face, hands or feet.      CURRENT MEDICATIONS:    chlorproMAZINE    Tablet  pantoprazole  Injectable  dextrose 40% Gel  dextrose 50% Injectable  dextrose 50% Injectable  dextrose 50% Injectable  glucagon  Injectable  insulin glargine Injectable (LANTUS)  insulin lispro (ADMELOG) corrective regimen sliding scale  aspirin  chewable  dextrose 5%.  dextrose 5%.  heparin   Injectable  sodium chloride 0.9%.      DIAGNOSTIC TESTING:  [ ] Echocardiogram:   < from: TTE Echo Complete w/ Contrast w/ Doppler (02.15.21 @ 08:53) >  Summary:   1. Normal global left ventricular systolic function.   2. Left ventricular ejection fraction, by visual estimation, is 60 to 65%.   3. Mildly increased LV wall thickness.   4. Left ventricle chordal calcification. The basal and mid inferior wall and the basal and mid inferoseptal wall appear mildly hypokinetic.   5. The right ventricle is not well visualized, appears normal in size with normal systolic function.   6. Normal left atrial size.   7. Normal right atrial size.   8. Mild thickening of the anterior and posterior mitral valve leaflets.   9. Mild mitral annular calcification.  10. The aortic valve leaflets are not well visualized, appears to be a small, round echodensity on the aortic valve which may be consistent with calcification, recommend clinical correlation. No aortic valve stenosis.  11. There is mild aortic root calcification.  12. Trivial pericardial effusion.  13. Recommend clinical correlation with the above findings.    Renny Arevalo MD Electronically signed on 2/15/2021 at 10:20:06 AM    < end of copied text >  	      LABS:	 	  CARDIAC MARKERS ( 15 Feb 2021 04:04 )  x     / 0.10 ng/mL / 144 U/L / x     / x      p-BNP 15 Feb 2021 04:04: 176 pg/mL, CARDIAC MARKERS ( 14 Feb 2021 21:19 )  x     / 0.11 ng/mL / x     / x     / x      p-BNP 14 Feb 2021 21:19: x    , CARDIAC MARKERS ( 14 Feb 2021 16:42 )  x     / 0.05 ng/mL / x     / x     / x      p-BNP 14 Feb 2021 16:42: x                              10.5   12.86 )-----------( 263      ( 16 Feb 2021 08:30 )             31.2     02-16    137  |  99  |  19.0  ----------------------------<  69<L>  3.3<L>   |  26.0  |  0.80    Ca    8.1<L>      16 Feb 2021 08:30  Phos  2.8     02-15  Mg     2.2     02-15    TPro  6.3<L>  /  Alb  3.4  /  TBili  0.3<L>  /  DBili  x   /  AST  15  /  ALT  7   /  AlkPhos  85  02-15    proBNP: Serum Pro-Brain Natriuretic Peptide: 176 pg/mL (02-15 @ 04:04)    Lipid Profile: Date: 02-15 @ 04:04  Total cholesterol 144; Direct LDL: --; HDL: 28; Triglycerides:139    HgA1c:   TSH: Thyroid Stimulating Hormone, Serum: 1.15 uIU/mL        TELEMETRY: Reviewed    ECG:  Reviewed by me. 	      
                        [place] [facility]                          [address] [place] [address]                       Phone: [phone]. Fax:[phone]                      ________________________________________________           Reason for follow up/Overnight events: TVD s/p CABG/ no events overnight     HPI:  69 y/o M with PMHX IDDM2 presenting with abdominal pain from home. He reports nausea and vomiting for 2 days. Patient is a poor historian. He endorses a history of Dm, on metformin and glipizide. He reports hiccuping frequently. Pt NSTEMI and s/p LHC  and found to have triple vessel disease. Pt now 3 days s/p CABG. Pt denies any CP or SOB. OOB to chair and tolerating PO intake                          LAB RESULTS                   COMPLETE BLOOD COUNT( 2021 10:58 )                            13.3 g/dL  16.03 K/uL<H> )---------------( 275 K/uL                        39.7 %      Automated Differential     Auto Basophil # - X      Auto Basophil % - X      Auto Eosinophil # - X      Auto Eosinophil % - X      Auto Immature Granulocyte # - X      Auto Immature Granulocyte % - X      Auto Lymphocyte # - X      Auto Lymphocyte % - X      Auto Monocyte # - X      Auto Monocyte % - X      Auto Neutrophil # - X      Auto Neutrophil % - X                                      CHEMISTRY                 Basic Metabolic Panel (21 @ 09:46)    132<L>  |  95<L>  |  17.0  ----------------------------<  185<H>  4.0   |  22.0  |  0.88    Ca    7.8<L>      2021 09:46  Phos  2.2       Mg     1.9                         Liver Functions (21 @ 16:58))  TPro  5.4  /  Alb  2.9  /  TBili  0.7  /  DBili  x   /  AST  18  /  ALT  6   /  AlkPhos  59     PT/INR/PTT ( 2021 02:46 )                        :                       :      14.9         :       33.7                  .        .                   .              .           .       1.30        .                                       ABG - ( 2021 02:57 )  pH: 7.39  /  pCO2: 40    /  pO2: 104   / HCO3: 24    / Base Excess: -0.3  /  SaO2: 98                                  Cardiac Enzymes   ( 2021 02:46 )  Troponin T  0.28<H>,  CPK  360<H>, CKMB  X    , BNP X        , ( 2021 16:58 )  Troponin T  0.25<H>,  CPK  201<H>, CKMB  X    , BNP X        , ( 2021 01:01 )  Troponin T  X    ,  CPK  X    , CKMB  X    , <H>                          RADIOLOGY RESULTS: Personally visualized   < from: Xray Chest 1 View- PORTABLE-Routine (21 @ 07:54) >  IMPRESSION: Removal of right jugular line.    DEONNA EM MD; Attending Radiologist  This document has been electronically signed. 2021 12:25PM    < end of copied text >    < from: CT Chest No Cont (21 @ 19:11) >  IMPRESSION: Mildly distended fluid-filled esophagus predisposes to aspiration.  Extensive vascular calcifications are noted  Mild emphysema  No significant adenopathy appreciated    MARTHA PRECIADO MD; Attending Radiologist  This document has been electronically signed. 2021  8:27PM    < end of copied text >                          CARDIOLOGY RESULTS: Official Report/Preliminary Verbal Reports    ECHO: < from: TTE Echo Complete w/ Contrast w/ Doppler (02.15.21 @ 08:53) >  Summary:   1. Normal global left ventricular systolic function.   2. Left ventricular ejection fraction, by visual estimation, is 60 to 65%.   3. Mildly increased LV wall thickness.   4. Left ventricle chordal calcification. The basal and mid inferior wall and the basal and mid inferoseptal wall appear mildly hypokinetic.   5. The right ventricle is not well visualized, appears normal in size with normal systolic function.   6. Normal left atrial size.   7. Normal right atrial size.   8. Mild thickening of the anterior and posterior mitral valve leaflets.   9. Mild mitral annular calcification.  10. The aortic valve leaflets are not well visualized, appears to be a small, round echodensity on the aortic valve which may be consistent with calcification, recommend clinical correlation. No aortic valve stenosis.  11. There is mild aortic root calcification.  12. Trivial pericardial effusion.  13. Recommend clinical correlation with the above findings.    Renny Arevalo MD Electronically signed on 2/15/2021 at 10:20:06 AM    *** Final ***      HANNAH AREVALO   This document has been electronically signed. Feb 15 2021  8:53AM    < end of copied text >  CATH: < from: Cardiac Cath Lab - Adult (21 @ 16:46) >  CORONARY VESSELS: The coronary circulation is right dominant.  LM:   --  LM: The vessel was medium sized, mildly calcified, and not  tortuous. Angiography showed no evidence of disease.  LAD:   --  LAD: The vessel was normal sized, moderately calcified, and not  tortuous. Angiography showed multiple discrete lesions. There was a  tubular 90 % stenosis in the proximal third of the vessel segment, just  before S1. The lesion was without evidence of thrombus. There was KODI  grade 3 flow through the vessel (brisk flow). In a second lesion, there  was a tubular 95 % stenosis in the middle third of the vessel segment. The  lesion was irregularly contoured, hazy, eccentric, heavily calcified, and  without evidence of thrombus. There was KODI grade 0 flow through the  vessel (no flow).  CX:   --  Circumflex: The vessel was small sized, moderately calcified, and  mildly tortuous. Angiography showed moderate atherosclerosis. There was a  tubular 90 % stenosis in the proximal third of the vessel segment. The  lesion was without evidence of thrombus. There was KODI grade 3 flow  through the vessel (brisk flow).  RCA:   --  RCA: The vessel was normal sized, moderately calcified, and  excessively tortuous. Angiography showed severe atherosclerosis. There was  a tubular 90 % stenosis in the proximal third of the vessel segment. The  lesion was without evidence of thrombus. There was KODI grade 3 flow  through the vessel (brisk flow). It appears amenable to percutaneous  intervention. In a second lesion, there was a diffuse 90 % stenosis in the  middle third of the vessel segment, just after RV marginal 2. The lesion  was without evidence of thrombus. There was KODI grade 3 flow through the  vessel (brisk flow). It appears amenable to percutaneous intervention.  COMPLICATIONS: No complications occurred during the cath lab visit.  DIAGNOSTIC IMPRESSIONS: There is significant triple vessel coronary artery  disease.  Prox LAD=90%  Mid LAD=95%  Prox LCx=90%  Prox RCA=90%  Mid RCA=95% Left ventricular function is normal.  LVEF=65%  DIAGNOSTIC RECOMMENDATIONS: The patient should continue with the present  medications. Consultation be obtained for coronary artery bypass grafting.  Patient management should include aggressive medical therapy and close  monitoring of BUN and creatinine.  Prepared and signed by  Tico Tomlinson MD    < end of copied text >                          CARDIOLOGY REVIEW: Personally visualized and reviewed    EKG: < from: 12 Lead ECG (21 @ 09:20) >  Diagnosis Line Sinus rhythm with occasional Premature ventricular complexes  Left axis deviation  Septal infarct , age undetermined  Abnormal ECG    Confirmed by GRAY GARAY ([id number]) on 2021 5:13:34 PM  < end of copied text >    Telemetry Last 24h: NSR 90's, LBBB, no ectopy                              DAILY WEIGHTS - 48 HOUR TREND     Daily Weight in k.3 (2021 06:38), Weight in k.2 (2021 05:05)                             INTAKE AND OUTPUT - 48 HOUR TREND     21 @ 07:01  -  21 @ 07:00  --------------------------------------------------------  IN:  Total IN: 0 mL    OUT:    Chest Tube (mL): 85 mL    Chest Tube (mL): 180 mL    Indwelling Catheter - Urethral (mL): 1430 mL  Total OUT: 1695 mL    Total NET: -1695 mL      21 @ 07:01  -  21 @ 07:00  --------------------------------------------------------  IN:  Total IN: 0 mL    OUT:    Voided (mL): 750 mL  Total OUT: 750 mL    Total NET: -750 mL    HOME MEDICATIONS:  glipiZIDE 10 mg oral tablet, extended release: 1 tab(s) orally once a day (15 Feb 2021 01:08)  Lantus 100 units/mL subcutaneous solution: 25 unit(s) subcutaneous once a day (15 Feb 2021 01:00)  metFORMIN 1000 mg oral tablet: 1 tab(s) orally 2 times a day (15 Feb 2021 01:08)                             Current Admission Active Medications    aspirin enteric coated 325 milliGRAM(s) Oral daily  atorvastatin 40 milliGRAM(s) Oral at bedtime  enoxaparin Injectable 40 milliGRAM(s) SubCutaneous daily  furosemide    Tablet 40 milliGRAM(s) Oral daily  insulin glargine Injectable (LANTUS) 10 Unit(s) SubCutaneous at bedtime  insulin lispro (ADMELOG) corrective regimen sliding scale   SubCutaneous three times a day before meals  insulin lispro Injectable (ADMELOG) 2 Unit(s) SubCutaneous three times a day before meals  metoprolol tartrate 50 milliGRAM(s) Oral two times a day  oxyCODONE    IR 5 milliGRAM(s) Oral every 4 hours PRN Moderate Pain (4 - 6)  oxyCODONE    IR 10 milliGRAM(s) Oral every 4 hours PRN Severe Pain (7 - 10)  pantoprazole    Tablet 40 milliGRAM(s) Oral daily  potassium chloride    Tablet ER 20 milliEquivalent(s) Oral daily  potassium chloride   Powder 20 milliEquivalent(s) Oral once  psyllium Powder 1 Packet(s) Oral daily PRN Constipation  senna 2 Tablet(s) Oral at bedtime  sodium chloride 0.9% lock flush 3 milliLiter(s) IV Push every 8 hours                        PHYSICAL EXAM:    Vital Signs Last 24 Hrs  T(C): 36.9 (2021 10:30), Max: 37 (2021 16:00)  T(F): 98.4 (2021 10:30), Max: 98.6 (2021 16:00)  HR: 84 (2021 10:30) (84 - 98)  BP: 117/43 (2021 10:30) (103/61 - 124/58)  BP(mean): 78 (2021 20:00) (76 - 83)  RR: 18 (2021 10:30) (18 - 28)  SpO2: 100% (2021 10:30) (93% - 100%)    GENERAL: NAD, OOB to chair   NECK: Supple, No JVD  NERVOUS SYSTEM:  Alert & Oriented X3, non focal neuro exam.   CHEST/LUNG: clear lungs, No rales, rhonchi, wheezing, or rubs  HEART: Regular rate and rhythm; s1 and s2 auscultated, +pacer wires, No murmurs, rubs, or gallops  ABDOMEN: Soft, Nontender, Nondistended; Bowel sounds present and normoactive.   EXTREMITIES:  2+ Peripheral Pulses, No clubbing, cyanosis, or edema      
INTERVAL HPI/OVERNIGHT EVENTS:  pt  without ocmplaints   follwoup T2DM     MEDICATIONS  (STANDING):  aspirin enteric coated 325 milliGRAM(s) Oral daily  atorvastatin 40 milliGRAM(s) Oral at bedtime  enoxaparin Injectable 40 milliGRAM(s) SubCutaneous daily  furosemide    Tablet 40 milliGRAM(s) Oral daily  insulin glargine Injectable (LANTUS) 15 Unit(s) SubCutaneous at bedtime  insulin lispro (ADMELOG) corrective regimen sliding scale   SubCutaneous three times a day before meals  insulin lispro Injectable (ADMELOG) 4 Unit(s) SubCutaneous three times a day with meals  metoprolol tartrate 50 milliGRAM(s) Oral two times a day  pantoprazole    Tablet 40 milliGRAM(s) Oral daily  potassium chloride    Tablet ER 20 milliEquivalent(s) Oral daily  senna 2 Tablet(s) Oral at bedtime  sodium chloride 0.9% lock flush 3 milliLiter(s) IV Push every 8 hours    MEDICATIONS  (PRN):  oxyCODONE    IR 5 milliGRAM(s) Oral every 4 hours PRN Moderate Pain (4 - 6)  oxyCODONE    IR 10 milliGRAM(s) Oral every 4 hours PRN Severe Pain (7 - 10)  psyllium Powder 1 Packet(s) Oral daily PRN Constipation      Allergies    No Known Allergies    Intolerances    OHS (Unknown)      Review of systems: No n/v no abd pain     Vital Signs Last 24 Hrs  T(C): 36.9 (23 Feb 2021 22:05), Max: 37.1 (23 Feb 2021 10:05)  T(F): 98.4 (23 Feb 2021 22:05), Max: 98.7 (23 Feb 2021 10:05)  HR: 75 (23 Feb 2021 22:05) (75 - 96)  BP: 103/57 (23 Feb 2021 22:05) (103/57 - 146/77)  BP(mean): --  RR: 17 (23 Feb 2021 22:05) (16 - 18)  SpO2: 96% (23 Feb 2021 22:05) (92% - 97%)    PHYSICAL EXAM:      Constitutional: NAD, well-groomed, well-developed  HEENT: PERRLA, EOMI, no exophalmos  Neck: No LAD, No JVD, trachea midline, no thyroid enlargement  Back: Normal spine flexure, No CVA tenderness  Respiratory: CTAB  Cardiovascular: S1 and S2, RRR, no M/G/R  Extremities: No peripheral edema, no pedal lesions  \      LABS:                        11.3   11.73 )-----------( 257      ( 23 Feb 2021 06:19 )             33.9     02-23    136  |  100  |  13.0  ----------------------------<  160<H>  4.0   |  28.0  |  0.77    Ca    7.7<L>      23 Feb 2021 06:19  Mg     2.2     02-23            CAPILLARY BLOOD GLUCOSE    CAPILLARY BLOOD GLUCOSE      POCT Blood Glucose.: 186 mg/dL (23 Feb 2021 21:17)  POCT Blood Glucose.: 229 mg/dL (23 Feb 2021 16:58)  POCT Blood Glucose.: 289 mg/dL (23 Feb 2021 12:01)  POCT Blood Glucose.: 167 mg/dL (23 Feb 2021 08:28)    RADIOLOGY & ADDITIONAL TESTS:  
Patient is a 70y old  Male who presents with a chief complaint of NSTEMI (15 Feb 2021 14:54)      INTERVAL HPI/OVERNIGHT EVENTS: c/o hiccups. Leukocytosis trending down.        MEDICATIONS  (STANDING):  aspirin  chewable 81 milliGRAM(s) Oral daily  chlorproMAZINE    Tablet 25 milliGRAM(s) Oral two times a day  dextrose 40% Gel 15 Gram(s) Oral once  dextrose 5%. 1000 milliLiter(s) (50 mL/Hr) IV Continuous <Continuous>  dextrose 5%. 1000 milliLiter(s) (100 mL/Hr) IV Continuous <Continuous>  dextrose 50% Injectable 25 Gram(s) IV Push once  dextrose 50% Injectable 12.5 Gram(s) IV Push once  dextrose 50% Injectable 25 Gram(s) IV Push once  glucagon  Injectable 1 milliGRAM(s) IntraMuscular once  heparin   Injectable 5000 Unit(s) SubCutaneous every 8 hours  insulin glargine Injectable (LANTUS) 20 Unit(s) SubCutaneous at bedtime  insulin lispro (ADMELOG) corrective regimen sliding scale   SubCutaneous every 6 hours  pantoprazole  Injectable 40 milliGRAM(s) IV Push daily  sodium chloride 0.9%. 1000 milliLiter(s) (125 mL/Hr) IV Continuous <Continuous>    MEDICATIONS  (PRN):  aluminum hydroxide/magnesium hydroxide/simethicone Suspension 30 milliLiter(s) Oral every 4 hours PRN Dyspepsia  ondansetron Injectable 4 milliGRAM(s) IV Push every 6 hours PRN Nausea      Allergies    No Known Allergies    Intolerances        REVIEW OF SYSTEMS:  CONSTITUTIONAL: No fever, weight loss, or fatigue  RESPIRATORY: No cough, wheezing, chills or hemoptysis; No shortness of breath  CARDIOVASCULAR: No chest pain, palpitations, dizziness, or leg swelling  GASTROINTESTINAL: No abdominal or epigastric pain. No nausea, vomiting, or hematemesis; No diarrhea or constipation. No melena or hematochezia.  NEUROLOGICAL: No headaches, memory loss, loss of strength, numbness, or tremors  MUSCULOSKELETAL: No joint pain or swelling; No muscle, back, or extremity pain      Vital Signs Last 24 Hrs  T(C): 37.1 (2021 08:48), Max: 37.1 (2021 08:48)  T(F): 98.7 (2021 08:48), Max: 98.7 (2021 08:48)  HR: 101 (2021 08:48) (96 - 101)  BP: 128/68 (2021 08:48) (118/72 - 128/81)  BP(mean): --  RR: 18 (2021 08:48) (16 - 18)  SpO2: 97% (2021 08:48) (96% - 100%)    PHYSICAL EXAM:  GENERAL: NAD, well-groomed, well-developed, sitting on the bed.   HEAD:  Atraumatic, Normocephalic  EYES: EOMI, PERRLA, conjunctiva and sclera clear  NECK: Supple, No JVD, Normal thyroid  NERVOUS SYSTEM:  Alert & Oriented X3, No gross focal deficits  CHEST/LUNG: Clear to percussion bilaterally; No rales, rhonchi, wheezing, or rubs  HEART: Regular rate and rhythm; No murmurs, rubs, or gallops  ABDOMEN: Soft, Nontender, Nondistended; Bowel sounds present  EXTREMITIES: right AKA,  No clubbing, cyanosis, or edema  SKIN: No rashes or lesions    LABS:                        10.5   12.86 )-----------( 263      ( 2021 08:30 )             31.2     02-16    137  |  99  |  19.0  ----------------------------<  69<L>  3.3<L>   |  26.0  |  0.80    Ca    8.1<L>      2021 08:30  Phos  2.8     02-15  Mg     2.2     02-15    TPro  6.3<L>  /  Alb  3.4  /  TBili  0.3<L>  /  DBili  x   /  AST  15  /  ALT  7   /  AlkPhos  85  02-15    PT/INR - ( 15 Feb 2021 04:04 )   PT: 14.7 sec;   INR: 1.28 ratio         PTT - ( 15 Feb 2021 04:04 )  PTT:27.6 sec  Urinalysis Basic - ( 2021 19:44 )    Color: Yellow / Appearance: Clear / S.015 / pH: x  Gluc: x / Ketone: Moderate  / Bili: Negative / Urobili: Negative mg/dL   Blood: x / Protein: 15 mg/dL / Nitrite: Negative   Leuk Esterase: Small / RBC: 0-2 /HPF / WBC 3-5   Sq Epi: x / Non Sq Epi: Occasional / Bacteria: Occasional      CAPILLARY BLOOD GLUCOSE      POCT Blood Glucose.: 121 mg/dL (2021 11:20)  POCT Blood Glucose.: 150 mg/dL (2021 08:47)  POCT Blood Glucose.: 61 mg/dL (2021 07:37)  POCT Blood Glucose.: 125 mg/dL (15 Feb 2021 20:49)  POCT Blood Glucose.: 216 mg/dL (15 Feb 2021 16:51)      RADIOLOGY & ADDITIONAL TESTS:    Imaging Personally Reviewed:  [ ] YES  [ ] NO    Consultant(s) Notes Reviewed:  [ ] YES  [ ] NO    Care Discussed with Consultants/Other Providers [ ] YES  [ ] NO    Plan of Care discussed with Housestaff [ ]YES [ ] NO
Patient is a 70y old  Male who presents with a chief complaint of NSTEMI (17 Feb 2021 13:42)      INTERVAL HPI/OVERNIGHT EVENTS: No new complains. No new events. Hiccups improved     MEDICATIONS  (STANDING):  aspirin  chewable 81 milliGRAM(s) Oral daily  atorvastatin 20 milliGRAM(s) Oral at bedtime  chlorproMAZINE    Tablet 25 milliGRAM(s) Oral two times a day  dextrose 40% Gel 15 Gram(s) Oral once  dextrose 5%. 1000 milliLiter(s) (50 mL/Hr) IV Continuous <Continuous>  dextrose 5%. 1000 milliLiter(s) (100 mL/Hr) IV Continuous <Continuous>  dextrose 50% Injectable 25 Gram(s) IV Push once  dextrose 50% Injectable 12.5 Gram(s) IV Push once  dextrose 50% Injectable 25 Gram(s) IV Push once  glucagon  Injectable 1 milliGRAM(s) IntraMuscular once  heparin   Injectable 5000 Unit(s) SubCutaneous every 8 hours  insulin glargine Injectable (LANTUS) 20 Unit(s) SubCutaneous at bedtime  insulin lispro (ADMELOG) corrective regimen sliding scale   SubCutaneous every 6 hours  pantoprazole    Tablet 40 milliGRAM(s) Oral two times a day  sodium chloride 0.9%. 1000 milliLiter(s) (125 mL/Hr) IV Continuous <Continuous>    MEDICATIONS  (PRN):  aluminum hydroxide/magnesium hydroxide/simethicone Suspension 30 milliLiter(s) Oral every 4 hours PRN Dyspepsia  ondansetron Injectable 4 milliGRAM(s) IV Push every 6 hours PRN Nausea      Allergies    No Known Allergies    Intolerances        REVIEW OF SYSTEMS:  CONSTITUTIONAL: No fever, weight loss, or fatigue  RESPIRATORY: No cough, wheezing, chills or hemoptysis; No shortness of breath  CARDIOVASCULAR: No chest pain, palpitations, dizziness, or leg swelling  GASTROINTESTINAL: No abdominal or epigastric pain. No nausea, vomiting, or hematemesis; No diarrhea or constipation. No melena or hematochezia.  NEUROLOGICAL: No headaches, memory loss, loss of strength, numbness, or tremors  MUSCULOSKELETAL: No joint pain or swelling; No muscle, back, or extremity pain      Vital Signs Last 24 Hrs  T(C): 36.8 (17 Feb 2021 08:00), Max: 37.3 (16 Feb 2021 21:14)  T(F): 98.2 (17 Feb 2021 08:00), Max: 99.2 (16 Feb 2021 21:14)  HR: 96 (17 Feb 2021 08:00) (50 - 96)  BP: 134/56 (17 Feb 2021 08:00) (134/56 - 158/94)  BP(mean): --  RR: 19 (17 Feb 2021 08:00) (19 - 20)  SpO2: 96% (17 Feb 2021 08:00) (95% - 99%)    PHYSICAL EXAM:  GENERAL: NAD, well-groomed, well-developed  HEAD:  Atraumatic, Normocephalic  EYES: EOMI, PERRLA, conjunctiva and sclera clear  NECK: Supple, No JVD, Normal thyroid  NERVOUS SYSTEM:  Alert & Oriented X3, No gross focal deficits  CHEST/LUNG: Clear to percussion bilaterally; No rales, rhonchi, wheezing, or rubs  HEART: Regular rate and rhythm; No murmurs, rubs, or gallops  ABDOMEN: Soft, Nontender, Nondistended; Bowel sounds present  EXTREMITIES:  right AKA, no edema   SKIN: No rashes or lesions    LABS:                        10.1   9.46  )-----------( 226      ( 17 Feb 2021 09:01 )             31.4     02-17    137  |  101  |  11.0  ----------------------------<  113<H>  3.4<L>   |  26.0  |  0.80    Ca    8.1<L>      17 Feb 2021 09:01  Mg     2.2     02-17    TPro  6.4<L>  /  Alb  3.1<L>  /  TBili  0.3<L>  /  DBili  x   /  AST  16  /  ALT  7   /  AlkPhos  88  02-17        CAPILLARY BLOOD GLUCOSE      POCT Blood Glucose.: 186 mg/dL (17 Feb 2021 11:57)  POCT Blood Glucose.: 112 mg/dL (17 Feb 2021 06:13)  POCT Blood Glucose.: 160 mg/dL (17 Feb 2021 00:05)  POCT Blood Glucose.: 147 mg/dL (16 Feb 2021 16:40)      RADIOLOGY & ADDITIONAL TESTS:    Imaging Personally Reviewed:  [ ] YES  [ ] NO    Consultant(s) Notes Reviewed:  [ ] YES  [ ] NO    Care Discussed with Consultants/Other Providers [ ] YES  [ ] NO    Plan of Care discussed with Housestaff [ ]YES [ ] NO
Patient is a 70y old  Male who presents with a chief complaint of NSTEMI (18 Feb 2021 12:27)      INTERVAL HPI/OVERNIGHT EVENTS: Seen and examined. NO complain today, but yesterday had severe mid epigastric pain after eating dinner.   Awaiting to go for cardiac cauterization     MEDICATIONS  (STANDING):  aspirin  chewable 81 milliGRAM(s) Oral daily  atorvastatin 20 milliGRAM(s) Oral at bedtime  chlorproMAZINE    Tablet 25 milliGRAM(s) Oral two times a day  dextrose 40% Gel 15 Gram(s) Oral once  dextrose 5%. 1000 milliLiter(s) (50 mL/Hr) IV Continuous <Continuous>  dextrose 5%. 1000 milliLiter(s) (100 mL/Hr) IV Continuous <Continuous>  dextrose 50% Injectable 25 Gram(s) IV Push once  dextrose 50% Injectable 12.5 Gram(s) IV Push once  dextrose 50% Injectable 25 Gram(s) IV Push once  glucagon  Injectable 1 milliGRAM(s) IntraMuscular once  heparin   Injectable 5000 Unit(s) SubCutaneous every 8 hours  insulin glargine Injectable (LANTUS) 20 Unit(s) SubCutaneous at bedtime  insulin lispro (ADMELOG) corrective regimen sliding scale   SubCutaneous every 6 hours  metoprolol tartrate 25 milliGRAM(s) Oral two times a day  pantoprazole    Tablet 40 milliGRAM(s) Oral two times a day  sodium chloride 0.9%. 1000 milliLiter(s) (125 mL/Hr) IV Continuous <Continuous>    MEDICATIONS  (PRN):  aluminum hydroxide/magnesium hydroxide/simethicone Suspension 30 milliLiter(s) Oral every 4 hours PRN Dyspepsia  ondansetron Injectable 4 milliGRAM(s) IV Push every 6 hours PRN Nausea      Allergies    No Known Allergies    Intolerances        REVIEW OF SYSTEMS:  CONSTITUTIONAL: No fever, weight loss, or fatigue  RESPIRATORY: No cough, wheezing, chills or hemoptysis; No shortness of breath  CARDIOVASCULAR: No chest pain, palpitations, dizziness, or leg swelling  GASTROINTESTINAL: No abdominal or epigastric pain. No nausea, vomiting, or hematemesis; No diarrhea or constipation. No melena or hematochezia.  NEUROLOGICAL: No headaches, memory loss, loss of strength, numbness, or tremors  MUSCULOSKELETAL: No joint pain or swelling; No muscle, back, or extremity pain      Vital Signs Last 24 Hrs  T(C): 36.7 (18 Feb 2021 10:31), Max: 37.2 (18 Feb 2021 05:04)  T(F): 98 (18 Feb 2021 10:31), Max: 98.9 (18 Feb 2021 05:04)  HR: 78 (18 Feb 2021 10:31) (78 - 96)  BP: 134/54 (18 Feb 2021 10:31) (134/54 - 178/89)  BP(mean): --  RR: 19 (18 Feb 2021 10:31) (18 - 20)  SpO2: 100% (18 Feb 2021 10:31) (96% - 100%)    PHYSICAL EXAM:  GENERAL: NAD, well-groomed, well-developed  HEAD:  Atraumatic, Normocephalic  EYES: EOMI, PERRLA, conjunctiva and sclera clear  NECK: Supple, No JVD, Normal thyroid  NERVOUS SYSTEM:  Alert & Oriented X3, No gross focal deficits  CHEST/LUNG: Clear to percussion bilaterally; No rales, rhonchi, wheezing, or rubs  HEART: Regular rate and rhythm; No murmurs, rubs, or gallops  ABDOMEN: Soft, Nontender, Nondistended; Bowel sounds present  EXTREMITIES:  No clubbing, cyanosis, or edema  SKIN: No rashes or lesions    LABS:                        10.1   9.46  )-----------( 226      ( 17 Feb 2021 09:01 )             31.4     02-17    137  |  101  |  11.0  ----------------------------<  113<H>  3.4<L>   |  26.0  |  0.80    Ca    8.1<L>      17 Feb 2021 09:01  Mg     2.2     02-17    TPro  6.4<L>  /  Alb  3.1<L>  /  TBili  0.3<L>  /  DBili  x   /  AST  16  /  ALT  7   /  AlkPhos  88  02-17        CAPILLARY BLOOD GLUCOSE      POCT Blood Glucose.: 187 mg/dL (18 Feb 2021 05:21)  POCT Blood Glucose.: 288 mg/dL (17 Feb 2021 22:49)  POCT Blood Glucose.: 166 mg/dL (17 Feb 2021 17:15)      RADIOLOGY & ADDITIONAL TESTS:    Imaging Personally Reviewed:  [ ] YES  [ ] NO    Consultant(s) Notes Reviewed:  [ ] YES  [ ] NO    Care Discussed with Consultants/Other Providers [ ] YES  [ ] NO    Plan of Care discussed with Housestaff [ ]YES [ ] NO
Patient is a 70y old  Male who presents with a chief complaint of NSTEMI (15 Feb 2021 03:16)      INTERVAL HPI/OVERNIGHT EVENTS: Feeling much better. Reports having nausea and vomiting along with abdominal pain for the past week. Not able to eat and take his medications. Now wants to eat   Overnight had a fall from his bed. Was placed on 1 to 1.     MEDICATIONS  (STANDING):  aspirin  chewable 81 milliGRAM(s) Oral daily  dextrose 40% Gel 15 Gram(s) Oral once  dextrose 5%. 1000 milliLiter(s) (50 mL/Hr) IV Continuous <Continuous>  dextrose 5%. 1000 milliLiter(s) (100 mL/Hr) IV Continuous <Continuous>  dextrose 50% Injectable 25 Gram(s) IV Push once  dextrose 50% Injectable 12.5 Gram(s) IV Push once  dextrose 50% Injectable 25 Gram(s) IV Push once  glucagon  Injectable 1 milliGRAM(s) IntraMuscular once  heparin   Injectable 5000 Unit(s) SubCutaneous every 8 hours  insulin glargine Injectable (LANTUS) 25 Unit(s) SubCutaneous at bedtime  insulin lispro (ADMELOG) corrective regimen sliding scale   SubCutaneous every 6 hours  pantoprazole  Injectable 40 milliGRAM(s) IV Push daily  sodium chloride 0.9%. 1000 milliLiter(s) (125 mL/Hr) IV Continuous <Continuous>    MEDICATIONS  (PRN):  aluminum hydroxide/magnesium hydroxide/simethicone Suspension 30 milliLiter(s) Oral every 4 hours PRN Dyspepsia  ondansetron Injectable 4 milliGRAM(s) IV Push every 6 hours PRN Nausea      Allergies    No Known Allergies    Intolerances        REVIEW OF SYSTEMS:  CONSTITUTIONAL: No fever, weight loss, or fatigue  RESPIRATORY: No cough, wheezing, chills or hemoptysis; No shortness of breath  CARDIOVASCULAR: No chest pain, palpitations, dizziness, or leg swelling  GASTROINTESTINAL: No abdominal or epigastric pain. No nausea, vomiting, or hematemesis; No diarrhea or constipation. No melena or hematochezia.  NEUROLOGICAL: No headaches, memory loss, loss of strength, numbness, or tremors  MUSCULOSKELETAL: No joint pain or swelling; No muscle, back, or extremity pain      Vital Signs Last 24 Hrs  T(C): 36.9 (15 Feb 2021 07:56), Max: 36.9 (15 Feb 2021 07:56)  T(F): 98.5 (15 Feb 2021 07:56), Max: 98.5 (15 Feb 2021 07:56)  HR: 108 (15 Feb 2021 07:56) (108 - 119)  BP: 127/79 (15 Feb 2021 07:56) (116/56 - 128/58)  BP(mean): --  RR: 18 (15 Feb 2021 07:56) (18 - 20)  SpO2: 97% (15 Feb 2021 07:56) (97% - 98%)    PHYSICAL EXAM:  GENERAL: Sitting on the bed, soft spoken. NAD, well-groomed, well-developed  HEAD:  Atraumatic, Normocephalic  EYES: conjunctiva and sclera clear  NECK: Supple, No JVD, Normal thyroid  NERVOUS SYSTEM:  Alert & Oriented X3, No gross focal deficits  CHEST/LUNG: Clear to percussion bilaterally; No rales, rhonchi, wheezing, or rubs  HEART: Regular rate and rhythm; No murmurs, rubs, or gallops  ABDOMEN: Soft, Nontender, Nondistended; Bowel sounds present  EXTREMITIES:  right aka with prosthesis, no edema   SKIN: No rashes or lesions    LABS:                        11.2   17.64 )-----------( 260      ( 15 Feb 2021 04:04 )             32.2     02-15    128<L>  |  89<L>  |  39.0<H>  ----------------------------<  327<H>  3.8   |  27.0  |  1.02    Ca    8.4<L>      15 Feb 2021 04:04  Phos  2.8     02-15  Mg     2.2     02-15    TPro  6.3<L>  /  Alb  3.4  /  TBili  0.3<L>  /  DBili  x   /  AST  15  /  ALT  7   /  AlkPhos  85  02-15    PT/INR - ( 15 Feb 2021 04:04 )   PT: 14.7 sec;   INR: 1.28 ratio         PTT - ( 15 Feb 2021 04:04 )  PTT:27.6 sec  Urinalysis Basic - ( 2021 19:44 )    Color: Yellow / Appearance: Clear / S.015 / pH: x  Gluc: x / Ketone: Moderate  / Bili: Negative / Urobili: Negative mg/dL   Blood: x / Protein: 15 mg/dL / Nitrite: Negative   Leuk Esterase: Small / RBC: 0-2 /HPF / WBC 3-5   Sq Epi: x / Non Sq Epi: Occasional / Bacteria: Occasional      CAPILLARY BLOOD GLUCOSE      POCT Blood Glucose.: 108 mg/dL (15 Feb 2021 11:38)  POCT Blood Glucose.: 153 mg/dL (15 Feb 2021 07:32)  POCT Blood Glucose.: 292 mg/dL (15 Feb 2021 05:48)  POCT Blood Glucose.: 277 mg/dL (15 Feb 2021 01:00)  POCT Blood Glucose.: 338 mg/dL (2021 18:36)  POCT Blood Glucose.: 413 mg/dL (2021 15:11)      RADIOLOGY & ADDITIONAL TESTS:    Imaging Personally Reviewed:  [ ] YES  [ ] NO    Consultant(s) Notes Reviewed:  [ ] YES  [ ] NO    Care Discussed with Consultants/Other Providers [ ] YES  [ ] NO    Plan of Care discussed with H[facility]est[facility] [ ]YES [ ] NO
Subjective:  69yo M resting comfortable, no c/o pain.      HPI:  71 y/o M with PMHX IDDM2 presenting with abdominal pain from home. He reports nausea and vomiting for 2 days. Patient is a poor historian. He endorses a history of Dm, on metformin and glipizide. He reports hiccuping frequently. He states he does see a PMD but doesn't recall the last time. He states that he has some mild epigastric abdominal pain, and that he has not eaten in 2 days due to the nausea and vomiting. Labs reviewed multiple abnormalities noted. CT and EKG reviewed. Improving s/p IVFB 2L. Still burping. Discussed plan below.  ROS +eructation, +n/V, +diaphoresis. +abd pain, denies CP, denies SOB, all other systems negative.  (14 Feb 2021 23:45)          PAST MEDICAL & SURGICAL HISTORY:  Psoriasis    Neuropathy    Cataract    HTN (hypertension)    Dry eye    DM (diabetes mellitus)    Toe amputation status, left  2nd 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    S/P BKA (below knee amputation) unilateral, right            MEDICATIONS  (STANDING):  aspirin enteric coated 325 milliGRAM(s) Oral daily  atorvastatin 40 milliGRAM(s) Oral at bedtime  cefuroxime  IVPB 1500 milliGRAM(s) IV Intermittent every 8 hours  chlorhexidine 2% Cloths 1 Application(s) Topical daily  dextrose 40% Gel 15 Gram(s) Oral once  dextrose 5%. 1000 milliLiter(s) (50 mL/Hr) IV Continuous <Continuous>  dextrose 50% Injectable 50 milliLiter(s) IV Push every 15 minutes  enoxaparin Injectable 40 milliGRAM(s) SubCutaneous daily  furosemide    Tablet 40 milliGRAM(s) Oral daily  glucagon  Injectable 1 milliGRAM(s) IntraMuscular once  insulin glargine Injectable (LANTUS) 10 Unit(s) SubCutaneous at bedtime  insulin lispro (ADMELOG) corrective regimen sliding scale   SubCutaneous three times a day before meals  insulin lispro Injectable (ADMELOG) 2 Unit(s) SubCutaneous Before meals and at bedtime  metoprolol tartrate 50 milliGRAM(s) Oral two times a day  pantoprazole    Tablet 40 milliGRAM(s) Oral daily  senna 2 Tablet(s) Oral at bedtime  sodium chloride 0.9%. 1000 milliLiter(s) (10 mL/Hr) IV Continuous <Continuous>  sodium chloride 0.9%. 1000 milliLiter(s) (10 mL/Hr) IV Continuous <Continuous>  vancomycin  IVPB 1000 milliGRAM(s) IV Intermittent every 12 hours    MEDICATIONS  (PRN):  oxyCODONE    IR 5 milliGRAM(s) Oral every 4 hours PRN Moderate Pain (4 - 6)  oxyCODONE    IR 10 milliGRAM(s) Oral every 4 hours PRN Severe Pain (7 - 10)  psyllium Powder 1 Packet(s) Oral daily PRN Constipation          Allergies    No Known Allergies    Intolerances    OHS (Unknown)        WEIGHTS:  Daily     Daily   Admit Wt: Drug Dosing Weight  Height (cm): 175.3 (19 Feb 2021 12:27)  Weight (kg): 77 (19 Feb 2021 12:27)  BMI (kg/m2): 25.1 (19 Feb 2021 12:27)  BSA (m2): 1.93 (19 Feb 2021 12:27)  I&O's Summary    19 Feb 2021 07:01  -  20 Feb 2021 07:00  --------------------------------------------------------  IN: 1626.5 mL / OUT: 2130 mL / NET: -503.5 mL    20 Feb 2021 07:01  -  21 Feb 2021 02:02  --------------------------------------------------------  IN: 2071 mL / OUT: 1500 mL / NET: 571 mL        VITAL SIGNS:  ICU Vital Signs Last 24 Hrs  T(C): 36.6 (21 Feb 2021 00:00), Max: 37.1 (20 Feb 2021 12:00)  T(F): 97.8 (21 Feb 2021 00:00), Max: 98.8 (20 Feb 2021 12:00)  HR: 90 (21 Feb 2021 01:00) (73 - 94)  BP: --  BP(mean): --  ABP: 104/44 (21 Feb 2021 01:00) (100/42 - 167/74)  ABP(mean): 63 (21 Feb 2021 01:00) (61 - 110)  RR: 20 (21 Feb 2021 01:00) (7 - 26)  SpO2: 95% (21 Feb 2021 01:00) (85% - 100%)        All laboratory results, radiology and medications reviewed.    LABS:  02-20    131<L>  |  100  |  16.0  ----------------------------<  90  4.3   |  22.0  |  0.78    Ca    7.8<L>      20 Feb 2021 17:01  Phos  2.2     02-19  Mg     2.1     02-20    TPro  5.4<L>  /  Alb  2.9<L>  /  TBili  0.7  /  DBili  x   /  AST  18  /  ALT  6   /  AlkPhos  59  02-19                                 13.2   18.27 )-----------( 254      ( 20 Feb 2021 17:01 )             38.6          PT/INR - ( 20 Feb 2021 02:46 )   PT: 14.9 sec;   INR: 1.30 ratio         PTT - ( 20 Feb 2021 02:46 )  PTT:33.7 sec    ABG - ( 20 Feb 2021 02:57 )  pH, Arterial: 7.39  pH, Blood: x     /  pCO2: 40    /  pO2: 104   / HCO3: 24    / Base Excess: -0.3  /  SaO2: 98                CARDIAC MARKERS ( 20 Feb 2021 02:46 )  x     / 0.28 ng/mL / 360 U/L / x     / 18.8 ng/mL  CARDIAC MARKERS ( 19 Feb 2021 16:58 )  x     / 0.25 ng/mL / 201 U/L / x     / 16.7 ng/mL      CAPILLARY BLOOD GLUCOSE      POCT Blood Glucose.: 95 mg/dL (21 Feb 2021 00:09)  POCT Blood Glucose.: 116 mg/dL (20 Feb 2021 21:45)  POCT Blood Glucose.: 84 mg/dL (20 Feb 2021 16:32)  POCT Blood Glucose.: 99 mg/dL (20 Feb 2021 15:57)  POCT Blood Glucose.: 123 mg/dL (20 Feb 2021 14:52)  POCT Blood Glucose.: 137 mg/dL (20 Feb 2021 13:58)  POCT Blood Glucose.: 140 mg/dL (20 Feb 2021 13:06)  POCT Blood Glucose.: 152 mg/dL (20 Feb 2021 11:48)  POCT Blood Glucose.: 163 mg/dL (20 Feb 2021 10:55)  POCT Blood Glucose.: 142 mg/dL (20 Feb 2021 09:58)  POCT Blood Glucose.: 162 mg/dL (20 Feb 2021 08:42)  POCT Blood Glucose.: 107 mg/dL (20 Feb 2021 08:01)  POCT Blood Glucose.: 106 mg/dL (20 Feb 2021 06:01)  POCT Blood Glucose.: 111 mg/dL (20 Feb 2021 05:08)  POCT Blood Glucose.: 120 mg/dL (20 Feb 2021 03:47)  POCT Blood Glucose.: 137 mg/dL (20 Feb 2021 02:33)             PHYSICAL EXAM:  General:  well nourished, no acute distress  Neurology:  alert and oriented X 4, nonfocal, no gross deficits  Respiratory:  clear to auscultation bilaterally  CV:  regular rate and rhythm, normal S1 S2  Abdomen:  soft, nontender, nondistended, positive bowel sounds  Extremities:  warm, well perfused, no edema +DP pulse on left, R BKA  Incisions:  midline sternal incision, c/d/i, sternum stable                
Pt seen and examined. Alert in NAD. Awaiting cardiac cath today. No GI complaints offered.     REVIEW OF SYSTEMS:  Constitutional: No fever, weight loss or fatigue  Cardiovascular: No chest pain, palpitations, dizziness or leg swelling  Gastrointestinal: As noted above. No abdominal or epigastric pain. No nausea, vomiting or hematemesis; No diarrhea or constipation. No melena or hematochezia.  Skin: No itching, burning, rashes or lesions       MEDICATIONS:  MEDICATIONS  (STANDING):  aspirin  chewable 81 milliGRAM(s) Oral daily  atorvastatin 20 milliGRAM(s) Oral at bedtime  chlorproMAZINE    Tablet 25 milliGRAM(s) Oral two times a day  dextrose 40% Gel 15 Gram(s) Oral once  dextrose 5%. 1000 milliLiter(s) (50 mL/Hr) IV Continuous <Continuous>  dextrose 5%. 1000 milliLiter(s) (100 mL/Hr) IV Continuous <Continuous>  dextrose 50% Injectable 25 Gram(s) IV Push once  dextrose 50% Injectable 12.5 Gram(s) IV Push once  dextrose 50% Injectable 25 Gram(s) IV Push once  glucagon  Injectable 1 milliGRAM(s) IntraMuscular once  heparin   Injectable 5000 Unit(s) SubCutaneous every 8 hours  insulin glargine Injectable (LANTUS) 20 Unit(s) SubCutaneous at bedtime  insulin lispro (ADMELOG) corrective regimen sliding scale   SubCutaneous every 6 hours  metoprolol tartrate 25 milliGRAM(s) Oral two times a day  pantoprazole    Tablet 40 milliGRAM(s) Oral two times a day  sodium chloride 0.9%. 1000 milliLiter(s) (125 mL/Hr) IV Continuous <Continuous>    MEDICATIONS  (PRN):  aluminum hydroxide/magnesium hydroxide/simethicone Suspension 30 milliLiter(s) Oral every 4 hours PRN Dyspepsia  ondansetron Injectable 4 milliGRAM(s) IV Push every 6 hours PRN Nausea      Allergies    No Known Allergies    Intolerances        Vital Signs Last 24 Hrs  T(C): 36.7 (18 Feb 2021 10:31), Max: 37.2 (18 Feb 2021 05:04)  T(F): 98 (18 Feb 2021 10:31), Max: 98.9 (18 Feb 2021 05:04)  HR: 78 (18 Feb 2021 10:31) (78 - 96)  BP: 134/54 (18 Feb 2021 10:31) (134/54 - 178/89)  BP(mean): --  RR: 19 (18 Feb 2021 10:31) (18 - 20)  SpO2: 100% (18 Feb 2021 10:31) (96% - 100%)      PHYSICAL EXAM:    General: Well developed; well nourished; in no acute distress  HEENT: MMM, conjunctiva pink and sclera anicteric.  Lungs: clear to auscultation bilaterally.  Cor: RRR S1, S2 only.  Gastrointestinal: Abdomen: Soft non-tender non-distended; Normal bowel sounds; No hepatosplenomegaly.  Extremities: no cyanosis, clubbing or edema.  Skin: Warm and dry. No obvious rash  Neuro: Pt. a + o x 3.    LABS:  CBC Full  -  ( 17 Feb 2021 09:01 )  WBC Count : 9.46 K/uL  RBC Count : 3.66 M/uL  Hemoglobin : 10.1 g/dL  Hematocrit : 31.4 %  Platelet Count - Automated : 226 K/uL  Mean Cell Volume : 85.8 fl  Mean Cell Hemoglobin : 27.6 pg  Mean Cell Hemoglobin Concentration : 32.2 gm/dL  Auto Neutrophil # : 6.78 K/uL  Auto Lymphocyte # : 1.60 K/uL  Auto Monocyte # : 0.98 K/uL  Auto Eosinophil # : 0.04 K/uL  Auto Basophil # : 0.02 K/uL  Auto Neutrophil % : 71.7 %  Auto Lymphocyte % : 16.9 %  Auto Monocyte % : 10.4 %  Auto Eosinophil % : 0.4 %  Auto Basophil % : 0.2 %    02-17    137  |  101  |  11.0  ----------------------------<  113<H>  3.4<L>   |  26.0  |  0.80    Ca    8.1<L>      17 Feb 2021 09:01  Mg     2.2     02-17    TPro  6.4<L>  /  Alb  3.1<L>  /  TBili  0.3<L>  /  DBili  x   /  AST  16  /  ALT  7   /  AlkPhos  88  02-17                      RADIOLOGY & ADDITIONAL STUDIES (The following images were personally reviewed):
POD 4 s/p C2L, uneventful postop course    Subjective: reports incisional pain, relieved with pain medication, denies CP, palpitations, SOB, cough, fever, chills, itchiness/rash, diaphoresis, vision changes, HA, dizziness/lightheadedness, numbness/tingling, abd pain, N/V     T(C): 36.8 (02-22-21 @ 22:00), Max: 36.9 (02-22-21 @ 04:00)  HR: 78 (02-22-21 @ 22:00) (78 - 88)  BP: 136/80 (02-22-21 @ 22:00) (117/43 - 138/72)  RR: 18 (02-22-21 @ 22:00) (18 - 18)  SpO2: 96% (02-22-21 @ 22:00) (92% - 100%)    02-22    132<L>  |  95<L>  |  17.0  ----------------------------<  185<H>  4.0   |  22.0  |  0.88    Ca    7.8<L>      22 Feb 2021 09:46  Mg     1.9     02-22                                 13.3   16.03 )-----------( 275      ( 22 Feb 2021 10:58 )             39.7           CAPILLARY BLOOD GLUCOSE  POCT Blood Glucose.: 229 mg/dL (22 Feb 2021 21:37)  POCT Blood Glucose.: 217 mg/dL (22 Feb 2021 16:59)  POCT Blood Glucose.: 233 mg/dL (22 Feb 2021 12:03)  POCT Blood Glucose.: 154 mg/dL (22 Feb 2021 08:03)    I&O's Detail    21 Feb 2021 07:01  -  22 Feb 2021 07:00  --------------------------------------------------------  IN:    IV PiggyBack: 50 mL    IV PiggyBack: 250 mL    Oral Fluid: 480 mL  Total IN: 780 mL    OUT:    Voided (mL): 750 mL  Total OUT: 750 mL  Total NET: 30 mL    22 Feb 2021 07:01  -  23 Feb 2021 02:55  --------------------------------------------------------  IN:    Oral Fluid: 1140 mL  Total IN: 1140 mL    OUT:    Voided (mL): 1100 mL  Total OUT: 1100 mL  Total NET: 40 mL    Drug Dosing Weight  Height (cm): 175.3 (19 Feb 2021 12:27)  Weight (kg): 77 (19 Feb 2021 12:27)  BMI (kg/m2): 25.1 (19 Feb 2021 12:27)  BSA (m2): 1.93 (19 Feb 2021 12:27)    MEDICATIONS  (STANDING):  aspirin enteric coated 325 milliGRAM(s) Oral daily  atorvastatin 40 milliGRAM(s) Oral at bedtime  enoxaparin Injectable 40 milliGRAM(s) SubCutaneous daily  furosemide    Tablet 40 milliGRAM(s) Oral daily  insulin glargine Injectable (LANTUS) 15 Unit(s) SubCutaneous at bedtime  insulin lispro (ADMELOG) corrective regimen sliding scale   SubCutaneous three times a day before meals  insulin lispro Injectable (ADMELOG) 4 Unit(s) SubCutaneous three times a day with meals  metoprolol tartrate 50 milliGRAM(s) Oral two times a day  pantoprazole    Tablet 40 milliGRAM(s) Oral daily  potassium chloride    Tablet ER 20 milliEquivalent(s) Oral daily  senna 2 Tablet(s) Oral at bedtime  sodium chloride 0.9% lock flush 3 milliLiter(s) IV Push every 8 hours    MEDICATIONS  (PRN):  oxyCODONE    IR 5 milliGRAM(s) Oral every 4 hours PRN Moderate Pain (4 - 6)  oxyCODONE    IR 10 milliGRAM(s) Oral every 4 hours PRN Severe Pain (7 - 10)  psyllium Powder 1 Packet(s) Oral daily PRN Constipation    Physical Exam  Constitutional: NAD, well developed, well nourished  Neuro: A+O x 3, non-focal, speech clear and intact  Neck: supple, no JVD  CV: S1S2 RRR, no murmurs  Pulm/chest: CTA b/l, no wheezing  Abd: +BS soft NT ND  Ext: AWAN x 4, R BKA, L toe amputations 1-3  Skin: warm, well perfused  Psych: calm, appropriate affect   inc: MSI dsg C/D/I, LLE SVG dsg C/D/I  lines/tubes: V wires isolated  
Subjective:  71yo M resting comfortable, no c/o pain.      HPI:  71 y/o M with PMHX IDDM2 presenting with abdominal pain from home. He reports nausea and vomiting for 2 days. Patient is a poor historian. He endorses a history of Dm, on metformin and glipizide. He reports hiccuping frequently. He states he does see a PMD but doesn't recall the last time. He states that he has some mild epigastric abdominal pain, and that he has not eaten in 2 days due to the nausea and vomiting. Labs reviewed multiple abnormalities noted. CT and EKG reviewed. Improving s/p IVFB 2L. Still burping. Discussed plan below.  ROS +eructation, +n/V, +diaphoresis. +abd pain, denies CP, denies SOB, all other systems negative.  (2021 23:45)          PAST MEDICAL & SURGICAL HISTORY:  Psoriasis    Neuropathy    Cataract    HTN (hypertension)    Dry eye    DM (diabetes mellitus)    Toe amputation status, left  2nd 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    S/P BKA (below knee amputation) unilateral, right            MEDICATIONS  (STANDING):  aspirin enteric coated 325 milliGRAM(s) Oral daily  atorvastatin 40 milliGRAM(s) Oral at bedtime  cefuroxime  IVPB 1500 milliGRAM(s) IV Intermittent every 8 hours  chlorhexidine 2% Cloths 1 Application(s) Topical daily  dextrose 50% Injectable 50 milliLiter(s) IV Push every 15 minutes  enoxaparin Injectable 40 milliGRAM(s) SubCutaneous daily  insulin regular Infusion 2 Unit(s)/Hr (2 mL/Hr) IV Continuous <Continuous>  norepinephrine Infusion 0.05 MICROgram(s)/kG/Min (7.22 mL/Hr) IV Continuous <Continuous>  pantoprazole    Tablet 40 milliGRAM(s) Oral daily  senna 2 Tablet(s) Oral at bedtime  sodium chloride 0.9%. 1000 milliLiter(s) (10 mL/Hr) IV Continuous <Continuous>  sodium chloride 0.9%. 1000 milliLiter(s) (10 mL/Hr) IV Continuous <Continuous>  vancomycin  IVPB 1000 milliGRAM(s) IV Intermittent every 12 hours    MEDICATIONS  (PRN):  psyllium Powder 1 Packet(s) Oral daily PRN Constipation          Allergies    No Known Allergies    Intolerances          WEIGHTS:  Daily Height in cm: 175.26 (2021 12:27)    Daily Weight in k.3 (2021 04:53)  Admit Wt: Drug Dosing Weight  Height (cm): 175.3 (2021 12:27)  Weight (kg): 77 (2021 12:)  BMI (kg/m2): 25.1 (2021 12:27)  BSA (m2): 1.93 (2021 12:27)  I&O's Summary    2021 07:01  -  2021 07:00  --------------------------------------------------------  IN: 540 mL / OUT: 900 mL / NET: -360 mL    2021 07:01  -  2021 03:14  --------------------------------------------------------  IN: 1031 mL / OUT: 1735 mL / NET: -704 mL        VITAL SIGNS:  ICU Vital Signs Last 24 Hrs  T(C): 36.7 (2021 00:00), Max: 36.8 (2021 12:27)  T(F): 98.1 (2021 00:00), Max: 98.2 (2021 12:27)  HR: 85 (2021 03:00) (75 - 94)  BP: 123/55 (2021 12:27) (123/55 - 152/75)  BP(mean): --  ABP: 139/62 (2021 03:00) (92/46 - 207/93)  ABP(mean): 88 (2021 03:00) (64 - 140)  RR: 22 (2021 03:00) (0 - 29)  SpO2: 99% (2021 03:00) (96% - 100%)        All laboratory results, radiology and medications reviewed.    LABS:      137  |  105  |  9.0  ----------------------------<  156<H>  4.6   |  19.0<L>  |  0.59    Ca    7.9<L>      2021 16:58  Phos  2.2       Mg     3.2         TPro  5.4<L>  /  Alb  2.9<L>  /  TBili  0.7  /  DBili  x   /  AST  18  /  ALT  6   /  AlkPhos  59                                   13.3   15.81 )-----------( 210      ( 2021 02:46 )             38.2          PT/INR - ( 2021 02:46 )   PT: 14.9 sec;   INR: 1.30 ratio         PTT - ( 2021 02:46 )  PTT:33.7 sec  Bilirubin Total, Serum: 0.7 mg/dL ( @ 16:58)  Bilirubin Total, Serum: 0.5 mg/dL ( @ 07:16)    ABG - ( 2021 02:57 )  pH, Arterial: 7.39  pH, Blood: x     /  pCO2: 40    /  pO2: 104   / HCO3: 24    / Base Excess: -0.3  /  SaO2: 98                CARDIAC MARKERS ( 2021 16:58 )  x     / 0.25 ng/mL / 201 U/L / x     / 16.7 ng/mL      CAPILLARY BLOOD GLUCOSE      POCT Blood Glucose.: 137 mg/dL (2021 02:33)  POCT Blood Glucose.: 148 mg/dL (2021 01:20)  POCT Blood Glucose.: 200 mg/dL (2021 23:47)  POCT Blood Glucose.: 196 mg/dL (2021 22:40)  POCT Blood Glucose.: 188 mg/dL (2021 21:13)  POCT Blood Glucose.: 175 mg/dL (2021 20:39)  POCT Blood Glucose.: 159 mg/dL (2021 18:27)  POCT Blood Glucose.: 148 mg/dL (2021 17:45)  POCT Blood Glucose.: 118 mg/dL (2021 12:42)  POCT Blood Glucose.: 114 mg/dL (2021 08:24)  POCT Blood Glucose.: 124 mg/dL (2021 06:37)             PHYSICAL EXAM:  General:  well nourished, no acute distress  Neurology:  alert and oriented X 4, nonfocal, no gross deficits  Respiratory:  clear to auscultation bilaterally  CV:  regular rate and rhythm, normal S1 S2  Abdomen:  soft, nontender, nondistended, positive bowel sounds  Extremities:  warm, well perfused, no edema +DP pulse on L, BKA on R  Incisions:  midline sternal incision, c/d/i, sternum stable                
SUBJECTIVE   "i am okay, just sleepy"   without acute complaints at this time     INTERIM HISTORY SIGNIFICANT FOR   s/p CABG x 2   post op uneventful   patient downgraded from ICU     Patient is a 70y old  Male who presents with a chief complaint of NSTEMI (21 Feb 2021 02:02)    HPI:  71 y/o M with PMHX IDDM2 presenting with abdominal pain from home. He reports nausea and vomiting for 2 days. Patient is a poor historian. He endorses a history of Dm, on metformin and glipizide. He reports hiccuping frequently. He states he does see a PMD but doesn't recall the last time. He states that he has some mild epigastric abdominal pain, and that he has not eaten in 2 days due to the nausea and vomiting. Labs reviewed multiple abnormalities noted. CT and EKG reviewed. Improving s/p IVFB 2L. Still burping. Discussed plan below.  ROS +eructation, +n/V, +diaphoresis. +abd pain, denies CP, denies SOB, all other systems negative.  (14 Feb 2021 23:45)    OBJECTIVE  PAST MEDICAL & SURGICAL HISTORY:  Psoriasis  Neuropathy  Cataract  HTN (hypertension)  Dry eye  DM (diabetes mellitus)  Toe amputation status, left  2nd 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  S/P BKA (below knee amputation) unilateral, right      No Known Allergies  OHS (Unknown)    Home Medications:  glipiZIDE 10 mg oral tablet, extended release: 1 tab(s) orally once a day (15 Feb 2021 01:08)  Lantus 100 units/mL subcutaneous solution: 25 unit(s) subcutaneous once a day (15 Feb 2021 01:00)  metFORMIN 1000 mg oral tablet: 1 tab(s) orally 2 times a day (15 Feb 2021 01:08)    VITALS  ICU Vital Signs Last 24 Hrs  T(C): 36.8 (21 Feb 2021 20:34), Max: 37 (21 Feb 2021 16:00)  T(F): 98.2 (21 Feb 2021 20:34), Max: 98.6 (21 Feb 2021 16:00)  HR: 98 (21 Feb 2021 20:34) (83 - 98)  BP: 111/72 (21 Feb 2021 20:34) (103/61 - 124/58)  BP(mean): 78 (21 Feb 2021 20:00) (76 - 84)  ABP: 113/48 (21 Feb 2021 08:00) (103/45 - 125/54)  ABP(mean): 71 (21 Feb 2021 08:00) (63 - 76)  RR: 18 (21 Feb 2021 20:34) (18 - 28)  SpO2: 98% (21 Feb 2021 20:34) (78% - 98%)    CVP(mm Hg): 7 (21 Feb 2021 04:00) (7 - 9)    LABS                        11.7   14.53 )-----------( 225      ( 21 Feb 2021 02:17 )             35.0   PT/INR - ( 20 Feb 2021 02:46 )   PT: 14.9 sec;   INR: 1.30 ratio         PTT - ( 20 Feb 2021 02:46 )  PTT:33.7 [id number]    133<L>  |  104  |  17.0  ----------------------------<  83  3.8   |  22.0  |  0.99    Ca    7.2<L>      21 Feb 2021 02:17  Mg     2.2     02-21    CAPILLARY BLOOD GLUCOSE      POCT Blood Glucose.: 175 mg/dL (21 Feb 2021 21:21)  CARDIAC MARKERS ( 20 Feb 2021 02:46 )  x     / 0.28 ng/mL / 360 U/L / x     / 18.8 ng/mL      ABG - ( 20 Feb 2021 02:57 )  pH, Arterial: 7.39  pH, Blood: x     /  pCO2: 40    /  pO2: 104   / HCO3: 24    / Base Excess: -0.3  /  SaO2: 98                  IN/OUT    02-20-21 @ 07:01  -  02-21-21 @ 07:00  --------------------------------------------------------  IN: 2381 mL / OUT: 1695 mL / NET: 686 mL    02-21-21 @ 07:01  -  02-22-21 @ 01:34  --------------------------------------------------------  IN: 300 mL / OUT: 750 mL / NET: -450 mL      IMAGING  personally reviewed imaging   Xray Chest 1 View- PORTABLE-Routine:    EXAM:  XR CHEST PORTABLE ROUTINE 1V                          PROCEDURE DATE:  02/21/2021          INTERPRETATION:  AP chest on February 21, 2021 at 6:57 AM. Patient is status post heart surgery.    Heart magnified by technique. Sternotomy again noted. Right jugular line present on February 20 has been removed.    Minimal right base fluid and/or atelectasis is noted.    Lungs are similar to February 20.    IMPRESSION: Removal of right jugular line.            DEONNA EM MD; Attending Radiologist  This document has been electronically signed. Feb 21 2021 12:25PM (02-21-21 @ 07:54)    CURRENT MEDICATIONS  MEDICATIONS  (STANDING):  aspirin enteric coated 325 milliGRAM(s) Oral daily  atorvastatin 40 milliGRAM(s) Oral at bedtime  enoxaparin Injectable 40 milliGRAM(s) SubCutaneous daily  furosemide    Tablet 40 milliGRAM(s) Oral daily  insulin glargine Injectable (LANTUS) 10 Unit(s) SubCutaneous at bedtime  insulin lispro (ADMELOG) corrective regimen sliding scale   SubCutaneous three times a day before meals  insulin lispro Injectable (ADMELOG) 2 Unit(s) SubCutaneous three times a day before meals  metoprolol tartrate 50 milliGRAM(s) Oral two times a day  pantoprazole    Tablet 40 milliGRAM(s) Oral daily  potassium chloride    Tablet ER 20 milliEquivalent(s) Oral daily  senna 2 Tablet(s) Oral at bedtime  sodium chloride 0.9% lock flush 3 milliLiter(s) IV Push every 8 hours    MEDICATIONS  (PRN):  oxyCODONE    IR 5 milliGRAM(s) Oral every 4 hours PRN Moderate Pain (4 - 6)  oxyCODONE    IR 10 milliGRAM(s) Oral every 4 hours PRN Severe Pain (7 - 10)  psyllium Powder 1 Packet(s) Oral daily PRN Constipation

## 2021-02-23 NOTE — PROGRESS NOTE ADULT - PROBLEM SELECTOR PLAN 2
As above
Resolved. Likely secondary to AMI +/or esophagitis. See above management plan recommendations.
As above

## 2021-02-23 NOTE — PROGRESS NOTE ADULT - PROBLEM SELECTOR PLAN 4
Insulin gtt transitioned to Lantus, Premeal and EDSON  Endocrine consult appreciated
Insulin gtt transitioned to Lantus, Premeal and EDSON  Endocrine consult appreciated
A1C 8.5 on lantus preop  cont to uptitrate as needed  f/u Endo
Currently on insulin gtt  Will transition to Lantus/Premeal/EDSON later this evening

## 2021-02-23 NOTE — PROGRESS NOTE ADULT - PROBLEM SELECTOR PROBLEM 2
NSTEMI (non-ST elevated myocardial infarction)
Nausea and vomiting, intractability of vomiting not specified, unspecified vomiting type
NSTEMI (non-ST elevated myocardial infarction)

## 2021-02-23 NOTE — DIETITIAN NUTRITION RISK NOTIFICATION - TREATMENT: THE FOLLOWING DIET HAS BEEN RECOMMENDED
Diet, Regular:   Consistent Carbohydrate {No Snacks} (CSTCHO)  DASH/TLC {Sodium & Cholesterol Restricted} (DASH)  Supplement Feeding Modality:  Oral  Glucerna Shake Cans or Servings Per Day:  3       Frequency:  Three Times a day (02-21-21 @ 21:55) [Active]      Continue with Glucerna tid

## 2021-02-23 NOTE — CHART NOTE - NSCHARTNOTEFT_GEN_A_CORE
Source: Patient [x ]  Family [ ]   other [ ]    Current Diet: Diet, Regular:   Consistent Carbohydrate {No Snacks} (CSTCHO)  DASH/TLC {Sodium & Cholesterol Restricted} (DASH)  Supplement Feeding Modality:  Oral  Glucerna Shake Cans or Servings Per Day:  3       Frequency:  Three Times a day (02-21-21 @ 21:55)    PO intake:  Pt reports mostly good po intake at meals and consumes Glucerna    Current Weight:   (2/23) 156.7 lbs  (2/14) 169.7 lbs    % Weight Change - possible wt loss noted since admission, if accurate- will continue to monitor.  Aware pt with 1+ trace edema left leg noted.    Pertinent Medications: MEDICATIONS  (STANDING):  aspirin enteric coated 325 milliGRAM(s) Oral daily  atorvastatin 40 milliGRAM(s) Oral at bedtime  enoxaparin Injectable 40 milliGRAM(s) SubCutaneous daily  furosemide    Tablet 40 milliGRAM(s) Oral daily  insulin glargine Injectable (LANTUS) 15 Unit(s) SubCutaneous at bedtime  insulin lispro (ADMELOG) corrective regimen sliding scale   SubCutaneous three times a day before meals  insulin lispro Injectable (ADMELOG) 4 Unit(s) SubCutaneous three times a day with meals  metoprolol tartrate 50 milliGRAM(s) Oral two times a day  pantoprazole    Tablet 40 milliGRAM(s) Oral daily  potassium chloride    Tablet ER 20 milliEquivalent(s) Oral daily  senna 2 Tablet(s) Oral at bedtime  sodium chloride 0.9% lock flush 3 milliLiter(s) IV Push every 8 hours    MEDICATIONS  (PRN):  oxyCODONE    IR 5 milliGRAM(s) Oral every 4 hours PRN Moderate Pain (4 - 6)  oxyCODONE    IR 10 milliGRAM(s) Oral every 4 hours PRN Severe Pain (7 - 10)  psyllium Powder 1 Packet(s) Oral daily PRN Constipation    Pertinent Labs: CBC Full  -  ( 23 Feb 2021 06:19 )  WBC Count : 11.73 K/uL  RBC Count : 3.95 M/uL  Hemoglobin : 11.3 g/dL  Hematocrit : 33.9 %  Platelet Count - Automated : 257 K/uL  Mean Cell Volume : 85.8 fl  Mean Cell Hemoglobin : 28.6 pg  Mean Cell Hemoglobin Concentration : 33.3 gm/dL  02-23 Na136 mmol/L Glu 160 mg/dL<H> K+ 4.0 mmol/L Cr  0.77 mg/dL BUN 13.0 mg/dL Phos n/a   Alb n/a   PAB n/a       Skin: sx MSI    Nutrition focused physical exam conducted - found signs of malnutrition [ ]absent [x ]present    Subcutaneous fat loss: [ ] Orbital fat pads region, [ ]Buccal fat region, [ ]Triceps region,  [ ]Ribs region    Muscle wasting: [x ]Temples region, [x ]Clavicle region, [x ]Shoulder region, [ ]Scapula region, [ ]Interosseous region,  [ ]thigh region, [ ]Calf region    Current Nutrition Diagnosis: Pt remains at nutrition risk secondary to moderate protein calorie malnutrition related to inability to consume sufficient protein energy intake with decreased appetite with N/V prior to admission in setting of +STEMI as evidenced by pt meeting <75% estimated energy intake > 7 days and mild muscle wasting.  Pt reports mostly good po intake at meals.      Unable to complete full assessment upon initial interview so more information was obtained during current interview.  Pt reports decreased po intake prior to admission and possible wt loss.  Pt overall poor historian.  Pt states not following cons cho, dash/tlc meal plan at home, but has fairly good understanding of diet guidelines.  Pt states his daughter mostly prepares his meals.  Pt also reports taking Metformin and Glipizide for diabetes, but appears to monitor BG levels inconsistently.  Reinforced meal plan using the plate method and encouraged diabetes self management to achieve euglycemia.  Pt agreeable and verbalized understanding, however unsure level of understanding at this time.  Nutrition literature provided.    Recommendations:   1. Continue with Glucerna TID  2. RX: MVI and Vit C 500mg daily   3. Monitor daily wts     Monitoring and Evaluation:   [x ] PO intake [x ] Tolerance to diet prescription [X] Weights  [X] Follow up per protocol [X] Labs:

## 2021-02-23 NOTE — PROGRESS NOTE ADULT - REASON FOR ADMISSION
NSTEMI

## 2021-02-23 NOTE — PROGRESS NOTE ADULT - PROBLEM SELECTOR PLAN 1
s/p C2L with Dr. Ramírez on 2/19  Continue ASA for acute graft patency  Continue statin for anti inflammatory properties  Encourage IS hourly while awake  Ambulate with PT assist, pt has prosthesis
oob, ambulate, pt as tolerated, has R prosthesis  stable on RA, encourage Incentive Spirometry, chest PT, deep breathing and coughing  hemodynamically stable, cont asa and lipitor for graft patency  cont lopressor, titrate as HR and BP allow  diet as tolerated, cont bowel regimen and protonix  cont lasix, monitor bun/cr, replace lytes PRN  trend H/H  dispo: d/c wednesday, f/u with PT if rehab needed  d/w team in AM rounds
Likely secondary to esophagitis seen on admission CT scan. Pt's present cardiac status not optimized for EGD at this time. Would continue current diet and Pantoprazole 40 mg. BID as OPT with OPT EGD with Dr. Miles in 6 to 8 weeks. Signing of for now. Re-consult in house as needed. Thank you.
s/p C2L with Dr. Ramírez on 2/19  No inotropic support needed, minimal pressor support  Continue ASA for acute graft patency  Continue statin for anti inflammatory properties  Extubated, encourage IS hourly while awake  Ambulate with PT assist, pt has prosthesis
as above

## 2021-02-23 NOTE — PROGRESS NOTE ADULT - PROBLEM SELECTOR PROBLEM 4
Type 2 diabetes mellitus with diabetic peripheral angiopathy and gangrene, with long-term current use of insulin

## 2021-02-23 NOTE — PROGRESS NOTE ADULT - PROVIDER SPECIALTY LIST ADULT
Cardiology
Cardiology
Endocrinology
Hospitalist
Hospitalist
Cardiology
Hospitalist
Hospitalist
Gastroenterology
CT Surgery

## 2021-02-23 NOTE — PROGRESS NOTE ADULT - PROBLEM SELECTOR PLAN 5
Lovenox for DVT prophylaxis  Pantoprazole for GI prophylaxis

## 2021-02-23 NOTE — CHART NOTE - NSCHARTNOTEFT_GEN_A_CORE
CTS ACP Addendum    Briefly, 70M h/o DM w/A1C 8.5, HTN, psoriasis, s/p TBI, R BKA presents with N/V, abd pain and + NSTEMI, no w s/p C2L 2/19 with Dr. Ramírez;    Patient seen and examined. Notes, flowsheets, medications, radiologic images and labs reviewed. VSS, no complaints. Ambulated well with PT although stairs still pose a challenge (pt lives on second floor). trace LLE edema and otherwise normal PE and VSS.    Plan:  - OOB, ambulate, chest PT, Incentive Spirometry encouraged  - No DME needs at home  - Stairs with PT tomorrow  - Wires removed without issue  - Hyperglycemia noted > will likely go home on home meds  - Extra lasix dose given today  - Plan for HOME tomorrow    Case discussed with Dr. Pettit

## 2021-02-23 NOTE — PROGRESS NOTE ADULT - PROBLEM SELECTOR PROBLEM 3
Abnormal CT scan, esophagus
Essential hypertension

## 2021-02-23 NOTE — PROGRESS NOTE ADULT - ASSESSMENT
70M h/o DM w/A1C 8.5, HTN, psoriasis, s/p TBI, R BKA presents with N/V, abd pain and + NSTEMI, no w s/p C2L 2/19 with Dr. Ramírez;

## 2021-02-23 NOTE — PROGRESS NOTE ADULT - PROBLEM SELECTOR PROBLEM 1
Epigastric pain
Coronary artery disease involving native coronary artery of native heart without angina pectoris

## 2021-02-24 ENCOUNTER — TRANSCRIPTION ENCOUNTER (OUTPATIENT)
Age: 71
End: 2021-02-24

## 2021-02-24 VITALS
SYSTOLIC BLOOD PRESSURE: 120 MMHG | DIASTOLIC BLOOD PRESSURE: 72 MMHG | HEART RATE: 83 BPM | OXYGEN SATURATION: 97 % | RESPIRATION RATE: 17 BRPM | TEMPERATURE: 98 F

## 2021-02-24 LAB
ANION GAP SERPL CALC-SCNC: 11 MMOL/L — SIGNIFICANT CHANGE UP (ref 5–17)
BUN SERPL-MCNC: 13 MG/DL — SIGNIFICANT CHANGE UP (ref 8–20)
CALCIUM SERPL-MCNC: 8 MG/DL — LOW (ref 8.6–10.2)
CHLORIDE SERPL-SCNC: 97 MMOL/L — LOW (ref 98–107)
CO2 SERPL-SCNC: 29 MMOL/L — SIGNIFICANT CHANGE UP (ref 22–29)
CREAT SERPL-MCNC: 0.74 MG/DL — SIGNIFICANT CHANGE UP (ref 0.5–1.3)
GLUCOSE BLDC GLUCOMTR-MCNC: 141 MG/DL — HIGH (ref 70–99)
GLUCOSE BLDC GLUCOMTR-MCNC: 155 MG/DL — HIGH (ref 70–99)
GLUCOSE BLDC GLUCOMTR-MCNC: 211 MG/DL — HIGH (ref 70–99)
GLUCOSE SERPL-MCNC: 139 MG/DL — HIGH (ref 70–99)
HCT VFR BLD CALC: 35.2 % — LOW (ref 39–50)
HGB BLD-MCNC: 11.5 G/DL — LOW (ref 13–17)
MAGNESIUM SERPL-MCNC: 1.9 MG/DL — SIGNIFICANT CHANGE UP (ref 1.8–2.6)
MCHC RBC-ENTMCNC: 28.2 PG — SIGNIFICANT CHANGE UP (ref 27–34)
MCHC RBC-ENTMCNC: 32.7 GM/DL — SIGNIFICANT CHANGE UP (ref 32–36)
MCV RBC AUTO: 86.3 FL — SIGNIFICANT CHANGE UP (ref 80–100)
PLATELET # BLD AUTO: 338 K/UL — SIGNIFICANT CHANGE UP (ref 150–400)
POTASSIUM SERPL-MCNC: 3.8 MMOL/L — SIGNIFICANT CHANGE UP (ref 3.5–5.3)
POTASSIUM SERPL-SCNC: 3.8 MMOL/L — SIGNIFICANT CHANGE UP (ref 3.5–5.3)
RBC # BLD: 4.08 M/UL — LOW (ref 4.2–5.8)
RBC # FLD: 13.2 % — SIGNIFICANT CHANGE UP (ref 10.3–14.5)
SODIUM SERPL-SCNC: 136 MMOL/L — SIGNIFICANT CHANGE UP (ref 135–145)
WBC # BLD: 11.59 K/UL — HIGH (ref 3.8–10.5)
WBC # FLD AUTO: 11.59 K/UL — HIGH (ref 3.8–10.5)

## 2021-02-24 PROCEDURE — 71045 X-RAY EXAM CHEST 1 VIEW: CPT | Mod: 26

## 2021-02-24 RX ORDER — ATORVASTATIN CALCIUM 80 MG/1
1 TABLET, FILM COATED ORAL
Qty: 30 | Refills: 0
Start: 2021-02-24

## 2021-02-24 RX ORDER — MAGNESIUM SULFATE 500 MG/ML
2 VIAL (ML) INJECTION ONCE
Refills: 0 | Status: COMPLETED | OUTPATIENT
Start: 2021-02-24 | End: 2021-02-24

## 2021-02-24 RX ORDER — ASPIRIN/CALCIUM CARB/MAGNESIUM 324 MG
1 TABLET ORAL
Qty: 30 | Refills: 0
Start: 2021-02-24

## 2021-02-24 RX ORDER — ACETAMINOPHEN 500 MG
2 TABLET ORAL
Qty: 0 | Refills: 0 | DISCHARGE

## 2021-02-24 RX ORDER — METOPROLOL TARTRATE 50 MG
1 TABLET ORAL
Qty: 60 | Refills: 0
Start: 2021-02-24

## 2021-02-24 RX ORDER — INSULIN GLARGINE 100 [IU]/ML
25 INJECTION, SOLUTION SUBCUTANEOUS AT BEDTIME
Refills: 0 | Status: DISCONTINUED | OUTPATIENT
Start: 2021-02-24 | End: 2021-02-24

## 2021-02-24 RX ORDER — METFORMIN HYDROCHLORIDE 850 MG/1
1 TABLET ORAL
Qty: 60 | Refills: 0
Start: 2021-02-24

## 2021-02-24 RX ORDER — METFORMIN HYDROCHLORIDE 850 MG/1
1 TABLET ORAL
Qty: 0 | Refills: 0 | DISCHARGE

## 2021-02-24 RX ORDER — FUROSEMIDE 40 MG
1 TABLET ORAL
Qty: 7 | Refills: 0
Start: 2021-02-24 | End: 2021-03-02

## 2021-02-24 RX ORDER — POTASSIUM CHLORIDE 20 MEQ
20 PACKET (EA) ORAL ONCE
Refills: 0 | Status: COMPLETED | OUTPATIENT
Start: 2021-02-24 | End: 2021-02-24

## 2021-02-24 RX ORDER — POTASSIUM CHLORIDE 20 MEQ
1 PACKET (EA) ORAL
Qty: 7 | Refills: 0
Start: 2021-02-24 | End: 2021-03-02

## 2021-02-24 RX ADMIN — Medication 50 GRAM(S): at 15:16

## 2021-02-24 RX ADMIN — Medication 20 MILLIEQUIVALENT(S): at 08:22

## 2021-02-24 RX ADMIN — Medication 2: at 17:16

## 2021-02-24 RX ADMIN — Medication 6 UNIT(S): at 08:22

## 2021-02-24 RX ADMIN — Medication 6 UNIT(S): at 12:08

## 2021-02-24 RX ADMIN — PANTOPRAZOLE SODIUM 40 MILLIGRAM(S): 20 TABLET, DELAYED RELEASE ORAL at 08:22

## 2021-02-24 RX ADMIN — Medication 50 MILLIGRAM(S): at 05:37

## 2021-02-24 RX ADMIN — SODIUM CHLORIDE 3 MILLILITER(S): 9 INJECTION INTRAMUSCULAR; INTRAVENOUS; SUBCUTANEOUS at 14:15

## 2021-02-24 RX ADMIN — Medication 40 MILLIGRAM(S): at 05:37

## 2021-02-24 RX ADMIN — Medication 4: at 12:08

## 2021-02-24 RX ADMIN — SODIUM CHLORIDE 3 MILLILITER(S): 9 INJECTION INTRAMUSCULAR; INTRAVENOUS; SUBCUTANEOUS at 03:22

## 2021-02-24 RX ADMIN — Medication 325 MILLIGRAM(S): at 08:22

## 2021-02-24 RX ADMIN — Medication 20 MILLIEQUIVALENT(S): at 15:29

## 2021-02-24 NOTE — DISCHARGE NOTE PROVIDER - NSDCCPTREATMENT_GEN_ALL_CORE_FT
PRINCIPAL PROCEDURE  Procedure: CABG, using 1 arterial and 1 venous graft  Findings and Treatment: LIMA- LAD, SVG - RPDA

## 2021-02-24 NOTE — DISCHARGE NOTE NURSING/CASE MANAGEMENT/SOCIAL WORK - NSDCFUADDAPPT_GEN_ALL_CORE_FT
Please follow up with Dr. Ramírez on 3/2/21 at 9:30am at [address] [facility] -    Please follow up with your Cardiologist and Primary care Doctor 2 weeks from discharge

## 2021-02-24 NOTE — DISCHARGE NOTE PROVIDER - NSDCFUADDAPPT_GEN_ALL_CORE_FT
Please follow up with Dr. Ramírez on 3/2/21 at 9:30am at [address] [facility] -    Please follow up with your Cardiologist and Primary care Doctor 2 weeks from discharge

## 2021-02-24 NOTE — DISCHARGE NOTE PROVIDER - CARE PROVIDER_API CALL
Brendon Ramírez)  Surgery; Thoracic and Cardiac Surgery  [address] [place] [address]  Phone: [phone]  Fax: [phone]  Follow Up Time:     Tico Tomlinson)  Cardiovascular Disease; Internal Medicine; Interventional Cardiology  [address] [place] [address]  Phone: [phone]  Fax: [phone]  Follow Up Time:

## 2021-02-24 NOTE — DISCHARGE NOTE PROVIDER - NSDCMRMEDTOKEN_GEN_ALL_CORE_FT
aspirin 325 mg oral delayed release tablet: 1 tab(s) orally once a day  atorvastatin 40 mg oral tablet: 1 tab(s) orally once a day (at bedtime)  DME: 32 G [employer] needles for insulin pen     1 time a day    1 month supply   furosemide 40 mg oral tablet: 1 tab(s) orally once a day  glipiZIDE 10 mg oral tablet, extended release: 1 tab(s) orally once a day  Lantus 100 units/mL subcutaneous solution: 25 unit(s) subcutaneous once a day  metFORMIN 1000 mg oral tablet: 1 tab(s) orally 2 times a day  metoprolol tartrate 50 mg oral tablet: 1 tab(s) orally 2 times a day  potassium chloride 20 mEq oral tablet, extended release: 1 tab(s) orally once  Tylenol 325 mg oral tablet: 2 tab(s) orally every 6 hours

## 2021-02-24 NOTE — DISCHARGE NOTE PROVIDER - NSDCFUSCHEDAPPT_GEN_ALL_CORE_FT
ANDRIY LEBLANC ; 04/19/2021 ; NPP Intmed [address] ANDRIY LEBLANC ; 03/02/2021 ; NPP CTSurg [address] [facility]  ANDRIY LEBLANC ; 04/19/2021 ; NPP Intmed [address] [facility]

## 2021-02-24 NOTE — DISCHARGE NOTE NURSING/CASE MANAGEMENT/SOCIAL WORK - PATIENT PORTAL LINK FT
You can access the FollowMyHealth Patient Portal offered by [facility] by registering at the following website: [email or web address]. By joining [facility]’s FollowMyHealth portal, you will also be able to view your health information using other applications (apps) compatible with our system.

## 2021-02-24 NOTE — DISCHARGE NOTE PROVIDER - NSDCPNSUBOBJ_GEN_ALL_CORE
Subjective:  pt in bed NAD no issues     VITAL SIGNS  T(C): 36.8 (02-24-21 @ 09:53), Max: 36.9 (02-23-21 @ 15:20)  HR: 83 (02-24-21 @ 09:53) (73 - 96)  BP: 108/69 (02-24-21 @ 09:53) (103/57 - 146/77)  RR: 20 (02-24-21 @ 09:53) (17 - 20)  SpO2: 100% (02-24-21 @ 09:53) (92% - 100%) RA   Wt(kg): --   on (O2)            Daily     Daily   Admit Wt: Drug Dosing Weight  Height (cm): 175.3 (19 Feb 2021 12:27)  Weight (kg): 77 (19 Feb 2021 12:27)  BMI (kg/m2): 25.1 (19 Feb 2021 12:27)  BSA (m2): 1.93 (19 Feb 2021 12:27)  Telemetry:   SR  LVEF:  65%    MEDICATIONS  aspirin enteric coated 325 milliGRAM(s) Oral daily  atorvastatin 40 milliGRAM(s) Oral at bedtime  enoxaparin Injectable 40 milliGRAM(s) SubCutaneous daily  furosemide    Tablet 40 milliGRAM(s) Oral daily  insulin glargine Injectable (LANTUS) 25 Unit(s) SubCutaneous at bedtime  insulin lispro (ADMELOG) corrective regimen sliding scale   SubCutaneous three times a day before meals  magnesium sulfate  IVPB 2 Gram(s) IV Intermittent once  metoprolol tartrate 50 milliGRAM(s) Oral two times a day  oxyCODONE    IR 5 milliGRAM(s) Oral every 4 hours PRN  oxyCODONE    IR 10 milliGRAM(s) Oral every 4 hours PRN  pantoprazole    Tablet 40 milliGRAM(s) Oral daily  potassium chloride    Tablet ER 20 milliEquivalent(s) Oral daily  potassium chloride    Tablet ER 20 milliEquivalent(s) Oral once  psyllium Powder 1 Packet(s) Oral daily PRN  senna 2 Tablet(s) Oral at bedtime  sodium chloride 0.9% lock flush 3 milliLiter(s) IV Push every 8 hours    MEDICATIONS  (PRN):  oxyCODONE    IR 5 milliGRAM(s) Oral every 4 hours PRN Moderate Pain (4 - 6)  oxyCODONE    IR 10 milliGRAM(s) Oral every 4 hours PRN Severe Pain (7 - 10)  psyllium Powder 1 Packet(s) Oral daily PRN Constipation        PHYSICAL EXAM  General: Alert Awake   Respiratory:  decreased at bases   CV: RRR S1 S2   Abdomen:  Soft NT ND + BS + BM  Extremities:  No edema left leg , Rt Leg + prosthetic   Incisions: MSI C/D/I  stable sternum  + staples Rt EVH site C/D/I  + staples           I&O's Detail    23 Feb 2021 07:01  -  24 Feb 2021 07:00  --------------------------------------------------------  IN:    Oral Fluid: 1380 mL  Total IN: 1380 mL    OUT:    Voided (mL): 2150 mL  Total OUT: 2150 mL    Total NET: -770 mL      24 Feb 2021 07:01  -  24 Feb 2021 15:13  --------------------------------------------------------  IN:    Oral Fluid: 260 mL  Total IN: 260 mL    OUT:    Voided (mL): 150 mL  Total OUT: 150 mL    Total NET: 110 mL          LABS  02-24    136  |  97<L>  |  13.0  ----------------------------<  139<H>  3.8   |  29.0  |  0.74    Ca    8.0<L>      24 Feb 2021 07:21  Mg     1.9     02-24                                   11.5   11.59 )-----------( 338      ( 24 Feb 2021 07:21 )             35.2                     CAPILLARY BLOOD GLUCOSE      POCT Blood Glucose.: 211 mg/dL (24 Feb 2021 12:01)  POCT Blood Glucose.: 141 mg/dL (24 Feb 2021 08:07)  POCT Blood Glucose.: 186 mg/dL (23 Feb 2021 21:17)  POCT Blood Glucose.: 229 mg/dL (23 Feb 2021 16:58)           Today's CXR:  < from: Xray Chest 1 View- PORTABLE-Routine (Xray Chest 1 View- PORTABLE-Routine in AM.) (02.24.21 @ 05:55) >    CLINICAL INFORMATION:  Postoperative  Cardiac surgery.    TECHNIQUE:  Portable  AP view of the chest was obtained.    FINDINGS:   2/23/2021 available for review.  .    The lungs show mild haziness of overlying lung bases which may indicate pleural effusions layering along posterior thoracic wall. No pneumothorax.        The heart and mediastinum are within normal limits following cardiac  surgery.    Visualized osseous structures are intact.        IMPRESSION:  Status post cardiac surgery.  Suspect bilateral pleural effusions. Confirmatory upright/semiupright lateral radiograph recommended.    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

## 2021-02-24 NOTE — DISCHARGE NOTE PROVIDER - DETAILS OF MALNUTRITION DIAGNOSIS/DIAGNOSES
This patient has been assessed with a concern for Malnutrition and was treated during this hospitalization for the following Nutrition diagnosis/diagnoses:     -  02/23/2021: Moderate protein-calorie malnutrition   This patient has been assessed with a concern for Malnutrition and was treated during this hospitalization for the following Nutrition diagnosis/diagnoses:     -  02/23/2021: Moderate protein-calorie malnutrition    This patient has been assessed with a concern for Malnutrition and was treated during this hospitalization for the following Nutrition diagnosis/diagnoses:     -  02/23/2021: Moderate protein-calorie malnutrition

## 2021-02-24 NOTE — DISCHARGE NOTE PROVIDER - NSDCCPCAREPLAN_GEN_ALL_CORE_FT
PRINCIPAL DISCHARGE DIAGNOSIS  Diagnosis: NSTEMI (non-ST elevated myocardial infarction)  Assessment and Plan of Treatment:       SECONDARY DISCHARGE DIAGNOSES  Diagnosis: Hyperglycemia  Assessment and Plan of Treatment:     Diagnosis: DM (diabetes mellitus)  Assessment and Plan of Treatment:

## 2021-02-24 NOTE — DISCHARGE NOTE PROVIDER - CARE PROVIDERS DIRECT ADDRESSES
[email or web address]

## 2021-02-25 ENCOUNTER — NON-APPOINTMENT (OUTPATIENT)
Age: 71
End: 2021-02-25

## 2021-03-01 ENCOUNTER — APPOINTMENT (OUTPATIENT)
Dept: CARE COORDINATION | Facility: HOME HEALTH | Age: 71
End: 2021-03-01

## 2021-03-01 NOTE — HISTORY OF PRESENT ILLNESS
[FreeTextEntry1] : Pt scheduled for FY in home visit today. Upon arrival no one answering door at provided address after multiple attempts. Attempted to reach pt or pt emergency contact via all contact numbers provided in AE and [employer] EMR. 2 number disconnected, messages left on other numbers. Waited 30 mins for call back and attempted calling again with no answer. Left pt apt complex, updated CTS surgery team of no show status. FYH team will continue to try and connect with pt.

## 2021-03-02 ENCOUNTER — APPOINTMENT (OUTPATIENT)
Dept: CARDIOTHORACIC SURGERY | Facility: CLINIC | Age: 71
End: 2021-03-02
Payer: MEDICARE

## 2021-03-02 VITALS
RESPIRATION RATE: 19 BRPM | HEIGHT: 69 IN | WEIGHT: 159 LBS | BODY MASS INDEX: 23.55 KG/M2 | HEART RATE: 79 BPM | TEMPERATURE: 97.1 F | OXYGEN SATURATION: 100 % | SYSTOLIC BLOOD PRESSURE: 101 MMHG | DIASTOLIC BLOOD PRESSURE: 58 MMHG

## 2021-03-02 PROCEDURE — 99024 POSTOP FOLLOW-UP VISIT: CPT

## 2021-03-02 RX ORDER — MUPIROCIN 20 MG/G
2 OINTMENT TOPICAL 3 TIMES DAILY
Qty: 1 | Refills: 2 | Status: COMPLETED | COMMUNITY
Start: 2019-09-10 | End: 2021-03-02

## 2021-03-02 RX ORDER — GLIPIZIDE 5 MG/1
5 TABLET, EXTENDED RELEASE ORAL
Qty: 60 | Refills: 0 | Status: COMPLETED | COMMUNITY
Start: 2021-02-24

## 2021-03-02 RX ORDER — CLOPIDOGREL BISULFATE 75 MG/1
75 TABLET, FILM COATED ORAL
Qty: 90 | Refills: 1 | Status: COMPLETED | COMMUNITY
Start: 2019-02-01 | End: 2021-03-02

## 2021-03-02 RX ORDER — ATORVASTATIN CALCIUM 40 MG/1
40 TABLET, FILM COATED ORAL
Qty: 30 | Refills: 0 | Status: COMPLETED | COMMUNITY
Start: 2021-02-24

## 2021-03-02 RX ORDER — MELOXICAM 15 MG/1
15 TABLET ORAL DAILY
Qty: 15 | Refills: 1 | Status: COMPLETED | COMMUNITY
Start: 2020-01-28 | End: 2021-03-02

## 2021-03-02 RX ORDER — OMEGA-3/DHA/EPA/FISH OIL 300-1000MG
1000 CAPSULE,DELAYED RELEASE (ENTERIC COATED) ORAL
Qty: 30 | Refills: 0 | Status: COMPLETED | COMMUNITY
Start: 2017-12-19 | End: 2021-03-02

## 2021-03-02 RX ORDER — ROSUVASTATIN CALCIUM 5 MG/1
5 TABLET, FILM COATED ORAL
Qty: 30 | Refills: 2 | Status: COMPLETED | COMMUNITY
Start: 2019-08-05 | End: 2021-03-02

## 2021-03-05 ENCOUNTER — NON-APPOINTMENT (OUTPATIENT)
Age: 71
End: 2021-03-05

## 2021-03-08 ENCOUNTER — NON-APPOINTMENT (OUTPATIENT)
Age: 71
End: 2021-03-08

## 2021-03-08 ENCOUNTER — APPOINTMENT (OUTPATIENT)
Dept: CARE COORDINATION | Facility: HOME HEALTH | Age: 71
End: 2021-03-08
Payer: MEDICARE

## 2021-03-08 ENCOUNTER — APPOINTMENT (OUTPATIENT)
Dept: INTERNAL MEDICINE | Facility: CLINIC | Age: 71
End: 2021-03-08
Payer: MEDICARE

## 2021-03-08 VITALS
OXYGEN SATURATION: 95 % | WEIGHT: 159 LBS | DIASTOLIC BLOOD PRESSURE: 66 MMHG | HEART RATE: 80 BPM | RESPIRATION RATE: 16 BRPM | SYSTOLIC BLOOD PRESSURE: 128 MMHG | BODY MASS INDEX: 23.55 KG/M2 | HEIGHT: 69 IN

## 2021-03-08 VITALS
DIASTOLIC BLOOD PRESSURE: 60 MMHG | SYSTOLIC BLOOD PRESSURE: 130 MMHG | HEART RATE: 100 BPM | WEIGHT: 153 LBS | OXYGEN SATURATION: 96 % | HEIGHT: 69 IN | TEMPERATURE: 96.6 F | BODY MASS INDEX: 22.66 KG/M2

## 2021-03-08 DIAGNOSIS — G54.7 PHANTOM LIMB SYNDROME W/OUT PAIN: ICD-10-CM

## 2021-03-08 DIAGNOSIS — E11.9 TYPE 2 DIABETES MELLITUS W/OUT COMPLICATIONS: ICD-10-CM

## 2021-03-08 DIAGNOSIS — I21.4 NON-ST ELEVATION (NSTEMI) MYOCARDIAL INFARCTION: ICD-10-CM

## 2021-03-08 DIAGNOSIS — E11.42 TYPE 2 DIABETES MELLITUS WITH DIABETIC POLYNEUROPATHY: ICD-10-CM

## 2021-03-08 DIAGNOSIS — E08.43 DIABETES MELLITUS DUE TO UNDERLYING CONDITION WITH DIABETIC AUTONOMIC (POLY)NEUROPATHY: ICD-10-CM

## 2021-03-08 PROCEDURE — 93000 ELECTROCARDIOGRAM COMPLETE: CPT

## 2021-03-08 PROCEDURE — 99024 POSTOP FOLLOW-UP VISIT: CPT

## 2021-03-08 PROCEDURE — 99072 ADDL SUPL MATRL&STAF TM PHE: CPT

## 2021-03-08 PROCEDURE — 99495 TRANSJ CARE MGMT MOD F2F 14D: CPT | Mod: 25

## 2021-03-09 LAB
BASOPHILS # BLD AUTO: 0.05 K/UL
BASOPHILS NFR BLD AUTO: 0.4 %
EOSINOPHIL # BLD AUTO: 0.31 K/UL
EOSINOPHIL NFR BLD AUTO: 2.7 %
HCT VFR BLD CALC: 40.4 %
HGB BLD-MCNC: 13.1 G/DL
IMM GRANULOCYTES NFR BLD AUTO: 0.3 %
LYMPHOCYTES # BLD AUTO: 1.82 K/UL
LYMPHOCYTES NFR BLD AUTO: 15.9 %
MAN DIFF?: NORMAL
MCHC RBC-ENTMCNC: 28.6 PG
MCHC RBC-ENTMCNC: 32.4 GM/DL
MCV RBC AUTO: 88.2 FL
MONOCYTES # BLD AUTO: 0.76 K/UL
MONOCYTES NFR BLD AUTO: 6.7 %
NEUTROPHILS # BLD AUTO: 8.45 K/UL
NEUTROPHILS NFR BLD AUTO: 74 %
PLATELET # BLD AUTO: 574 K/UL
RBC # BLD: 4.58 M/UL
RBC # FLD: 13.4 %
WBC # FLD AUTO: 11.42 K/UL

## 2021-03-09 NOTE — ASSESSMENT
[FreeTextEntry1] : Pt recovering well at home s/p OHS. Reviewed all medications and dosages with pt and pt dtr understanding. Pt has all medications in home and is taking as prescribed.  completed course of diuretic and KCl. Pain controlled with current medication regimen, using only Tylenol PRN. BG range . No new symptoms, issues or concerns to report at this time.\par

## 2021-03-09 NOTE — HISTORY OF PRESENT ILLNESS
[Diabetes Mellitus] : Diabetes Mellitus [FreeTextEntry1] : 70 YOM with pmhx including HTN, TBI, R BKA with prosthesis, DM (HA1c 8.5), blind in Left eye  2/14 Admitted with intractable vomiting, esophagitis, DKA, NSTEMI.  CT Chest: +Esophagitis, small bullae in lung apices, minimal pericardial effusion Mod-severe reversible ischemia on NST. 2/18 LHC via R groin, OR 2/19 for CABG x 2 with Dr. Ramírez, GI follow up: EGD as outpatient 6-8wks after surgery. Pt discharged home in stable condition with support of his dtr and family as well as home care services. Pt seen last week with CTS office follow up. No issues identifed at that time. Initial [facility] visit completed at home this week. \par CC "I think I'm doing ok"

## 2021-03-09 NOTE — PHYSICAL EXAM
[Sclera] : the sclera and conjunctiva were normal [Neck Appearance] : the appearance of the neck was normal [Respiration, Rhythm And Depth] : normal respiratory rhythm and effort [Exaggerated Use Of Accessory Muscles For Inspiration] : no accessory muscle use [Apical Impulse] : the apical impulse was normal [Auscultation Breath Sounds / Voice Sounds] : lungs were clear to auscultation bilaterally [Heart Rate And Rhythm] : heart rate was normal and rhythm regular [Heart Sounds] : normal S1 and S2 [Chest Visual Inspection Thoracic Asymmetry] : no chest asymmetry [1+] : left 1+ [Bowel Sounds] : normal bowel sounds [Abdomen Soft] : soft [Abdomen Tenderness] : non-tender [Abnormal Walk] : normal gait [Skin Color & Pigmentation] : normal skin color and pigmentation [Skin Turgor] : normal skin turgor [] : no rash [Oriented To Time, Place, And Person] : oriented to person, place, and time [Impaired Insight] : insight and judgment were intact [Affect] : the affect was normal [FreeTextEntry2] : LLE without edema, calf  soft, NT. Orthotic in placed right RL no issues [FreeTextEntry1] : SVG harvest sites without erythema, warmth or drainage

## 2021-03-09 NOTE — REVIEW OF SYSTEMS
[Negative] : Psychiatric [FreeTextEntry3] : Blindness left eye unchanged [FreeTextEntry7] : last BM yesterday

## 2021-03-09 NOTE — REASON FOR VISIT
[Follow-Up: _____] : a [unfilled] follow-up visit [Family Member] : family member [FreeTextEntry1] : FOLLOW YOUR HEART- Transitional Care Management Program- [facility]\par \par

## 2021-03-10 LAB
ALBUMIN SERPL ELPH-MCNC: 3.8 G/DL
ALP BLD-CCNC: 112 U/L
ALT SERPL-CCNC: 9 U/L
ANION GAP SERPL CALC-SCNC: 17 MMOL/L
AST SERPL-CCNC: 12 U/L
BILIRUB SERPL-MCNC: 0.2 MG/DL
BUN SERPL-MCNC: 12 MG/DL
CALCIUM SERPL-MCNC: 9 MG/DL
CHLORIDE SERPL-SCNC: 100 MMOL/L
CO2 SERPL-SCNC: 22 MMOL/L
CREAT SERPL-MCNC: 0.87 MG/DL
GLUCOSE SERPL-MCNC: 138 MG/DL
POTASSIUM SERPL-SCNC: 4.5 MMOL/L
PROT SERPL-MCNC: 7 G/DL
SODIUM SERPL-SCNC: 139 MMOL/L

## 2021-03-16 PROCEDURE — 83690 ASSAY OF LIPASE: CPT

## 2021-03-16 PROCEDURE — 84443 ASSAY THYROID STIM HORMONE: CPT

## 2021-03-16 PROCEDURE — 86900 BLOOD TYPING SEROLOGIC ABO: CPT

## 2021-03-16 PROCEDURE — 82947 ASSAY GLUCOSE BLOOD QUANT: CPT

## 2021-03-16 PROCEDURE — 87640 STAPH A DNA AMP PROBE: CPT

## 2021-03-16 PROCEDURE — C1751: CPT

## 2021-03-16 PROCEDURE — 83880 ASSAY OF NATRIURETIC PEPTIDE: CPT

## 2021-03-16 PROCEDURE — 81001 URINALYSIS AUTO W/SCOPE: CPT

## 2021-03-16 PROCEDURE — 80048 BASIC METABOLIC PNL TOTAL CA: CPT

## 2021-03-16 PROCEDURE — 86923 COMPATIBILITY TEST ELECTRIC: CPT

## 2021-03-16 PROCEDURE — 86769 SARS-COV-2 COVID-19 ANTIBODY: CPT

## 2021-03-16 PROCEDURE — 96361 HYDRATE IV INFUSION ADD-ON: CPT

## 2021-03-16 PROCEDURE — P9045: CPT

## 2021-03-16 PROCEDURE — 85730 THROMBOPLASTIN TIME PARTIAL: CPT

## 2021-03-16 PROCEDURE — 87641 MR-STAPH DNA AMP PROBE: CPT

## 2021-03-16 PROCEDURE — 84145 PROCALCITONIN (PCT): CPT

## 2021-03-16 PROCEDURE — 71045 X-RAY EXAM CHEST 1 VIEW: CPT

## 2021-03-16 PROCEDURE — 74177 CT ABD & PELVIS W/CONTRAST: CPT

## 2021-03-16 PROCEDURE — C1894: CPT

## 2021-03-16 PROCEDURE — 85610 PROTHROMBIN TIME: CPT

## 2021-03-16 PROCEDURE — A9500: CPT

## 2021-03-16 PROCEDURE — 93005 ELECTROCARDIOGRAM TRACING: CPT

## 2021-03-16 PROCEDURE — 82962 GLUCOSE BLOOD TEST: CPT

## 2021-03-16 PROCEDURE — C1889: CPT

## 2021-03-16 PROCEDURE — U0005: CPT

## 2021-03-16 PROCEDURE — 82803 BLOOD GASES ANY COMBINATION: CPT

## 2021-03-16 PROCEDURE — 97163 PT EVAL HIGH COMPLEX 45 MIN: CPT

## 2021-03-16 PROCEDURE — 86850 RBC ANTIBODY SCREEN: CPT

## 2021-03-16 PROCEDURE — 99152 MOD SED SAME PHYS/QHP 5/>YRS: CPT

## 2021-03-16 PROCEDURE — 80053 COMPREHEN METABOLIC PANEL: CPT

## 2021-03-16 PROCEDURE — 93017 CV STRESS TEST TRACING ONLY: CPT

## 2021-03-16 PROCEDURE — 82553 CREATINE MB FRACTION: CPT

## 2021-03-16 PROCEDURE — 96374 THER/PROPH/DIAG INJ IV PUSH: CPT

## 2021-03-16 PROCEDURE — 36430 TRANSFUSION BLD/BLD COMPNT: CPT

## 2021-03-16 PROCEDURE — P9016: CPT

## 2021-03-16 PROCEDURE — 93458 L HRT ARTERY/VENTRICLE ANGIO: CPT

## 2021-03-16 PROCEDURE — 84484 ASSAY OF TROPONIN QUANT: CPT

## 2021-03-16 PROCEDURE — 97530 THERAPEUTIC ACTIVITIES: CPT

## 2021-03-16 PROCEDURE — 71250 CT THORAX DX C-: CPT

## 2021-03-16 PROCEDURE — 93880 EXTRACRANIAL BILAT STUDY: CPT

## 2021-03-16 PROCEDURE — 85384 FIBRINOGEN ACTIVITY: CPT

## 2021-03-16 PROCEDURE — C8929: CPT

## 2021-03-16 PROCEDURE — 82330 ASSAY OF CALCIUM: CPT

## 2021-03-16 PROCEDURE — 97110 THERAPEUTIC EXERCISES: CPT

## 2021-03-16 PROCEDURE — 94010 BREATHING CAPACITY TEST: CPT

## 2021-03-16 PROCEDURE — 84439 ASSAY OF FREE THYROXINE: CPT

## 2021-03-16 PROCEDURE — 85018 HEMOGLOBIN: CPT

## 2021-03-16 PROCEDURE — 85027 COMPLETE CBC AUTOMATED: CPT

## 2021-03-16 PROCEDURE — C1887: CPT

## 2021-03-16 PROCEDURE — 82009 KETONE BODYS QUAL: CPT

## 2021-03-16 PROCEDURE — 82010 KETONE BODYS QUAN: CPT

## 2021-03-16 PROCEDURE — 86901 BLOOD TYPING SEROLOGIC RH(D): CPT

## 2021-03-16 PROCEDURE — 84295 ASSAY OF SERUM SODIUM: CPT

## 2021-03-16 PROCEDURE — 94002 VENT MGMT INPAT INIT DAY: CPT

## 2021-03-16 PROCEDURE — 82550 ASSAY OF CK (CPK): CPT

## 2021-03-16 PROCEDURE — 85025 COMPLETE CBC W/AUTO DIFF WBC: CPT

## 2021-03-16 PROCEDURE — 82435 ASSAY OF BLOOD CHLORIDE: CPT

## 2021-03-16 PROCEDURE — U0003: CPT

## 2021-03-16 PROCEDURE — 83036 HEMOGLOBIN GLYCOSYLATED A1C: CPT

## 2021-03-16 PROCEDURE — 99285 EMERGENCY DEPT VISIT HI MDM: CPT | Mod: 25

## 2021-03-16 PROCEDURE — 84134 ASSAY OF PREALBUMIN: CPT

## 2021-03-16 PROCEDURE — 97116 GAIT TRAINING THERAPY: CPT

## 2021-03-16 PROCEDURE — 94760 N-INVAS EAR/PLS OXIMETRY 1: CPT

## 2021-03-16 PROCEDURE — 83605 ASSAY OF LACTIC ACID: CPT

## 2021-03-16 PROCEDURE — 84100 ASSAY OF PHOSPHORUS: CPT

## 2021-03-16 PROCEDURE — 85014 HEMATOCRIT: CPT

## 2021-03-16 PROCEDURE — 83735 ASSAY OF MAGNESIUM: CPT

## 2021-03-16 PROCEDURE — 85576 BLOOD PLATELET AGGREGATION: CPT

## 2021-03-16 PROCEDURE — 84132 ASSAY OF SERUM POTASSIUM: CPT

## 2021-03-16 PROCEDURE — 78452 HT MUSCLE IMAGE SPECT MULT: CPT

## 2021-03-16 PROCEDURE — C1769: CPT

## 2021-03-16 PROCEDURE — 36415 COLL VENOUS BLD VENIPUNCTURE: CPT

## 2021-03-16 PROCEDURE — 99153 MOD SED SAME PHYS/QHP EA: CPT

## 2021-03-16 PROCEDURE — 80061 LIPID PANEL: CPT

## 2021-03-16 PROCEDURE — P9040: CPT

## 2021-03-31 ENCOUNTER — TRANSCRIPTION ENCOUNTER (OUTPATIENT)
Age: 71
End: 2021-03-31

## 2021-04-12 ENCOUNTER — APPOINTMENT (OUTPATIENT)
Dept: INTERNAL MEDICINE | Facility: CLINIC | Age: 71
End: 2021-04-12

## 2021-04-14 ENCOUNTER — APPOINTMENT (OUTPATIENT)
Dept: CARDIOLOGY | Facility: CLINIC | Age: 71
End: 2021-04-14
Payer: MEDICARE

## 2021-04-14 ENCOUNTER — NON-APPOINTMENT (OUTPATIENT)
Age: 71
End: 2021-04-14

## 2021-04-14 VITALS
WEIGHT: 156 LBS | BODY MASS INDEX: 23.04 KG/M2 | TEMPERATURE: 97.7 F | SYSTOLIC BLOOD PRESSURE: 128 MMHG | DIASTOLIC BLOOD PRESSURE: 70 MMHG | OXYGEN SATURATION: 100 % | HEART RATE: 83 BPM

## 2021-04-14 VITALS — DIASTOLIC BLOOD PRESSURE: 72 MMHG | SYSTOLIC BLOOD PRESSURE: 122 MMHG

## 2021-04-14 DIAGNOSIS — E78.49 OTHER HYPERLIPIDEMIA: ICD-10-CM

## 2021-04-14 PROCEDURE — 99072 ADDL SUPL MATRL&STAF TM PHE: CPT

## 2021-04-14 PROCEDURE — 93000 ELECTROCARDIOGRAM COMPLETE: CPT

## 2021-04-14 PROCEDURE — 99213 OFFICE O/P EST LOW 20 MIN: CPT

## 2021-04-14 RX ORDER — ACETAMINOPHEN 325 MG/1
325 TABLET, FILM COATED ORAL
Refills: 0 | Status: DISCONTINUED | COMMUNITY
End: 2021-04-14

## 2021-04-14 RX ORDER — ASPIRIN 325 MG/1
325 TABLET, FILM COATED ORAL DAILY
Refills: 0 | Status: DISCONTINUED | COMMUNITY
End: 2021-04-14

## 2021-04-14 RX ORDER — LANCETS
EACH MISCELLANEOUS
Qty: 1 | Refills: 0 | Status: ACTIVE | COMMUNITY
Start: 2017-02-01

## 2021-04-14 RX ORDER — POTASSIUM CHLORIDE 1500 MG/1
20 TABLET, FILM COATED, EXTENDED RELEASE ORAL
Qty: 30 | Refills: 5 | Status: DISCONTINUED | COMMUNITY
Start: 2021-02-24 | End: 2021-04-14

## 2021-04-14 RX ORDER — BLOOD SUGAR DIAGNOSTIC
STRIP MISCELLANEOUS 4 TIMES DAILY
Qty: 2 | Refills: 0 | Status: ACTIVE | COMMUNITY
Start: 2017-02-01

## 2021-04-14 RX ORDER — FUROSEMIDE 40 MG/1
40 TABLET ORAL DAILY
Qty: 30 | Refills: 0 | Status: DISCONTINUED | COMMUNITY
Start: 2021-02-24 | End: 2021-04-14

## 2021-04-14 NOTE — HISTORY OF PRESENT ILLNESS
[FreeTextEntry1] : 71 yo AA man, , father of 4. Has a prolonged history of DM since the age of 43. On insulin for the last 5 years. Underwent Rt BKA in 11/2009  following gangrene. \par Admitted on 2/2021 to [facility] with DKA and a NSTEMI. Had a cardiac cath that revealed three vessel CAD with normal LV function. In 2/19/2021 underwent CABG x 2 (LIMA to LAD and SVG to PDA). Since then he has been asymptomatic, and states that he stopped all his cardiac meds  three weeks ago (run out). \par At the present time patient is completely asymptomatic and denies CP, SOB, orthopnea or PND. Denies palpitations, dizziness or ankle swelling.\par HTN treated with medications.\par Chronic Hepatitis C treated in the past\par Quit smoking in 2011\par Denies alcohol abuse\par Denies the use of illicit drugs\par Has not received the COVID 19 vaccine \par \par

## 2021-04-14 NOTE — ASSESSMENT
[FreeTextEntry1] : ECG performed today at the office revealed a NSR 90 bpm, with normal AQRS, WA, QRS and QTc. Multifocal VPBs. \par

## 2021-04-14 NOTE — DISCUSSION/SUMMARY
[FreeTextEntry1] : Mr. ANDRIY LEBLANC is a 70 year male with known CAD. Underwent CABG x 2 in Feb 2021 with Dr. Ramírez. Since then he has been asymptomatic but has stopped all his medications 3 weeks ago. Will restart BB, statin and aspirin. LV function is normal. \par Recommended to get the COVID 19 vaccine. \par Routine follow up in 3 months\par

## 2021-04-19 ENCOUNTER — LABORATORY RESULT (OUTPATIENT)
Age: 71
End: 2021-04-19

## 2021-04-19 ENCOUNTER — APPOINTMENT (OUTPATIENT)
Dept: INTERNAL MEDICINE | Facility: CLINIC | Age: 71
End: 2021-04-19
Payer: MEDICARE

## 2021-04-19 ENCOUNTER — NON-APPOINTMENT (OUTPATIENT)
Age: 71
End: 2021-04-19

## 2021-04-19 VITALS
HEART RATE: 81 BPM | OXYGEN SATURATION: 96 % | SYSTOLIC BLOOD PRESSURE: 100 MMHG | WEIGHT: 158 LBS | HEIGHT: 69 IN | BODY MASS INDEX: 23.4 KG/M2 | TEMPERATURE: 97.5 F | DIASTOLIC BLOOD PRESSURE: 70 MMHG

## 2021-04-19 DIAGNOSIS — L89.892 PRESSURE ULCER OF OTHER SITE, STAGE 2: ICD-10-CM

## 2021-04-19 PROCEDURE — 99072 ADDL SUPL MATRL&STAF TM PHE: CPT

## 2021-04-19 PROCEDURE — G0439: CPT

## 2021-04-19 PROCEDURE — 83036 HEMOGLOBIN GLYCOSYLATED A1C: CPT | Mod: QW

## 2021-04-19 PROCEDURE — 93000 ELECTROCARDIOGRAM COMPLETE: CPT | Mod: 59

## 2021-04-19 PROCEDURE — G0444 DEPRESSION SCREEN ANNUAL: CPT | Mod: 59

## 2021-04-19 PROCEDURE — 92552 PURE TONE AUDIOMETRY AIR: CPT

## 2021-04-19 PROCEDURE — 82043 UR ALBUMIN QUANTITATIVE: CPT | Mod: QW

## 2021-04-20 LAB
ALBUMIN SERPL ELPH-MCNC: 4.1 G/DL
ALBUMIN: 150
ALP BLD-CCNC: 113 U/L
ALT SERPL-CCNC: 5 U/L
ANION GAP SERPL CALC-SCNC: 12 MMOL/L
AST SERPL-CCNC: 11 U/L
BASOPHILS # BLD AUTO: 0.05 K/UL
BASOPHILS NFR BLD AUTO: 0.6 %
BILIRUB SERPL-MCNC: 0.2 MG/DL
BUN SERPL-MCNC: 14 MG/DL
CALCIUM SERPL-MCNC: 9.3 MG/DL
CHLORIDE SERPL-SCNC: 104 MMOL/L
CHOLEST SERPL-MCNC: 138 MG/DL
CK SERPL-CCNC: 84 U/L
CO2 SERPL-SCNC: 24 MMOL/L
CREAT SERPL-MCNC: 0.73 MG/DL
CREATININE: 300
EOSINOPHIL # BLD AUTO: 0.11 K/UL
EOSINOPHIL NFR BLD AUTO: 1.4 %
GLUCOSE SERPL-MCNC: 131 MG/DL
HBA1C MFR BLD HPLC: 6.4
HCT VFR BLD CALC: 38.8 %
HDLC SERPL-MCNC: 39 MG/DL
HGB BLD-MCNC: 12.4 G/DL
IMM GRANULOCYTES NFR BLD AUTO: 0.1 %
LDLC SERPL CALC-MCNC: 78 MG/DL
LYMPHOCYTES # BLD AUTO: 1.55 K/UL
LYMPHOCYTES NFR BLD AUTO: 19.1 %
MAN DIFF?: NORMAL
MCHC RBC-ENTMCNC: 28.4 PG
MCHC RBC-ENTMCNC: 32 GM/DL
MCV RBC AUTO: 89 FL
MICROALBUMIN/CREAT UR TEST STR-RTO: NORMAL
MONOCYTES # BLD AUTO: 0.58 K/UL
MONOCYTES NFR BLD AUTO: 7.2 %
NEUTROPHILS # BLD AUTO: 5.8 K/UL
NEUTROPHILS NFR BLD AUTO: 71.6 %
NONHDLC SERPL-MCNC: 99 MG/DL
PLATELET # BLD AUTO: 330 K/UL
POTASSIUM SERPL-SCNC: 4.3 MMOL/L
PROT SERPL-MCNC: 6.8 G/DL
RBC # BLD: 4.36 M/UL
RBC # FLD: 13.7 %
SODIUM SERPL-SCNC: 140 MMOL/L
TRIGL SERPL-MCNC: 102 MG/DL
TSH SERPL-ACNC: 1.3 UIU/ML
URATE SERPL-MCNC: 4.3 MG/DL
WBC # FLD AUTO: 8.1 K/UL

## 2021-06-05 LAB
24R-OH-CALCIDIOL SERPL-MCNC: 49.7 PG/ML
APPEARANCE: ABNORMAL
BILIRUBIN URINE: ABNORMAL
BLOOD URINE: ABNORMAL
COLOR: ABNORMAL
GLUCOSE QUALITATIVE U: NEGATIVE
KETONES URINE: NORMAL
LEUKOCYTE ESTERASE URINE: ABNORMAL
NITRITE URINE: NEGATIVE
PH URINE: 5.5
PROTEIN URINE: ABNORMAL
SPECIFIC GRAVITY URINE: 1.04
UROBILINOGEN URINE: ABNORMAL

## 2021-06-08 ENCOUNTER — APPOINTMENT (OUTPATIENT)
Dept: INTERNAL MEDICINE | Facility: CLINIC | Age: 71
End: 2021-06-08

## 2021-06-08 ENCOUNTER — NON-APPOINTMENT (OUTPATIENT)
Age: 71
End: 2021-06-08

## 2021-07-06 RX ORDER — ASPIRIN 81 MG/1
81 TABLET, CHEWABLE ORAL
Qty: 90 | Refills: 3 | Status: ACTIVE | COMMUNITY
Start: 2021-04-14

## 2021-07-07 ENCOUNTER — APPOINTMENT (OUTPATIENT)
Dept: CARDIOLOGY | Facility: CLINIC | Age: 71
End: 2021-07-07
Payer: MEDICARE

## 2021-07-07 VITALS
TEMPERATURE: 97.8 F | DIASTOLIC BLOOD PRESSURE: 76 MMHG | BODY MASS INDEX: 23.85 KG/M2 | OXYGEN SATURATION: 98 % | HEART RATE: 89 BPM | SYSTOLIC BLOOD PRESSURE: 138 MMHG | HEIGHT: 69 IN | WEIGHT: 161 LBS

## 2021-07-07 DIAGNOSIS — B18.2 CHRONIC VIRAL HEPATITIS C: ICD-10-CM

## 2021-07-07 DIAGNOSIS — Z95.1 PRESENCE OF AORTOCORONARY BYPASS GRAFT: ICD-10-CM

## 2021-07-07 PROCEDURE — 99212 OFFICE O/P EST SF 10 MIN: CPT

## 2021-07-07 NOTE — PHYSICAL EXAM
[Well Developed] : well developed [Well Nourished] : well nourished [No Acute Distress] : no acute distress [Normal Conjunctiva] : normal conjunctiva [Normal Venous Pressure] : normal venous pressure [No Carotid Bruit] : no carotid bruit [Normal S1, S2] : normal S1, S2 [No Murmur] : no murmur [No Rub] : no rub [No Gallop] : no gallop [Clear Lung Fields] : clear lung fields [Good Air Entry] : good air entry [No Respiratory Distress] : no respiratory distress  [Soft] : abdomen soft [Non Tender] : non-tender [No Masses/organomegaly] : no masses/organomegaly [Normal Bowel Sounds] : normal bowel sounds [Normal Gait] : normal gait [No Edema] : no edema [No Cyanosis] : no cyanosis [No Clubbing] : no clubbing [No Varicosities] : no varicosities [No Rash] : no rash [No Skin Lesions] : no skin lesions [Moves all extremities] : moves all extremities [No Focal Deficits] : no focal deficits [Normal Speech] : normal speech [Alert and Oriented] : alert and oriented [Normal memory] : normal memory [de-identified] : LE ptosis [de-identified] : Mid sterontomy surgical scar

## 2021-07-07 NOTE — ASSESSMENT
[FreeTextEntry1] : ECG performed today at the office revealed a NSR 90 bpm, with normal AQRS, NV, QRS and QTc. Multifocal VPBs. \par

## 2021-07-07 NOTE — HISTORY OF PRESENT ILLNESS
[FreeTextEntry1] : 69 yo AA man, , father of 4. Has a prolonged history of DM since the age of 43. On insulin for the last 5 years. Underwent Rt BKA in 11/2009 following gangrene. \par Admitted on 2/2021 to [facility] with DKA and a NSTEMI. Had a cardiac cath that revealed three vessel CAD with normal LV function. In 2/19/2021 underwent CABG x 2 (LIMA to LAD and SVG to PDA). Since then he has been asymptomatic, and states that he stopped all his cardiac meds three weeks ago (run out). \par At the present time patient is completely asymptomatic and denies CP, SOB, orthopnea or PND. Denies palpitations, dizziness or ankle swelling.\par HTN treated with medications.\par Chronic Hepatitis C treated in the past\par Quit smoking in 2011\par Denies alcohol abuse\par Denies the use of illicit drugs\par Received the COVID 19 vaccine in June 2021 (Pfizer)\par

## 2021-07-07 NOTE — DISCUSSION/SUMMARY
[FreeTextEntry1] : Mr. ANDRIY LEBLANC is a 70 year male with known CAD. Underwent CABG x 2 in Feb 2021 with Dr. Ramírez. Since then he has been asymptomatic. LV function is normal. \par Will continue all medications\par Routine blood work prior to follow up\par Routine follow up in 6 months\par

## 2021-08-09 NOTE — DISCHARGE NOTE NURSING/CASE MANAGEMENT/SOCIAL WORK - NSDCPETBCESMAN_GEN_ALL_CORE
If you are a smoker, it is important for your health to stop smoking. Please be aware that second hand smoke is also harmful. Complex Repair And Melolabial Flap Text: The defect edges were debeveled with a #15 scalpel blade.  The primary defect was closed partially with a complex linear closure.  Given the location of the remaining defect, shape of the defect and the proximity to free margins a melolabial flap was deemed most appropriate for complete closure of the defect.  Using a sterile surgical marker, an appropriate advancement flap was drawn incorporating the defect and placing the expected incisions within the relaxed skin tension lines where possible.    The area thus outlined was incised deep to adipose tissue with a #15 scalpel blade.  The skin margins were undermined to an appropriate distance in all directions utilizing iris scissors.

## 2021-08-28 NOTE — ED PROVIDER NOTE - PSYCHIATRIC, MLM
Alert and oriented to person, place, time/situation. normal mood and affect. no apparent risk to self or others.
0 = independent

## 2021-10-26 RX ORDER — GLIPIZIDE 10 MG/1
10 TABLET, EXTENDED RELEASE ORAL
Qty: 180 | Refills: 3 | Status: ACTIVE | COMMUNITY
Start: 1900-01-01 | End: 1900-01-01

## 2021-11-01 ENCOUNTER — NON-APPOINTMENT (OUTPATIENT)
Age: 71
End: 2021-11-01

## 2021-11-01 ENCOUNTER — APPOINTMENT (OUTPATIENT)
Dept: INTERNAL MEDICINE | Facility: CLINIC | Age: 71
End: 2021-11-01
Payer: MEDICARE

## 2021-11-01 VITALS
TEMPERATURE: 97.2 F | DIASTOLIC BLOOD PRESSURE: 60 MMHG | OXYGEN SATURATION: 97 % | SYSTOLIC BLOOD PRESSURE: 110 MMHG | WEIGHT: 163 LBS | HEIGHT: 69 IN | BODY MASS INDEX: 24.14 KG/M2 | HEART RATE: 90 BPM

## 2021-11-01 DIAGNOSIS — K21.9 GASTRO-ESOPHAGEAL REFLUX DISEASE W/OUT ESOPHAGITIS: ICD-10-CM

## 2021-11-01 DIAGNOSIS — Z79.4 TYPE 2 DIABETES MELLITUS WITH UNSPECIFIED COMPLICATIONS: ICD-10-CM

## 2021-11-01 DIAGNOSIS — E11.8 TYPE 2 DIABETES MELLITUS WITH UNSPECIFIED COMPLICATIONS: ICD-10-CM

## 2021-11-01 DIAGNOSIS — I25.10 ATHEROSCLEROTIC HEART DISEASE OF NATIVE CORONARY ARTERY W/OUT ANGINA PECTORIS: ICD-10-CM

## 2021-11-01 DIAGNOSIS — I10 ESSENTIAL (PRIMARY) HYPERTENSION: ICD-10-CM

## 2021-11-01 PROCEDURE — 99215 OFFICE O/P EST HI 40 MIN: CPT | Mod: 25

## 2021-11-01 PROCEDURE — 93000 ELECTROCARDIOGRAM COMPLETE: CPT

## 2021-11-01 PROCEDURE — 83036 HEMOGLOBIN GLYCOSYLATED A1C: CPT | Mod: QW

## 2021-11-01 RX ORDER — ATORVASTATIN CALCIUM 40 MG/1
40 TABLET, FILM COATED ORAL
Qty: 1 | Refills: 3 | Status: ACTIVE | COMMUNITY
Start: 1900-01-01 | End: 1900-01-01

## 2021-11-01 RX ORDER — METOPROLOL TARTRATE 50 MG/1
50 TABLET, FILM COATED ORAL TWICE DAILY
Qty: 180 | Refills: 3 | Status: DISCONTINUED | COMMUNITY
Start: 2021-02-24 | End: 2021-11-01

## 2021-11-01 RX ORDER — INSULIN GLARGINE 100 [IU]/ML
100 INJECTION, SOLUTION SUBCUTANEOUS
Refills: 0 | Status: DISCONTINUED | COMMUNITY
End: 2021-11-01

## 2021-11-02 LAB
BASOPHILS # BLD AUTO: 0.03 K/UL
BASOPHILS NFR BLD AUTO: 0.4 %
EOSINOPHIL # BLD AUTO: 0.11 K/UL
EOSINOPHIL NFR BLD AUTO: 1.6 %
HBA1C MFR BLD HPLC: 10.6
HCT VFR BLD CALC: 38.1 %
HGB BLD-MCNC: 12.6 G/DL
IMM GRANULOCYTES NFR BLD AUTO: 0.3 %
LYMPHOCYTES # BLD AUTO: 1.77 K/UL
LYMPHOCYTES NFR BLD AUTO: 25.4 %
MAN DIFF?: NORMAL
MCHC RBC-ENTMCNC: 28.3 PG
MCHC RBC-ENTMCNC: 33.1 GM/DL
MCV RBC AUTO: 85.4 FL
MONOCYTES # BLD AUTO: 0.63 K/UL
MONOCYTES NFR BLD AUTO: 9 %
NEUTROPHILS # BLD AUTO: 4.41 K/UL
NEUTROPHILS NFR BLD AUTO: 63.3 %
PLATELET # BLD AUTO: 265 K/UL
RBC # BLD: 4.46 M/UL
RBC # FLD: 12.8 %
WBC # FLD AUTO: 6.97 K/UL

## 2021-11-03 ENCOUNTER — NON-APPOINTMENT (OUTPATIENT)
Age: 71
End: 2021-11-03

## 2021-11-03 LAB
ANION GAP SERPL CALC-SCNC: 15 MMOL/L
BUN SERPL-MCNC: 11 MG/DL
CALCIUM SERPL-MCNC: 9.3 MG/DL
CHLORIDE SERPL-SCNC: 100 MMOL/L
CO2 SERPL-SCNC: 23 MMOL/L
CREAT SERPL-MCNC: 0.84 MG/DL
GLUCOSE SERPL-MCNC: 284 MG/DL
POTASSIUM SERPL-SCNC: 4.4 MMOL/L
SODIUM SERPL-SCNC: 139 MMOL/L

## 2021-11-08 ENCOUNTER — NON-APPOINTMENT (OUTPATIENT)
Age: 71
End: 2021-11-08

## 2021-11-10 ENCOUNTER — NON-APPOINTMENT (OUTPATIENT)
Age: 71
End: 2021-11-10

## 2021-12-03 ENCOUNTER — APPOINTMENT (OUTPATIENT)
Dept: INTERNAL MEDICINE | Facility: CLINIC | Age: 71
End: 2021-12-03

## 2022-01-25 NOTE — ED ADULT NURSE NOTE - CAS ELECT INFOMATION PROVIDED
Received call from [facility] at [facility] with [employer] Complaint. Subjective: broke arm Nov 23 and was placed on Percocet. Finished 1/2. Have been having left upper abdominal nausea/acid Has been going on since the 1st of the year. Current Symptoms: not feeling pain or nausea now, always w/i 1/2 hour of eating. Onset: 25 days ago; intermittent    Associated Symptoms: constipation Last BM Gopal    10/10; nausea/acid feeling; intermittent (not really pain)    Temperature: no fever     What has been tried: Mylanta, Zofran, Papaya enzymes, laxatives to go. LMP: NA Pregnant: NA    Recommended disposition: see today or tomorrow. Advised to go to walk in clinic or [facility] if no appts available. Care advice provided, patient verbalizes understanding; denies any other questions or concerns; instructed to call back for any new or worsening symptoms. Writer provided warm transfer to Devaughn Colby at [facility] for appointment scheduling     Attention Provider: Thank you for allowing me to participate in the care of your patient. The patient was connected to triage in response to information provided to the ECC/PSC. Please do not respond through this encounter as the response is not directed to a shared pool.         Reason for Disposition   Patient wants to be seen    Protocols used: NAUSEA-ADULT-OH DC instructions/DC instructions reviewed with patient and daughter at bedside, verbalized understanding. Pt in no apparent acute Distress observed at time of discharge, pt ambulatory to exit with cane as baseline. Safety maintained in ED.

## 2022-06-15 NOTE — ED ADULT NURSE NOTE - NSFALLRSKOUTCOME_ED_ALL_ED
Patient returned call to schedule an appointment with Dr. Brown. Writer scheduled him on 07/06.    Fall with Harm Risk

## 2022-09-27 NOTE — PATIENT PROFILE ADULT - NSPRONUTRITIONRISK_GEN_A_NUR
Topical Sulfur Applications Pregnancy And Lactation Text: This medication is considered safe during pregnancy and breast feeding secondary to limited systemic absorption. No indicators present

## 2022-10-09 ENCOUNTER — RX RENEWAL (OUTPATIENT)
Age: 72
End: 2022-10-09

## 2022-10-09 RX ORDER — GLIPIZIDE 10 MG/1
10 TABLET, FILM COATED, EXTENDED RELEASE ORAL
Qty: 180 | Refills: 3 | Status: ACTIVE | COMMUNITY
Start: 2022-10-09 | End: 1900-01-01

## 2022-10-09 RX ORDER — METFORMIN HYDROCHLORIDE 1000 MG/1
1000 TABLET, COATED ORAL
Qty: 180 | Refills: 3 | Status: ACTIVE | COMMUNITY
Start: 2022-10-09 | End: 1900-01-01

## 2022-10-11 NOTE — HISTORY OF PRESENT ILLNESS
[FreeTextEntry1] : CPE/establish care [de-identified] : Ruben is a 72-year-old male with past medical history of type 2 diabetes mellitus insulin-dependent, gangrene of foot status right BKA , three-vessel CAD status post CABG x2, chronic hepatitis C treated in the past, former smoker\par Last year A1c was 10.6\par \par Healthcare maintenance\par Prevnar 13 on 2018\par Need for Prevnar 20 and influenza vaccine\par Tdap booster

## 2022-10-12 ENCOUNTER — APPOINTMENT (OUTPATIENT)
Dept: INTERNAL MEDICINE | Facility: CLINIC | Age: 72
End: 2022-10-12

## 2022-12-23 ENCOUNTER — APPOINTMENT (OUTPATIENT)
Dept: INTERNAL MEDICINE | Facility: CLINIC | Age: 72
End: 2022-12-23

## 2023-01-30 NOTE — ED ADULT TRIAGE NOTE - CHIEF COMPLAINT QUOTE
pt is unable to release the button on his prosthetic right leg.  No swelling to leg, button not working
Yes

## 2023-02-27 NOTE — PROGRESS NOTE ADULT - NEGATIVE GASTROINTESTINAL SYMPTOMS
no nausea/no vomiting/no diarrhea/no constipation
Home
no nausea/no vomiting/no diarrhea/no constipation

## 2023-07-13 NOTE — ED ADULT TRIAGE NOTE - HEIGHT IN INCHES
9 Bed/Stretcher in lowest position, wheels locked, appropriate side rails in place/Call bell, personal items and telephone in reach/Instruct patient to call for assistance before getting out of bed/chair/stretcher/Non-slip footwear applied when patient is off stretcher/[place] to call system/Physically safe environment - no spills, clutter or unnecessary equipment/Purposeful proactive rounding/Room/bathroom lighting operational, light cord in reach

## 2023-07-20 NOTE — ED ADULT NURSE NOTE - CAS DISCH CONDITION
Stable Tranexamic Acid Counseling:  Patient advised of the small risk of bleeding problems with tranexamic acid. They were also instructed to call if they developed any nausea, vomiting or diarrhea. All of the patient's questions and concerns were addressed.

## 2023-08-24 NOTE — ED ADULT NURSE NOTE - NURSING MUSC ROM
Detail Level: Detailed
full range of motion in all extremities
Patient Specific Counseling (Will Not Stick From Patient To Patient): Patient reports to injuring right elbow 1-2 years ago. Has noticed intermittent swelling and pain since the trauma. Has had IL steroid injections with ortho. Recommend ice and Aleve 500mg QD as needed F/u with ortho if pain and swelling persist.

## 2024-01-01 NOTE — ED ADULT TRIAGE NOTE - NS ED TRIAGE AVPU SCALE
Alert-The patient is alert, awake and responds to voice. The patient is oriented to time, place, and person. The triage nurse is able to obtain subjective information.
Aortic stenosis

## 2024-01-09 NOTE — ED ADULT NURSE NOTE - NS PRO AD NO ADVANCE DIRECTIVE
Spoke with Elina ,  at  [facility]  and received the fax number  from them  to  fax the orders to them  for this pt's  physical thearpy .   No

## 2024-02-05 NOTE — PHYSICAL THERAPY INITIAL EVALUATION ADULT - CAPILLARY REFILL, RUE
Continue NaCl 2 mEq/kg/day - weight adj  - Nephrology recs: for persistence of hyponatremia, send plasma osmolality, urine osmolality, serum cortisol level, thyroid function test, urine Na and urine urea.    less than/equal to 3 secs

## 2024-03-09 NOTE — DIETITIAN INITIAL EVALUATION ADULT. - REASON FOR ADMISSION
CBC was monitored.  No acute changes.  Will continue to monitor.  Pancytopenia is from cirrhosis per Hematology.   NSTEMI

## 2024-03-13 ENCOUNTER — TRANSCRIPTION ENCOUNTER (OUTPATIENT)
Age: 74
End: 2024-03-13

## 2024-03-13 ENCOUNTER — APPOINTMENT (OUTPATIENT)
Dept: ORTHOPEDIC SURGERY | Facility: CLINIC | Age: 74
End: 2024-03-13
Payer: MEDICARE

## 2024-03-13 VITALS
DIASTOLIC BLOOD PRESSURE: 70 MMHG | SYSTOLIC BLOOD PRESSURE: 120 MMHG | WEIGHT: 155 LBS | BODY MASS INDEX: 22.96 KG/M2 | HEART RATE: 74 BPM | HEIGHT: 69 IN

## 2024-03-13 DIAGNOSIS — S88.111A COMPLETE TRAUMATIC AMPUTATION AT LVL BETWEEN KNEE AND ANKLE, RIGHT LOWER LEG, INITIAL ENCOUNTER: ICD-10-CM

## 2024-03-13 PROCEDURE — 99203 OFFICE O/P NEW LOW 30 MIN: CPT

## 2024-03-13 NOTE — REASON FOR VISIT
The patient was seen today for an ultrasound and NST  Please see ultrasound report (located under OB Procedures tab) for additional details  [Initial Visit] : an initial visit for [FreeTextEntry2] : right leg pain

## 2024-03-13 NOTE — PHYSICAL EXAM
[de-identified] : Physical Exam: General: Well appearing, no acute distress, A&O Neurologic: A&Ox3, No focal deficits Head: NCAT without abrasions, lacerations, or ecchymosis to head, face, or scalp Respiratory: Equal chest wall expansion bilaterally, no accessory muscle use Lymphatic: No lymphadenopathy palpated Skin: Warm and dry Psychiatric: Normal mood and affect  RLE: Below knee prosthetic in place Typical wear seen on prosthetic parts

## 2024-03-13 NOTE — DISCUSSION/SUMMARY
[de-identified] : 73-year-old male with mature right below-knee amputation in need of a new below-knee prosthetic.  He was given the contact information for our local orthotist in case his previous orthotist is no longer in practice.  If he has any difficulty getting in touch with the orthotist, he should give us a call and we will expedite him obtaining a new prosthetic as he needs it for ambulation and activities of daily living.  The patient was given the opportunity to ask questions and all questions were answered to their satisfaction.    Saúl Jacinto MD Orthopaedic Trauma Surgeon [facility]  Orthopaedic Trauma, [facility]

## 2024-03-13 NOTE — HISTORY OF PRESENT ILLNESS
[de-identified] : Mr. Smith is a pleasant 73-year-old gentleman who presents today for evaluation of a right below-knee amputation.  He had this done many years ago and has been doing well with his prosthesis but the prosthetic itself is wearing out.  He has not seen an orthotist in several years.  His previous orthotist may have retired in the interim.  [3] : a current pain level of 3/10 [Bending] : worsened by bending [Lifting] : worsened by lifting [Recumbency] : relieved by recumbency [Rest] : relieved by rest

## 2024-03-19 NOTE — ED ADULT NURSE NOTE - ISOLATION TYPE:
If you are a smoker, it is important for your health to stop smoking. Please be aware that second hand smoke is also harmful.
None

## 2024-07-21 NOTE — DIETITIAN INITIAL EVALUATION ADULT. - 25 CAL
Problem: SAFETY ADULT  Goal: Patient will remain free of falls  Description: INTERVENTIONS:  - Educate patient/family on patient safety including physical limitations  - Instruct patient to call for assistance with activity   - Consult OT/PT to assist with strengthening/mobility   - Keep Call bell within reach  - Keep bed low and locked with side rails adjusted as appropriate  - Keep care items and personal belongings within reach  - Initiate and maintain comfort rounds  - Make Fall Risk Sign visible to staff  - Offer Toileting every 2 Hours, in advance of need  - Initiate/Maintain bed/chair alarm  - Obtain necessary fall risk management equipment: non skid footwear  - Apply yellow socks and bracelet for high fall risk patients  - Consider moving patient to room near nurses station  Outcome: Progressing     Problem: Prexisting or High Potential for Compromised Skin Integrity  Goal: Skin integrity is maintained or improved  Description: INTERVENTIONS:  - Identify patients at risk for skin breakdown  - Assess and monitor skin integrity  - Assess and monitor nutrition and hydration status  - Monitor labs   - Assess for incontinence   - Turn and reposition patient  - Assist with mobility/ambulation  - Relieve pressure over bony prominences  - Avoid friction and shearing  - Provide appropriate hygiene as needed including keeping skin clean and dry  - Evaluate need for skin moisturizer/barrier cream  - Collaborate with interdisciplinary team   - Patient/family teaching  - Consider wound care consult   Outcome: Progressing      2097

## 2024-11-22 NOTE — PHYSICAL THERAPY INITIAL EVALUATION ADULT - NEUROVASCULAR ASSESSMENT RUE
Detail Level: Zone
Patient Specific Otc Recommendations (Will Not Stick From Patient To Patient): Panoxyl
no discoloration/no tingling/no numbness/warm

## 2025-03-18 NOTE — ED ADULT TRIAGE NOTE - ACCOMPANIED BY
"Chief Complaint  Congestive Heart Failure, ICD, and Shortness of Breath    Subjective            Rubin Ratliff presents to [facility]      Mr. Ratliff is here for follow-up evaluation management of dilated cardiomyopathy with chronic systolic heart failure, ventricular tachycardia, and dual-chamber ICD.  He was recently hospitalized with atrial dysrhythmias, symptomatic acute on chronic systolic heart failure.  The patient cannot tolerate guideline directed medical therapy due to chronic hypotension.  His ejection fraction is some 20%.  Since hospital discharge he reports marked generalized weakness with shortness of breath with minimal exertion and at rest.  He is not gaining fluid weight.  He is compliant with his medical therapy.  No bleeding problems on anticoagulation.    PMH  Past Medical History:   Diagnosis Date    Asthma     Cardiomyopathy     CHF (congestive heart failure)     COPD (chronic obstructive pulmonary disease)     ESRD (end stage renal disease)     STAGE 3 B    GERD (gastroesophageal reflux disease)     Hypertension     ICD (implantable cardioverter-defibrillator) in place  \" NOT MRI COMPATIBLE\"  E142          SURGICALHX  Past Surgical History:   Procedure Laterality Date    CARDIAC CATHETERIZATION      HERNIA REPAIR      IHR    ICD GENERATOR REPLACEMENT N/A 8/1/2024    Procedure: Defibrillator Generator Change - DC -[employer];  Surgeon: JAMES Greer MD;  Location: [facility] INVASIVE LOCATION;  Service: Cardiovascular;  Laterality: N/A;    INSERT / REPLACE / REMOVE PACEMAKER      PLACED 2012    LUMBAR DISCECTOMY      W/ FUSION    SINUS SURGERY      TOTAL HIP ARTHROPLASTY REVISION          SOC  Social History     Socioeconomic History    Marital status:    Tobacco Use    Smoking status: Never    Smokeless tobacco: Never   Vaping Use    Vaping status: Never Used   Substance and Sexual Activity    Alcohol use: Never    Drug use: Never    Sexual " activity: Defer         FAMHX  Family History   Problem Relation Age of Onset    Heart disease Father     Heart failure Father     Malig Hyperthermia Neg Hx           ALLERGY  Allergies   Allergen Reactions    Penicillins Hives        MEDSCURRENT    Current Outpatient Medications:     albuterol sulfate  (90 Base) MCG/ACT inhaler, Inhale 2 puffs Every 4 (Four) Hours As Needed for Wheezing or Shortness of Air., Disp: , Rfl:     allopurinol (ZYLOPRIM) 100 MG tablet, Take 1 tablet by mouth 2 (Two) Times a Day., Disp: , Rfl:     amiodarone (PACERONE) 200 MG tablet, Take 1 tablet by mouth Every 12 (Twelve) Hours. Take 1 pill twice a day for 14 days then decrease to 1 pill daily (Patient taking differently: Take 1 tablet by mouth Daily. Take 1 pill twice a day for 14 days then decrease to 1 pill daily), Disp: 90 tablet, Rfl: 3    apixaban (ELIQUIS) 2.5 MG tablet tablet, Take 1 tablet by mouth Every 12 (Twelve) Hours. Indications: Atrial Fibrillation, Disp: 180 tablet, Rfl: 3    bumetanide (BUMEX) 2 MG tablet, Take 1 tablet by mouth Daily. PRN   WHEN -144, Disp: , Rfl:     denosumab (PROLIA) 60 MG/ML solution prefilled syringe syringe, Inject 1 mL under the skin into the appropriate area as directed. Every 6 months, Disp: , Rfl:     fluticasone (FLONASE) 50 MCG/ACT nasal spray, Administer 2 sprays into the nostril(s) as directed by provider Daily As Needed for Rhinitis or Allergies., Disp: , Rfl:     Fluticasone-Umeclidin-Vilant (Trelegy Ellipta) 200-62.5-25 MCG/ACT inhaler, Inhale 1 puff Daily., Disp: , Rfl:     metoprolol succinate XL (TOPROL-XL) 25 MG 24 hr tablet, Take 1 tablet by mouth Daily., Disp: , Rfl:     potassium chloride 10 MEQ CR tablet, , Disp: , Rfl:     tamsulosin (FLOMAX) 0.4 MG capsule 24 hr capsule, Take 1 capsule by mouth Every Evening., Disp: , Rfl:       Review of Systems   Constitutional: Positive for decreased appetite and malaise/fatigue.   Cardiovascular:  Positive for dyspnea on  "exertion. Negative for chest pain and palpitations.   Respiratory:  Positive for shortness of breath.    Neurological:  Positive for weakness.        Objective     BP 95/65   Pulse 98   Ht 177.8 cm (70\")   Wt 63.5 kg (140 lb)   SpO2 94%   BMI 20.09 kg/m²       General Appearance:   well developed  Somewhat cachectic appearing  HENT:   oropharynx moist  lips not cyanotic  Neck:  thyroid not enlarged  supple  Respiratory:  no respiratory distress  normal breath sounds  no rales  Cardiovascular:  no jugular venous distention  regular rhythm  apical impulse normal  S1 normal, S2 normal  no S3, no S4   no murmur  no rub, no thrill  carotid pulses normal; no bruit  pedal pulses normal  lower extremity edema: Mild  Musculoskeletal:  no clubbing of fingers.   normocephalic, head atraumatic  Skin:   Warm, dry  Psychiatric:  judgement and insight appropriate  normal mood and affect      Result Review :             Data reviewed : ICD remote monitoring reviewed, normal device function.   ICD interrogation at recent hospitalization shows only 2% atrial pacing, 1% ventricular pacing.  He had some atrial tachycardia episodes noted which appear to be 1-1 conduction of atrial tachycardia or slow cycle length atrial flutter.    Procedures               Assessment and Plan        ASSESSMENT:  Encounter Diagnoses   Name Primary?    Dilated cardiomyopathy Yes    VT (ventricular tachycardia)     ICD (implantable cardioverter-defibrillator), dual, in situ     Chronic systolic congestive heart failure          PLAN:    1.  Dilated cardiomyopathy with chronic systolic congestive heart failure, class IV symptomatology.  The patient unfortunately cannot tolerate guideline directed medical therapy due to chronic hypotension.  He is symptomatic even at rest.  He does not appear volume overloaded today.  He has very poor cardiac and pulmonary reserve.  His rhythm is stable.  I had a lengthy discussion with him and his daughter today " regarding his overall course and condition.  A consideration would be to upgrade his dual-chamber ICD to a biventricular device.  We discussed the general likelihood of symptom improvement with this approach.  His QRS duration on most recent EKG was 181 ms with left bundle branch block pattern so I do think he is likely to feel somewhat better.  However, he is very frail and very weak.  The procedure would require sedation and in unlikely cases can be quite prolonged.  The patient and his daughter both have concerns how he would do going through that invasive procedure.  I did explain that while I cannot predict with absolute certainty I do think that he would likely do fine with the procedure and there is probably a 70% likelihood that he would symptomatically feel better with biventricular pacing.  The risk benefits and alternatives were discussed with the patient.  He understands that in unlikely cases approximately 1 and 20 the coronary sinus is unable to be cannulated for the coronary venous anatomy is not suitable for transvenous lead placement.  He is going to give this some thought and discussed with his primary care doctor and let me know if he would like to proceed.  He definitely understands the details of the procedure as well as the risks and benefits.  2.  Ventricular tachycardia, stable, no significant recurrence on remote ICD monitoring.  3.  Dual-chamber ICD.  The device is functioning normally.    Routine follow-up will be arranged, the patient will call the office if he wants to consider proceeding with CRT upgrade.  I spoke to the patient's primary care physician as well after the visit today to inform him of the discussion          Patient was given instructions and counseling regarding his condition or for health maintenance advice. Please see specific information pulled into the AVS if appropriate.             JAMES Greer MD  3/18/2025    10:07 EDT  Congestive Heart Failure  Associated  symptoms include shortness of breath. Pertinent negatives include no chest pain or palpitations.   Shortness of Breath  Pertinent negatives include no chest pain.      Immediate family member

## 2025-05-05 NOTE — H&P ADULT - GASTROINTESTINAL
Interval Events: Patient was seen and examined at bedside. No overnight events. Reports feeling well currently without chest pain, wheezing, coughing, or dyspnea.    REVIEW OF SYSTEMS:  Constitutional: no fever, chills, fatigue  Neuro: no headache, numbness, weakness  Resp: no cough, wheezing, shortness of breath  CVS: no chest pain, palpitations, leg swelling  GI: no abdominal pain, nausea, vomiting, diarrhea   : no dysuria, frequency, incontinence  Skin: no itching, burning, rashes, or lesions   Msk: no joint pain or swelling  Psych: no depression, anxiety  [x] All other systems negative    OBJECTIVE:  ICU Vital Signs Last 24 Hrs  T(C): 36.7 (05 May 2025 10:32), Max: 37.2 (04 May 2025 21:55)  T(F): 98 (05 May 2025 10:32), Max: 98.9 (04 May 2025 21:55)  HR: 80 (05 May 2025 10:32) (70 - 104)  BP: 146/75 (05 May 2025 10:32) (121/58 - 174/79)  RR: 18 (05 May 2025 10:32) (18 - 20)  SpO2: 95% (05 May 2025 10:32) (91% - 99%)    O2 Parameters below as of 05 May 2025 09:00  Patient On (Oxygen Delivery Method): room air      CAPILLARY BLOOD GLUCOSE      POCT Blood Glucose.: 116 mg/dL (05 May 2025 07:50)    PHYSICAL EXAM:    Gen: NAD; AAOx3  Neuro: CN II-XII grossly intact; moving all extremities   HEENT: NC/AT; EOMI; MMM  CV: normal S1 & S2; regular rate and rhythm   Pulm: expiratory wheezing bilaterally that improved with coughing and deep breathing; no rales or rhonchi  GI: soft; NT/ND  Ext: no edema; pulses intact  Skin: warm and well perfused       HOSPITAL MEDICATIONS:  MEDICATIONS  (STANDING):  artificial tears (preservative free) Ophthalmic Solution 1 Drop(s) Both EYES every 8 hours  bisacodyl 10 milliGRAM(s) Oral once  chlorhexidine 2% Cloths 1 Application(s) Topical <User Schedule>  enoxaparin Injectable 40 milliGRAM(s) SubCutaneous every 24 hours  fluticasone propionate/ salmeterol 250-50 MICROgram(s) Diskus 1 Dose(s) Inhalation two times a day  glucagon  Injectable 1 milliGRAM(s) IntraMuscular once  guaiFENesin  milliGRAM(s) Oral every 12 hours  hydrochlorothiazide 25 milliGRAM(s) Oral daily  insulin glargine Injectable (LANTUS) 3 Unit(s) SubCutaneous at bedtime  insulin lispro (ADMELOG) corrective regimen sliding scale   SubCutaneous three times a day before meals  insulin lispro (ADMELOG) corrective regimen sliding scale   SubCutaneous at bedtime  insulin lispro Injectable (ADMELOG) 2 Unit(s) SubCutaneous three times a day before meals  ipratropium    for Nebulization 500 MICROGram(s) Nebulizer every 8 hours  levalbuterol Inhalation 0.63 milliGRAM(s) Inhalation every 8 hours  lisinopril 40 milliGRAM(s) Oral daily  melatonin 9 milliGRAM(s) Oral at bedtime  memantine 10 milliGRAM(s) Oral at bedtime  memantine 5 milliGRAM(s) Oral <User Schedule>  OLANZapine 2.5 milliGRAM(s) Oral at bedtime  pantoprazole    Tablet 40 milliGRAM(s) Oral before breakfast  petrolatum Ophthalmic Ointment 1 Application(s) Both EYES two times a day  polyethylene glycol 3350 17 Gram(s) Oral two times a day  senna 2 Tablet(s) Oral at bedtime  sertraline 125 milliGRAM(s) Oral daily    MEDICATIONS  (PRN):  albuterol/ipratropium for Nebulization 3 milliLiter(s) Nebulizer every 6 hours PRN Shortness of Breath and/or Wheezing      LABS:                        11.5   8.41  )-----------( 194      ( 05 May 2025 05:27 )             34.4     Hgb Trend: 11.5<--, 11.4<--, 10.7<--, 10.8<--, 11.2<--  05-05    139  |  100  |  13  ----------------------------<  116[H]  3.3[L]   |  28  |  0.63    Ca    9.1      05 May 2025 05:27  Phos  3.2     05-05  Mg     1.70     05-05      Creatinine Trend: 0.63<--, 0.61<--, 0.68<--, 0.60<--, 0.68<--, 0.65<--    Urinalysis Basic - ( 05 May 2025 05:27 )    Color: x / Appearance: x / SG: x / pH: x  Gluc: 116 mg/dL / Ketone: x  / Bili: x / Urobili: x   Blood: x / Protein: x / Nitrite: x   Leuk Esterase: x / RBC: x / WBC x   Sq Epi: x / Non Sq Epi: x / Bacteria: x   details… detailed exam